# Patient Record
Sex: MALE | Race: WHITE | NOT HISPANIC OR LATINO | Employment: OTHER | URBAN - METROPOLITAN AREA
[De-identification: names, ages, dates, MRNs, and addresses within clinical notes are randomized per-mention and may not be internally consistent; named-entity substitution may affect disease eponyms.]

---

## 2017-01-10 RX ORDER — GLIPIZIDE 5 MG/1
TABLET ORAL
Qty: 180 TAB | Refills: 3 | Status: SHIPPED | OUTPATIENT
Start: 2017-01-10 | End: 2018-01-08 | Stop reason: SDUPTHER

## 2017-02-23 ENCOUNTER — HOSPITAL ENCOUNTER (OUTPATIENT)
Dept: LAB | Facility: MEDICAL CENTER | Age: 72
End: 2017-02-23
Attending: UROLOGY
Payer: MEDICARE

## 2017-02-23 LAB — PSA SERPL DL<=0.01 NG/ML-MCNC: <0.01 NG/ML (ref 0–4)

## 2017-02-23 PROCEDURE — 84270 ASSAY OF SEX HORMONE GLOBUL: CPT

## 2017-02-23 PROCEDURE — 36415 COLL VENOUS BLD VENIPUNCTURE: CPT

## 2017-02-23 PROCEDURE — 84402 ASSAY OF FREE TESTOSTERONE: CPT

## 2017-02-23 PROCEDURE — 84403 ASSAY OF TOTAL TESTOSTERONE: CPT

## 2017-02-23 PROCEDURE — 84153 ASSAY OF PSA TOTAL: CPT

## 2017-02-25 LAB
SHBG SERPL-SCNC: 23 NMOL/L (ref 11–80)
TESTOST FREE MFR SERPL: 2 % (ref 1.6–2.9)
TESTOST FREE SERPL-MCNC: 1 PG/ML (ref 47–244)
TESTOST SERPL-MCNC: 7 NG/DL (ref 300–720)

## 2017-05-15 ENCOUNTER — HOSPITAL ENCOUNTER (OUTPATIENT)
Dept: LAB | Facility: MEDICAL CENTER | Age: 72
End: 2017-05-15
Attending: FAMILY MEDICINE
Payer: MEDICARE

## 2017-05-15 DIAGNOSIS — R80.9 PROTEINURIA: ICD-10-CM

## 2017-05-15 DIAGNOSIS — I10 ESSENTIAL HYPERTENSION, BENIGN: ICD-10-CM

## 2017-05-15 DIAGNOSIS — E78.2 MIXED HYPERLIPIDEMIA: ICD-10-CM

## 2017-05-15 LAB
ALBUMIN SERPL BCP-MCNC: 4.5 G/DL (ref 3.2–4.9)
ALBUMIN/GLOB SERPL: 1.3 G/DL
ALP SERPL-CCNC: 66 U/L (ref 30–99)
ALT SERPL-CCNC: 40 U/L (ref 2–50)
ANION GAP SERPL CALC-SCNC: 7 MMOL/L (ref 0–11.9)
APPEARANCE UR: CLEAR
AST SERPL-CCNC: 23 U/L (ref 12–45)
BASOPHILS # BLD AUTO: 0.2 % (ref 0–1.8)
BASOPHILS # BLD: 0.01 K/UL (ref 0–0.12)
BILIRUB SERPL-MCNC: 0.3 MG/DL (ref 0.1–1.5)
BILIRUB UR QL STRIP.AUTO: NEGATIVE
BUN SERPL-MCNC: 46 MG/DL (ref 8–22)
CALCIUM SERPL-MCNC: 10.1 MG/DL (ref 8.5–10.5)
CHLORIDE SERPL-SCNC: 108 MMOL/L (ref 96–112)
CHOLEST SERPL-MCNC: 203 MG/DL (ref 100–199)
CO2 SERPL-SCNC: 23 MMOL/L (ref 20–33)
COLOR UR: YELLOW
CREAT SERPL-MCNC: 1.6 MG/DL (ref 0.5–1.4)
CULTURE IF INDICATED INDCX: NO UA CULTURE
EOSINOPHIL # BLD AUTO: 0.03 K/UL (ref 0–0.51)
EOSINOPHIL NFR BLD: 0.6 % (ref 0–6.9)
ERYTHROCYTE [DISTWIDTH] IN BLOOD BY AUTOMATED COUNT: 40.9 FL (ref 35.9–50)
GFR SERPL CREATININE-BSD FRML MDRD: 42 ML/MIN/1.73 M 2
GLOBULIN SER CALC-MCNC: 3.4 G/DL (ref 1.9–3.5)
GLUCOSE SERPL-MCNC: 112 MG/DL (ref 65–99)
GLUCOSE UR STRIP.AUTO-MCNC: NEGATIVE MG/DL
HCT VFR BLD AUTO: 36.9 % (ref 42–52)
HDLC SERPL-MCNC: 36 MG/DL
HGB BLD-MCNC: 11.9 G/DL (ref 14–18)
IMM GRANULOCYTES # BLD AUTO: 0.04 K/UL (ref 0–0.11)
IMM GRANULOCYTES NFR BLD AUTO: 0.8 % (ref 0–0.9)
KETONES UR STRIP.AUTO-MCNC: NEGATIVE MG/DL
LDLC SERPL CALC-MCNC: 111 MG/DL
LEUKOCYTE ESTERASE UR QL STRIP.AUTO: NEGATIVE
LYMPHOCYTES # BLD AUTO: 2.21 K/UL (ref 1–4.8)
LYMPHOCYTES NFR BLD: 41.7 % (ref 22–41)
MCH RBC QN AUTO: 28.7 PG (ref 27–33)
MCHC RBC AUTO-ENTMCNC: 32.2 G/DL (ref 33.7–35.3)
MCV RBC AUTO: 88.9 FL (ref 81.4–97.8)
MICRO URNS: NORMAL
MONOCYTES # BLD AUTO: 1.02 K/UL (ref 0–0.85)
MONOCYTES NFR BLD AUTO: 19.2 % (ref 0–13.4)
NEUTROPHILS # BLD AUTO: 1.99 K/UL (ref 1.82–7.42)
NEUTROPHILS NFR BLD: 37.5 % (ref 44–72)
NITRITE UR QL STRIP.AUTO: NEGATIVE
NRBC # BLD AUTO: 0 K/UL
NRBC BLD AUTO-RTO: 0 /100 WBC
PH UR STRIP.AUTO: 6 [PH]
PLATELET # BLD AUTO: 220 K/UL (ref 164–446)
PMV BLD AUTO: 11 FL (ref 9–12.9)
POTASSIUM SERPL-SCNC: 5 MMOL/L (ref 3.6–5.5)
PROT SERPL-MCNC: 7.9 G/DL (ref 6–8.2)
PROT UR QL STRIP: NEGATIVE MG/DL
RBC # BLD AUTO: 4.15 M/UL (ref 4.7–6.1)
RBC UR QL AUTO: NEGATIVE
SODIUM SERPL-SCNC: 138 MMOL/L (ref 135–145)
SP GR UR STRIP.AUTO: 1.02
TRIGL SERPL-MCNC: 281 MG/DL (ref 0–149)
WBC # BLD AUTO: 5.3 K/UL (ref 4.8–10.8)

## 2017-05-15 PROCEDURE — 85025 COMPLETE CBC W/AUTO DIFF WBC: CPT

## 2017-05-15 PROCEDURE — 36415 COLL VENOUS BLD VENIPUNCTURE: CPT

## 2017-05-15 PROCEDURE — 80053 COMPREHEN METABOLIC PANEL: CPT

## 2017-05-15 PROCEDURE — 81003 URINALYSIS AUTO W/O SCOPE: CPT

## 2017-05-15 PROCEDURE — 80061 LIPID PANEL: CPT

## 2017-05-16 ENCOUNTER — OFFICE VISIT (OUTPATIENT)
Dept: MEDICAL GROUP | Facility: PHYSICIAN GROUP | Age: 72
End: 2017-05-16
Payer: MEDICARE

## 2017-05-16 VITALS
HEIGHT: 68 IN | DIASTOLIC BLOOD PRESSURE: 88 MMHG | OXYGEN SATURATION: 97 % | WEIGHT: 180 LBS | BODY MASS INDEX: 27.28 KG/M2 | TEMPERATURE: 96.8 F | HEART RATE: 86 BPM | SYSTOLIC BLOOD PRESSURE: 138 MMHG

## 2017-05-16 DIAGNOSIS — N20.0 NEPHROLITHIASIS: ICD-10-CM

## 2017-05-16 DIAGNOSIS — C61 PROSTATE CANCER (HCC): ICD-10-CM

## 2017-05-16 DIAGNOSIS — E78.2 MIXED HYPERLIPIDEMIA: ICD-10-CM

## 2017-05-16 DIAGNOSIS — I10 ESSENTIAL HYPERTENSION, BENIGN: ICD-10-CM

## 2017-05-16 DIAGNOSIS — R73.9 ELEVATED BLOOD SUGAR: ICD-10-CM

## 2017-05-16 DIAGNOSIS — N28.9 FUNCTION KIDNEY DECREASED: ICD-10-CM

## 2017-05-16 PROCEDURE — 4040F PNEUMOC VAC/ADMIN/RCVD: CPT | Performed by: FAMILY MEDICINE

## 2017-05-16 PROCEDURE — G8419 CALC BMI OUT NRM PARAM NOF/U: HCPCS | Performed by: FAMILY MEDICINE

## 2017-05-16 PROCEDURE — G8432 DEP SCR NOT DOC, RNG: HCPCS | Performed by: FAMILY MEDICINE

## 2017-05-16 PROCEDURE — 1036F TOBACCO NON-USER: CPT | Performed by: FAMILY MEDICINE

## 2017-05-16 PROCEDURE — 99214 OFFICE O/P EST MOD 30 MIN: CPT | Performed by: FAMILY MEDICINE

## 2017-05-16 PROCEDURE — 1101F PT FALLS ASSESS-DOCD LE1/YR: CPT | Performed by: FAMILY MEDICINE

## 2017-05-16 PROCEDURE — 3017F COLORECTAL CA SCREEN DOC REV: CPT | Performed by: FAMILY MEDICINE

## 2017-05-16 RX ORDER — ATORVASTATIN CALCIUM 80 MG/1
80 TABLET, FILM COATED ORAL EVERY EVENING
Qty: 90 TAB | Refills: 3 | Status: SHIPPED | OUTPATIENT
Start: 2017-05-16 | End: 2018-06-18 | Stop reason: SDUPTHER

## 2017-05-16 NOTE — PATIENT INSTRUCTIONS
Patient given written instructions regarding labs, imaging, medications, referrals, dietary and lifestyle management, and return visit.    Carlos Peters MD

## 2017-05-16 NOTE — MR AVS SNAPSHOT
"        Kemal Mueller   2017 9:00 AM   Office Visit   MRN: [id number]    Department:  [facility]   Dept Phone:  [phone]    Description:  Male : 1945   Provider:  Carlos Peters M.D.           Reason for Visit     Follow-Up 6 month FV/no concerns       Allergies as of 2017     Allergen Noted Reactions    Nkda [No Known Drug Allergy] 2010         You were diagnosed with     Function kidney decreased   [468594]       Mixed hyperlipidemia   [272.2.ICD-9-CM]       Essential hypertension, benign   [401.1.ICD-9-CM]       Elevated blood sugar   [738718]       Nephrolithiasis   [659055]         Vital Signs     Blood Pressure Pulse Temperature Height Weight Body Mass Index    138/88 mmHg 86 36 °C (96.8 °F) 1.727 m (5' 8\") 81.647 kg (180 lb) 27.38 kg/m2    Oxygen Saturation Smoking Status                97% Former Smoker          Basic Information     Date Of Birth Sex Race Ethnicity Preferred Language    1945 Male White Non- English      Your appointments     Aug 16, 2017  9:20 AM   Established Patient with Carlos Peters M.D.   [facility] ([address] [address] [phone]           You will be receiving a confirmation call a few days before your appointment from our automated call confirmation system.              Problem List              ICD-10-CM Priority Class Noted - Resolved    Mixed hyperlipidemia E78.2   2009 - Present    Essential hypertension, benign I10   2009 - Present    Calculus of kidney N20.0   2009 - Present    Reflux esophagitis K21.0   2009 - Present    Other abnormal blood chemistry R79.89   2009 - Present    Proteinuria R80.9   2009 - Present    Elevated blood sugar R73.9   Unknown - Present    Nephrolithiasis N20.0   2010 - Present    Glaucoma H40.9   2010 - Present    GERD (gastroesophageal reflux disease) K21.9   2010 - Present    Prostate cancer " (CMS-[facility]) C61   9/21/2010 - Present    ED (erectile dysfunction) N52.9   1/11/2011 - Present    Cataracts, bilateral H26.9   1/13/2012 - Present    Degeneration of cervical intervertebral disc M50.30   10/30/2012 - Present    Cervical stenosis of spine M48.02 Medium  10/30/2012 - Present    Left wrist pain M25.532   5/20/2015 - Present    Right renal mass N28.89   1/11/2016 - Present    Age-related nuclear cataract of eye H25.10   1/14/2016 - Present    Herpes zoster without complication B02.9   11/15/2016 - Present    Function kidney decreased N28.9   5/16/2017 - Present      Health Maintenance        Date Due Completion Dates    IMM ZOSTER VACCINE 1/3/2003 ---    IMM DTaP/Tdap/Td Vaccine (1 - Tdap) 9/2/2008 9/1/2008    IMM PNEUMOCOCCAL 65+ (ADULT) LOW/MEDIUM RISK SERIES (2 of 2 - PPSV23) 10/20/2016 10/20/2015    COLONOSCOPY 2/9/2020 2/9/2010            Current Immunizations     13-VALENT PCV PREVNAR 10/20/2015 11:56 AM    Influenza TIV (IM) 10/23/2012, 10/16/2011    Influenza Vaccine Adult HD 12/14/2016  4:53 PM, 10/20/2015 11:54 AM    Influenza Vaccine Pediatric 10/28/2010    Influenza Vaccine Quad Inj (Pf) 9/23/2014    Pneumococcal Vaccine (UF)Historical Data 9/1/2008    TD Vaccine 9/1/2008      Below and/or attached are the medications your provider expects you to take. Review all of your home medications and newly ordered medications with your provider and/or pharmacist. Follow medication instructions as directed by your provider and/or pharmacist. Please keep your medication list with you and share with your provider. Update the information when medications are discontinued, doses are changed, or new medications (including over-the-counter products) are added; and carry medication information at all times in the event of emergency situations     Allergies:  NKDA - (reactions not documented)               Medications  Valid as of: May 16, 2017 -  9:40 AM    Generic Name Brand Name Tablet Size Instructions for  use    AcetaZOLAMIDE (Tab) DIAMOX 125 MG Take 125 mg by mouth every day.        Atorvastatin Calcium (Tab) LIPITOR 80 MG Take 1 Tab by mouth every evening.        Brimonidine Tartrate-Timolol (Solution) Brimonidine Tartrate-Timolol 0.2-0.5 % 1 Drop by Ophthalmic route 2 times a day.        Ezetimibe (Tab) ZETIA 10 MG TAKE ONE TABLET BY MOUTH DAILY        GlipiZIDE (Tab) GLUCOTROL 5 MG TAKE ONE TABLET BY MOUTH TWICE A DAY        Latanoprost (Solution) XALATAN 0.005 % Place 1 Drop in both eyes every evening.        Lisinopril (Tab) PRINIVIL, ZESTRIL 40 MG TAKE ONE TABLET BY MOUTH DAILY        Multiple Vitamins-Minerals   Take  by mouth.        Nortriptyline HCl (Cap) PAMELOR 10 MG Take 1 Cap by mouth every bedtime.        Omeprazole (CAPSULE DELAYED RELEASE) PRILOSEC 20 MG TAKE ONE CAPSULE BY MOUTH DAILY        Potassium Citrate (Tab CR) UROCIT-K SR 10 MEQ (1080 MG) Take 10 mEq by mouth every day.        Trolamine Salicylate (Cream) ASPERCREME or MYOFLEX 10 % Apply 1 Squirt to affected area(s) 4 times a day.        ValACYclovir HCl (Tab) VALTREX 1 GM Take 1 Tab by mouth 3 times a day.        .                 Medicines prescribed today were sent to:     [facility] [place], [place] - [address]    [address] [place] [address]    Phone: [phone] Fax: [phone]    Open 24 Hours?: No    [id number] - [place] - [address] [place] [address]    Phone: [phone] Fax: [phone]    Open 24 Hours?: No      Medication refill instructions:       If your prescription bottle indicates you have medication refills left, it is not necessary to call your provider’s office. Please contact your pharmacy and they will refill your medication.    If your prescription bottle indicates you do not have any refills left, you may request refills at any time through one of the following ways: The online [employer] system (except Urgent Care), by calling your provider’s office, or by  asking your pharmacy to contact your provider’s office with a refill request. Medication refills are processed only during regular business hours and may not be available until the next business day. Your provider may request additional information or to have a follow-up visit with you prior to refilling your medication.   *Please Note: Medication refills are assigned a new Rx number when refilled electronically. Your pharmacy may indicate that no refills were authorized even though a new prescription for the same medication is available at the pharmacy. Please request the medicine by name with the pharmacy before contacting your provider for a refill.        Your To Do List     Future Labs/Procedures Complete By Expires    CBC WITH DIFFERENTIAL  As directed 5/17/2018    COMP METABOLIC PANEL  As directed 5/17/2018    LIPID PROFILE  As directed 5/17/2018    URINALYSIS,CULTURE IF INDICATED  As directed 5/17/2018      Other Notes About Your Plan               Please Assess and Status Chronic Disease from Current and Prior Visits.     Query: Please Assess the following Diagnosis    Please obtain Medical Records from Dr. James Choudhary - Urologist - Please status if the Prostate Cancer is still active and being treated C61or is it now a HX of Prostate Cancer Z85.46. Please provide the documents for  to review.                 [employer] Access Code: [email or web address]  Expires: 6/15/2017  8:55 AM    [employer]  A secure, online tool to manage your health information     [employer]® is a secure, online tool that connects you to your personalized health information from the privacy of your home -- day or night - making it very easy for you to manage your healthcare. Once the activation process is completed, you can even access your medical information using the [employer] mario, which is available for free in the Apple Mario store or Google Play store.     [employer] provides the following levels of access (as  shown below):   My Chart Features   [facility] Doctor [facility]  Urgent  Care Non-[facility]  Primary Care  Doctor   Email your healthcare team securely and privately 24/7 X X X    Manage appointments: schedule your next appointment; view details of past/upcoming appointments X      Request prescription refills. X      View recent personal medical records, including lab and immunizations X X X X   View health record, including health history, allergies, medications X X X X   Read reports about your outpatient visits, procedures, consult and ER notes X X X X   See your discharge summary, which is a recap of your hospital and/or ER visit that includes your diagnosis, lab results, and care plan. X X       How to register for [employer]:  1. Go to  [email or web address].  2. Click on the Sign Up Now box, which takes you to the New Member Sign Up page. You will need to provide the following information:  a. Enter your [employer] Access Code exactly as it appears at the top of this page. (You will not need to use this code after you’ve completed the sign-up process. If you do not sign up before the expiration date, you must request a new code.)   b. Enter your date of birth.   c. Enter your home email address.   d. Click Submit, and follow the next screen’s instructions.  3. Create a [employer] ID. This will be your [employer] login ID and cannot be changed, so think of one that is secure and easy to remember.  4. Create a [employer] password. You can change your password at any time.  5. Enter your Password Reset Question and Answer. This can be used at a later time if you forget your password.   6. Enter your e-mail address. This allows you to receive e-mail notifications when new information is available in [employer].  7. Click Sign Up. You can now view your health information.    For assistance activating your [employer] account, call [phone]

## 2017-05-16 NOTE — PROGRESS NOTES
Patient comes in to review his recent labs. He feels well. He doesn't really have any complaints today. He has an appointment to see his urologist within the next couple of weeks regarding his history of prostate cancer. He says that his urine flow is stable.  He has no chest pains or shortness of breath.    I reviewed the following    Past Medical History   Diagnosis Date   • Elevated cholesterol    • Elevated BP    • Elevated blood sugar    • GERD (gastroesophageal reflux disease)    • Hypertension    • Glaucoma    • Unspecified urinary incontinence    • Indigestion    • CATARACT    • Dental disorder      upper dentures   • Arthritis      spine and wrists   • High cholesterol    • Cancer (CMS-HCC)      prostate- removed '09; CA returned in pocket- radiation therapy done   • Renal disorder      stones   • Renal cancer (CMS-HCC)      Had treatment done- resolved per CAT scan   • Diabetes (CMS-HCC)    • Bronchitis 1/15   • Breath shortness      activity induced   • Heart burn    • wrist pain 5/20/2015     bilat wrist pain   • ED (erectile dysfunction)    • Degeneration of cervical intervertebral disc    • Cervical stenosis of spine    • Anesthesia      Low BP; nausea; hard time waking        Past Surgical History   Procedure Laterality Date   • Tonsillectomy and adenoidectomy  1953   • Pr removal of kidney stone  1986/1998     x2   • Prostatectomy, radical retro  4/21/2010     Performed by EDYTA KUMAR at [facility]   • Node dissection  4/21/2010     Performed by EDYTA KUMAR at [facility]   • Appendectomy     • Carpal tunnel release  1990     Bilateral   • Trigger finger release  1990     Right small finger   • Cervical disk and fusion anterior  10/30/2012     Performed by Selvin Ying M.D. at [facility]   • Cataract phaco with iol Left 1/14/2016     Procedure: CATARACT PHACO WITH IOL;  Surgeon: Alfonso Hernandez M.D.;  Location: [facility];  Service:    •  Cataract phaco with iol Right 1/28/2016     Procedure: CATARACT PHACO WITH IOL;  Surgeon: Alfonso Hernandez M.D.;  Location: [facility];  Service:        Allergies   Allergen Reactions   • Nkda [No Known Drug Allergy]        Current Outpatient Prescriptions   Medication Sig Dispense Refill   • atorvastatin (LIPITOR) 80 MG tablet Take 1 Tab by mouth every evening. 90 Tab 3   • lisinopril (PRINIVIL, ZESTRIL) 40 MG tablet TAKE ONE TABLET BY MOUTH DAILY 90 Tab 3   • glipiZIDE (GLUCOTROL) 5 MG Tab TAKE ONE TABLET BY MOUTH TWICE A  Tab 3   • omeprazole (PRILOSEC) 20 MG delayed-release capsule TAKE ONE CAPSULE BY MOUTH DAILY 90 Cap 3   • ZETIA 10 MG Tab TAKE ONE TABLET BY MOUTH DAILY 90 Tab 3   • potassium citrate SR (UROCIT-K SR) 10 MEQ (1080 MG) TBCR Take 10 mEq by mouth every day.     • Multiple Vitamins-Minerals (CENTRUM SILVER PO) Take  by mouth.     • Brimonidine Tartrate-Timolol (COMBIGAN) 0.2-0.5 % SOLN 1 Drop by Ophthalmic route 2 times a day.     • latanoprost (XALATAN) 0.005 % SOLN Place 1 Drop in both eyes every evening.     • ACETAZOLAMIDE 125 MG PO TABS Take 125 mg by mouth every day.     • valacyclovir (VALTREX) 1 GM Tab Take 1 Tab by mouth 3 times a day. 21 Tab 0   • nortriptyline (PAMELOR) 10 MG Cap Take 1 Cap by mouth every bedtime. 30 Cap 3   • trolamine salicylate (ASPERCREME/ALOE) 10 % cream Apply 1 Squirt to affected area(s) 4 times a day. 1 Tube 3     No current facility-administered medications for this visit.        Family History   Problem Relation Age of Onset   • Diabetes Mother        Social History     Social History   • Marital Status:      Spouse Name: N/A   • Number of Children: N/A   • Years of Education: N/A     Occupational History   • Not on file.     Social History Main Topics   • Smoking status: Former Smoker     Quit date: 01/01/1977   • Smokeless tobacco: Never Used      Comment: 1/3 ppd for 10 years, quit 1977   • Alcohol Use: 1.2 oz/week     2 Cans of  beer per week      Comment: 1-2 per day   • Drug Use: No   • Sexual Activity:     Partners: Male     Other Topics Concern   • Not on file     Social History Narrative        Hospital Outpatient Visit on 05/15/2017   Component Date Value   • WBC 05/15/2017 5.3    • RBC 05/15/2017 4.15*   • Hemoglobin 05/15/2017 11.9*   • Hematocrit 05/15/2017 36.9*   • MCV 05/15/2017 88.9    • MCH 05/15/2017 28.7    • MCHC 05/15/2017 32.2*   • RDW 05/15/2017 40.9    • Platelet Count 05/15/2017 220    • MPV 05/15/2017 11.0    • Neutrophils-Polys 05/15/2017 37.50*   • Lymphocytes 05/15/2017 41.70*   • Monocytes 05/15/2017 19.20*   • Eosinophils 05/15/2017 0.60    • Basophils 05/15/2017 0.20    • Immature Granulocytes 05/15/2017 0.80    • Nucleated RBC 05/15/2017 0.00    • Neutrophils (Absolute) 05/15/2017 1.99    • Lymphs (Absolute) 05/15/2017 2.21    • Monos (Absolute) 05/15/2017 1.02*   • Eos (Absolute) 05/15/2017 0.03    • Baso (Absolute) 05/15/2017 0.01    • Immature Granulocytes (a* 05/15/2017 0.04    • NRBC (Absolute) 05/15/2017 0.00    • Sodium 05/15/2017 138    • Potassium 05/15/2017 5.0    • Chloride 05/15/2017 108    • Co2 05/15/2017 23    • Anion Gap 05/15/2017 7.0    • Glucose 05/15/2017 112*   • Bun 05/15/2017 46*   • Creatinine 05/15/2017 1.60*   • Calcium 05/15/2017 10.1    • AST(SGOT) 05/15/2017 23    • ALT(SGPT) 05/15/2017 40    • Alkaline Phosphatase 05/15/2017 66    • Total Bilirubin 05/15/2017 0.3    • Albumin 05/15/2017 4.5    • Total Protein 05/15/2017 7.9    • Globulin 05/15/2017 3.4    • A-G Ratio 05/15/2017 1.3    • Cholesterol,Tot 05/15/2017 203*   • Triglycerides 05/15/2017 281*   • HDL 05/15/2017 36*   • LDL 05/15/2017 111*   • Color 05/15/2017 Yellow    • Character 05/15/2017 Clear    • Specific Gravity 05/15/2017 1.020    • Ph 05/15/2017 6.0    • Glucose 05/15/2017 Negative    • Ketones 05/15/2017 Negative    • Protein 05/15/2017 Negative    • Bilirubin 05/15/2017 Negative    • Nitrite 05/15/2017 Negative     • Leukocyte Esterase 05/15/2017 Negative    • Occult Blood 05/15/2017 Negative    • Micro Urine Req 05/15/2017 see below    • Culture Indicated 05/15/2017 No    • GFR If  05/15/2017 51*   • GFR If Non  Ameri* 05/15/2017 42*         Physical Exam   Constitutional: He is oriented. He appears well-developed and well-nourished. No distress.   HENT:   Head: Normocephalic and atraumatic.   Right Ear: External ear normal. Ear canal and TM normal   Left Ear: External ear normal. Ear canal and TM normal   Nose: Nose normal.   Mouth/Throat: Oropharynx is clear and moist.   Eyes: Conjunctivae and extraocular motions are normal. Pupils are equal, round, and reactive to light.        Fundi benign bilaterally   Neck: No thyromegaly present.   Cardiovascular: Normal rate, regular rhythm, normal heart sounds and intact distal pulses.  Exam reveals no gallop.    No murmur heard.  Pulmonary/Chest: Effort normal and breath sounds normal. No respiratory distress. He has no wheezes. He has no rales.   Abdominal: Soft. Bowel sounds are normal. No hepatosplenomegaly. He exhibits no distension. No tenderness. He has no rebound and no guarding.   Musculoskeletal: Normal range of motion. He exhibits no edema and no tenderness.   Lymphadenopathy:     He has no cervical adenopathy.  No supraclavicular adenopathy.   Neurological: He is alert and oriented. He has normal reflexes.        Babinskis downgoing bilaterally   Skin: Skin is warm and dry. No rash noted. No erythema.   Psychiatric: He has a normal mood and appropriate affect. His behavior is normal. Judgment and thought content normal.     1. Function kidney decreased --- I want the patient to drink more water  COMP METABOLIC PANEL    URINALYSIS,CULTURE IF INDICATED   2. Mixed hyperlipidemia --control could be better  atorvastatin (LIPITOR) 80 MG tablet--one by mouth daily at bedtime instead of pravastatin which is not working as well as I would like. We will  discontinue the pravastatin.     COMP METABOLIC PANEL    LIPID PROFILE   3. Essential hypertension, benign --controlled  CBC WITH DIFFERENTIAL    COMP METABOLIC PANEL    LIPID PROFILE   4. Elevated blood sugar --improved  COMP METABOLIC PANEL   5. Nephrolithiasis --quiescent  URINALYSIS,CULTURE IF INDICATED   6. Prostate cancer (CMS-HCC)   doing well. Continue to see urologist      Recheck with me 3 months or when necessary    Please note that this dictation was created using voice recognition software. I have worked with consultants from the vendor as well as technical experts from [employer] to optimize the interface. I have made every reasonable attempt to correct obvious errors, but I expect that there are errors of grammar and possibly content that I did not discover before finalizing the note.

## 2017-06-23 ENCOUNTER — HOSPITAL ENCOUNTER (OUTPATIENT)
Dept: LAB | Facility: MEDICAL CENTER | Age: 72
End: 2017-06-23
Attending: UROLOGY
Payer: MEDICARE

## 2017-06-23 ENCOUNTER — HOSPITAL ENCOUNTER (OUTPATIENT)
Dept: LAB | Facility: MEDICAL CENTER | Age: 72
End: 2017-06-23
Attending: PHYSICIAN ASSISTANT
Payer: MEDICARE

## 2017-06-23 LAB
PSA SERPL-MCNC: <0.01 NG/ML (ref 0–4)
TESTOST SERPL-MCNC: 44 NG/DL (ref 175–781)

## 2017-06-23 PROCEDURE — 36415 COLL VENOUS BLD VENIPUNCTURE: CPT

## 2017-06-23 PROCEDURE — 84153 ASSAY OF PSA TOTAL: CPT

## 2017-08-14 ENCOUNTER — HOSPITAL ENCOUNTER (OUTPATIENT)
Dept: LAB | Facility: MEDICAL CENTER | Age: 72
End: 2017-08-14
Attending: FAMILY MEDICINE
Payer: MEDICARE

## 2017-08-14 DIAGNOSIS — E78.2 MIXED HYPERLIPIDEMIA: ICD-10-CM

## 2017-08-14 DIAGNOSIS — R73.9 ELEVATED BLOOD SUGAR: ICD-10-CM

## 2017-08-14 DIAGNOSIS — I10 ESSENTIAL HYPERTENSION, BENIGN: ICD-10-CM

## 2017-08-14 DIAGNOSIS — N20.0 NEPHROLITHIASIS: ICD-10-CM

## 2017-08-14 DIAGNOSIS — N28.9 FUNCTION KIDNEY DECREASED: ICD-10-CM

## 2017-08-14 LAB
ALBUMIN SERPL BCP-MCNC: 4.2 G/DL (ref 3.2–4.9)
ALBUMIN/GLOB SERPL: 1.4 G/DL
ALP SERPL-CCNC: 71 U/L (ref 30–99)
ALT SERPL-CCNC: 26 U/L (ref 2–50)
ANION GAP SERPL CALC-SCNC: 5 MMOL/L (ref 0–11.9)
APPEARANCE UR: CLEAR
AST SERPL-CCNC: 21 U/L (ref 12–45)
BASOPHILS # BLD AUTO: 0.2 % (ref 0–1.8)
BASOPHILS # BLD: 0.01 K/UL (ref 0–0.12)
BILIRUB SERPL-MCNC: 0.3 MG/DL (ref 0.1–1.5)
BILIRUB UR QL STRIP.AUTO: NEGATIVE
BUN SERPL-MCNC: 32 MG/DL (ref 8–22)
CALCIUM SERPL-MCNC: 9.5 MG/DL (ref 8.5–10.5)
CHLORIDE SERPL-SCNC: 111 MMOL/L (ref 96–112)
CHOLEST SERPL-MCNC: 129 MG/DL (ref 100–199)
CO2 SERPL-SCNC: 24 MMOL/L (ref 20–33)
COLOR UR: YELLOW
CREAT SERPL-MCNC: 1.22 MG/DL (ref 0.5–1.4)
CULTURE IF INDICATED INDCX: NO UA CULTURE
EOSINOPHIL # BLD AUTO: 0.01 K/UL (ref 0–0.51)
EOSINOPHIL NFR BLD: 0.2 % (ref 0–6.9)
ERYTHROCYTE [DISTWIDTH] IN BLOOD BY AUTOMATED COUNT: 41.1 FL (ref 35.9–50)
GFR SERPL CREATININE-BSD FRML MDRD: 58 ML/MIN/1.73 M 2
GLOBULIN SER CALC-MCNC: 3 G/DL (ref 1.9–3.5)
GLUCOSE SERPL-MCNC: 74 MG/DL (ref 65–99)
GLUCOSE UR STRIP.AUTO-MCNC: NEGATIVE MG/DL
HCT VFR BLD AUTO: 36.4 % (ref 42–52)
HDLC SERPL-MCNC: 35 MG/DL
HGB BLD-MCNC: 11.4 G/DL (ref 14–18)
IMM GRANULOCYTES # BLD AUTO: 0.04 K/UL (ref 0–0.11)
IMM GRANULOCYTES NFR BLD AUTO: 0.8 % (ref 0–0.9)
KETONES UR STRIP.AUTO-MCNC: NEGATIVE MG/DL
LDLC SERPL CALC-MCNC: 66 MG/DL
LEUKOCYTE ESTERASE UR QL STRIP.AUTO: NEGATIVE
LYMPHOCYTES # BLD AUTO: 2.15 K/UL (ref 1–4.8)
LYMPHOCYTES NFR BLD: 43.4 % (ref 22–41)
MCH RBC QN AUTO: 28.2 PG (ref 27–33)
MCHC RBC AUTO-ENTMCNC: 31.3 G/DL (ref 33.7–35.3)
MCV RBC AUTO: 90.1 FL (ref 81.4–97.8)
MICRO URNS: NORMAL
MONOCYTES # BLD AUTO: 0.9 K/UL (ref 0–0.85)
MONOCYTES NFR BLD AUTO: 18.2 % (ref 0–13.4)
NEUTROPHILS # BLD AUTO: 1.84 K/UL (ref 1.82–7.42)
NEUTROPHILS NFR BLD: 37.2 % (ref 44–72)
NITRITE UR QL STRIP.AUTO: NEGATIVE
NRBC # BLD AUTO: 0 K/UL
NRBC BLD AUTO-RTO: 0 /100 WBC
PH UR STRIP.AUTO: 7 [PH]
PLATELET # BLD AUTO: 167 K/UL (ref 164–446)
PMV BLD AUTO: 11.3 FL (ref 9–12.9)
POTASSIUM SERPL-SCNC: 5.3 MMOL/L (ref 3.6–5.5)
PROT SERPL-MCNC: 7.2 G/DL (ref 6–8.2)
PROT UR QL STRIP: NEGATIVE MG/DL
RBC # BLD AUTO: 4.04 M/UL (ref 4.7–6.1)
RBC UR QL AUTO: NEGATIVE
SODIUM SERPL-SCNC: 140 MMOL/L (ref 135–145)
SP GR UR STRIP.AUTO: 1.02
TRIGL SERPL-MCNC: 140 MG/DL (ref 0–149)
UROBILINOGEN UR STRIP.AUTO-MCNC: 0.2 MG/DL
WBC # BLD AUTO: 5 K/UL (ref 4.8–10.8)

## 2017-08-14 PROCEDURE — 80061 LIPID PANEL: CPT

## 2017-08-14 PROCEDURE — 81003 URINALYSIS AUTO W/O SCOPE: CPT

## 2017-08-14 PROCEDURE — 85025 COMPLETE CBC W/AUTO DIFF WBC: CPT

## 2017-08-14 PROCEDURE — 80053 COMPREHEN METABOLIC PANEL: CPT

## 2017-08-14 PROCEDURE — 36415 COLL VENOUS BLD VENIPUNCTURE: CPT

## 2017-08-15 ENCOUNTER — TELEPHONE (OUTPATIENT)
Dept: MEDICAL GROUP | Facility: PHYSICIAN GROUP | Age: 72
End: 2017-08-15

## 2017-08-15 NOTE — TELEPHONE ENCOUNTER
ESTABLISHED PATIENT PRE-VISIT PLANNING     Note: Patient will not be contacted if there is no indication to call.     1.  Reviewed notes from the last few office visits within the medical group: Yes    2.  If any orders were placed at last visit or intended to be done for this visit (i.e. 6 mos follow-up), do we have Results/Consult Notes?        •  Labs - Labs ordered, completed and results are in chart.       •  Imaging - Imaging was not ordered at last office visit.       •  Referrals - No referrals were ordered at last office visit.    3. Is this appointment scheduled as a Hospital Follow-Up? No    4.  Immunizations were updated in Epic using WebIZ?: Epic matches WebIZ       •  Web [employer] Recommendations: TDAP and ZOSTAVAX (Shingles)    5.  Patient is due for the following Health Maintenance Topics:   Health Maintenance Due   Topic Date Due   • Annual Wellness Visit  1945   • IMM ZOSTER VACCINE  01/03/2003   • IMM DTaP/Tdap/Td Vaccine (1 - Tdap) 09/02/2008   • IMM PNEUMOCOCCAL 65+ (ADULT) LOW/MEDIUM RISK SERIES (2 of 2 - PPSV23) 10/20/2016           6.  Patient was informed to arrive 15 min prior to their scheduled appointment and bring in their medication bottles.

## 2017-08-16 ENCOUNTER — OFFICE VISIT (OUTPATIENT)
Dept: MEDICAL GROUP | Facility: PHYSICIAN GROUP | Age: 72
End: 2017-08-16
Payer: MEDICARE

## 2017-08-16 VITALS
WEIGHT: 178 LBS | BODY MASS INDEX: 26.98 KG/M2 | HEIGHT: 68 IN | TEMPERATURE: 97.9 F | RESPIRATION RATE: 14 BRPM | OXYGEN SATURATION: 95 % | DIASTOLIC BLOOD PRESSURE: 60 MMHG | SYSTOLIC BLOOD PRESSURE: 126 MMHG | HEART RATE: 78 BPM

## 2017-08-16 DIAGNOSIS — D64.89 ANEMIA DUE TO OTHER CAUSE, NOT CLASSIFIED: ICD-10-CM

## 2017-08-16 DIAGNOSIS — N28.89 RIGHT RENAL MASS: ICD-10-CM

## 2017-08-16 DIAGNOSIS — E78.2 MIXED HYPERLIPIDEMIA: ICD-10-CM

## 2017-08-16 DIAGNOSIS — C61 PROSTATE CANCER (HCC): ICD-10-CM

## 2017-08-16 DIAGNOSIS — E78.5 HYPERLIPIDEMIA, UNSPECIFIED HYPERLIPIDEMIA TYPE: ICD-10-CM

## 2017-08-16 DIAGNOSIS — I10 ESSENTIAL HYPERTENSION, BENIGN: ICD-10-CM

## 2017-08-16 PROCEDURE — 99214 OFFICE O/P EST MOD 30 MIN: CPT | Performed by: FAMILY MEDICINE

## 2017-08-16 RX ORDER — EZETIMIBE 10 MG/1
TABLET ORAL
Qty: 90 TAB | Refills: 3 | Status: SHIPPED | OUTPATIENT
Start: 2017-08-16 | End: 2018-10-22 | Stop reason: SDUPTHER

## 2017-08-16 NOTE — MR AVS SNAPSHOT
"        Kemal Mueller   2017 9:20 AM   Office Visit   MRN: [id number]    Department:  [facility]   Dept Phone:  [phone]    Description:  Male : 1945   Provider:  Carlos Peters M.D.           Reason for Visit     Follow-Up 3 month follow up       Allergies as of 2017     Allergen Noted Reactions    Nkda [No Known Drug Allergy] 2010         You were diagnosed with     Anemia due to other cause, not classified   [5496750]       Mixed hyperlipidemia   [272.2.ICD-9-CM]       Essential hypertension, benign   [401.1.ICD-9-CM]       Prostate cancer (CMS-HCC)   [608678]   Sees Dr Choudhary    Right renal mass   [189844]   Sees Dr Choudhary    Hyperlipidemia, unspecified hyperlipidemia type   [2658376]         Vital Signs     Blood Pressure Pulse Temperature Respirations Height Weight    126/60 mmHg 78 36.6 °C (97.9 °F) 14 1.727 m (5' 8\") 80.74 kg (178 lb)    Body Mass Index Oxygen Saturation Smoking Status             27.07 kg/m2 95% Former Smoker         Basic Information     Date Of Birth Sex Race Ethnicity Preferred Language    1945 Male White Non- English      Your appointments     2018 10:20 AM   Established Patient with Carlos Peters M.D.   [address] [id number] [phone]           You will be receiving a confirmation call a few days before your appointment from our automated call confirmation system.              Problem List              ICD-10-CM Priority Class Noted - Resolved    Mixed hyperlipidemia E78.2   2009 - Present    Essential hypertension, benign I10   2009 - Present    Calculus of kidney N20.0   2009 - Present    Reflux esophagitis K21.0   2009 - Present    Other abnormal blood chemistry R79.89   2009 - Present    Proteinuria R80.9   2009 - Present    Elevated blood sugar R73.9   Unknown - Present    Nephrolithiasis N20.0   2010 - Present  "    Glaucoma H40.9   9/12/2010 - Present    GERD (gastroesophageal reflux disease) K21.9   9/12/2010 - Present    Prostate cancer (CMS-HCC) C61   9/21/2010 - Present    ED (erectile dysfunction) N52.9   1/11/2011 - Present    Cataracts, bilateral H26.9   1/13/2012 - Present    Degeneration of cervical intervertebral disc M50.30   10/30/2012 - Present    Cervical stenosis of spine M48.02 Medium  10/30/2012 - Present    Left wrist pain M25.532   5/20/2015 - Present    Right renal mass N28.89   1/11/2016 - Present    Age-related nuclear cataract of eye H25.10   1/14/2016 - Present    Herpes zoster without complication B02.9   11/15/2016 - Present    Function kidney decreased N28.9   5/16/2017 - Present      Health Maintenance        Date Due Completion Dates    IMM DTaP/Tdap/Td Vaccine (1 - Tdap) 9/2/2008 9/1/2008    IMM PNEUMOCOCCAL 65+ (ADULT) LOW/MEDIUM RISK SERIES (2 of 2 - PPSV23) 10/20/2016 10/20/2015    IMM INFLUENZA (1) 9/1/2017 12/14/2016, 10/20/2015, 9/23/2014, 10/23/2012, 10/16/2011, 10/28/2010    COLONOSCOPY 2/9/2020 2/9/2010            Current Immunizations     13-VALENT PCV PREVNAR 10/20/2015 11:56 AM    Influenza TIV (IM) 10/23/2012, 10/16/2011    Influenza Vaccine Adult HD 12/14/2016  4:53 PM, 10/20/2015 11:54 AM    Influenza Vaccine Pediatric 10/28/2010    Influenza Vaccine Quad Inj (Pf) 9/23/2014    Pneumococcal Vaccine (UF)Historical Data 9/1/2008    TD Vaccine 9/1/2008      Below and/or attached are the medications your provider expects you to take. Review all of your home medications and newly ordered medications with your provider and/or pharmacist. Follow medication instructions as directed by your provider and/or pharmacist. Please keep your medication list with you and share with your provider. Update the information when medications are discontinued, doses are changed, or new medications (including over-the-counter products) are added; and carry medication information at all times in the event  of emergency situations     Allergies:  NKDA - (reactions not documented)               Medications  Valid as of: August 16, 2017 - 10:26 AM    Generic Name Brand Name Tablet Size Instructions for use    AcetaZOLAMIDE (Tab) DIAMOX 125 MG Take 125 mg by mouth every day.        Atorvastatin Calcium (Tab) LIPITOR 80 MG Take 1 Tab by mouth every evening.        Brimonidine Tartrate-Timolol (Solution) Brimonidine Tartrate-Timolol 0.2-0.5 % 1 Drop by Ophthalmic route 2 times a day.        Ezetimibe (Tab) ZETIA 10 MG TAKE ONE TABLET BY MOUTH DAILY        GlipiZIDE (Tab) GLUCOTROL 5 MG TAKE ONE TABLET BY MOUTH TWICE A DAY        Latanoprost (Solution) XALATAN 0.005 % Place 1 Drop in both eyes every evening.        Lisinopril (Tab) PRINIVIL, ZESTRIL 40 MG TAKE ONE TABLET BY MOUTH DAILY        Multiple Vitamins-Minerals   Take  by mouth.        Nortriptyline HCl (Cap) PAMELOR 10 MG Take 1 Cap by mouth every bedtime.        Omeprazole (CAPSULE DELAYED RELEASE) PRILOSEC 20 MG TAKE ONE CAPSULE BY MOUTH DAILY        Potassium Citrate (Tab CR) UROCIT-K SR 10 MEQ (1080 MG) Take 10 mEq by mouth every day.        Trolamine Salicylate (Cream) ASPERCREME or MYOFLEX 10 % Apply 1 Squirt to affected area(s) 4 times a day.        ValACYclovir HCl (Tab) VALTREX 1 GM Take 1 Tab by mouth 3 times a day.        .                 Medicines prescribed today were sent to:     [facility] - [place], [place] - [address]    [address] [facility] [address]    Phone: [phone] Fax: [phone]    Open 24 Hours?: No    [id number] - [place] - [address]    [address] [place] [address]    Phone: [phone] Fax: [phone]    Open 24 Hours?: No      Medication refill instructions:       If your prescription bottle indicates you have medication refills left, it is not necessary to call your provider’s office. Please contact your pharmacy and they will refill your medication.    If your prescription bottle indicates  you do not have any refills left, you may request refills at any time through one of the following ways: The online [employer] system (except Urgent Care), by calling your provider’s office, or by asking your pharmacy to contact your provider’s office with a refill request. Medication refills are processed only during regular business hours and may not be available until the next business day. Your provider may request additional information or to have a follow-up visit with you prior to refilling your medication.   *Please Note: Medication refills are assigned a new Rx number when refilled electronically. Your pharmacy may indicate that no refills were authorized even though a new prescription for the same medication is available at the pharmacy. Please request the medicine by name with the pharmacy before contacting your provider for a refill.        Referral     A referral request has been sent to our patient care coordination department. Please allow 3-5 business days for us to process this request and contact you either by phone or mail. If you do not hear from us by the 5th business day, please call us at [phone].        Other Notes About Your Plan               Please Assess and Status Chronic Disease from Current and Prior Visits.     Query: Please Assess the following Diagnosis    Please obtain Medical Records from Dr. James Choudhary - Urologist - Please status if the Prostate Cancer is still active and being treated C61or is it now a HX of Prostate Cancer Z85.46. Please provide the documents for  to review.                 [employer] Status: Patient Declined

## 2017-08-16 NOTE — PROGRESS NOTES
Patient comes in to review his recent labs. He says he feels well. He doesn't have any chest pains or shortness of breath. He feels that he has enough energy. He has been taking vitamins--the [employer] equivalent of Centrum--on a regular basis for at least a year.      Review of Systems   Constitutional: Negative.  Negative for fever, chills, weight loss and malaise/fatigue.   HENT: Negative for hearing loss, ear pain, congestion, sore throat, neck pain and tinnitus.    Eyes: Negative for blurred vision, double vision and pain.   Respiratory: Negative for cough, hemoptysis, shortness of breath and wheezing.    Cardiovascular: Negative for chest pain, palpitations, orthopnea, claudication, leg swelling and PND.   Gastrointestinal: Negative for heartburn, nausea, vomiting, abdominal pain, diarrhea, constipation, blood in stool and melena.   Genitourinary: Negative for incontinence, dysuria, urgency, frequency and hematuria.  Male--negative for erectile dysfunction  Musculoskeletal: Negative for myalgias, back pain and joint pain.   Skin: Negative for rash and itching. No lesions that will not heal.  Neurological: Negative for dizziness, tingling, tremors, focal weakness, seizures, loss of consciousness and headaches.   Endo/Heme/Allergies: Negative for environmental allergies and polydipsia.  Does not bruise/bleed easily.   Psychiatric/Behavioral: Negative for depression, memory loss and substance abuse.  The patient is not nervous/anxious and does not have insomnia.  All others negative.      I reviewed the following    Past Medical History   Diagnosis Date   • Elevated cholesterol    • Elevated BP    • Elevated blood sugar    • GERD (gastroesophageal reflux disease)    • Hypertension    • Glaucoma    • Unspecified urinary incontinence    • Indigestion    • CATARACT    • Dental disorder      upper dentures   • Arthritis      spine and wrists   • High cholesterol    • Cancer (CMS-HCC)      prostate- removed '09; CA  returned in pocket- radiation therapy done   • Renal disorder      stones   • Renal cancer (CMS-HCC)      Had treatment done- resolved per CAT scan   • Diabetes (CMS-HCC)    • Bronchitis 1/15   • Breath shortness      activity induced   • Heart burn    • wrist pain 5/20/2015     bilat wrist pain   • ED (erectile dysfunction)    • Degeneration of cervical intervertebral disc    • Cervical stenosis of spine    • Anesthesia      Low BP; nausea; hard time waking        Past Surgical History   Procedure Laterality Date   • Tonsillectomy and adenoidectomy  1953   • Pr removal of kidney stone  1986/1998     x2   • Prostatectomy, radical retro  4/21/2010     Performed by EDYTA KUMAR at SURGERY [facility] ORS   • Node dissection  4/21/2010     Performed by EDYTA KUMAR at [facility] ORS   • Appendectomy     • Carpal tunnel release  1990     Bilateral   • Trigger finger release  1990     Right small finger   • Cervical disk and fusion anterior  10/30/2012     Performed by Selvin Ying M.D. at SURGERY [facility] ORS   • Cataract phaco with iol Left 1/14/2016     Procedure: CATARACT PHACO WITH IOL;  Surgeon: Alfonso Hernandez M.D.;  Location: SURGERY SURGICAL [facility] ORS;  Service:    • Cataract phaco with iol Right 1/28/2016     Procedure: CATARACT PHACO WITH IOL;  Surgeon: Alfonso Hernandez M.D.;  Location: SURGERY SURGICAL [facility] ORS;  Service:        Allergies   Allergen Reactions   • Nkda [No Known Drug Allergy]        Current Outpatient Prescriptions   Medication Sig Dispense Refill   • ezetimibe (ZETIA) 10 MG Tab TAKE ONE TABLET BY MOUTH DAILY 90 Tab 3   • atorvastatin (LIPITOR) 80 MG tablet Take 1 Tab by mouth every evening. 90 Tab 3   • lisinopril (PRINIVIL, ZESTRIL) 40 MG tablet TAKE ONE TABLET BY MOUTH DAILY 90 Tab 3   • glipiZIDE (GLUCOTROL) 5 MG Tab TAKE ONE TABLET BY MOUTH TWICE A  Tab 3   • omeprazole (PRILOSEC) 20 MG delayed-release capsule TAKE ONE CAPSULE BY MOUTH DAILY 90 Cap 3   • potassium  citrate SR (UROCIT-K SR) 10 MEQ (1080 MG) TBCR Take 10 mEq by mouth every day.     • Multiple Vitamins-Minerals (CENTRUM SILVER PO) Take  by mouth.     • Brimonidine Tartrate-Timolol (COMBIGAN) 0.2-0.5 % SOLN 1 Drop by Ophthalmic route 2 times a day.     • latanoprost (XALATAN) 0.005 % SOLN Place 1 Drop in both eyes every evening.     • ACETAZOLAMIDE 125 MG PO TABS Take 125 mg by mouth every day.     • valacyclovir (VALTREX) 1 GM Tab Take 1 Tab by mouth 3 times a day. (Patient not taking: Reported on 8/16/2017) 21 Tab 0   • nortriptyline (PAMELOR) 10 MG Cap Take 1 Cap by mouth every bedtime. (Patient not taking: Reported on 8/16/2017) 30 Cap 3   • trolamine salicylate (ASPERCREME/ALOE) 10 % cream Apply 1 Squirt to affected area(s) 4 times a day. 1 Tube 3     No current facility-administered medications for this visit.        Family History   Problem Relation Age of Onset   • Diabetes Mother        Social History     Social History   • Marital Status:      Spouse Name: N/A   • Number of Children: N/A   • Years of Education: N/A     Occupational History   • Not on file.     Social History Main Topics   • Smoking status: Former Smoker     Quit date: 01/01/1977   • Smokeless tobacco: Never Used      Comment: 1/3 ppd for 10 years, quit 1977   • Alcohol Use: 1.2 oz/week     2 Cans of beer per week      Comment: 1-2 per day   • Drug Use: No   • Sexual Activity:     Partners: Male     Other Topics Concern   • Not on file     Social History Narrative      Hospital Outpatient Visit on 08/14/2017   Component Date Value   • WBC 08/14/2017 5.0    • RBC 08/14/2017 4.04*   • Hemoglobin 08/14/2017 11.4*   • Hematocrit 08/14/2017 36.4*   • MCV 08/14/2017 90.1    • MCH 08/14/2017 28.2    • MCHC 08/14/2017 31.3*   • RDW 08/14/2017 41.1    • Platelet Count 08/14/2017 167    • MPV 08/14/2017 11.3    • Neutrophils-Polys 08/14/2017 37.20*   • Lymphocytes 08/14/2017 43.40*   • Monocytes 08/14/2017 18.20*   • Eosinophils 08/14/2017  0.20    • Basophils 08/14/2017 0.20    • Immature Granulocytes 08/14/2017 0.80    • Nucleated RBC 08/14/2017 0.00    • Neutrophils (Absolute) 08/14/2017 1.84    • Lymphs (Absolute) 08/14/2017 2.15    • Monos (Absolute) 08/14/2017 0.90*   • Eos (Absolute) 08/14/2017 0.01    • Baso (Absolute) 08/14/2017 0.01    • Immature Granulocytes (a* 08/14/2017 0.04    • NRBC (Absolute) 08/14/2017 0.00    • Sodium 08/14/2017 140    • Potassium 08/14/2017 5.3    • Chloride 08/14/2017 111    • Co2 08/14/2017 24    • Anion Gap 08/14/2017 5.0    • Glucose 08/14/2017 74    • Bun 08/14/2017 32*   • Creatinine 08/14/2017 1.22    • Calcium 08/14/2017 9.5    • AST(SGOT) 08/14/2017 21    • ALT(SGPT) 08/14/2017 26    • Alkaline Phosphatase 08/14/2017 71    • Total Bilirubin 08/14/2017 0.3    • Albumin 08/14/2017 4.2    • Total Protein 08/14/2017 7.2    • Globulin 08/14/2017 3.0    • A-G Ratio 08/14/2017 1.4    • Cholesterol,Tot 08/14/2017 129    • Triglycerides 08/14/2017 140    • HDL 08/14/2017 35*   • LDL 08/14/2017 66    • Color 08/14/2017 Yellow    • Character 08/14/2017 Clear    • Specific Gravity 08/14/2017 1.016    • Ph 08/14/2017 7.0    • Glucose 08/14/2017 Negative    • Ketones 08/14/2017 Negative    • Protein 08/14/2017 Negative    • Bilirubin 08/14/2017 Negative    • Urobilinogen, Urine 08/14/2017 0.2    • Nitrite 08/14/2017 Negative    • Leukocyte Esterase 08/14/2017 Negative    • Occult Blood 08/14/2017 Negative    • Micro Urine Req 08/14/2017 see below    • Culture Indicated 08/14/2017 No    • GFR If  08/14/2017 >60    • GFR If Non  Ameri* 08/14/2017 58*       Physical Exam   Constitutional: He is oriented. He appears well-developed and well-nourished. No distress.   HENT:   Head: Normocephalic and atraumatic.   Right Ear: External ear normal. Ear canal and TM normal   Left Ear: External ear normal. Ear canal and TM normal   Nose: Nose normal.   Mouth/Throat: Oropharynx is clear and moist.   Eyes:  Conjunctivae and extraocular motions are normal. Pupils are equal, round, and reactive to light.        Fundi benign bilaterally   Neck: No thyromegaly present.   Cardiovascular: Normal rate, regular rhythm, normal heart sounds and intact distal pulses.  Exam reveals no gallop.    No murmur heard.  Pulmonary/Chest: Effort normal and breath sounds normal. No respiratory distress. He has no wheezes. He has no rales.   Abdominal: Soft. Bowel sounds are normal. No hepatosplenomegaly. He exhibits no distension. No tenderness. He has no rebound and no guarding.   Musculoskeletal: Normal range of motion. He exhibits no edema and no tenderness.   Lymphadenopathy:     He has no cervical adenopathy.  No supraclavicular adenopathy.   Neurological: He is alert and oriented. He has normal reflexes.        Babinskis downgoing bilaterally   Skin: Skin is warm and dry. No rash noted. No erythema.   Psychiatric: He has a normal mood and appropriate affect. His behavior is normal. Judgment and thought content normal.     1. Anemia due to other cause, not classified--this anemia is persistent and has not responded to over-the-counter vitamins  REFERRAL TO HEMATOLOGY ONCOLOGY Referral to?: [facility] Hem[facility]Onc   2. Mixed hyperlipidemia   doing well    3. Essential hypertension, benign   controlled    4. Prostate cancer (CMS-HCC)   continue seeing Dr. Choudhary     Sees Dr Choudhary   5. Right renal mass   continue seeing Dr. Choudhary     Sees Dr Choudhary   6. Hyperlipidemia, unspecified hyperlipidemia type  ezetimibe (ZETIA) 10 MG Tab--refill      Recheck 6 months or when necessary    Please note that this dictation was created using voice recognition software. I have worked with consultants from the vendor as well as technical experts from [facility] to optimize the interface. I have made every reasonable attempt to correct obvious errors, but I expect that there are errors of grammar and possibly content that I did not discover before  finalizing the note.

## 2017-08-16 NOTE — PATIENT INSTRUCTIONS
Patient given written instructions regarding labs, medications, referrals, dietary and lifestyle management, and return visit.    Carlos Peters MD

## 2017-08-17 ENCOUNTER — NON-PROVIDER VISIT (OUTPATIENT)
Dept: HEMATOLOGY ONCOLOGY | Facility: MEDICAL CENTER | Age: 72
End: 2017-08-17
Payer: MEDICARE

## 2017-08-17 ENCOUNTER — HOSPITAL ENCOUNTER (OUTPATIENT)
Facility: MEDICAL CENTER | Age: 72
End: 2017-08-17
Attending: INTERNAL MEDICINE
Payer: MEDICARE

## 2017-08-17 ENCOUNTER — OFFICE VISIT (OUTPATIENT)
Dept: HEMATOLOGY ONCOLOGY | Facility: MEDICAL CENTER | Age: 72
End: 2017-08-17
Payer: MEDICARE

## 2017-08-17 VITALS
TEMPERATURE: 97.6 F | RESPIRATION RATE: 16 BRPM | OXYGEN SATURATION: 96 % | DIASTOLIC BLOOD PRESSURE: 64 MMHG | HEART RATE: 76 BPM | HEIGHT: 66 IN | BODY MASS INDEX: 28.31 KG/M2 | SYSTOLIC BLOOD PRESSURE: 120 MMHG | WEIGHT: 176.15 LBS

## 2017-08-17 DIAGNOSIS — D64.9 ANEMIA, UNSPECIFIED TYPE: ICD-10-CM

## 2017-08-17 DIAGNOSIS — D63.8 ANEMIA, CHRONIC DISEASE: Primary | ICD-10-CM

## 2017-08-17 DIAGNOSIS — D63.8 ANEMIA, CHRONIC DISEASE: ICD-10-CM

## 2017-08-17 LAB
BASOPHILS # BLD AUTO: 0.9 % (ref 0–1.8)
BASOPHILS # BLD: 0.05 K/UL (ref 0–0.12)
EOSINOPHIL # BLD AUTO: 0.13 K/UL (ref 0–0.51)
EOSINOPHIL NFR BLD: 2.6 % (ref 0–6.9)
ERYTHROCYTE [DISTWIDTH] IN BLOOD BY AUTOMATED COUNT: 38.6 FL (ref 35.9–50)
FERRITIN SERPL-MCNC: 314.8 NG/ML (ref 22–322)
FOLATE SERPL-MCNC: >23.7 NG/ML
HCT VFR BLD AUTO: 35.9 % (ref 42–52)
HGB BLD-MCNC: 11.8 G/DL (ref 14–18)
HGB RETIC QN AUTO: 33.5 PG/CELL (ref 29–35)
IMM RETICS NFR: 9.9 % (ref 9.3–17.4)
IRON SATN MFR SERPL: 28 % (ref 15–55)
IRON SERPL-MCNC: 96 UG/DL (ref 50–180)
LYMPHOCYTES # BLD AUTO: 2.26 K/UL (ref 1–4.8)
LYMPHOCYTES NFR BLD: 45.2 % (ref 22–41)
MANUAL DIFF BLD: ABNORMAL
MCH RBC QN AUTO: 28.6 PG (ref 27–33)
MCHC RBC AUTO-ENTMCNC: 32.9 G/DL (ref 33.7–35.3)
MCV RBC AUTO: 86.9 FL (ref 81.4–97.8)
MONOCYTES # BLD AUTO: 0.52 K/UL (ref 0–0.85)
MONOCYTES NFR BLD AUTO: 10.4 % (ref 0–13.4)
NEUTROPHILS # BLD AUTO: 2.05 K/UL (ref 1.82–7.42)
NEUTROPHILS NFR BLD: 40.9 % (ref 44–72)
PLATELET # BLD AUTO: 169 K/UL (ref 164–446)
PLATELET BLD QL SMEAR: NORMAL
PMV BLD AUTO: 10.7 FL (ref 9–12.9)
RBC # BLD AUTO: 4.13 M/UL (ref 4.7–6.1)
RBC BLD AUTO: NORMAL
RETICS # AUTO: 0.12 M/UL (ref 0.04–0.06)
RETICS/RBC NFR: 3 % (ref 0.8–2.1)
TIBC SERPL-MCNC: 342 UG/DL (ref 250–450)
VIT B12 SERPL-MCNC: 935 PG/ML (ref 211–911)
WBC # BLD AUTO: 5 K/UL (ref 4.8–10.8)

## 2017-08-17 PROCEDURE — 82728 ASSAY OF FERRITIN: CPT

## 2017-08-17 PROCEDURE — 83540 ASSAY OF IRON: CPT

## 2017-08-17 PROCEDURE — 82525 ASSAY OF COPPER: CPT

## 2017-08-17 PROCEDURE — 82746 ASSAY OF FOLIC ACID SERUM: CPT

## 2017-08-17 PROCEDURE — 82668 ASSAY OF ERYTHROPOIETIN: CPT

## 2017-08-17 PROCEDURE — 82607 VITAMIN B-12: CPT

## 2017-08-17 PROCEDURE — 36415 COLL VENOUS BLD VENIPUNCTURE: CPT | Performed by: INTERNAL MEDICINE

## 2017-08-17 PROCEDURE — 83550 IRON BINDING TEST: CPT

## 2017-08-17 PROCEDURE — 99204 OFFICE O/P NEW MOD 45 MIN: CPT | Performed by: INTERNAL MEDICINE

## 2017-08-17 PROCEDURE — 85007 BL SMEAR W/DIFF WBC COUNT: CPT

## 2017-08-17 PROCEDURE — 85027 COMPLETE CBC AUTOMATED: CPT

## 2017-08-17 PROCEDURE — 85046 RETICYTE/HGB CONCENTRATE: CPT

## 2017-08-17 ASSESSMENT — PAIN SCALES - GENERAL: PAINLEVEL: NO PAIN

## 2017-08-17 NOTE — MR AVS SNAPSHOT
"        Kemal Mueller   2017 9:00 AM   Office Visit   MRN: [id number]    Department:  [facility]   Dept Phone:  [phone]    Description:  Male : 1945   Provider:  Gio Vick M.D.           Reason for Visit     New Patient Ref by Fran Dx: Anemia      Allergies as of 2017     Allergen Noted Reactions    Nkda [No Known Drug Allergy] 2010         You were diagnosed with     Anemia, chronic disease   [310322]  -  Primary       Vital Signs     Blood Pressure Pulse Temperature Respirations Height Weight    120/64 mmHg 76 36.4 °C (97.6 °F) 16 1.676 m (5' 5.98\") 79.9 kg (176 lb 2.4 oz)    Body Mass Index Oxygen Saturation Smoking Status             28.44 kg/m2 96% Former Smoker         Basic Information     Date Of Birth Sex Race Ethnicity Preferred Language    1945 Male White Non- English      Your appointments     2018 10:20 AM   Established Patient with Carlos Peters M.D.   [facility]    [address] [place] [id number] [phone]           You will be receiving a confirmation call a few days before your appointment from our automated call confirmation system.              Problem List              ICD-10-CM Priority Class Noted - Resolved    Mixed hyperlipidemia E78.2   2009 - Present    Essential hypertension, benign I10   2009 - Present    Calculus of kidney N20.0   2009 - Present    Reflux esophagitis K21.0   2009 - Present    Other abnormal blood chemistry R79.89   2009 - Present    Proteinuria R80.9   2009 - Present    Elevated blood sugar R73.9   Unknown - Present    Nephrolithiasis N20.0   2010 - Present    Glaucoma H40.9   2010 - Present    GERD (gastroesophageal reflux disease) K21.9   2010 - Present    Prostate cancer (CMS-HCC) C61   2010 - Present    ED (erectile dysfunction) N52.9   2011 - Present    Cataracts, bilateral H26.9   2012 - " Present    Degeneration of cervical intervertebral disc M50.30   10/30/2012 - Present    Cervical stenosis of spine M48.02 Medium  10/30/2012 - Present    Left wrist pain M25.532   5/20/2015 - Present    Right renal mass N28.89   1/11/2016 - Present    Age-related nuclear cataract of eye H25.10   1/14/2016 - Present    Herpes zoster without complication B02.9   11/15/2016 - Present    Function kidney decreased N28.9   5/16/2017 - Present      Health Maintenance        Date Due Completion Dates    IMM DTaP/Tdap/Td Vaccine (1 - Tdap) 9/2/2008 9/1/2008    IMM PNEUMOCOCCAL 65+ (ADULT) LOW/MEDIUM RISK SERIES (2 of 2 - PPSV23) 10/20/2016 10/20/2015    IMM INFLUENZA (1) 9/1/2017 12/14/2016, 10/20/2015, 9/23/2014, 10/23/2012, 10/16/2011, 10/28/2010    COLONOSCOPY 2/9/2020 2/9/2010            Current Immunizations     13-VALENT PCV PREVNAR 10/20/2015 11:56 AM    Influenza TIV (IM) 10/23/2012, 10/16/2011    Influenza Vaccine Adult HD 12/14/2016  4:53 PM, 10/20/2015 11:54 AM    Influenza Vaccine Pediatric 10/28/2010    Influenza Vaccine Quad Inj (Pf) 9/23/2014    Pneumococcal Vaccine (UF)Historical Data 9/1/2008    TD Vaccine 9/1/2008      Below and/or attached are the medications your provider expects you to take. Review all of your home medications and newly ordered medications with your provider and/or pharmacist. Follow medication instructions as directed by your provider and/or pharmacist. Please keep your medication list with you and share with your provider. Update the information when medications are discontinued, doses are changed, or new medications (including over-the-counter products) are added; and carry medication information at all times in the event of emergency situations     Allergies:  NKDA - (reactions not documented)               Medications  Valid as of: August 17, 2017 -  9:39 AM    Generic Name Brand Name Tablet Size Instructions for use    AcetaZOLAMIDE (Tab) DIAMOX 125 MG Take 125 mg by mouth every  day.        Atorvastatin Calcium (Tab) LIPITOR 80 MG Take 1 Tab by mouth every evening.        Brimonidine Tartrate-Timolol (Solution) Brimonidine Tartrate-Timolol 0.2-0.5 % 1 Drop by Ophthalmic route 2 times a day.        Ezetimibe (Tab) ZETIA 10 MG TAKE ONE TABLET BY MOUTH DAILY        GlipiZIDE (Tab) GLUCOTROL 5 MG TAKE ONE TABLET BY MOUTH TWICE A DAY        Latanoprost (Solution) XALATAN 0.005 % Place 1 Drop in both eyes every evening.        Lisinopril (Tab) PRINIVIL, ZESTRIL 40 MG TAKE ONE TABLET BY MOUTH DAILY        Multiple Vitamins-Minerals   Take  by mouth.        Nortriptyline HCl (Cap) PAMELOR 10 MG Take 1 Cap by mouth every bedtime.        Omeprazole (CAPSULE DELAYED RELEASE) PRILOSEC 20 MG TAKE ONE CAPSULE BY MOUTH DAILY        Potassium Citrate (Tab CR) UROCIT-K SR 10 MEQ (1080 MG) Take 10 mEq by mouth every day.        Trolamine Salicylate (Cream) ASPERCREME or MYOFLEX 10 % Apply 1 Squirt to affected area(s) 4 times a day.        ValACYclovir HCl (Tab) VALTREX 1 GM Take 1 Tab by mouth 3 times a day.        .                 Medicines prescribed today were sent to:     [facility] [place], [place] - [address]    [address] [facility] [address]    Phone: [phone] Fax: [phone]    Open 24 Hours?: No    [facility] #[id number] - [place] - [address]    [address] [place] [address]    Phone: [phone] Fax: [phone]    Open 24 Hours?: No      Medication refill instructions:       If your prescription bottle indicates you have medication refills left, it is not necessary to call your provider’s office. Please contact your pharmacy and they will refill your medication.    If your prescription bottle indicates you do not have any refills left, you may request refills at any time through one of the following ways: The online [employer] system (except Urgent Care), by calling your provider’s office, or by asking your pharmacy to contact your provider’s office with a refill  request. Medication refills are processed only during regular business hours and may not be available until the next business day. Your provider may request additional information or to have a follow-up visit with you prior to refilling your medication.   *Please Note: Medication refills are assigned a new Rx number when refilled electronically. Your pharmacy may indicate that no refills were authorized even though a new prescription for the same medication is available at the pharmacy. Please request the medicine by name with the pharmacy before contacting your provider for a refill.        Your To Do List     Future Labs/Procedures Complete By Expires    CBC without Differential  As directed 8/1/2018    Copper, Serum  As directed 8/1/2018    Differential Manual  As directed 8/1/2018    ERYTHROPOIETIN  As directed 8/17/2018    Ferritin  As directed 8/1/2018    Folate  As directed 8/1/2018    Iron/Total Iron Bind  As directed 8/1/2018    Reticulocytes Count  As directed 8/1/2018    Vitamin B12  As directed 8/1/2018      Other Notes About Your Plan               Please Assess and Status Chronic Disease from Current and Prior Visits.     Query: Please Assess the following Diagnosis    Please obtain Medical Records from Dr. James Choudhary - Urologist - Please status if the Prostate Cancer is still active and being treated C61or is it now a HX of Prostate Cancer Z85.46. Please provide the documents for  to review.                 Saji Status: Patient Declined

## 2017-08-17 NOTE — MR AVS SNAPSHOT
Kemal Mueller   2017 8:30 AM   Appointment   MRN: [id number]    Department:  [facility]   Dept Phone:  [phone]    Description:  Male : 1945   Provider:  ON[facility] RYANN Izquierdo           Allergies as of 2017     Allergen Noted Reactions    Nkda [No Known Drug Allergy] 2010         Vital Signs     Smoking Status                   Former Smoker           Basic Information     Date Of Birth Sex Race Ethnicity Preferred Language    1945 Male White Non- English      Your appointments     2018 10:20 AM   Established Patient with Carlos Peters M.D.   [facility]    [address] [facility] [address] [phone]           You will be receiving a confirmation call a few days before your appointment from our automated call confirmation system.              Problem List              ICD-10-CM Priority Class Noted - Resolved    Mixed hyperlipidemia E78.2   2009 - Present    Essential hypertension, benign I10   2009 - Present    Calculus of kidney N20.0   2009 - Present    Reflux esophagitis K21.0   2009 - Present    Other abnormal blood chemistry R79.89   2009 - Present    Proteinuria R80.9   2009 - Present    Elevated blood sugar R73.9   Unknown - Present    Nephrolithiasis N20.0   2010 - Present    Glaucoma H40.9   2010 - Present    GERD (gastroesophageal reflux disease) K21.9   2010 - Present    Prostate cancer (CMS-[facility]) C61   2010 - Present    ED (erectile dysfunction) N52.9   2011 - Present    Cataracts, bilateral H26.9   2012 - Present    Degeneration of cervical intervertebral disc M50.30   10/30/2012 - Present    Cervical stenosis of spine M48.02 Medium  10/30/2012 - Present    Left wrist pain M25.532   2015 - Present    Right renal mass N28.89   2016 - Present    Age-related nuclear cataract of eye H25.10   2016 - Present    Herpes zoster without complication  B02.9   11/15/2016 - Present    Function kidney decreased N28.9   5/16/2017 - Present      Health Maintenance        Date Due Completion Dates    IMM DTaP/Tdap/Td Vaccine (1 - Tdap) 9/2/2008 9/1/2008    IMM PNEUMOCOCCAL 65+ (ADULT) LOW/MEDIUM RISK SERIES (2 of 2 - PPSV23) 10/20/2016 10/20/2015    IMM INFLUENZA (1) 9/1/2017 12/14/2016, 10/20/2015, 9/23/2014, 10/23/2012, 10/16/2011, 10/28/2010    COLONOSCOPY 2/9/2020 2/9/2010            Current Immunizations     13-VALENT PCV PREVNAR 10/20/2015 11:56 AM    Influenza TIV (IM) 10/23/2012, 10/16/2011    Influenza Vaccine Adult HD 12/14/2016  4:53 PM, 10/20/2015 11:54 AM    Influenza Vaccine Pediatric 10/28/2010    Influenza Vaccine Quad Inj (Pf) 9/23/2014    Pneumococcal Vaccine (UF)Historical Data 9/1/2008    TD Vaccine 9/1/2008      Below and/or attached are the medications your provider expects you to take. Review all of your home medications and newly ordered medications with your provider and/or pharmacist. Follow medication instructions as directed by your provider and/or pharmacist. Please keep your medication list with you and share with your provider. Update the information when medications are discontinued, doses are changed, or new medications (including over-the-counter products) are added; and carry medication information at all times in the event of emergency situations     Allergies:  NKDA - (reactions not documented)               Medications  Valid as of: August 17, 2017 -  9:57 AM    Generic Name Brand Name Tablet Size Instructions for use    AcetaZOLAMIDE (Tab) DIAMOX 125 MG Take 125 mg by mouth every day.        Atorvastatin Calcium (Tab) LIPITOR 80 MG Take 1 Tab by mouth every evening.        Brimonidine Tartrate-Timolol (Solution) Brimonidine Tartrate-Timolol 0.2-0.5 % 1 Drop by Ophthalmic route 2 times a day.        Ezetimibe (Tab) ZETIA 10 MG TAKE ONE TABLET BY MOUTH DAILY        GlipiZIDE (Tab) GLUCOTROL 5 MG TAKE ONE TABLET BY MOUTH TWICE A DAY          Latanoprost (Solution) XALATAN 0.005 % Place 1 Drop in both eyes every evening.        Lisinopril (Tab) PRINIVIL, ZESTRIL 40 MG TAKE ONE TABLET BY MOUTH DAILY        Multiple Vitamins-Minerals   Take  by mouth.        Nortriptyline HCl (Cap) PAMELOR 10 MG Take 1 Cap by mouth every bedtime.        Omeprazole (CAPSULE DELAYED RELEASE) PRILOSEC 20 MG TAKE ONE CAPSULE BY MOUTH DAILY        Potassium Citrate (Tab CR) UROCIT-K SR 10 MEQ (1080 MG) Take 10 mEq by mouth every day.        Trolamine Salicylate (Cream) ASPERCREME or MYOFLEX 10 % Apply 1 Squirt to affected area(s) 4 times a day.        ValACYclovir HCl (Tab) VALTREX 1 GM Take 1 Tab by mouth 3 times a day.        .                 Medicines prescribed today were sent to:     [facility] [place], [place] - [address]    [address] [facility] [address]    Phone: [phone] Fax: [phone]    Open 24 Hours?: No    [facility] #[id number] - [place] - [address]    [address] [place] [address]    Phone: [phone] Fax: [phone]    Open 24 Hours?: No      Medication refill instructions:       If your prescription bottle indicates you have medication refills left, it is not necessary to call your provider’s office. Please contact your pharmacy and they will refill your medication.    If your prescription bottle indicates you do not have any refills left, you may request refills at any time through one of the following ways: The online [employer] system (except Urgent Care), by calling your provider’s office, or by asking your pharmacy to contact your provider’s office with a refill request. Medication refills are processed only during regular business hours and may not be available until the next business day. Your provider may request additional information or to have a follow-up visit with you prior to refilling your medication.   *Please Note: Medication refills are assigned a new Rx number when refilled electronically. Your pharmacy  may indicate that no refills were authorized even though a new prescription for the same medication is available at the pharmacy. Please request the medicine by name with the pharmacy before contacting your provider for a refill.        Other Notes About Your Plan               Please Assess and Status Chronic Disease from Current and Prior Visits.     Query: Please Assess the following Diagnosis    Please obtain Medical Records from Dr. James Choudhary - Urologist - Please status if the Prostate Cancer is still active and being treated C61or is it now a HX of Prostate Cancer Z85.46. Please provide the documents for  to review.                 Luis Felipet Status: Patient Declined

## 2017-08-17 NOTE — PROGRESS NOTES
Kemal Mueller is a 72 y.o. male here for a non-provider visit for: Lab Draws on  8/17/2017 at 1:06 PM    Procedure Performed: Venipuncture     Anatomical Site: Left Antecubital Area (AC)    Equipment Used: 21G    Labs Drawn: CBC W/O Diff, Differential Manual, Ferritin, Vitamin B12, Folate, Copper Serum, Erythropoietin, Iron/Total Iron Bind    Ordering Provider: Gio Vick M.D.    Drawn By: Jerrod Stubbs, Med Ass't

## 2017-08-17 NOTE — PROGRESS NOTES
Consult Note: Hematology    Date of consultation: 8/17/2017 9:48 AM    Referring provider: Carlos Peters,*    Reason for consultation:   CC: Anemia    History of presenting illness:   -April 21, 2010. Radical prostatectomy. Elkin 4+3. Adenocarcinoma is present from proximal mid and distal left prostate gland amounting to 30% of the left prostate volume. No carcinoma identified on the right side. Stage pT2b  -April 13, 2012 completed radiation to the prostate bed.   -2014 started on Lupron for biochemical recurrence.  -2016 Lupron has been stopped  -February 23, 2017. Testosterone level-1  -June 23, 2017. PSA was undetectable    -2014. 1.4 cm solid enhancing mass lower pole of the right kidney.  -February 12, 2015 Cryotherapy right inferior pole  -Surveillance per Dr. Pacheco     Medical records in [facility] were reviewed and summarized above    Kemal Mueller  is a 72 y.o. year old male who is referred to the hematology clinic for evaluation of anemia. The anemia is chronic, mild, normocytic, not associated with any shortness of breath or chest pains.. first noted on February 2015, patient has not needed any blood transfusions.    Past Medical History:    Past Medical History   Diagnosis Date   • Elevated cholesterol    • Elevated BP    • Elevated blood sugar    • GERD (gastroesophageal reflux disease)    • Hypertension    • Glaucoma    • Unspecified urinary incontinence    • Indigestion    • CATARACT    • Dental disorder      upper dentures   • Arthritis      spine and wrists   • High cholesterol    • Cancer (CMS-HCC)      prostate- removed '09; CA returned in pocket- radiation therapy done   • Renal disorder      stones   • Renal cancer (CMS-HCC)      Had treatment done- resolved per CAT scan   • Diabetes (CMS-HCC)    • Bronchitis 1/15   • Breath shortness      activity induced   • Heart burn    • wrist pain 5/20/2015     bilat wrist pain   • ED (erectile dysfunction)    • Degeneration of cervical  intervertebral disc    • Cervical stenosis of spine    • Anesthesia      Low BP; nausea; hard time waking       Past surgical history:    Past Surgical History   Procedure Laterality Date   • Tonsillectomy and adenoidectomy  1953   • Pr removal of kidney stone  1986/1998     x2   • Prostatectomy, radical retro  4/21/2010     Performed by EDYTA KUMAR at SURGERY [facility] ORS   • Node dissection  4/21/2010     Performed by EDYTA KUMAR at SURGERY [facility] ORS   • Appendectomy     • Carpal tunnel release  1990     Bilateral   • Trigger finger release  1990     Right small finger   • Cervical disk and fusion anterior  10/30/2012     Performed by Selvin Ying M.D. at SURGERY [facility] ORS   • Cataract phaco with iol Left 1/14/2016     Procedure: CATARACT PHACO WITH IOL;  Surgeon: Alfonso Hernandez M.D.;  Location: SURGERY [facility];  Service:    • Cataract phaco with iol Right 1/28/2016     Procedure: CATARACT PHACO WITH IOL;  Surgeon: Alfonso Hernandez M.D.;  Location: SURGERY SURGICAL [facility];  Service:        Allergies:  Nkda    Medications:    Current Outpatient Prescriptions   Medication Sig Dispense Refill   • ezetimibe (ZETIA) 10 MG Tab TAKE ONE TABLET BY MOUTH DAILY 90 Tab 3   • atorvastatin (LIPITOR) 80 MG tablet Take 1 Tab by mouth every evening. 90 Tab 3   • lisinopril (PRINIVIL, ZESTRIL) 40 MG tablet TAKE ONE TABLET BY MOUTH DAILY 90 Tab 3   • glipiZIDE (GLUCOTROL) 5 MG Tab TAKE ONE TABLET BY MOUTH TWICE A  Tab 3   • omeprazole (PRILOSEC) 20 MG delayed-release capsule TAKE ONE CAPSULE BY MOUTH DAILY 90 Cap 3   • trolamine salicylate (ASPERCREME/ALOE) 10 % cream Apply 1 Squirt to affected area(s) 4 times a day. 1 Tube 3   • potassium citrate SR (UROCIT-K SR) 10 MEQ (1080 MG) TBCR Take 10 mEq by mouth every day.     • Multiple Vitamins-Minerals (CENTRUM SILVER PO) Take  by mouth.     • Brimonidine Tartrate-Timolol (COMBIGAN) 0.2-0.5 % SOLN 1 Drop by Ophthalmic route 2 times a day.    "  • latanoprost (XALATAN) 0.005 % SOLN Place 1 Drop in both eyes every evening.     • ACETAZOLAMIDE 125 MG PO TABS Take 125 mg by mouth every day.     • valacyclovir (VALTREX) 1 GM Tab Take 1 Tab by mouth 3 times a day. (Patient not taking: Reported on 8/16/2017) 21 Tab 0   • nortriptyline (PAMELOR) 10 MG Cap Take 1 Cap by mouth every bedtime. (Patient not taking: Reported on 8/16/2017) 30 Cap 3     No current facility-administered medications for this visit.       Social History:     Social History     Social History   • Marital Status:      Spouse Name: N/A   • Number of Children: N/A   • Years of Education: N/A     Occupational History   • retired [employer] manager      Social History Main Topics   • Smoking status: Former Smoker -- 0.20 packs/day for 10 years     Quit date: 01/01/1977   • Smokeless tobacco: Never Used      Comment: 1/3 ppd for 10 years, quit 1977   • Alcohol Use: 1.2 oz/week     2 Cans of beer per week      Comment: 1-2 per day   • Drug Use: No   • Sexual Activity:     Partners: Female     Other Topics Concern   • Not on file     Social History Narrative       Family History:     Family History   Problem Relation Age of Onset   • Diabetes Mother        Review of Systems:  Constitutional: No fever, chills, weight loss ,malaise/fatigue.      All other review of systems are negative except what was mentioned above in the HPI.    Physical Exam:  Vitals:   /64 mmHg  Pulse 76  Temp(Src) 36.4 °C (97.6 °F)  Resp 16  Ht 1.676 m (5' 5.98\")  Wt 79.9 kg (176 lb 2.4 oz)  BMI 28.44 kg/m2  SpO2 96%    General: Not in acute distress, alert and oriented x 3  HEENT: No pallor, icterus. Oropharynx clear.   Neck: Supple, no palpable masses.  Lymph nodes: No palpable cervical, supraclavicular, axillary or inguinal lymphadenopathy.    CVS: regular rate and rhythm, no rubs or gallops  RESP: Clear to auscultate bilaterally, no wheezing or crackles.   ABD: Soft, non tender, non distended, positive " bowel sounds, no palpable organomegaly  EXT: No edema or cyanosis  CNS: Alert and oriented x3, No focal deficits.  Skin- No rash      Labs:   Results for NILE BEGUM (MRN [id number]) as of 8/17/2017 10:03   10/31/2012 04:18 2/14/2014 10:52 4/16/2014 10:25 2/2/2015 10:20 1/8/2016 09:21 5/25/2016 13:37 10/8/2016 09:33 5/15/2017 10:06 8/14/2017 10:42   WBC 10.7 5.1 5.2 5.2 4.3 (L)  4.4 (L) 5.3 5.0   RBC 4.74 5.64 5.53 3.87 (L) 4.08 (L)  4.23 (L) 4.15 (L) 4.04 (L)   Hemoglobin 14.0 16.1 15.8 11.4 (L) 11.7 (L) 11.7 (L) 12.1 (L) 11.9 (L) 11.4 (L)   Hematocrit 41.7 (L) 47.9 46.6 35.6 (L) 36.7 (L) 37.5 (L) 37.8 (L) 36.9 (L) 36.4 (L)   MCV 88.0 85.0 84.3 92.0 90.0  89.4 88.9 90.1   MCH 29.6 28.6 28.5 29.5 28.7  28.6 28.7 28.2   MCHC 33.7 33.7 33.8 32.0 (L) 31.9 (L)  32.0 (L) 32.2 (L) 31.3 (L)   RDW 12.7 13.1 13.1 41.7 39.5  40.7 40.9 41.1   Platelet Count 172 201 154 (L) 258 174  202 220 167   MPV 8.1 8.5 8.9 10.6 10.9  10.8 11.0 11.3     Imaging  November 20, 2015 MRI of the abdomen  1.  1.5 cm right renal lower pole exophytic T2 hypointense mass without central or masslike enhancement. There is mild peripheral enhancement.    2.  This finding is most consistent with a treated renal cell carcinoma.    3.  The peripheral enhancement could be residual tumor versus from treatment.    4.  Multiple bilateral renal cysts.    Assessment and Plan:  -Anemia  -Secondary to low testosterone  -We'll check serum iron ferritin and B12 folate today.  -We'll check a erythropoietin level as he has mild CK D  -All labs above were reviewed with the patient from 2012 to present.    -Prostate cancer  -Elkin 4+3 = 7  -Stage II a [pT2b,N0,M0,PSA X]  -Primary treatment with prostatectomy  -Radiation to the prostate bed for recurrence.  -Followed by Lupron for biochemical recurrence, which was stopped last year.  -June 23, 2017 PSA less than 0.1  -Continue follow-up with Dr. Baptiste    -Kidney mass  -Discovered 2014  -Cryoablation on  February 12, 2015  -Continued surveillance with Dr. Baptiste      He agreed and verbalized his agreement and understanding with the current plan.  I answered all questions and concerns he has at this time.  Dear  Carlos Peters,*,  Thank you for allowing me to participate in his care.    All labs and/or imaging studies mentioned in the note above were reviewed with and explained to the patient as a pertain to medical decision making.    Please note that this dictation was created using voice recognition software. I have made every reasonable attempt to correct obvious errors, but I expect that there are errors of grammar and possibly content that I did not discover before finalizing the note.      SIGNATURES:  Gio Vick    CC:  Carlos Peters M.D.  Carlos Peters,*

## 2017-08-18 LAB — EPO SERPL-ACNC: 5 MU/ML (ref 4–27)

## 2017-08-19 LAB — COPPER SERPL-MCNC: 93 UG/DL (ref 70–140)

## 2017-09-20 ENCOUNTER — HOSPITAL ENCOUNTER (OUTPATIENT)
Dept: RADIOLOGY | Facility: MEDICAL CENTER | Age: 72
End: 2017-09-20
Attending: UROLOGY
Payer: MEDICARE

## 2017-09-20 VITALS — BODY MASS INDEX: 27.47 KG/M2 | HEIGHT: 67 IN | WEIGHT: 175 LBS

## 2017-09-20 DIAGNOSIS — C64.1 MALIGNANT NEOPLASM OF RIGHT KIDNEY, EXCEPT RENAL PELVIS (HCC): ICD-10-CM

## 2017-09-20 DIAGNOSIS — C61 MALIGNANT NEOPLASM OF PROSTATE (HCC): ICD-10-CM

## 2017-09-20 PROCEDURE — A9270 NON-COVERED ITEM OR SERVICE: HCPCS | Performed by: RADIOLOGY

## 2017-09-20 PROCEDURE — 74183 MRI ABD W/O CNTR FLWD CNTR: CPT

## 2017-09-20 PROCEDURE — 700102 HCHG RX REV CODE 250 W/ 637 OVERRIDE(OP): Performed by: RADIOLOGY

## 2017-09-20 PROCEDURE — A9577 INJ MULTIHANCE: HCPCS | Performed by: UROLOGY

## 2017-09-20 PROCEDURE — 700117 HCHG RX CONTRAST REV CODE 255: Performed by: UROLOGY

## 2017-09-20 RX ORDER — DIAZEPAM 5 MG/1
5 TABLET ORAL
Status: COMPLETED | OUTPATIENT
Start: 2017-09-20 | End: 2017-09-20

## 2017-09-20 RX ADMIN — GADOBENATE DIMEGLUMINE 10 ML: 529 INJECTION, SOLUTION INTRAVENOUS at 16:14

## 2017-09-20 RX ADMIN — DIAZEPAM 5 MG: 5 TABLET ORAL at 14:22

## 2017-09-20 RX ADMIN — DIAZEPAM 5 MG: 5 TABLET ORAL at 14:47

## 2017-09-20 ASSESSMENT — PAIN SCALES - GENERAL
PAINLEVEL_OUTOF10: 0
PAINLEVEL_OUTOF10: 0

## 2017-09-20 NOTE — DISCHARGE INSTRUCTIONS
MRI ADULT DISCHARGE INSTRUCTIONS    You have been medicated today for your scan. Please follow the instructions below to ensure your safe recovery. If you have any questions or problems, feel free to call us at [phone] or [phone].     1.   Have someone stay with you to assist you as needed.    2.   Do not drive or operate any mechanical devices.    3.   Do not perform any activity that requires concentration. Make no major decisions over the next 24 hours.     4.   Be careful changing positions from laying down to sitting or standing, as you may become dizzy.     5.   Do not drink alcohol for 48 hours.    6.   There are no restrictions for eating your normal meals. Drink fluids.    7.   You may continue your usual medications for pain, tranquilizers, muscle relaxants or sedatives when awake.     8.   Tomorrow, you may continue your normal daily activities.     9.   Pressure dressing on 10 - 15 minutes. If swelling or bleeding occurs when removed, continue placing direct pressure on injection site for another 5 minutes, or until bleeding stops.   Diazepam (VALIUM) Oral solution  What is this medicine?  You were prescribed DIAZEPAM (dye AZ e radha) for the procedure you had today. This medication is a benzodiazepine. It is used to treat anxiety and nervousness. It also can help treat alcohol withdrawal, relax muscles, and treat certain types of seizures.  This medicine may be used for other purposes; ask your health care provider or pharmacist if you have questions.  What side effects may I notice from receiving this medicine?  Side effects that you should report to your doctor or health care professional as soon as possible:  • allergic reactions like skin rash, itching or hives, swelling of the face, lips, or tongue  • angry, confused, depressed, other mood changes  • breathing problems  • feeling faint or lightheaded, falls  • muscle cramps  • problems with balance, talking,  walking  • restlessness  • tremors  • trouble passing urine or change in the amount of urine  • unusually weak or tired  Side effects that usually do not require medical attention (report to your doctor or health care professional if they continue or are bothersome):  • difficulty sleeping, nightmares  • dizziness, drowsiness, clumsiness, or unsteadiness, a hangover effect  • headache  • nausea, vomiting  This list may not describe all possible side effects. Call your doctor for medical advice about side effects. You may report side effects to FDA at 5-071-BAG-6240.    I have been informed of and understand the above discharge instructions.

## 2017-10-25 ENCOUNTER — HOSPITAL ENCOUNTER (OUTPATIENT)
Dept: LAB | Facility: MEDICAL CENTER | Age: 72
End: 2017-10-25
Attending: UROLOGY
Payer: MEDICARE

## 2017-10-25 LAB — PSA SERPL-MCNC: 0.01 NG/ML (ref 0–4)

## 2017-10-25 PROCEDURE — 84402 ASSAY OF FREE TESTOSTERONE: CPT

## 2017-10-25 PROCEDURE — 84403 ASSAY OF TOTAL TESTOSTERONE: CPT

## 2017-10-25 PROCEDURE — 84270 ASSAY OF SEX HORMONE GLOBUL: CPT

## 2017-10-25 PROCEDURE — 84153 ASSAY OF PSA TOTAL: CPT

## 2017-10-25 PROCEDURE — 36415 COLL VENOUS BLD VENIPUNCTURE: CPT

## 2017-10-27 LAB
SHBG SERPL-SCNC: 31 NMOL/L (ref 11–80)
TESTOST FREE MFR SERPL: 1.7 % (ref 1.6–2.9)
TESTOST FREE SERPL-MCNC: 10 PG/ML (ref 47–244)
TESTOST SERPL-MCNC: 57 NG/DL (ref 300–720)

## 2017-11-19 DIAGNOSIS — K21.9 GASTROESOPHAGEAL REFLUX DISEASE WITHOUT ESOPHAGITIS: ICD-10-CM

## 2017-11-20 RX ORDER — OMEPRAZOLE 20 MG/1
CAPSULE, DELAYED RELEASE ORAL
Qty: 90 CAP | Refills: 1 | Status: SHIPPED | OUTPATIENT
Start: 2017-11-20 | End: 2018-05-02 | Stop reason: SDUPTHER

## 2017-12-04 ENCOUNTER — PATIENT OUTREACH (OUTPATIENT)
Dept: HEALTH INFORMATION MANAGEMENT | Facility: OTHER | Age: 72
End: 2017-12-04

## 2017-12-04 NOTE — PROGRESS NOTES
Attempt #:1    WebIZ Checked & Epic Updated: Yes  · WebIZ Recommendations: FLU, TDAP and ZOSTAVAX (Shingles)  · Is patient due for Tdap? YES. Patient was not notified of copay/out of pocket cost.  · Is patient due for Shingles? YES. Patient was not notified of copay/out of pocket cost.  HealthConnect Verified: yes  Verify PCP: yes    Communication Preference Obtained: yes     Review Care Team: yes    Annual Wellness Visit Scheduling  1. Scheduling Status:Scheduled        Care Gap Scheduling (Attempt to Schedule EACH Overdue Care Gap!)     Health Maintenance Due   Topic Date Due   • Annual Wellness Visit  1945   • IMM DTaP/Tdap/Td Vaccine (1 - Tdap) 09/02/2008   • IMM PNEUMOCOCCAL 65+ (ADULT) LOW/MEDIUM RISK SERIES (2 of 2 - PPSV23) 10/20/2016   • IMM INFLUENZA (1) 09/01/2017        Scheduled patient for Annual Wellness Visit/ IMMUNIZATIONS WILL BE COMPLETED AT PHARMACY       Chatalog Activation: DECLINED  Chatalog Mario: no  Virtual Visits: no  Opt In to Text Messages: no

## 2018-01-08 RX ORDER — GLIPIZIDE 5 MG/1
TABLET ORAL
Qty: 180 TAB | Refills: 3 | Status: SHIPPED | OUTPATIENT
Start: 2018-01-08 | End: 2018-12-30 | Stop reason: SDUPTHER

## 2018-02-09 ENCOUNTER — OFFICE VISIT (OUTPATIENT)
Dept: URGENT CARE | Facility: CLINIC | Age: 73
End: 2018-02-09
Payer: MEDICARE

## 2018-02-09 VITALS
DIASTOLIC BLOOD PRESSURE: 78 MMHG | TEMPERATURE: 97.4 F | WEIGHT: 176 LBS | OXYGEN SATURATION: 97 % | RESPIRATION RATE: 16 BRPM | HEART RATE: 82 BPM | BODY MASS INDEX: 27.62 KG/M2 | HEIGHT: 67 IN | SYSTOLIC BLOOD PRESSURE: 110 MMHG

## 2018-02-09 DIAGNOSIS — J22 LOWER RESPIRATORY INFECTION (E.G., BRONCHITIS, PNEUMONIA, PNEUMONITIS, PULMONITIS): ICD-10-CM

## 2018-02-09 PROCEDURE — 99214 OFFICE O/P EST MOD 30 MIN: CPT | Performed by: PHYSICIAN ASSISTANT

## 2018-02-09 RX ORDER — AZITHROMYCIN 250 MG/1
TABLET, FILM COATED ORAL
Qty: 6 TAB | Refills: 0 | Status: SHIPPED | OUTPATIENT
Start: 2018-02-09 | End: 2019-01-29

## 2018-02-09 RX ORDER — BENZONATATE 100 MG/1
100 CAPSULE ORAL 3 TIMES DAILY PRN
Qty: 60 CAP | Refills: 0 | Status: SHIPPED | OUTPATIENT
Start: 2018-02-09 | End: 2019-01-29

## 2018-02-09 RX ORDER — PROMETHAZINE HYDROCHLORIDE AND CODEINE PHOSPHATE 6.25; 1 MG/5ML; MG/5ML
5 SYRUP ORAL NIGHTLY PRN
Qty: 120 ML | Refills: 0 | Status: SHIPPED | OUTPATIENT
Start: 2018-02-09 | End: 2018-02-16

## 2018-02-09 ASSESSMENT — ENCOUNTER SYMPTOMS
NEUROLOGICAL NEGATIVE: 1
MUSCULOSKELETAL NEGATIVE: 1
COUGH: 1
SINUS PAIN: 1
EYES NEGATIVE: 1
WHEEZING: 1
SORE THROAT: 1
MYALGIAS: 0
CARDIOVASCULAR NEGATIVE: 1
PSYCHIATRIC NEGATIVE: 1
GASTROINTESTINAL NEGATIVE: 1

## 2018-02-09 NOTE — PATIENT INSTRUCTIONS
Flonase and nasal saline irrigation (netti pot or Don Med Sinus Rinse).  Used distilled water or boiled tap water with nasal flushes, not straight tap water.  Humidifier at bedtime.  Hot steam showers to loosen up mucous.  Cough medicine at bedtime.  Delsym 12 hour during the day (orange bottle).  Lots of fluids, tea with honey.  Return if worsening: Yellow thicker mucus changes, worsening pain around the eyes and radiates to the teeth, and fever over 101°F.

## 2018-02-09 NOTE — PROGRESS NOTES
Subjective:      Kemal Mueller is a 73 y.o. male who presents with Cough (x 3 days, productive cough, chest congestion and little wheezing) and Sinus Problem (x 4 days, nasal congestion, runny nose, )            HPI  Chief Complaint   Patient presents with   • Cough     x 3 days, productive cough, chest congestion and little wheezing   • Sinus Problem     x 4 days, nasal congestion, runny nose,        HPI:  Kemal Mueller is a 73 y.o. male who presents with cough and sinus problems for 3-4 days.  He does have a hx of DM and renal insufficinecy .  Unable to sleep at night due to cough.  Post nasal drip.  Lots of cough.  Wet sounding cough.  Feeling wheezy.  No fever or chills.  Some sinus pressure.  Patient denies HA,  chest pain, palpitations, fever, chills, or n/v/d.      Past Medical History:   Diagnosis Date   • Anesthesia     Low BP; nausea; hard time waking   • Arthritis     spine and wrists   • Breath shortness     activity induced   • Bronchitis 1/15   • Cancer (CMS-HCC)     prostate- removed '09; CA returned in pocket- radiation therapy done   • CATARACT    • Cervical stenosis of spine    • Degeneration of cervical intervertebral disc    • Dental disorder     upper dentures   • Diabetes (CMS-[facility])    • ED (erectile dysfunction)    • Elevated blood sugar    • Elevated BP    • Elevated cholesterol    • GERD (gastroesophageal reflux disease)    • Glaucoma    • Heart burn    • High cholesterol    • Hypertension    • Indigestion    • Renal cancer (CMS-[facility])     Had treatment done- resolved per CAT scan   • Renal disorder     stones   • Unspecified urinary incontinence    • wrist pain 5/20/2015    bilat wrist pain       Past Surgical History:   Procedure Laterality Date   • CATARACT PHACO WITH IOL Right 1/28/2016    Procedure: CATARACT PHACO WITH IOL;  Surgeon: Alfonso Hernandez M.D.;  Location: SURGERY SURGICAL [facility] ORS;  Service:    • CATARACT PHACO WITH IOL Left 1/14/2016    Procedure: CATARACT PHACO WITH  IOL;  Surgeon: Alfonso Hernandez M.D.;  Location: SURGERY [facility] ORS;  Service:    • CERVICAL DISK AND FUSION ANTERIOR  10/30/2012    Performed by Selvin Ying M.D. at SURGERY [facility] ORS   • PROSTATECTOMY, RADICAL RETRO  4/21/2010    Performed by EDYTA KUMAR at SURGERY [facility] ORS   • NODE DISSECTION  4/21/2010    Performed by EDYTA KUMAR at SURGERY [facility] ORS   • CARPAL TUNNEL RELEASE  1990    Bilateral   • TRIGGER FINGER RELEASE  1990    Right small finger   • TONSILLECTOMY AND ADENOIDECTOMY  1953   • APPENDECTOMY     • PB REMOVAL OF KIDNEY STONE  1986/1998    x2       Family History   Problem Relation Age of Onset   • Diabetes Mother    • Alzheimer's Disease Father    • Stroke Mother      No pertinent family history.    Social History     Social History   • Marital status:      Spouse name: N/A   • Number of children: N/A   • Years of education: N/A     Occupational History   • retired [employer] manager      Social History Main Topics   • Smoking status: Former Smoker     Packs/day: 0.20     Years: 10.00     Quit date: 1/1/1977   • Smokeless tobacco: Never Used      Comment: 1/3 ppd for 10 years, quit 1977   • Alcohol use 1.2 oz/week     2 Cans of beer per week      Comment: 1-2 per day   • Drug use: No   • Sexual activity: Not Currently     Partners: Female     Other Topics Concern   • Not on file     Social History Narrative   • No narrative on file         Current Outpatient Prescriptions:   •  glipiZIDE, TAKE ONE TABLET BY MOUTH TWICE A DAY, 2/9/2018  •  omeprazole, TAKE ONE CAPSULE BY MOUTH DAILY, 2/9/2018  •  ezetimibe, TAKE ONE TABLET BY MOUTH DAILY, 2/9/2018  •  atorvastatin, 80 mg, Oral, Q EVENING, 2/9/2018  •  lisinopril, TAKE ONE TABLET BY MOUTH DAILY, 2/9/2018  •  nortriptyline, 10 mg, Oral, QHS, 2/9/2018  •  potassium citrate SR, 10 mEq, Oral, DAILY, 2/9/2018  •  Multiple Vitamins-Minerals (CENTRUM SILVER PO), Take  by mouth., 2/9/2018  •  Brimonidine  "Tartrate-Timolol, 1 Drop, Ophthalmic, BID, 2/9/2018  •  latanoprost, 1 Drop, Both Eyes, Nightly, 2/9/2018  •  acetaZOLAMIDE, 125 mg, Oral, DAILY, 2/9/2018  •  valacyclovir, 1,000 mg, Oral, TID (Patient not taking: Reported on 8/16/2017), Not taking  •  trolamine salicylate, 1 Squirt, Topical, 4X/DAY, PRN    Allergies   Allergen Reactions   • Nkda [No Known Drug Allergy]         Review of Systems   Constitutional: Positive for malaise/fatigue.   HENT: Positive for sinus pain and sore throat.    Eyes: Negative.    Respiratory: Positive for cough and wheezing.    Cardiovascular: Negative.    Gastrointestinal: Negative.    Genitourinary: Negative.    Musculoskeletal: Negative.  Negative for myalgias.   Skin: Negative.    Neurological: Negative.    Endo/Heme/Allergies: Negative.    Psychiatric/Behavioral: Negative.           Objective:     /78   Pulse 82   Temp 36.3 °C (97.4 °F)   Resp 16   Ht 1.702 m (5' 7\")   Wt 79.8 kg (176 lb)   SpO2 97%   BMI 27.57 kg/m²      Physical Exam       Physical Exam   Nursing note and vitals reviewed.    Constitutional:   Appropriately groomed, pleasant affect, well nourished, in NAD.    Head:   Normocephalic, atraumatic.    Eyes:   PERRLA, EOM's full, sclera white, conjunctiva not erythematous, and medial canthus without exudate bilaterally.    Ears:  Auricle and tragus non-tender to manipulation.  No pre-auricular lymphadenopathy or mastoid ttp.  EACs with mild cerumen bilaterally, not erythematous.  TM’s pearly gray with cone of light present and umbo and malleolus visible bilaterally.  No bulging or fluid bubbles present in middle ear.  Hearing grossly intact to voice.    Nose:  Nares not patent bilaterally.  Nasal mucosa edematous with yellow-white rhinorrhea bilaterally. Frontal and Maxillary sinuses exquisitely tender to percussion bilaterally.    Throat:  Dentition wnl, mucosa moist without lesions.  Oropharynx erythematous, with no enlargement of the palatine tonsils " bilaterally with no exudates.    Post nasal drainage present.  Soft palate rises symmetrically bilaterally and uvula midline.      Neck: Neck supple, with mild anterior lymphadenopathy that is soft and mobile to palpation. Thyroid non-palpable without tenderness or nodules. No supraclavicular lymphadenopathy.    Lungs:  Respiratory effort not labored without accessory muscle use.  Lungs clear to auscultation bilaterally without wheezes or rales. Rhonchi clear to cough.    Heart:  RRR, without murmurs rubs or gallops.  Radial and dorsalis pedis pulse 2+ bilaterally.  No LE edema.    Musculoskeletal:  Gait non-antalgic with a narrow base.    Derm:  Skin without rashes or lesions with good turgor pressure.      Psychiatric:  Mood, affect, and judgement appropriate.         Assessment/Plan:     1. Lower respiratory infection (e.g., bronchitis, pneumonia, pneumonitis, pulmonitis)  promethazine-codeine (PHENERGAN-CODEINE) 6.25-10 MG/5ML Syrup    azithromycin (ZITHROMAX) 250 MG Tab    benzonatate (TESSALON) 100 MG Cap      Patient presents with sinus congestion and worsening cough. Unable to lay flat at that time feeling wheezy. On exam patient has oxygen saturation 97% room air. Lungs tight to auscultation but no evidence of crackles or wheezes. Suspect viral etiology recommended pushing fluids and nighttime cough medicine. Tessalon Perles for daytime. Did prescribe contingent antibiotic if not improving or having thicker mucus changes.    Patient was in agreement with this treatment plan and seemed to understand without barriers. All questions were encouraged and answered.  Reviewed signs and symptoms of when to seek emergency medical care.     Please note that this dictation was created using voice recognition software.  I have made every reasonable attempt to correct obvious errors, but I expect there are errors of evelia and possibly content that I did not discover before finalizing the note.

## 2018-02-12 ENCOUNTER — TELEPHONE (OUTPATIENT)
Dept: MEDICAL GROUP | Facility: PHYSICIAN GROUP | Age: 73
End: 2018-02-12

## 2018-02-12 NOTE — TELEPHONE ENCOUNTER
Future Appointments       Provider Department Center    2/14/2018 1:00 PM Carlos Peters M.D.; [facility]        FULL PVP ANNUAL WELLNESS VISIT PRE-VISIT PLANNING WITHOUT OUTREACH    1.  Reviewed note from last office visit with PCP: YES 8/16/17    2.  If any orders were placed at last visit, do we have Results/Consult Notes?        •  Labs - Labs were not ordered at last office visit.       •  Imaging - Imaging was not ordered at last office visit.       •  Referrals - No referrals were ordered at last office visit.    3.  Immunizations were updated in [facility] using WebIZ?: Yes       •  WebIZ Recommendations: FLU, TDAP and ZOSTAVAX (Shingles)        •  Is patient due for Tdap? YES. Patient was notified of copay/out of pocket cost.       •  Is patient due for Shingles? YES. Patient was notified of copay/out of pocket cost.     4.  Patient is due for the following Health Maintenance Topics:   Health Maintenance Due   Topic Date Due   • Annual Wellness Visit  SCHEDULED FOR 2/14/18   • IMM DTaP/Tdap/Td Vaccine (1 - Tdap) 09/02/2008   • IMM PNEUMOCOCCAL 65+ (ADULT) LOW/MEDIUM RISK SERIES (2 of 2 - PPSV23) 10/20/2016       - Patient already has appointment scheduled for Immunizations: FLU, PNEUMOVAX (PPSV23) and TDAP.    5.  Reviewed/Updated the following with patient:       •   Preferred Pharmacy? YES       •   Preferred Lab? YES       •   Preferred Communication? YES       •   Allergies? YES       •   Medications? YES. Was Abstract Encounter opened and chart updated? YES       •   Social History? YES. Was Abstract Encounter opened and chart updated? YES       •   Family History (document living status of immediate family members and if + hx of  cancer, diabetes, hypertension, hyperlipidemia, heart attack, stroke) YES. Was Abstract Encounter opened and chart updated? YES    6.  Care Team Updated:       •   DME Company (gait device, O2, CPAP, etc.): YES       •   Other  Specialists (eye doctor, derm, GYN, cardiology, endo, etc): YES    7.  Patient has the following Care Path diagnoses on Problem List:  NONE    8.  MDX printed and highlighted for Provider? YES    9.  Patient was advised: “This is a free wellness visit. The provider will screen for medical conditions to help you stay healthy. If you have other concerns to address you may be asked to discuss these at a separate visit or there may be an additional fee.”     10.  Patient was informed to arrive 15 min prior to their scheduled appointment and bring in their medication bottles.

## 2018-02-14 ENCOUNTER — OFFICE VISIT (OUTPATIENT)
Dept: MEDICAL GROUP | Facility: PHYSICIAN GROUP | Age: 73
End: 2018-02-14
Payer: MEDICARE

## 2018-02-14 VITALS
SYSTOLIC BLOOD PRESSURE: 124 MMHG | TEMPERATURE: 97.3 F | HEART RATE: 77 BPM | HEIGHT: 67 IN | OXYGEN SATURATION: 97 % | DIASTOLIC BLOOD PRESSURE: 76 MMHG | BODY MASS INDEX: 27.47 KG/M2 | WEIGHT: 175 LBS

## 2018-02-14 DIAGNOSIS — M48.02 CERVICAL STENOSIS OF SPINE: ICD-10-CM

## 2018-02-14 DIAGNOSIS — E55.9 VITAMIN D DEFICIENCY: ICD-10-CM

## 2018-02-14 DIAGNOSIS — I10 ESSENTIAL HYPERTENSION, BENIGN: ICD-10-CM

## 2018-02-14 DIAGNOSIS — C61 PROSTATE CANCER (HCC): ICD-10-CM

## 2018-02-14 DIAGNOSIS — E78.2 MIXED HYPERLIPIDEMIA: ICD-10-CM

## 2018-02-14 DIAGNOSIS — N28.89 RIGHT RENAL MASS: ICD-10-CM

## 2018-02-14 PROCEDURE — G0439 PPPS, SUBSEQ VISIT: HCPCS | Performed by: FAMILY MEDICINE

## 2018-02-14 RX ORDER — LISINOPRIL 40 MG/1
TABLET ORAL
Qty: 90 TAB | Refills: 3 | Status: SHIPPED | OUTPATIENT
Start: 2018-02-14 | End: 2019-03-11 | Stop reason: SDUPTHER

## 2018-02-14 ASSESSMENT — ACTIVITIES OF DAILY LIVING (ADL): BATHING_REQUIRES_ASSISTANCE: 0

## 2018-02-14 ASSESSMENT — PATIENT HEALTH QUESTIONNAIRE - PHQ9: CLINICAL INTERPRETATION OF PHQ2 SCORE: 0

## 2018-02-14 ASSESSMENT — PAIN SCALES - GENERAL: PAINLEVEL: 1=MINIMAL PAIN

## 2018-02-14 NOTE — PROGRESS NOTES
Chief Complaint   Patient presents with   • Annual Wellness Visit         HPI:  Kemal is a 73 y.o. here for Medicare Annual Wellness Visit--patient feels well. He sees Dr. Choudhary for his history of prostate cancer and his right renal mass. Dr. Choudhary has been following his PSAs.  The patient is due for lab work otherwise.  He has no chest pains or shortness of breath.    Patient Active Problem List    Diagnosis Date Noted   • Cervical stenosis of spine 10/30/2012     Priority: Medium   • Anemia 08/17/2017   • Function kidney decreased 05/16/2017   • Herpes zoster without complication 11/15/2016   • Age-related nuclear cataract of eye 01/14/2016   • Right renal mass 01/11/2016   • Left wrist pain 05/20/2015   • Degeneration of cervical intervertebral disc 10/30/2012   • Cataracts, bilateral 01/13/2012   • ED (erectile dysfunction) 01/11/2011   • Prostate cancer (CMS-[facility]) 09/21/2010   • Nephrolithiasis 09/12/2010   • Glaucoma 09/12/2010   • GERD (gastroesophageal reflux disease) 09/12/2010   • Elevated blood sugar    • Mixed hyperlipidemia 05/21/2009   • Essential hypertension, benign 05/21/2009   • Calculus of kidney 05/21/2009   • Reflux esophagitis 05/21/2009   • Other abnormal blood chemistry 05/21/2009   • Proteinuria 05/21/2009       Current Outpatient Prescriptions   Medication Sig Dispense Refill   • promethazine-codeine (PHENERGAN-CODEINE) 6.25-10 MG/5ML Syrup Take 5 mL by mouth at bedtime as needed for Cough for up to 7 days. 120 mL 0   • benzonatate (TESSALON) 100 MG Cap Take 1 Cap by mouth 3 times a day as needed for Cough. 60 Cap 0   • glipiZIDE (GLUCOTROL) 5 MG Tab TAKE ONE TABLET BY MOUTH TWICE A  Tab 3   • omeprazole (PRILOSEC) 20 MG delayed-release capsule TAKE ONE CAPSULE BY MOUTH DAILY 90 Cap 1   • ezetimibe (ZETIA) 10 MG Tab TAKE ONE TABLET BY MOUTH DAILY 90 Tab 3   • atorvastatin (LIPITOR) 80 MG tablet Take 1 Tab by mouth every evening. 90 Tab 3   • lisinopril (PRINIVIL, ZESTRIL) 40  MG tablet TAKE ONE TABLET BY MOUTH DAILY 90 Tab 3   • valacyclovir (VALTREX) 1 GM Tab Take 1 Tab by mouth 3 times a day. 21 Tab 0   • trolamine salicylate (ASPERCREME/ALOE) 10 % cream Apply 1 Squirt to affected area(s) 4 times a day. 1 Tube 3   • potassium citrate SR (UROCIT-K SR) 10 MEQ (1080 MG) TBCR Take 10 mEq by mouth every day.     • Multiple Vitamins-Minerals (CENTRUM SILVER PO) Take  by mouth.     • Brimonidine Tartrate-Timolol (COMBIGAN) 0.2-0.5 % SOLN 1 Drop by Ophthalmic route 2 times a day.     • latanoprost (XALATAN) 0.005 % SOLN Place 1 Drop in both eyes every evening.     • ACETAZOLAMIDE 125 MG PO TABS Take 125 mg by mouth every day.     • azithromycin (ZITHROMAX) 250 MG Tab 2 tablets on day one, then 1 tablet PO daily until done. 6 Tab 0   • nortriptyline (PAMELOR) 10 MG Cap Take 1 Cap by mouth every bedtime. 30 Cap 3     No current facility-administered medications for this visit.         Patient is taking medications as noted in medication list.  Current supplements as per medication list.     Allergies: Nkda [no known drug allergy]    Current social contact/activities: go out to dinner with friends     Is patient current with immunizations? Yes.    He  reports that he quit smoking about 41 years ago. He has a 2.00 pack-year smoking history. He has never used smokeless tobacco. He reports that he drinks about 1.2 oz of alcohol per week . He reports that he does not use drugs.  Counseling given: Not Answered        DPA/Advanced directive: Patient has Advanced Directive on file.     ROS:    Gait: Uses no assistive device   Ostomy: no   Other tubes: no   Amputations: no   Chronic oxygen use no   Last eye exam 2/2018   Wears hearing aids: no   : Reports urinary leakage during the last 6 months that has not interfered at all with their daily activities or sleep.      Screening:    Depression Screening    Little interest or pleasure in doing things?  0 - not at all  Feeling down, depressed, or  hopeless? 0 - not at all  Patient Health Questionnaire Score: 0    If depressive symptoms identified deferred to follow up visit unless specifically addressed in assessment and plan.    Interpretation of PHQ-9 Total Score   Score Severity   1-4 No Depression   5-9 Mild Depression   10-14 Moderate Depression   15-19 Moderately Severe Depression   20-27 Severe Depression    Screening for Cognitive Impairment    Three Minute Recall (apple, watch, deejay)  3/3 Village, kitchen, baby  Draw clock face with all 12 numbers set to the hand to show 10 minutes past 11 o'clock  1 /5 time 3:40  If cognitive concerns identified, deferred for follow up unless specifically addressed in assessment and plan.    Fall Risk Assessment    Has the patient had two or more falls in the last year or any fall with injury in the last year?  No  If fall risk identified, deferred for follow up unless specifically addressed in assessment and plan.    Safety Assessment    Throw rugs on floor.  No  Handrails on all stairs.  Yes  Good lighting in all hallways.  Yes  Difficulty hearing.  No  Patient counseled about all safety risks that were identified.    Functional Assessment ADLs    Are there any barriers preventing you from cooking for yourself or meeting nutritional needs?  No.    Are there any barriers preventing you from driving safely or obtaining transportation?  No.    Are there any barriers preventing you from using a telephone or calling for help?  No.    Are there any barriers preventing you from shopping?  No.    Are there any barriers preventing you from taking care of your own finances?  No.    Are there any barriers preventing you from managing your medications?  No.    Are there any barriers preventing you from showering/bathing yourself?  No.    Are you currently engaging any exercise or physical activity?  No.       Health Maintenance Summary                Annual Wellness Visit Overdue 1945     IMM DTaP/Tdap/Td Vaccine  Overdue 9/2/2008      Done 9/1/2008 Imm Admin: TD Vaccine    IMM INFLUENZA Postponed 2/16/2018 Originally 9/1/2017. Provider advises postponing for medical reasons     Done 12/14/2016 Imm Admin: Influenza Vaccine Adult HD     Patient has more history with this topic...    COLONOSCOPY Next Due 2/9/2020      Done 2/9/2010 AMB REFERRAL TO GI FOR COLONOSCOPY          Patient Care Team:  Carlos Petesr M.D. as PCP - General  Alfonso Hernandez M.D. as Consulting Physician (Ophthalmology)  James Choudhary M.D. as Consulting Physician (Urology)    Social History   Substance Use Topics   • Smoking status: Former Smoker     Packs/day: 0.20     Years: 10.00     Quit date: 1/1/1977   • Smokeless tobacco: Never Used      Comment: 1/3 ppd for 10 years, quit 1977   • Alcohol use 1.2 oz/week     2 Cans of beer per week      Comment: 1-2 per day     Family History   Problem Relation Age of Onset   • Diabetes Mother    • Stroke Mother    • Alzheimer's Disease Father      He  has a past medical history of Anesthesia; Arthritis; Breath shortness; Bronchitis (1/15); Cancer (CMS-HCC); CATARACT; Cervical stenosis of spine; Degeneration of cervical intervertebral disc; Dental disorder; Diabetes (CMS-HCC); ED (erectile dysfunction); Elevated blood sugar; Elevated BP; Elevated cholesterol; GERD (gastroesophageal reflux disease); Glaucoma; Heart burn; High cholesterol; Hypertension; Indigestion; Renal cancer (CMS-HCC); Renal disorder; Unspecified urinary incontinence; and wrist pain (5/20/2015).   Past Surgical History:   Procedure Laterality Date   • CATARACT PHACO WITH IOL Right 1/28/2016    Procedure: CATARACT PHACO WITH IOL;  Surgeon: Alfonso Hernandez M.D.;  Location: SURGERY SURGICAL [facility] ORS;  Service:    • CATARACT PHACO WITH IOL Left 1/14/2016    Procedure: CATARACT PHACO WITH IOL;  Surgeon: Alfonso Hernandez M.D.;  Location: SURGERY SURGICAL [facility] ORS;  Service:    • CERVICAL DISK AND FUSION ANTERIOR  10/30/2012     "Performed by Selvin Ying M.D. at SURGERY [facility]   • PROSTATECTOMY, RADICAL RETRO  4/21/2010    Performed by EDYTA KUMAR at SURGERY [facility]   • NODE DISSECTION  4/21/2010    Performed by EDYTA KUMAR at SURGERY [facility]   • CARPAL TUNNEL RELEASE  1990    Bilateral   • TRIGGER FINGER RELEASE  1990    Right small finger   • TONSILLECTOMY AND ADENOIDECTOMY  1953   • APPENDECTOMY     • PB REMOVAL OF KIDNEY STONE  1986/1998    x2           Exam:     Blood pressure 124/76, pulse 77, temperature 36.3 °C (97.3 °F), height 1.702 m (5' 7\"), weight 79.4 kg (175 lb), SpO2 97 %. Body mass index is 27.41 kg/m².    Hearing fair.    Dentition good  Alert, oriented in no acute distress.  Eye contact is good, speech goal directed, affect calm    Physical Exam   Constitutional: He is oriented. He appears well-developed and well-nourished. No distress.   HENT:   Head: Normocephalic and atraumatic.   Right Ear: External ear normal. Ear canal and TM normal   Left Ear: External ear normal. Ear canal and TM normal   Nose: Nose normal.   Mouth/Throat: Oropharynx is clear and moist.   Eyes: Conjunctivae and extraocular motions are normal. Pupils are equal, round, and reactive to light.        Fundi benign bilaterally   Neck: No thyromegaly present.   Cardiovascular: Normal rate, regular rhythm, normal heart sounds and intact distal pulses.  Exam reveals no gallop.    No murmur heard.  Pulmonary/Chest: Effort normal and breath sounds normal. No respiratory distress. He has no wheezes. He has no rales.   Abdominal: Soft. Bowel sounds are normal. No hepatosplenomegaly. He exhibits no distension. No tenderness. He has no rebound and no guarding.   Musculoskeletal: Normal range of motion. He exhibits no edema and no tenderness.   Lymphadenopathy:     He has no cervical adenopathy.  No supraclavicular adenopathy.   Neurological: He is alert and oriented. He has normal reflexes.        Babinskis downgoing bilaterally  " The patient has intact sensation to monofilament light touch over both feet. No sores or ulcers are noted over the feet.    Skin: Skin is warm and dry. No rash noted. No erythema.   Psychiatric: He has a normal mood and appropriate affect. His behavior is normal. Judgment and thought content normal.     1. Cervical stenosis of spine   Doing well improved.    2. Mixed hyperlipidemia --needs reassessment  COMP METABOLIC PANEL    LIPID PROFILE   3. Essential hypertension, benign--needs reassessment  CBC WITH DIFFERENTIAL    COMP METABOLIC PANEL    LIPID PROFILE    URINALYSIS,CULTURE IF INDICATED    lisinopril (PRINIVIL, ZESTRIL) 40 MG tablet   4. Prostate cancer (CMS-[facility])   Continue to see Dr. Choudhary    5. Right renal mass   Continue to see Dr. Choudhary    6. Vitamin D deficiency  VITAMIN D 25-HYDROXY         Assessment and Plan. The following treatment and monitoring plan is recommended--see above      Services suggested: No services needed at this time  Health Care Screening recommendations as per orders if indicated.  Referrals offered: PT/OT/Nutrition counseling/Behavioral Health/Smoking cessation as per orders if indicated.    Discussion today about general wellness and lifestyle habits:    · Prevent falls and reduce trip hazards; Cautioned about securing or removing rugs.  · Have a working fire alarm and carbon monoxide detector;   · Engage in regular physical activity and social activities       Follow-up: Recheck one year or when necessary    Please note that this dictation was created using voice recognition software. I have worked with consultants from the vendor as well as technical experts from [facility] [email or web address] to optimize the interface. I have made every reasonable attempt to correct obvious errors, but I expect that there are errors of grammar and possibly content that I did not discover before finalizing the note.

## 2018-02-15 ENCOUNTER — HOSPITAL ENCOUNTER (OUTPATIENT)
Dept: LAB | Facility: MEDICAL CENTER | Age: 73
End: 2018-02-15
Attending: FAMILY MEDICINE
Payer: MEDICARE

## 2018-02-15 DIAGNOSIS — Z20.828 EXPOSURE TO THE FLU: ICD-10-CM

## 2018-02-15 DIAGNOSIS — E78.2 MIXED HYPERLIPIDEMIA: ICD-10-CM

## 2018-02-15 DIAGNOSIS — I10 ESSENTIAL HYPERTENSION, BENIGN: ICD-10-CM

## 2018-02-15 LAB
25(OH)D3 SERPL-MCNC: 24 NG/ML (ref 30–100)
ALBUMIN SERPL BCP-MCNC: 4.4 G/DL (ref 3.2–4.9)
ALBUMIN/GLOB SERPL: 1.4 G/DL
ALP SERPL-CCNC: 92 U/L (ref 30–99)
ALT SERPL-CCNC: 32 U/L (ref 2–50)
ANION GAP SERPL CALC-SCNC: 6 MMOL/L (ref 0–11.9)
APPEARANCE UR: CLEAR
AST SERPL-CCNC: 24 U/L (ref 12–45)
BASOPHILS # BLD AUTO: 0.2 % (ref 0–1.8)
BASOPHILS # BLD: 0.01 K/UL (ref 0–0.12)
BILIRUB SERPL-MCNC: 0.3 MG/DL (ref 0.1–1.5)
BILIRUB UR QL STRIP.AUTO: NEGATIVE
BUN SERPL-MCNC: 26 MG/DL (ref 8–22)
CALCIUM SERPL-MCNC: 9.4 MG/DL (ref 8.5–10.5)
CHLORIDE SERPL-SCNC: 109 MMOL/L (ref 96–112)
CHOLEST SERPL-MCNC: 117 MG/DL (ref 100–199)
CO2 SERPL-SCNC: 25 MMOL/L (ref 20–33)
COLOR UR: YELLOW
CREAT SERPL-MCNC: 1.4 MG/DL (ref 0.5–1.4)
CULTURE IF INDICATED INDCX: NO UA CULTURE
EOSINOPHIL # BLD AUTO: 0.04 K/UL (ref 0–0.51)
EOSINOPHIL NFR BLD: 0.8 % (ref 0–6.9)
ERYTHROCYTE [DISTWIDTH] IN BLOOD BY AUTOMATED COUNT: 41.1 FL (ref 35.9–50)
GLOBULIN SER CALC-MCNC: 3.1 G/DL (ref 1.9–3.5)
GLUCOSE SERPL-MCNC: 70 MG/DL (ref 65–99)
GLUCOSE UR STRIP.AUTO-MCNC: NEGATIVE MG/DL
HCT VFR BLD AUTO: 40.7 % (ref 42–52)
HDLC SERPL-MCNC: 28 MG/DL
HGB BLD-MCNC: 12.5 G/DL (ref 14–18)
IMM GRANULOCYTES # BLD AUTO: 0.03 K/UL (ref 0–0.11)
IMM GRANULOCYTES NFR BLD AUTO: 0.6 % (ref 0–0.9)
KETONES UR STRIP.AUTO-MCNC: NEGATIVE MG/DL
LDLC SERPL CALC-MCNC: 50 MG/DL
LEUKOCYTE ESTERASE UR QL STRIP.AUTO: NEGATIVE
LYMPHOCYTES # BLD AUTO: 2.69 K/UL (ref 1–4.8)
LYMPHOCYTES NFR BLD: 54.3 % (ref 22–41)
MCH RBC QN AUTO: 27.9 PG (ref 27–33)
MCHC RBC AUTO-ENTMCNC: 30.7 G/DL (ref 33.7–35.3)
MCV RBC AUTO: 90.8 FL (ref 81.4–97.8)
MICRO URNS: NORMAL
MONOCYTES # BLD AUTO: 1.08 K/UL (ref 0–0.85)
MONOCYTES NFR BLD AUTO: 21.8 % (ref 0–13.4)
NEUTROPHILS # BLD AUTO: 1.1 K/UL (ref 1.82–7.42)
NEUTROPHILS NFR BLD: 22.3 % (ref 44–72)
NITRITE UR QL STRIP.AUTO: NEGATIVE
NRBC # BLD AUTO: 0 K/UL
NRBC BLD-RTO: 0 /100 WBC
PH UR STRIP.AUTO: 7 [PH]
PLATELET # BLD AUTO: 149 K/UL (ref 164–446)
PMV BLD AUTO: 10.7 FL (ref 9–12.9)
POTASSIUM SERPL-SCNC: 5.1 MMOL/L (ref 3.6–5.5)
PROT SERPL-MCNC: 7.5 G/DL (ref 6–8.2)
PROT UR QL STRIP: NEGATIVE MG/DL
RBC # BLD AUTO: 4.48 M/UL (ref 4.7–6.1)
RBC UR QL AUTO: NEGATIVE
SODIUM SERPL-SCNC: 140 MMOL/L (ref 135–145)
SP GR UR STRIP.AUTO: 1.01
TRIGL SERPL-MCNC: 194 MG/DL (ref 0–149)
UROBILINOGEN UR STRIP.AUTO-MCNC: 0.2 MG/DL
WBC # BLD AUTO: 5 K/UL (ref 4.8–10.8)

## 2018-02-15 PROCEDURE — 82306 VITAMIN D 25 HYDROXY: CPT

## 2018-02-15 PROCEDURE — 81003 URINALYSIS AUTO W/O SCOPE: CPT

## 2018-02-15 PROCEDURE — 80053 COMPREHEN METABOLIC PANEL: CPT

## 2018-02-15 PROCEDURE — 80061 LIPID PANEL: CPT

## 2018-02-15 PROCEDURE — 36415 COLL VENOUS BLD VENIPUNCTURE: CPT

## 2018-02-15 PROCEDURE — 85025 COMPLETE CBC W/AUTO DIFF WBC: CPT

## 2018-02-15 RX ORDER — OSELTAMIVIR PHOSPHATE 75 MG/1
75 CAPSULE ORAL DAILY
Qty: 10 CAP | Refills: 0 | Status: SHIPPED | OUTPATIENT
Start: 2018-02-15 | End: 2019-01-29

## 2018-02-18 DIAGNOSIS — R79.89 LOW VITAMIN D LEVEL: ICD-10-CM

## 2018-02-18 RX ORDER — MELATONIN
2000 DAILY
Qty: 100 TAB | Refills: 3
Start: 2018-02-18

## 2018-02-20 ENCOUNTER — TELEPHONE (OUTPATIENT)
Dept: MEDICAL GROUP | Facility: PHYSICIAN GROUP | Age: 73
End: 2018-02-20

## 2018-02-20 NOTE — TELEPHONE ENCOUNTER
----- Message from Carlos Peters M.D. sent at 2/18/2018  9:43 PM PST -----  Dear Kemal  Your chem panel and cholesterol look good.  Your blood count shows that your past mild anemia is improving.  Your urine is normal.  Your Vitamin D is still low. Begin OTC Vitamin D 1000 units, 2 pills a day and recheck Vitamin D level in 4 months.    Carlos Peters M.D.

## 2018-02-26 ENCOUNTER — PATIENT OUTREACH (OUTPATIENT)
Dept: HEALTH INFORMATION MANAGEMENT | Facility: OTHER | Age: 73
End: 2018-02-26

## 2018-04-26 ENCOUNTER — HOSPITAL ENCOUNTER (OUTPATIENT)
Dept: LAB | Facility: MEDICAL CENTER | Age: 73
End: 2018-04-26
Attending: UROLOGY
Payer: MEDICARE

## 2018-04-26 LAB — PSA SERPL-MCNC: 0.08 NG/ML (ref 0–4)

## 2018-04-26 PROCEDURE — 84153 ASSAY OF PSA TOTAL: CPT

## 2018-04-26 PROCEDURE — 36415 COLL VENOUS BLD VENIPUNCTURE: CPT

## 2018-04-26 PROCEDURE — 84403 ASSAY OF TOTAL TESTOSTERONE: CPT

## 2018-04-26 PROCEDURE — 84270 ASSAY OF SEX HORMONE GLOBUL: CPT

## 2018-04-28 LAB
SHBG SERPL-SCNC: 33 NMOL/L (ref 11–80)
TESTOST FREE MFR SERPL: 1.7 % (ref 1.6–2.9)
TESTOST FREE SERPL-MCNC: 18 PG/ML (ref 47–244)
TESTOST SERPL-MCNC: 105 NG/DL (ref 300–720)

## 2018-05-02 DIAGNOSIS — K21.9 GASTROESOPHAGEAL REFLUX DISEASE WITHOUT ESOPHAGITIS: ICD-10-CM

## 2018-05-02 RX ORDER — OMEPRAZOLE 20 MG/1
CAPSULE, DELAYED RELEASE ORAL
Qty: 90 CAP | Refills: 3 | Status: SHIPPED | OUTPATIENT
Start: 2018-05-02 | End: 2019-05-10 | Stop reason: SDUPTHER

## 2018-05-31 NOTE — PROGRESS NOTES
Attempt #:Final    WebIZ Checked & Epic Updated: yes  Annual Wellness Visit Scheduling  1. Scheduling Status:Not scheduled/ Pt has a FV scheduled before June 30 the      Care Gap Scheduling (Attempt to Schedule EACH Overdue Care Gap!)  Health Maintenance Due   Topic Date Due   • Annual Wellness Visit  1945   • IMM DTaP/Tdap/Td Vaccine (1 - Tdap) 09/02/2008 // Scheduled       MyChart Activation: already active/ Pt already complete info with [facility].

## 2018-06-18 DIAGNOSIS — E78.2 MIXED HYPERLIPIDEMIA: ICD-10-CM

## 2018-06-19 RX ORDER — ATORVASTATIN CALCIUM 80 MG/1
TABLET, FILM COATED ORAL
Qty: 90 TAB | Refills: 2 | Status: SHIPPED | OUTPATIENT
Start: 2018-06-19 | End: 2019-03-11 | Stop reason: SDUPTHER

## 2018-06-20 ENCOUNTER — TELEPHONE (OUTPATIENT)
Dept: MEDICAL GROUP | Facility: PHYSICIAN GROUP | Age: 73
End: 2018-06-20

## 2018-06-20 DIAGNOSIS — C61 PROSTATE CANCER (HCC): ICD-10-CM

## 2018-06-20 DIAGNOSIS — E78.2 MIXED HYPERLIPIDEMIA: ICD-10-CM

## 2018-06-20 DIAGNOSIS — D64.9 ANEMIA, UNSPECIFIED TYPE: ICD-10-CM

## 2018-06-20 DIAGNOSIS — R73.9 ELEVATED BLOOD SUGAR: ICD-10-CM

## 2018-06-20 DIAGNOSIS — I10 ESSENTIAL HYPERTENSION, BENIGN: ICD-10-CM

## 2018-06-20 NOTE — TELEPHONE ENCOUNTER
Fasting labs have been ordered; please be sure patient is to do these at least 48 hours prior to appointment    Any Simpson D.O.   Covering for Carlos Peters M.D.

## 2018-06-20 NOTE — TELEPHONE ENCOUNTER
VOICEMAIL  1. Caller Name: Ron                      Call Back Number: [phone]    2. Message: Patient is scheduled to see  6/27/18 and is requesting lab orders to have done prior? Please let patient know.     3. Patient approves office to leave a detailed voicemail/MyChart message: N\A

## 2018-06-22 ENCOUNTER — HOSPITAL ENCOUNTER (OUTPATIENT)
Dept: LAB | Facility: MEDICAL CENTER | Age: 73
End: 2018-06-22
Attending: FAMILY MEDICINE
Payer: MEDICARE

## 2018-06-22 DIAGNOSIS — E78.2 MIXED HYPERLIPIDEMIA: ICD-10-CM

## 2018-06-22 DIAGNOSIS — C61 PROSTATE CANCER (HCC): ICD-10-CM

## 2018-06-22 DIAGNOSIS — D64.9 ANEMIA, UNSPECIFIED TYPE: ICD-10-CM

## 2018-06-22 DIAGNOSIS — R73.9 ELEVATED BLOOD SUGAR: ICD-10-CM

## 2018-06-22 DIAGNOSIS — I10 ESSENTIAL HYPERTENSION, BENIGN: ICD-10-CM

## 2018-06-22 LAB
ALBUMIN SERPL BCP-MCNC: 4.5 G/DL (ref 3.2–4.9)
ALBUMIN/GLOB SERPL: 1.5 G/DL
ALP SERPL-CCNC: 76 U/L (ref 30–99)
ALT SERPL-CCNC: 19 U/L (ref 2–50)
ANION GAP SERPL CALC-SCNC: 6 MMOL/L (ref 0–11.9)
AST SERPL-CCNC: 21 U/L (ref 12–45)
BASOPHILS # BLD AUTO: 0.2 % (ref 0–1.8)
BASOPHILS # BLD: 0.01 K/UL (ref 0–0.12)
BILIRUB SERPL-MCNC: 0.4 MG/DL (ref 0.1–1.5)
BUN SERPL-MCNC: 37 MG/DL (ref 8–22)
CALCIUM SERPL-MCNC: 9.8 MG/DL (ref 8.5–10.5)
CHLORIDE SERPL-SCNC: 110 MMOL/L (ref 96–112)
CHOLEST SERPL-MCNC: 127 MG/DL (ref 100–199)
CO2 SERPL-SCNC: 24 MMOL/L (ref 20–33)
CREAT SERPL-MCNC: 1.64 MG/DL (ref 0.5–1.4)
CREAT UR-MCNC: 63.5 MG/DL
EOSINOPHIL # BLD AUTO: 0.02 K/UL (ref 0–0.51)
EOSINOPHIL NFR BLD: 0.4 % (ref 0–6.9)
ERYTHROCYTE [DISTWIDTH] IN BLOOD BY AUTOMATED COUNT: 41.1 FL (ref 35.9–50)
EST. AVERAGE GLUCOSE BLD GHB EST-MCNC: 140 MG/DL
GLOBULIN SER CALC-MCNC: 3 G/DL (ref 1.9–3.5)
GLUCOSE SERPL-MCNC: 68 MG/DL (ref 65–99)
HBA1C MFR BLD: 6.5 % (ref 0–5.6)
HCT VFR BLD AUTO: 40.2 % (ref 42–52)
HDLC SERPL-MCNC: 34 MG/DL
HGB BLD-MCNC: 12.5 G/DL (ref 14–18)
IMM GRANULOCYTES # BLD AUTO: 0.03 K/UL (ref 0–0.11)
IMM GRANULOCYTES NFR BLD AUTO: 0.6 % (ref 0–0.9)
LDLC SERPL CALC-MCNC: 66 MG/DL
LYMPHOCYTES # BLD AUTO: 2.14 K/UL (ref 1–4.8)
LYMPHOCYTES NFR BLD: 39.9 % (ref 22–41)
MCH RBC QN AUTO: 28 PG (ref 27–33)
MCHC RBC AUTO-ENTMCNC: 31.1 G/DL (ref 33.7–35.3)
MCV RBC AUTO: 89.9 FL (ref 81.4–97.8)
MICROALBUMIN UR-MCNC: 4 MG/DL
MICROALBUMIN/CREAT UR: 63 MG/G (ref 0–30)
MONOCYTES # BLD AUTO: 1.09 K/UL (ref 0–0.85)
MONOCYTES NFR BLD AUTO: 20.3 % (ref 0–13.4)
NEUTROPHILS # BLD AUTO: 2.08 K/UL (ref 1.82–7.42)
NEUTROPHILS NFR BLD: 38.6 % (ref 44–72)
NRBC # BLD AUTO: 0 K/UL
NRBC BLD-RTO: 0 /100 WBC
PLATELET # BLD AUTO: 165 K/UL (ref 164–446)
PMV BLD AUTO: 10.8 FL (ref 9–12.9)
POTASSIUM SERPL-SCNC: 5.7 MMOL/L (ref 3.6–5.5)
PROT SERPL-MCNC: 7.5 G/DL (ref 6–8.2)
PSA SERPL-MCNC: 0.1 NG/ML (ref 0–4)
RBC # BLD AUTO: 4.47 M/UL (ref 4.7–6.1)
SODIUM SERPL-SCNC: 140 MMOL/L (ref 135–145)
TRIGL SERPL-MCNC: 136 MG/DL (ref 0–149)
WBC # BLD AUTO: 5.4 K/UL (ref 4.8–10.8)

## 2018-06-22 PROCEDURE — 82570 ASSAY OF URINE CREATININE: CPT

## 2018-06-22 PROCEDURE — 80053 COMPREHEN METABOLIC PANEL: CPT

## 2018-06-22 PROCEDURE — 82043 UR ALBUMIN QUANTITATIVE: CPT

## 2018-06-22 PROCEDURE — 36415 COLL VENOUS BLD VENIPUNCTURE: CPT

## 2018-06-22 PROCEDURE — 80061 LIPID PANEL: CPT

## 2018-06-22 PROCEDURE — 84153 ASSAY OF PSA TOTAL: CPT

## 2018-06-22 PROCEDURE — 83036 HEMOGLOBIN GLYCOSYLATED A1C: CPT

## 2018-06-22 PROCEDURE — 85025 COMPLETE CBC W/AUTO DIFF WBC: CPT

## 2018-06-27 ENCOUNTER — OFFICE VISIT (OUTPATIENT)
Dept: MEDICAL GROUP | Facility: PHYSICIAN GROUP | Age: 73
End: 2018-06-27
Payer: MEDICARE

## 2018-06-27 VITALS
SYSTOLIC BLOOD PRESSURE: 120 MMHG | HEIGHT: 67 IN | TEMPERATURE: 97.7 F | DIASTOLIC BLOOD PRESSURE: 64 MMHG | WEIGHT: 173 LBS | BODY MASS INDEX: 27.15 KG/M2 | RESPIRATION RATE: 16 BRPM | OXYGEN SATURATION: 95 %

## 2018-06-27 DIAGNOSIS — C61 PROSTATE CANCER (HCC): ICD-10-CM

## 2018-06-27 DIAGNOSIS — I10 ESSENTIAL HYPERTENSION, BENIGN: ICD-10-CM

## 2018-06-27 DIAGNOSIS — N28.9 FUNCTION KIDNEY DECREASED: ICD-10-CM

## 2018-06-27 DIAGNOSIS — D64.9 ANEMIA, UNSPECIFIED TYPE: ICD-10-CM

## 2018-06-27 DIAGNOSIS — Z23 NEED FOR TDAP VACCINATION: ICD-10-CM

## 2018-06-27 PROCEDURE — 99213 OFFICE O/P EST LOW 20 MIN: CPT | Performed by: FAMILY MEDICINE

## 2018-06-27 NOTE — PROGRESS NOTES
Patient comes in for follow-up on his recent labs.  We have tried vitamins and iron for his anemia.  He feels well.  He does not really have any symptoms.  He is taking supplemental vitamin D as well.  He is due to have a great grandchild born in the next couple of months.  He needs a Tdap update.  He continues to see his urologist for his history of prostate cancer and for the right renal mass.  The right renal mass is stable.    I reviewed the following    Past Medical History:   Diagnosis Date   • Anemia 2015     Due to low testosterone from hormone treatment for prostate cancer as per [facility] evaluation.   • Anesthesia     Low BP; nausea; hard time waking   • Arthritis     spine and wrists   • Breath shortness     activity induced   • Bronchitis 1/15   • Cancer (HCC)     prostate- removed '09; CA returned in pocket- radiation therapy done   • CATARACT    • Cervical stenosis of spine    • Degeneration of cervical intervertebral disc    • Dental disorder     upper dentures   • Diabetes (HCC)    • ED (erectile dysfunction)    • Elevated blood sugar    • Elevated BP    • Elevated cholesterol    • GERD (gastroesophageal reflux disease)    • Glaucoma    • Heart burn    • High cholesterol    • Hypertension    • Indigestion    • Renal cancer (HCC)     Had treatment done- resolved per CAT scan   • Renal disorder     stones   • Unspecified urinary incontinence    • wrist pain 5/20/2015    bilat wrist pain        Past Surgical History:   Procedure Laterality Date   • CATARACT PHACO WITH IOL Right 1/28/2016    Procedure: CATARACT PHACO WITH IOL;  Surgeon: Alfonso Hernandez M.D.;  Location: [facility];  Service:    • CATARACT PHACO WITH IOL Left 1/14/2016    Procedure: CATARACT PHACO WITH IOL;  Surgeon: Alfonso Hernandez M.D.;  Location: [facility];  Service:    • CERVICAL DISK AND FUSION ANTERIOR  10/30/2012    Performed by Selvin Ying M.D. at [facility]   •  PROSTATECTOMY, RADICAL RETRO  4/21/2010    Performed by EDYTA KUMAR at SURGERY [facility] ORS   • NODE DISSECTION  4/21/2010    Performed by EDYTA KUMAR at SURGERY [facility]   • CARPAL TUNNEL RELEASE  1990    Bilateral   • TRIGGER FINGER RELEASE  1990    Right small finger   • TONSILLECTOMY AND ADENOIDECTOMY  1953   • APPENDECTOMY     • PB REMOVAL OF KIDNEY STONE  1986/1998    x2       Allergies   Allergen Reactions   • Nkda [No Known Drug Allergy]        Current Outpatient Prescriptions   Medication Sig Dispense Refill   • tetanus-dipth-acell pertussis (ADACEL) 5-2-15.5 LF-MCG/0.5 Suspension 0.5 mL by Intramuscular route Once PRN (Give 1 Vial Tdap IM  Z23). 1 Vial 0   • atorvastatin (LIPITOR) 80 MG tablet TAKE ONE TABLET BY MOUTH EVERY EVENING 90 Tab 2   • omeprazole (PRILOSEC) 20 MG delayed-release capsule TAKE ONE CAPSULE BY MOUTH DAILY 90 Cap 3   • vitamin D3, cholecalciferol, 1000 UNIT Tab Take 2 Tabs by mouth every day. 100 Tab 3   • lisinopril (PRINIVIL, ZESTRIL) 40 MG tablet TAKE ONE TABLET BY MOUTH DAILY 90 Tab 3   • glipiZIDE (GLUCOTROL) 5 MG Tab TAKE ONE TABLET BY MOUTH TWICE A  Tab 3   • potassium citrate SR (UROCIT-K SR) 10 MEQ (1080 MG) TBCR Take 10 mEq by mouth every day.     • Multiple Vitamins-Minerals (CENTRUM SILVER PO) Take  by mouth.     • Brimonidine Tartrate-Timolol (COMBIGAN) 0.2-0.5 % SOLN 1 Drop by Ophthalmic route 2 times a day.     • latanoprost (XALATAN) 0.005 % SOLN Place 1 Drop in both eyes every evening.     • ACETAZOLAMIDE 125 MG PO TABS Take 125 mg by mouth every day.     • oseltamivir (TAMIFLU) 75 MG Cap Take 1 Cap by mouth every day. (Patient not taking: Reported on 6/27/2018) 10 Cap 0   • azithromycin (ZITHROMAX) 250 MG Tab 2 tablets on day one, then 1 tablet PO daily until done. 6 Tab 0   • benzonatate (TESSALON) 100 MG Cap Take 1 Cap by mouth 3 times a day as needed for Cough. (Patient not taking: Reported on 6/27/2018) 60 Cap 0   • ezetimibe (ZETIA) 10 MG  Tab TAKE ONE TABLET BY MOUTH DAILY (Patient not taking: Reported on 6/27/2018) 90 Tab 3   • valacyclovir (VALTREX) 1 GM Tab Take 1 Tab by mouth 3 times a day. 21 Tab 0   • trolamine salicylate (ASPERCREME/ALOE) 10 % cream Apply 1 Squirt to affected area(s) 4 times a day. 1 Tube 3     No current facility-administered medications for this visit.         Family History   Problem Relation Age of Onset   • Diabetes Mother    • Stroke Mother    • Alzheimer's Disease Father        Social History     Social History   • Marital status:      Spouse name: N/A   • Number of children: N/A   • Years of education: N/A     Occupational History   • retired [employer] manager      Social History Main Topics   • Smoking status: Former Smoker     Packs/day: 0.20     Years: 10.00     Quit date: 1/1/1977   • Smokeless tobacco: Never Used      Comment: 1/3 ppd for 10 years, quit 1977   • Alcohol use 1.2 oz/week     2 Cans of beer per week      Comment: 1-2 per day   • Drug use: No   • Sexual activity: Not Currently     Partners: Female     Other Topics Concern   • Not on file     Social History Narrative   • No narrative on file      Hospital Outpatient Visit on 06/22/2018   Component Date Value   • WBC 06/22/2018 5.4    • RBC 06/22/2018 4.47*   • Hemoglobin 06/22/2018 12.5*   • Hematocrit 06/22/2018 40.2*   • MCV 06/22/2018 89.9    • MCH 06/22/2018 28.0    • MCHC 06/22/2018 31.1*   • RDW 06/22/2018 41.1    • Platelet Count 06/22/2018 165    • MPV 06/22/2018 10.8    • Neutrophils-Polys 06/22/2018 38.60*   • Lymphocytes 06/22/2018 39.90    • Monocytes 06/22/2018 20.30*   • Eosinophils 06/22/2018 0.40    • Basophils 06/22/2018 0.20    • Immature Granulocytes 06/22/2018 0.60    • Nucleated RBC 06/22/2018 0.00    • Neutrophils (Absolute) 06/22/2018 2.08    • Lymphs (Absolute) 06/22/2018 2.14    • Monos (Absolute) 06/22/2018 1.09*   • Eos (Absolute) 06/22/2018 0.02    • Baso (Absolute) 06/22/2018 0.01    • Immature Granulocytes (a*  06/22/2018 0.03    • NRBC (Absolute) 06/22/2018 0.00    • Sodium 06/22/2018 140    • Potassium 06/22/2018 5.7*   • Chloride 06/22/2018 110    • Co2 06/22/2018 24    • Anion Gap 06/22/2018 6.0    • Glucose 06/22/2018 68    • Bun 06/22/2018 37*   • Creatinine 06/22/2018 1.64*   • Calcium 06/22/2018 9.8    • AST(SGOT) 06/22/2018 21    • ALT(SGPT) 06/22/2018 19    • Alkaline Phosphatase 06/22/2018 76    • Total Bilirubin 06/22/2018 0.4    • Albumin 06/22/2018 4.5    • Total Protein 06/22/2018 7.5    • Globulin 06/22/2018 3.0    • A-G Ratio 06/22/2018 1.5    • Cholesterol,Tot 06/22/2018 127    • Triglycerides 06/22/2018 136    • HDL 06/22/2018 34*   • LDL 06/22/2018 66    • Creatinine, Urine 06/22/2018 63.50    • Microalbumin, Urine Rand* 06/22/2018 4.0    • Micro Alb Creat Ratio 06/22/2018 63*   • Glycohemoglobin 06/22/2018 6.5*   • Est Avg Glucose 06/22/2018 140    • Prostatic Specific Antig* 06/22/2018 0.10    • GFR If  06/22/2018 50*   • GFR If Non  Ameri* 06/22/2018 41*     Physical Exam   Constitutional: He is oriented. He appears well-developed and well-nourished. No distress.   HENT:   Head: Normocephalic and atraumatic.   Right Ear: External ear normal. Ear canal and TM normal   Left Ear: External ear normal. Ear canal and TM normal   Nose: Nose normal.   Mouth/Throat: Oropharynx is clear and moist.   Eyes: Conjunctivae and extraocular motions are normal. Pupils are equal, round, and reactive to light.        Fundi benign bilaterally   Neck: No thyromegaly present.   Cardiovascular: Normal rate, regular rhythm, normal heart sounds and intact distal pulses.  Exam reveals no gallop.    No murmur heard.  Pulmonary/Chest: Effort normal and breath sounds normal. No respiratory distress. He has no wheezes. He has no rales.   Abdominal: Soft. Bowel sounds are normal. No hepatosplenomegaly. He exhibits no distension. No tenderness. He has no rebound and no guarding.   Musculoskeletal: Normal range  of motion. He exhibits no edema and no tenderness.   Lymphadenopathy:     He has no cervical adenopathy.  No supraclavicular adenopathy.   Neurological: He is alert and oriented. He has normal reflexes.        Babinskis downgoing bilaterally   Skin: Skin is warm and dry. No rash noted. No erythema.   Psychiatric: He has a normal mood and appropriate affect. His behavior is normal. Judgment and thought content normal.    1. Anemia, unspecified type   stable.  I do not think that the vitamins and iron did anything for this anemia but they are not hurting him either so I told him to continue taking the vitamins    Due to Low Testosterone Level as per Hematology at [facility]   2. Function kidney decreased   stable   3. Prostate cancer (HCC)   continue to see urologist   4. Essential hypertension, benign   controlled   5. Need for Tdap vaccination  tetanus-dipth-acell pertussis (ADACEL) 5-2-15.5 LF-MCG/0.5 Suspension--given prescription to have this done at an outside pharmacy     Recheck January 2019 or as needed    Please note that this dictation was created using voice recognition software. I have worked with consultants from the vendor as well as technical experts from [facility] to optimize the interface. I have made every reasonable attempt to correct obvious errors, but I expect that there are errors of grammar and possibly content that I did not discover before finalizing the note.

## 2018-10-22 DIAGNOSIS — E78.5 HYPERLIPIDEMIA, UNSPECIFIED HYPERLIPIDEMIA TYPE: ICD-10-CM

## 2018-10-22 NOTE — TELEPHONE ENCOUNTER
Phone Number Called: [phone] (home)       Message: lvm for pt to call and let us know if he is taking the zetia still    Left Message for patient to call back: yes

## 2018-10-24 RX ORDER — EZETIMIBE 10 MG/1
TABLET ORAL
Qty: 90 TAB | Refills: 0 | Status: SHIPPED | OUTPATIENT
Start: 2018-10-24 | End: 2019-01-17 | Stop reason: SDUPTHER

## 2018-10-24 NOTE — TELEPHONE ENCOUNTER
1. Caller Name: Kemal Mueller         Call Back Number: [phone] (home)         Patient approves a detailed voicemail message: yes    spoke to Sam he is still currently taking zetia

## 2018-10-24 NOTE — TELEPHONE ENCOUNTER
Requested Prescriptions     Signed Prescriptions Disp Refills   • ezetimibe (ZETIA) 10 MG Tab 90 Tab 0     Sig: TAKE ONE TABLET BY MOUTH DAILY     Authorizing Provider: FLORY IZAGUIRRE A.P.R.N.

## 2018-11-05 ENCOUNTER — HOSPITAL ENCOUNTER (OUTPATIENT)
Dept: LAB | Facility: MEDICAL CENTER | Age: 73
End: 2018-11-05
Attending: UROLOGY
Payer: MEDICARE

## 2018-11-05 LAB
BUN SERPL-MCNC: 40 MG/DL (ref 8–22)
CREAT SERPL-MCNC: 1.73 MG/DL (ref 0.5–1.4)
PSA SERPL-MCNC: 0.2 NG/ML (ref 0–4)

## 2018-11-05 PROCEDURE — 82565 ASSAY OF CREATININE: CPT

## 2018-11-05 PROCEDURE — 36415 COLL VENOUS BLD VENIPUNCTURE: CPT

## 2018-11-05 PROCEDURE — 84520 ASSAY OF UREA NITROGEN: CPT

## 2018-11-05 PROCEDURE — 84153 ASSAY OF PSA TOTAL: CPT

## 2018-11-14 ENCOUNTER — HOSPITAL ENCOUNTER (OUTPATIENT)
Dept: RADIOLOGY | Facility: MEDICAL CENTER | Age: 73
End: 2018-11-14
Attending: UROLOGY
Payer: MEDICARE

## 2018-11-14 VITALS — WEIGHT: 175 LBS | BODY MASS INDEX: 27.47 KG/M2 | HEIGHT: 67 IN

## 2018-11-14 DIAGNOSIS — Z85.528 PERSONAL HISTORY OF RENAL CANCER: ICD-10-CM

## 2018-11-14 PROCEDURE — 700117 HCHG RX CONTRAST REV CODE 255: Performed by: UROLOGY

## 2018-11-14 PROCEDURE — 74183 MRI ABD W/O CNTR FLWD CNTR: CPT

## 2018-11-14 PROCEDURE — 700102 HCHG RX REV CODE 250 W/ 637 OVERRIDE(OP)

## 2018-11-14 PROCEDURE — A9585 GADOBUTROL INJECTION: HCPCS | Performed by: UROLOGY

## 2018-11-14 PROCEDURE — A9270 NON-COVERED ITEM OR SERVICE: HCPCS

## 2018-11-14 RX ORDER — DIAZEPAM 5 MG/1
10 TABLET ORAL
Status: COMPLETED | OUTPATIENT
Start: 2018-11-14 | End: 2018-11-14

## 2018-11-14 RX ORDER — DIAZEPAM 5 MG/1
TABLET ORAL
Status: COMPLETED
Start: 2018-11-14 | End: 2018-11-14

## 2018-11-14 RX ORDER — GADOBUTROL 604.72 MG/ML
7.5 INJECTION INTRAVENOUS ONCE
Status: COMPLETED | OUTPATIENT
Start: 2018-11-14 | End: 2018-11-14

## 2018-11-14 RX ADMIN — GADOBUTROL 7.5 ML: 604.72 INJECTION INTRAVENOUS at 11:15

## 2018-11-14 RX ADMIN — DIAZEPAM 10 MG: 5 TABLET ORAL at 10:05

## 2018-11-14 NOTE — DISCHARGE INSTRUCTIONS
MRI ADULT DISCHARGE INSTRUCTIONS    You have been medicated today for your scan. Please follow the instructions below to ensure your safe recovery. If you have any questions or problems, feel free to call us at [phone] or [phone].     1.   Have someone stay with you to assist you as needed.    2.   Do not drive or operate any mechanical devices.    3.   Do not perform any activity that requires concentration. Make no major decisions over the next 24 hours.     4.   Be careful changing positions from laying down to sitting or standing, as you may become dizzy.     5.   Do not drink alcohol for 48 hours.    6.   There are no restrictions for eating your normal meals. Drink fluids.    7.   You may continue your usual medications for pain, tranquilizers, muscle relaxants or sedatives when awake.     8.   Tomorrow, you may continue your normal daily activities.     9.   Pressure dressing on 10 - 15 minutes. If swelling or bleeding occurs when removed, continue placing direct pressure on injection site for another 5 minutes, or until bleeding stops.     Diazepam (VALIUM) Oral solution  What is this medicine?  You were prescribed DIAZEPAM (dye AZ e radha) for the procedure you had today. This medication is a benzodiazepine. It is used to treat anxiety and nervousness. It also can help treat alcohol withdrawal, relax muscles, and treat certain types of seizures.  This medicine may be used for other purposes; ask your health care provider or pharmacist if you have questions.  What side effects may I notice from receiving this medicine?  Side effects that you should report to your doctor or health care professional as soon as possible:  • allergic reactions like skin rash, itching or hives, swelling of the face, lips, or tongue  • angry, confused, depressed, other mood changes  • breathing problems  • feeling faint or lightheaded, falls  • muscle cramps  • problems with balance, talking,  walking  • restlessness  • tremors  • trouble passing urine or change in the amount of urine  • unusually weak or tired  Side effects that usually do not require medical attention (report to your doctor or health care professional if they continue or are bothersome):  • difficulty sleeping, nightmares  • dizziness, drowsiness, clumsiness, or unsteadiness, a hangover effect  • headache  • nausea, vomiting  This list may not describe all possible side effects. Call your doctor for medical advice about side effects. You may report side effects to FDA at 6-772-PNX-7277.      I have been informed of and understand the above discharge instructions.

## 2018-12-26 ENCOUNTER — HOSPITAL ENCOUNTER (OUTPATIENT)
Dept: LAB | Facility: MEDICAL CENTER | Age: 73
End: 2018-12-26
Attending: INTERNAL MEDICINE
Payer: MEDICARE

## 2018-12-26 LAB
ALBUMIN SERPL BCP-MCNC: 4.4 G/DL (ref 3.2–4.9)
ALBUMIN/GLOB SERPL: 1.5 G/DL
ALP SERPL-CCNC: 77 U/L (ref 30–99)
ALT SERPL-CCNC: 20 U/L (ref 2–50)
ANION GAP SERPL CALC-SCNC: 6 MMOL/L (ref 0–11.9)
APPEARANCE UR: CLEAR
AST SERPL-CCNC: 18 U/L (ref 12–45)
BASOPHILS # BLD AUTO: 0.2 % (ref 0–1.8)
BASOPHILS # BLD: 0.01 K/UL (ref 0–0.12)
BILIRUB SERPL-MCNC: 0.4 MG/DL (ref 0.1–1.5)
BILIRUB UR QL STRIP.AUTO: NEGATIVE
BUN SERPL-MCNC: 32 MG/DL (ref 8–22)
CALCIUM SERPL-MCNC: 9.3 MG/DL (ref 8.5–10.5)
CHLORIDE SERPL-SCNC: 108 MMOL/L (ref 96–112)
CO2 SERPL-SCNC: 24 MMOL/L (ref 20–33)
COLOR UR: YELLOW
CREAT SERPL-MCNC: 1.7 MG/DL (ref 0.5–1.4)
CREAT UR-MCNC: 90.7 MG/DL
EOSINOPHIL # BLD AUTO: 0.03 K/UL (ref 0–0.51)
EOSINOPHIL NFR BLD: 0.5 % (ref 0–6.9)
ERYTHROCYTE [DISTWIDTH] IN BLOOD BY AUTOMATED COUNT: 40.2 FL (ref 35.9–50)
FASTING STATUS PATIENT QL REPORTED: NORMAL
GLOBULIN SER CALC-MCNC: 3 G/DL (ref 1.9–3.5)
GLUCOSE SERPL-MCNC: 86 MG/DL (ref 65–99)
GLUCOSE UR STRIP.AUTO-MCNC: NEGATIVE MG/DL
HCT VFR BLD AUTO: 39.9 % (ref 42–52)
HGB BLD-MCNC: 12.8 G/DL (ref 14–18)
IMM GRANULOCYTES # BLD AUTO: 0.03 K/UL (ref 0–0.11)
IMM GRANULOCYTES NFR BLD AUTO: 0.5 % (ref 0–0.9)
KETONES UR STRIP.AUTO-MCNC: NEGATIVE MG/DL
LEUKOCYTE ESTERASE UR QL STRIP.AUTO: NEGATIVE
LYMPHOCYTES # BLD AUTO: 2.49 K/UL (ref 1–4.8)
LYMPHOCYTES NFR BLD: 42.9 % (ref 22–41)
MCH RBC QN AUTO: 28.4 PG (ref 27–33)
MCHC RBC AUTO-ENTMCNC: 32.1 G/DL (ref 33.7–35.3)
MCV RBC AUTO: 88.5 FL (ref 81.4–97.8)
MICRO URNS: NORMAL
MONOCYTES # BLD AUTO: 1.37 K/UL (ref 0–0.85)
MONOCYTES NFR BLD AUTO: 23.6 % (ref 0–13.4)
NEUTROPHILS # BLD AUTO: 1.88 K/UL (ref 1.82–7.42)
NEUTROPHILS NFR BLD: 32.3 % (ref 44–72)
NITRITE UR QL STRIP.AUTO: NEGATIVE
NRBC # BLD AUTO: 0 K/UL
NRBC BLD-RTO: 0 /100 WBC
PH UR STRIP.AUTO: 7.5 [PH]
PHOSPHATE SERPL-MCNC: 3.7 MG/DL (ref 2.5–4.5)
PLATELET # BLD AUTO: 169 K/UL (ref 164–446)
PMV BLD AUTO: 10.8 FL (ref 9–12.9)
POTASSIUM SERPL-SCNC: 5.1 MMOL/L (ref 3.6–5.5)
PROT SERPL-MCNC: 7.4 G/DL (ref 6–8.2)
PROT UR QL STRIP: NEGATIVE MG/DL
PROT UR-MCNC: 11 MG/DL (ref 0–15)
PROT/CREAT UR: 121 MG/G (ref 15–68)
RBC # BLD AUTO: 4.51 M/UL (ref 4.7–6.1)
RBC UR QL AUTO: NEGATIVE
SODIUM SERPL-SCNC: 138 MMOL/L (ref 135–145)
SP GR UR STRIP.AUTO: 1.01
UROBILINOGEN UR STRIP.AUTO-MCNC: 0.2 MG/DL
WBC # BLD AUTO: 5.8 K/UL (ref 4.8–10.8)

## 2018-12-26 PROCEDURE — 82570 ASSAY OF URINE CREATININE: CPT

## 2018-12-26 PROCEDURE — 80053 COMPREHEN METABOLIC PANEL: CPT

## 2018-12-26 PROCEDURE — 85025 COMPLETE CBC W/AUTO DIFF WBC: CPT

## 2018-12-26 PROCEDURE — 84156 ASSAY OF PROTEIN URINE: CPT

## 2018-12-26 PROCEDURE — 81003 URINALYSIS AUTO W/O SCOPE: CPT

## 2018-12-26 PROCEDURE — 84100 ASSAY OF PHOSPHORUS: CPT

## 2018-12-26 PROCEDURE — 36415 COLL VENOUS BLD VENIPUNCTURE: CPT

## 2019-01-17 DIAGNOSIS — E78.5 HYPERLIPIDEMIA, UNSPECIFIED HYPERLIPIDEMIA TYPE: ICD-10-CM

## 2019-01-17 RX ORDER — EZETIMIBE 10 MG/1
10 TABLET ORAL DAILY
Qty: 90 TAB | Refills: 0 | Status: SHIPPED | OUTPATIENT
Start: 2019-01-17 | End: 2019-04-20 | Stop reason: SDUPTHER

## 2019-01-29 ENCOUNTER — OFFICE VISIT (OUTPATIENT)
Dept: MEDICAL GROUP | Facility: PHYSICIAN GROUP | Age: 74
End: 2019-01-29
Payer: MEDICARE

## 2019-01-29 VITALS
SYSTOLIC BLOOD PRESSURE: 120 MMHG | RESPIRATION RATE: 12 BRPM | DIASTOLIC BLOOD PRESSURE: 68 MMHG | HEART RATE: 56 BPM | TEMPERATURE: 97.2 F | OXYGEN SATURATION: 95 % | WEIGHT: 175 LBS | BODY MASS INDEX: 27.47 KG/M2 | HEIGHT: 67 IN

## 2019-01-29 DIAGNOSIS — N28.89 RIGHT RENAL MASS: ICD-10-CM

## 2019-01-29 DIAGNOSIS — R80.9 PROTEINURIA, UNSPECIFIED TYPE: ICD-10-CM

## 2019-01-29 DIAGNOSIS — D64.9 ANEMIA, UNSPECIFIED TYPE: ICD-10-CM

## 2019-01-29 DIAGNOSIS — C61 PROSTATE CANCER (HCC): ICD-10-CM

## 2019-01-29 DIAGNOSIS — Z23 NEEDS FLU SHOT: ICD-10-CM

## 2019-01-29 DIAGNOSIS — H25.10 AGE-RELATED NUCLEAR CATARACT, UNSPECIFIED LATERALITY: ICD-10-CM

## 2019-01-29 DIAGNOSIS — I10 ESSENTIAL HYPERTENSION, BENIGN: ICD-10-CM

## 2019-01-29 DIAGNOSIS — E78.2 MIXED HYPERLIPIDEMIA: ICD-10-CM

## 2019-01-29 DIAGNOSIS — N20.0 CALCULUS OF KIDNEY: ICD-10-CM

## 2019-01-29 PROCEDURE — G0008 ADMIN INFLUENZA VIRUS VAC: HCPCS | Performed by: FAMILY MEDICINE

## 2019-01-29 PROCEDURE — 99214 OFFICE O/P EST MOD 30 MIN: CPT | Mod: 25 | Performed by: FAMILY MEDICINE

## 2019-01-29 PROCEDURE — 90662 IIV NO PRSV INCREASED AG IM: CPT | Performed by: FAMILY MEDICINE

## 2019-01-29 PROCEDURE — 8041 PR SCP AHA: Performed by: FAMILY MEDICINE

## 2019-01-29 ASSESSMENT — PATIENT HEALTH QUESTIONNAIRE - PHQ9: CLINICAL INTERPRETATION OF PHQ2 SCORE: 0

## 2019-01-29 NOTE — PROGRESS NOTES
Patient comes in with several issues.  He needs to have his cholesterol level rechecked.  He saw a kidney specialist recently about a slightly increased creatinine level.  Things are stable as far as he knows.  He feels well.  He has no chest pains no shortness of breath.  He is due for a flu shot.    I reviewed the following    Past Medical History:   Diagnosis Date   • Anemia 2015     Due to low testosterone from hormone treatment for prostate cancer as per [facility] Hematology evaluation.   • Anesthesia     Low BP; nausea; hard time waking   • Arthritis     spine and wrists   • Breath shortness     activity induced   • Bronchitis 1/15   • Cancer (HCC)     prostate- removed '09; CA returned in pocket- radiation therapy done   • CATARACT    • Cervical stenosis of spine    • Degeneration of cervical intervertebral disc    • Dental disorder     upper dentures   • Diabetes (HCC)    • ED (erectile dysfunction)    • Elevated blood sugar    • Elevated BP    • Elevated cholesterol    • GERD (gastroesophageal reflux disease)    • Glaucoma    • Heart burn    • High cholesterol    • Hypertension    • Indigestion    • Renal cancer (HCC)     Had treatment done- resolved per CAT scan   • Renal disorder     stones   • Unspecified urinary incontinence    • wrist pain 5/20/2015    bilat wrist pain        Past Surgical History:   Procedure Laterality Date   • CATARACT PHACO WITH IOL Right 1/28/2016    Procedure: CATARACT PHACO WITH IOL;  Surgeon: Alfonso Hernandez M.D.;  Location: [facility];  Service:    • CATARACT PHACO WITH IOL Left 1/14/2016    Procedure: CATARACT PHACO WITH IOL;  Surgeon: Alfonso Hernandez M.D.;  Location: [facility];  Service:    • CERVICAL DISK AND FUSION ANTERIOR  10/30/2012    Performed by Selvin Ying M.D. at [facility]   • PROSTATECTOMY, RADICAL RETRO  4/21/2010    Performed by EDYTA KUMAR at [facility]   • NODE DISSECTION  4/21/2010    Performed by  EDYTA KUMAR at [facility] ORS   • CARPAL TUNNEL RELEASE  1990    Bilateral   • TRIGGER FINGER RELEASE  1990    Right small finger   • TONSILLECTOMY AND ADENOIDECTOMY  1953   • APPENDECTOMY     • PB REMOVAL OF KIDNEY STONE  1986/1998    x2       Allergies   Allergen Reactions   • Nkda [No Known Drug Allergy]        Current Outpatient Prescriptions   Medication Sig Dispense Refill   • ezetimibe (ZETIA) 10 MG Tab Take 1 Tab by mouth every day. 90 Tab 0   • glipiZIDE (GLUCOTROL) 5 MG Tab TAKE ONE TABLET BY MOUTH TWICE A  Tab 3   • atorvastatin (LIPITOR) 80 MG tablet TAKE ONE TABLET BY MOUTH EVERY EVENING 90 Tab 2   • omeprazole (PRILOSEC) 20 MG delayed-release capsule TAKE ONE CAPSULE BY MOUTH DAILY 90 Cap 3   • vitamin D3, cholecalciferol, 1000 UNIT Tab Take 2 Tabs by mouth every day. 100 Tab 3   • lisinopril (PRINIVIL, ZESTRIL) 40 MG tablet TAKE ONE TABLET BY MOUTH DAILY 90 Tab 3   • potassium citrate SR (UROCIT-K SR) 10 MEQ (1080 MG) TBCR Take 10 mEq by mouth every day.     • Multiple Vitamins-Minerals (CENTRUM SILVER PO) Take  by mouth.     • Brimonidine Tartrate-Timolol (COMBIGAN) 0.2-0.5 % SOLN 1 Drop by Ophthalmic route 2 times a day.     • latanoprost (XALATAN) 0.005 % SOLN Place 1 Drop in both eyes every evening.     • ACETAZOLAMIDE 125 MG PO TABS Take 125 mg by mouth every day.     • trolamine salicylate (ASPERCREME/ALOE) 10 % cream Apply 1 Squirt to affected area(s) 4 times a day. 1 Tube 3     No current facility-administered medications for this visit.         Family History   Problem Relation Age of Onset   • Diabetes Mother    • Stroke Mother    • Alzheimer's Disease Father        Social History     Social History   • Marital status:      Spouse name: N/A   • Number of children: N/A   • Years of education: N/A     Occupational History   • retired drugstore manager      Social History Main Topics   • Smoking status: Former Smoker     Packs/day: 0.20     Years: 10.00     Quit date:  1/1/1977   • Smokeless tobacco: Never Used      Comment: 1/3 ppd for 10 years, quit 1977   • Alcohol use 1.2 oz/week     2 Cans of beer per week      Comment: 1-2 per day   • Drug use: No   • Sexual activity: Not Currently     Partners: Female     Other Topics Concern   • Not on file     Social History Narrative   • No narrative on file      No visits with results within 1 Month(s) from this visit.   Latest known visit with results is:   Hospital Outpatient Visit on 12/26/2018   Component Date Value   • Color 12/26/2018 Yellow    • Character 12/26/2018 Clear    • Specific Gravity 12/26/2018 1.011    • Ph 12/26/2018 7.5    • Glucose 12/26/2018 Negative    • Ketones 12/26/2018 Negative    • Protein 12/26/2018 Negative    • Bilirubin 12/26/2018 Negative    • Urobilinogen, Urine 12/26/2018 0.2    • Nitrite 12/26/2018 Negative    • Leukocyte Esterase 12/26/2018 Negative    • Occult Blood 12/26/2018 Negative    • Micro Urine Req 12/26/2018 see below    • Total Protein, Urine 12/26/2018 11.0    • Creatinine, Random Urine 12/26/2018 90.70    • Protein Creatinine Ratio 12/26/2018 121*   • WBC 12/26/2018 5.8    • RBC 12/26/2018 4.51*   • Hemoglobin 12/26/2018 12.8*   • Hematocrit 12/26/2018 39.9*   • MCV 12/26/2018 88.5    • MCH 12/26/2018 28.4    • MCHC 12/26/2018 32.1*   • RDW 12/26/2018 40.2    • Platelet Count 12/26/2018 169    • MPV 12/26/2018 10.8    • Neutrophils-Polys 12/26/2018 32.30*   • Lymphocytes 12/26/2018 42.90*   • Monocytes 12/26/2018 23.60*   • Eosinophils 12/26/2018 0.50    • Basophils 12/26/2018 0.20    • Immature Granulocytes 12/26/2018 0.50    • Nucleated RBC 12/26/2018 0.00    • Neutrophils (Absolute) 12/26/2018 1.88    • Lymphs (Absolute) 12/26/2018 2.49    • Monos (Absolute) 12/26/2018 1.37*   • Eos (Absolute) 12/26/2018 0.03    • Baso (Absolute) 12/26/2018 0.01    • Immature Granulocytes (a* 12/26/2018 0.03    • NRBC (Absolute) 12/26/2018 0.00    • GFR If  12/26/2018 48*   • GFR If  Non  Ameri* 12/26/2018 39*   • Sodium 12/26/2018 138    • Potassium 12/26/2018 5.1    • Chloride 12/26/2018 108    • Co2 12/26/2018 24    • Anion Gap 12/26/2018 6.0    • Glucose 12/26/2018 86    • Bun 12/26/2018 32*   • Creatinine 12/26/2018 1.70*   • Calcium 12/26/2018 9.3    • AST(SGOT) 12/26/2018 18    • ALT(SGPT) 12/26/2018 20    • Alkaline Phosphatase 12/26/2018 77    • Total Bilirubin 12/26/2018 0.4    • Albumin 12/26/2018 4.4    • Total Protein 12/26/2018 7.4    • Globulin 12/26/2018 3.0    • A-G Ratio 12/26/2018 1.5    • Phosphorus 12/26/2018 3.7    • Fasting Status 12/26/2018 Fasting        Physical Exam   Constitutional: He is oriented. He appears well-developed and well-nourished. No distress.   HENT:   Head: Normocephalic and atraumatic.   Right Ear: External ear normal. Ear canal and TM normal   Left Ear: External ear normal. Ear canal and TM normal   Nose: Nose normal.   Mouth/Throat: Oropharynx is clear and moist.   Eyes: Conjunctivae and extraocular motions are normal. Pupils are equal, round, and reactive to light.        Fundi benign bilaterally   Neck: No thyromegaly present.   Cardiovascular: Normal rate, regular rhythm, normal heart sounds and intact distal pulses.  Exam reveals no gallop.    No murmur heard.  Pulmonary/Chest: Effort normal and breath sounds normal. No respiratory distress. He has no wheezes. He has no rales.   Abdominal: Soft. Bowel sounds are normal. No hepatosplenomegaly. He exhibits no distension. No tenderness. He has no rebound and no guarding.   Musculoskeletal: Normal range of motion. He exhibits no edema and no tenderness.   Lymphadenopathy:     He has no cervical adenopathy.  No supraclavicular adenopathy.   Neurological: He is alert and oriented. He has normal reflexes.        Babinskis downgoing bilaterally   Skin: Skin is warm and dry. No rash noted. No erythema.   Psychiatric: He has a normal mood and appropriate affect. His behavior is normal. Judgment and  thought content normal.     1. Mixed hyperlipidemia --needs reassessment Lipid Profile   2. Essential hypertension, benign   controlled   3. Proteinuria, unspecified type   continue to see kidney specialist   4. Needs flu shot  Influenza Vaccine, High Dose (65+ Only)     Please note that this dictation was created using voice recognition software. I have worked with consultants from the vendor as well as technical experts from [facility] [employer] to optimize the interface. I have made every reasonable attempt to correct obvious errors, but I expect that there are errors of grammar and possibly content that I did not discover before finalizing the note.    Recheck with me 6 months or as needed

## 2019-01-30 ENCOUNTER — HOSPITAL ENCOUNTER (OUTPATIENT)
Dept: LAB | Facility: MEDICAL CENTER | Age: 74
End: 2019-01-30
Attending: FAMILY MEDICINE
Payer: MEDICARE

## 2019-01-30 DIAGNOSIS — E78.2 MIXED HYPERLIPIDEMIA: ICD-10-CM

## 2019-01-30 LAB
CHOLEST SERPL-MCNC: 124 MG/DL (ref 100–199)
FASTING STATUS PATIENT QL REPORTED: NORMAL
HDLC SERPL-MCNC: 36 MG/DL
LDLC SERPL CALC-MCNC: 59 MG/DL
TRIGL SERPL-MCNC: 146 MG/DL (ref 0–149)

## 2019-01-30 PROCEDURE — 36415 COLL VENOUS BLD VENIPUNCTURE: CPT

## 2019-01-30 PROCEDURE — 80061 LIPID PANEL: CPT

## 2019-01-31 ENCOUNTER — TELEPHONE (OUTPATIENT)
Dept: MEDICAL GROUP | Facility: PHYSICIAN GROUP | Age: 74
End: 2019-01-31

## 2019-01-31 NOTE — TELEPHONE ENCOUNTER
----- Message from Carlos Peters M.D. sent at 1/30/2019  5:58 PM PST -----  Dear Kemal  Your cholesterol levels are excellent.Good news.    Carlos Peters MD

## 2019-01-31 NOTE — LETTER
January 31, 2019         Kemal Mueller  [address] [place] [address]        Dear Kemal     Your cholesterol levels are excellent.Good news.     Carlos Peters MD       Resulted Orders   Lipid Profile   Result Value Ref Range    Cholesterol,Tot 124 100 - 199 mg/dL    Triglycerides 146 0 - 149 mg/dL    HDL 36 (A) >=40 mg/dL    LDL 59 <100 mg/dL    Narrative    Request patient fasting?->Yes   FASTING STATUS   Result Value Ref Range    Fasting Status Fasting     Narrative    Request patient fasting?->Yes     If you have any questions or concerns, please don't hesitate to call.    Electronically Signed

## 2019-02-26 ENCOUNTER — HOSPITAL ENCOUNTER (OUTPATIENT)
Dept: LAB | Facility: MEDICAL CENTER | Age: 74
End: 2019-02-26
Attending: INTERNAL MEDICINE
Payer: MEDICARE

## 2019-02-26 LAB
ALBUMIN SERPL BCP-MCNC: 4.8 G/DL (ref 3.2–4.9)
ALBUMIN/GLOB SERPL: 2 G/DL
ALP SERPL-CCNC: 77 U/L (ref 30–99)
ALT SERPL-CCNC: 17 U/L (ref 2–50)
ANION GAP SERPL CALC-SCNC: 6 MMOL/L (ref 0–11.9)
APPEARANCE UR: CLEAR
AST SERPL-CCNC: 18 U/L (ref 12–45)
BASOPHILS # BLD AUTO: 0.2 % (ref 0–1.8)
BASOPHILS # BLD: 0.01 K/UL (ref 0–0.12)
BILIRUB SERPL-MCNC: 0.4 MG/DL (ref 0.1–1.5)
BILIRUB UR QL STRIP.AUTO: NEGATIVE
BUN SERPL-MCNC: 33 MG/DL (ref 8–22)
CALCIUM SERPL-MCNC: 10.3 MG/DL (ref 8.5–10.5)
CHLORIDE SERPL-SCNC: 108 MMOL/L (ref 96–112)
CO2 SERPL-SCNC: 24 MMOL/L (ref 20–33)
COLOR UR: YELLOW
CREAT SERPL-MCNC: 1.72 MG/DL (ref 0.5–1.4)
EOSINOPHIL # BLD AUTO: 0.03 K/UL (ref 0–0.51)
EOSINOPHIL NFR BLD: 0.6 % (ref 0–6.9)
ERYTHROCYTE [DISTWIDTH] IN BLOOD BY AUTOMATED COUNT: 42.4 FL (ref 35.9–50)
FASTING STATUS PATIENT QL REPORTED: NORMAL
GLOBULIN SER CALC-MCNC: 2.4 G/DL (ref 1.9–3.5)
GLUCOSE SERPL-MCNC: 76 MG/DL (ref 65–99)
GLUCOSE UR STRIP.AUTO-MCNC: NEGATIVE MG/DL
HCT VFR BLD AUTO: 40.8 % (ref 42–52)
HGB BLD-MCNC: 12.8 G/DL (ref 14–18)
IMM GRANULOCYTES # BLD AUTO: 0.02 K/UL (ref 0–0.11)
IMM GRANULOCYTES NFR BLD AUTO: 0.4 % (ref 0–0.9)
KETONES UR STRIP.AUTO-MCNC: NEGATIVE MG/DL
LEUKOCYTE ESTERASE UR QL STRIP.AUTO: NEGATIVE
LYMPHOCYTES # BLD AUTO: 2.12 K/UL (ref 1–4.8)
LYMPHOCYTES NFR BLD: 42.6 % (ref 22–41)
MCH RBC QN AUTO: 28.7 PG (ref 27–33)
MCHC RBC AUTO-ENTMCNC: 31.4 G/DL (ref 33.7–35.3)
MCV RBC AUTO: 91.5 FL (ref 81.4–97.8)
MICRO URNS: NORMAL
MONOCYTES # BLD AUTO: 1.01 K/UL (ref 0–0.85)
MONOCYTES NFR BLD AUTO: 20.3 % (ref 0–13.4)
NEUTROPHILS # BLD AUTO: 1.79 K/UL (ref 1.82–7.42)
NEUTROPHILS NFR BLD: 35.9 % (ref 44–72)
NITRITE UR QL STRIP.AUTO: NEGATIVE
NRBC # BLD AUTO: 0 K/UL
NRBC BLD-RTO: 0 /100 WBC
PH UR STRIP.AUTO: 7.5 [PH]
PLATELET # BLD AUTO: 163 K/UL (ref 164–446)
PMV BLD AUTO: 11.5 FL (ref 9–12.9)
POTASSIUM SERPL-SCNC: 4.9 MMOL/L (ref 3.6–5.5)
PROT SERPL-MCNC: 7.2 G/DL (ref 6–8.2)
PROT UR QL STRIP: NEGATIVE MG/DL
RBC # BLD AUTO: 4.46 M/UL (ref 4.7–6.1)
RBC UR QL AUTO: NEGATIVE
SODIUM SERPL-SCNC: 138 MMOL/L (ref 135–145)
SP GR UR STRIP.AUTO: 1.01
UROBILINOGEN UR STRIP.AUTO-MCNC: 0.2 MG/DL
WBC # BLD AUTO: 5 K/UL (ref 4.8–10.8)

## 2019-02-26 PROCEDURE — 80053 COMPREHEN METABOLIC PANEL: CPT

## 2019-02-26 PROCEDURE — 36415 COLL VENOUS BLD VENIPUNCTURE: CPT

## 2019-02-26 PROCEDURE — 85025 COMPLETE CBC W/AUTO DIFF WBC: CPT

## 2019-02-26 PROCEDURE — 81003 URINALYSIS AUTO W/O SCOPE: CPT

## 2019-03-11 DIAGNOSIS — I10 ESSENTIAL HYPERTENSION, BENIGN: ICD-10-CM

## 2019-03-11 DIAGNOSIS — E78.2 MIXED HYPERLIPIDEMIA: ICD-10-CM

## 2019-03-11 RX ORDER — ATORVASTATIN CALCIUM 80 MG/1
TABLET, FILM COATED ORAL
Qty: 90 TAB | Refills: 3 | Status: SHIPPED | OUTPATIENT
Start: 2019-03-11 | End: 2020-02-12 | Stop reason: SDUPTHER

## 2019-03-11 RX ORDER — LISINOPRIL 40 MG/1
TABLET ORAL
Qty: 90 TAB | Refills: 3 | Status: SHIPPED | OUTPATIENT
Start: 2019-03-11 | End: 2019-12-23

## 2019-05-10 DIAGNOSIS — K21.9 GASTROESOPHAGEAL REFLUX DISEASE WITHOUT ESOPHAGITIS: ICD-10-CM

## 2019-05-10 NOTE — TELEPHONE ENCOUNTER
Was the patient seen in the last year in this department? Yes LOV 01/29/19    Does patient have an active prescription for medications requested? No     Received Request Via: Pharmacy

## 2019-05-11 RX ORDER — OMEPRAZOLE 20 MG/1
20 CAPSULE, DELAYED RELEASE ORAL DAILY
Qty: 90 CAP | Refills: 3 | Status: SHIPPED | OUTPATIENT
Start: 2019-05-11 | End: 2020-02-12 | Stop reason: SDUPTHER

## 2019-06-24 ENCOUNTER — TELEPHONE (OUTPATIENT)
Dept: MEDICAL GROUP | Facility: PHYSICIAN GROUP | Age: 74
End: 2019-06-24

## 2019-06-24 NOTE — TELEPHONE ENCOUNTER
Future Appointments       Provider Department Center    7/9/2019 10:00 AM Carlos Peters M.D. [facility] [place]        ESTABLISHED PATIENT PRE-VISIT PLANNING     Patient was NOT contacted to complete PVP.    1.  Reviewed notes from the last few office visits within the medical group: Yes 1/29/19    2.  If any orders were placed at last visit or intended to be done for this visit (i.e. 6 mos follow-up), do we have Results/Consult Notes?        •  Labs - Labs ordered, completed on 2/26/19 and results are in chart.       •  Imaging - Imaging was not ordered at last office visit.       •  Referrals - No referrals were ordered at last office visit.    3. Is this appointment scheduled as a Hospital Follow-Up? No    4.  Immunizations were updated in [place] using [employer]?: Yes       •  Web Iz Recommendations: MMR  and SHINGRIX (Shingles)    5.  Patient is due for the following Health Maintenance Topics:   Health Maintenance Due   Topic Date Due   • Annual Wellness Visit  1945       6. Orders for overdue Health Maintenance topics pended in Pre-Charting? NO    7.  AHA (MDX) form printed for Provider? No, already completed    8.  Patient was NOT informed to arrive 15 min prior to their scheduled appointment and bring in their medication bottles.

## 2019-07-03 ENCOUNTER — HOSPITAL ENCOUNTER (OUTPATIENT)
Dept: LAB | Facility: MEDICAL CENTER | Age: 74
End: 2019-07-03
Attending: UROLOGY
Payer: MEDICARE

## 2019-07-03 LAB — PSA SERPL-MCNC: 0.66 NG/ML (ref 0–4)

## 2019-07-03 PROCEDURE — 36415 COLL VENOUS BLD VENIPUNCTURE: CPT

## 2019-07-03 PROCEDURE — 84153 ASSAY OF PSA TOTAL: CPT

## 2019-07-15 ENCOUNTER — TELEPHONE (OUTPATIENT)
Dept: MEDICAL GROUP | Facility: PHYSICIAN GROUP | Age: 74
End: 2019-07-15

## 2019-07-15 NOTE — TELEPHONE ENCOUNTER
VOICEMAIL  1. Caller Name: Kemal Mueller                      Call Back Number: [phone] (home)     2. Message: Pt requesting for lab work for Next visit on 08/13/19.    3. Patient approves office to leave a detailed voicemail/MyChart message: N\A

## 2019-07-16 ENCOUNTER — APPOINTMENT (OUTPATIENT)
Dept: MEDICAL GROUP | Facility: PHYSICIAN GROUP | Age: 74
End: 2019-07-16
Payer: MEDICARE

## 2019-07-16 DIAGNOSIS — N28.9 FUNCTION KIDNEY DECREASED: ICD-10-CM

## 2019-07-16 DIAGNOSIS — E55.9 VITAMIN D DEFICIENCY: ICD-10-CM

## 2019-07-16 DIAGNOSIS — E78.2 MIXED HYPERLIPIDEMIA: ICD-10-CM

## 2019-07-16 DIAGNOSIS — I10 ESSENTIAL HYPERTENSION, BENIGN: ICD-10-CM

## 2019-07-16 DIAGNOSIS — D64.9 ANEMIA, UNSPECIFIED TYPE: ICD-10-CM

## 2019-07-17 NOTE — TELEPHONE ENCOUNTER
Phone Number Called: [phone] (home)     Call outcome: left message for patient to call back regarding message below    Message: Letting Pt know his labs orders are in the system and can get them done prior to next arianna.

## 2019-08-12 ENCOUNTER — HOSPITAL ENCOUNTER (OUTPATIENT)
Dept: LAB | Facility: MEDICAL CENTER | Age: 74
End: 2019-08-12
Attending: FAMILY MEDICINE
Payer: MEDICARE

## 2019-08-12 DIAGNOSIS — N28.9 FUNCTION KIDNEY DECREASED: ICD-10-CM

## 2019-08-12 DIAGNOSIS — E78.2 MIXED HYPERLIPIDEMIA: ICD-10-CM

## 2019-08-12 DIAGNOSIS — I10 ESSENTIAL HYPERTENSION, BENIGN: ICD-10-CM

## 2019-08-12 DIAGNOSIS — D64.9 ANEMIA, UNSPECIFIED TYPE: ICD-10-CM

## 2019-08-12 LAB
25(OH)D3 SERPL-MCNC: 47 NG/ML (ref 30–100)
ALBUMIN SERPL BCP-MCNC: 4.1 G/DL (ref 3.2–4.9)
ALBUMIN/GLOB SERPL: 1.3 G/DL
ALP SERPL-CCNC: 67 U/L (ref 30–99)
ALT SERPL-CCNC: 17 U/L (ref 2–50)
ANION GAP SERPL CALC-SCNC: 5 MMOL/L (ref 0–11.9)
APPEARANCE UR: CLEAR
AST SERPL-CCNC: 18 U/L (ref 12–45)
BASOPHILS # BLD AUTO: 0.2 % (ref 0–1.8)
BASOPHILS # BLD: 0.01 K/UL (ref 0–0.12)
BILIRUB SERPL-MCNC: 0.4 MG/DL (ref 0.1–1.5)
BILIRUB UR QL STRIP.AUTO: NEGATIVE
BUN SERPL-MCNC: 31 MG/DL (ref 8–22)
CALCIUM SERPL-MCNC: 9.7 MG/DL (ref 8.5–10.5)
CHLORIDE SERPL-SCNC: 109 MMOL/L (ref 96–112)
CHOLEST SERPL-MCNC: 111 MG/DL (ref 100–199)
CO2 SERPL-SCNC: 25 MMOL/L (ref 20–33)
COLOR UR: YELLOW
CREAT SERPL-MCNC: 1.55 MG/DL (ref 0.5–1.4)
EOSINOPHIL # BLD AUTO: 0.02 K/UL (ref 0–0.51)
EOSINOPHIL NFR BLD: 0.4 % (ref 0–6.9)
ERYTHROCYTE [DISTWIDTH] IN BLOOD BY AUTOMATED COUNT: 43.9 FL (ref 35.9–50)
FASTING STATUS PATIENT QL REPORTED: NORMAL
GLOBULIN SER CALC-MCNC: 3.1 G/DL (ref 1.9–3.5)
GLUCOSE SERPL-MCNC: 64 MG/DL (ref 65–99)
GLUCOSE UR STRIP.AUTO-MCNC: NEGATIVE MG/DL
HCT VFR BLD AUTO: 41.8 % (ref 42–52)
HDLC SERPL-MCNC: 36 MG/DL
HGB BLD-MCNC: 12.7 G/DL (ref 14–18)
IMM GRANULOCYTES # BLD AUTO: 0.01 K/UL (ref 0–0.11)
IMM GRANULOCYTES NFR BLD AUTO: 0.2 % (ref 0–0.9)
KETONES UR STRIP.AUTO-MCNC: NEGATIVE MG/DL
LDLC SERPL CALC-MCNC: 57 MG/DL
LEUKOCYTE ESTERASE UR QL STRIP.AUTO: NEGATIVE
LYMPHOCYTES # BLD AUTO: 2.02 K/UL (ref 1–4.8)
LYMPHOCYTES NFR BLD: 43.4 % (ref 22–41)
MCH RBC QN AUTO: 28.2 PG (ref 27–33)
MCHC RBC AUTO-ENTMCNC: 30.4 G/DL (ref 33.7–35.3)
MCV RBC AUTO: 92.9 FL (ref 81.4–97.8)
MICRO URNS: NORMAL
MONOCYTES # BLD AUTO: 0.86 K/UL (ref 0–0.85)
MONOCYTES NFR BLD AUTO: 18.5 % (ref 0–13.4)
NEUTROPHILS # BLD AUTO: 1.73 K/UL (ref 1.82–7.42)
NEUTROPHILS NFR BLD: 37.3 % (ref 44–72)
NITRITE UR QL STRIP.AUTO: NEGATIVE
NRBC # BLD AUTO: 0 K/UL
NRBC BLD-RTO: 0 /100 WBC
PH UR STRIP.AUTO: 7.5 [PH] (ref 5–8)
PLATELET # BLD AUTO: 187 K/UL (ref 164–446)
PMV BLD AUTO: 11.1 FL (ref 9–12.9)
POTASSIUM SERPL-SCNC: 4.4 MMOL/L (ref 3.6–5.5)
PROT SERPL-MCNC: 7.2 G/DL (ref 6–8.2)
PROT UR QL STRIP: NEGATIVE MG/DL
RBC # BLD AUTO: 4.5 M/UL (ref 4.7–6.1)
RBC UR QL AUTO: NEGATIVE
SODIUM SERPL-SCNC: 139 MMOL/L (ref 135–145)
SP GR UR STRIP.AUTO: 1.01
TRIGL SERPL-MCNC: 88 MG/DL (ref 0–149)
UROBILINOGEN UR STRIP.AUTO-MCNC: 0.2 MG/DL
WBC # BLD AUTO: 4.7 K/UL (ref 4.8–10.8)

## 2019-08-12 PROCEDURE — 80053 COMPREHEN METABOLIC PANEL: CPT

## 2019-08-12 PROCEDURE — 80061 LIPID PANEL: CPT

## 2019-08-12 PROCEDURE — 36415 COLL VENOUS BLD VENIPUNCTURE: CPT

## 2019-08-12 PROCEDURE — 85025 COMPLETE CBC W/AUTO DIFF WBC: CPT

## 2019-08-12 PROCEDURE — 81003 URINALYSIS AUTO W/O SCOPE: CPT

## 2019-08-12 PROCEDURE — 82306 VITAMIN D 25 HYDROXY: CPT

## 2019-08-13 ENCOUNTER — OFFICE VISIT (OUTPATIENT)
Dept: MEDICAL GROUP | Facility: PHYSICIAN GROUP | Age: 74
End: 2019-08-13
Payer: MEDICARE

## 2019-08-13 VITALS
DIASTOLIC BLOOD PRESSURE: 78 MMHG | HEIGHT: 67 IN | TEMPERATURE: 97.4 F | RESPIRATION RATE: 16 BRPM | BODY MASS INDEX: 26.06 KG/M2 | OXYGEN SATURATION: 94 % | SYSTOLIC BLOOD PRESSURE: 130 MMHG | HEART RATE: 66 BPM | WEIGHT: 166 LBS

## 2019-08-13 DIAGNOSIS — D64.9 ANEMIA, UNSPECIFIED TYPE: ICD-10-CM

## 2019-08-13 DIAGNOSIS — C61 PROSTATE CANCER (HCC): ICD-10-CM

## 2019-08-13 DIAGNOSIS — N28.89 RIGHT RENAL MASS: ICD-10-CM

## 2019-08-13 DIAGNOSIS — I10 ESSENTIAL HYPERTENSION, BENIGN: ICD-10-CM

## 2019-08-13 DIAGNOSIS — N28.9 FUNCTION KIDNEY DECREASED: ICD-10-CM

## 2019-08-13 PROCEDURE — 99214 OFFICE O/P EST MOD 30 MIN: CPT | Performed by: FAMILY MEDICINE

## 2019-08-14 NOTE — PROGRESS NOTES
Patient comes in to follow-up with his recent labs.  He feels good.  He says he has more energy than he used to have.  He denies chest pains or shortness of breath.  He just saw his urologist who feels that the lesion on his right kidney is stable and has not recurred.  Also his prostate cancer is doing well according to the urologist.    I reviewed the following    Past Medical History:   Diagnosis Date   • Anemia 2015     Due to low testosterone from hormone treatment for prostate cancer as per [facility] Hematology evaluation.   • Anesthesia     Low BP; nausea; hard time waking   • Arthritis     spine and wrists   • Breath shortness     activity induced   • Bronchitis 1/15   • Cancer (HCC)     prostate- removed '09; CA returned in pocket- radiation therapy done   • CATARACT    • Cervical stenosis of spine    • Degeneration of cervical intervertebral disc    • Dental disorder     upper dentures   • Diabetes (HCC)    • ED (erectile dysfunction)    • Elevated blood sugar    • Elevated BP    • Elevated cholesterol    • GERD (gastroesophageal reflux disease)    • Glaucoma    • Heart burn    • High cholesterol    • Hypertension    • Indigestion    • Renal cancer (HCC)     Had treatment done- resolved per CAT scan   • Renal disorder     stones   • Unspecified urinary incontinence    • wrist pain 5/20/2015    bilat wrist pain        Past Surgical History:   Procedure Laterality Date   • CATARACT PHACO WITH IOL Right 1/28/2016    Procedure: CATARACT PHACO WITH IOL;  Surgeon: Alfonso Hernandez M.D.;  Location: [facility];  Service:    • CATARACT PHACO WITH IOL Left 1/14/2016    Procedure: CATARACT PHACO WITH IOL;  Surgeon: Alfonso Hernandez M.D.;  Location: [facility];  Service:    • CERVICAL DISK AND FUSION ANTERIOR  10/30/2012    Performed by Selvin Ying M.D. at [facility]   • PROSTATECTOMY, RADICAL RETRO  4/21/2010    Performed by EDYTA KUMAR at [facility]   • NODE  DISSECTION  4/21/2010    Performed by EDYTA KUMAR at [facility]   • CARPAL TUNNEL RELEASE  1990    Bilateral   • TRIGGER FINGER RELEASE  1990    Right small finger   • TONSILLECTOMY AND ADENOIDECTOMY  1953   • APPENDECTOMY     • PB REMOVAL OF KIDNEY STONE  1986/1998    x2       Allergies   Allergen Reactions   • Nkda [No Known Drug Allergy]        Current Outpatient Medications   Medication Sig Dispense Refill   • omeprazole (PRILOSEC) 20 MG delayed-release capsule Take 1 Cap by mouth every day. 90 Cap 3   • ezetimibe (ZETIA) 10 MG Tab TAKE ONE TABLET BY MOUTH DAILY 90 Tab 3   • atorvastatin (LIPITOR) 80 MG tablet TAKE ONE TABLET BY MOUTH EVERY EVENING 90 Tab 3   • lisinopril (PRINIVIL, ZESTRIL) 40 MG tablet TAKE ONE TABLET BY MOUTH DAILY 90 Tab 3   • glipiZIDE (GLUCOTROL) 5 MG Tab TAKE ONE TABLET BY MOUTH TWICE A  Tab 3   • vitamin D3, cholecalciferol, 1000 UNIT Tab Take 2 Tabs by mouth every day. 100 Tab 3   • trolamine salicylate (ASPERCREME/ALOE) 10 % cream Apply 1 Squirt to affected area(s) 4 times a day. 1 Tube 3   • potassium citrate SR (UROCIT-K SR) 10 MEQ (1080 MG) TBCR Take 10 mEq by mouth every day.     • Multiple Vitamins-Minerals (CENTRUM SILVER PO) Take  by mouth.     • Brimonidine Tartrate-Timolol (COMBIGAN) 0.2-0.5 % SOLN 1 Drop by Ophthalmic route 2 times a day.     • latanoprost (XALATAN) 0.005 % SOLN Place 1 Drop in both eyes every evening.     • ACETAZOLAMIDE 125 MG PO TABS Take 125 mg by mouth every day.       No current facility-administered medications for this visit.         Family History   Problem Relation Age of Onset   • Diabetes Mother    • Stroke Mother    • Alzheimer's Disease Father        Social History     Socioeconomic History   • Marital status:      Spouse name: Not on file   • Number of children: Not on file   • Years of education: Not on file   • Highest education level: Not on file   Occupational History   • Occupation: retired drugstore manager    Social Needs   • Financial resource strain: Not on file   • Food insecurity:     Worry: Not on file     Inability: Not on file   • Transportation needs:     Medical: Not on file     Non-medical: Not on file   Tobacco Use   • Smoking status: Former Smoker     Packs/day: 0.20     Years: 10.00     Pack years: 2.00     Last attempt to quit: 1977     Years since quittin.6   • Smokeless tobacco: Never Used   • Tobacco comment: 1/3 ppd for 10 years, quit    Substance and Sexual Activity   • Alcohol use: Yes     Alcohol/week: 1.2 oz     Types: 2 Cans of beer per week     Comment: 1-2 per day   • Drug use: No   • Sexual activity: Not Currently     Partners: Female   Lifestyle   • Physical activity:     Days per week: Not on file     Minutes per session: Not on file   • Stress: Not on file   Relationships   • Social connections:     Talks on phone: Not on file     Gets together: Not on file     Attends [employer] service: Not on file     Active member of club or organization: Not on file     Attends meetings of clubs or organizations: Not on file     Relationship status: Not on file   • Intimate partner violence:     Fear of current or ex partner: Not on file     Emotionally abused: Not on file     Physically abused: Not on file     Forced sexual activity: Not on file   Other Topics Concern   • Not on file   Social History Narrative   • Not on file      Hospital Outpatient Visit on 2019   Component Date Value   • Color 2019 Yellow    • Character 2019 Clear    • Specific Gravity 2019 1.010    • Ph 2019 7.5    • Glucose 2019 Negative    • Ketones 2019 Negative    • Protein 2019 Negative    • Bilirubin 2019 Negative    • Urobilinogen, Urine 2019 0.2    • Nitrite 2019 Negative    • Leukocyte Esterase 2019 Negative    • Occult Blood 2019 Negative    • Micro Urine Req 2019 see below    • Cholesterol,Tot 2019 111    •  Triglycerides 08/12/2019 88    • HDL 08/12/2019 36*   • LDL 08/12/2019 57    • Sodium 08/12/2019 139    • Potassium 08/12/2019 4.4    • Chloride 08/12/2019 109    • Co2 08/12/2019 25    • Anion Gap 08/12/2019 5.0    • Glucose 08/12/2019 64*   • Bun 08/12/2019 31*   • Creatinine 08/12/2019 1.55*   • Calcium 08/12/2019 9.7    • AST(SGOT) 08/12/2019 18    • ALT(SGPT) 08/12/2019 17    • Alkaline Phosphatase 08/12/2019 67    • Total Bilirubin 08/12/2019 0.4    • Albumin 08/12/2019 4.1    • Total Protein 08/12/2019 7.2    • Globulin 08/12/2019 3.1    • A-G Ratio 08/12/2019 1.3    • WBC 08/12/2019 4.7*   • RBC 08/12/2019 4.50*   • Hemoglobin 08/12/2019 12.7*   • Hematocrit 08/12/2019 41.8*   • MCV 08/12/2019 92.9    • MCH 08/12/2019 28.2    • MCHC 08/12/2019 30.4*   • RDW 08/12/2019 43.9    • Platelet Count 08/12/2019 187    • MPV 08/12/2019 11.1    • Neutrophils-Polys 08/12/2019 37.30*   • Lymphocytes 08/12/2019 43.40*   • Monocytes 08/12/2019 18.50*   • Eosinophils 08/12/2019 0.40    • Basophils 08/12/2019 0.20    • Immature Granulocytes 08/12/2019 0.20    • Nucleated RBC 08/12/2019 0.00    • Neutrophils (Absolute) 08/12/2019 1.73*   • Lymphs (Absolute) 08/12/2019 2.02    • Monos (Absolute) 08/12/2019 0.86*   • Eos (Absolute) 08/12/2019 0.02    • Baso (Absolute) 08/12/2019 0.01    • Immature Granulocytes (a* 08/12/2019 0.01    • NRBC (Absolute) 08/12/2019 0.00    • 25-Hydroxy   Vitamin D 25 08/12/2019 47    • Fasting Status 08/12/2019 Fasting    • GFR If  08/12/2019 53*   • GFR If Non  Ameri* 08/12/2019 44*     Physical Exam   Constitutional: He is oriented. He appears well-developed and well-nourished. No distress.   HENT:   Head: Normocephalic and atraumatic.   Right Ear: External ear normal. Ear canal and TM normal   Left Ear: External ear normal. Ear canal and TM normal   Nose: Nose normal.   Mouth/Throat: Oropharynx is clear and moist.   Eyes: Conjunctivae and extraocular motions are normal.  Pupils are equal, round, and reactive to light.        Fundi benign bilaterally   Neck: No thyromegaly present.   Cardiovascular: Normal rate, regular rhythm, normal heart sounds and intact distal pulses.  Exam reveals no gallop.    No murmur heard.  Pulmonary/Chest: Effort normal and breath sounds normal. No respiratory distress. He has no wheezes. He has no rales.   Abdominal: Soft. Bowel sounds are normal. No hepatosplenomegaly. He exhibits no distension. No tenderness. He has no rebound and no guarding.   Musculoskeletal: Normal range of motion. He exhibits no edema and no tenderness.   Lymphadenopathy:     He has no cervical adenopathy.  No supraclavicular adenopathy.   Neurological: He is alert and oriented. He has normal reflexes.        Babinskis downgoing bilaterally   Skin: Skin is warm and dry. No rash noted. No erythema.   Psychiatric: He has a normal mood and appropriate affect. His behavior is normal. Judgment and thought content normal.     1. Anemia, unspecified type   thought to be due to low testosterone.  The patient has a history of prostate cancer so we cannot get any testosterone treatment.  He is just going to have to live with this anemia.   2. Essential hypertension, benign   controlled   3. Function kidney decreased   improving   4. Prostate cancer (HCC)   continue to see urologist   5. Right renal mass   continue to see urologist     Please note that this dictation was created using voice recognition software. I have worked with consultants from the vendor as well as technical experts from [facility] [employer] to optimize the interface. I have made every reasonable attempt to correct obvious errors, but I expect that there are errors of grammar and possibly content that I did not discover before finalizing the note.    Recheck with me 6 months or as needed

## 2019-08-31 ENCOUNTER — HOSPITAL ENCOUNTER (OUTPATIENT)
Dept: LAB | Facility: MEDICAL CENTER | Age: 74
End: 2019-08-31
Attending: INTERNAL MEDICINE
Payer: MEDICARE

## 2019-08-31 LAB
ALBUMIN SERPL BCP-MCNC: 4.4 G/DL (ref 3.2–4.9)
BUN SERPL-MCNC: 37 MG/DL (ref 8–22)
CALCIUM SERPL-MCNC: 9.6 MG/DL (ref 8.5–10.5)
CHLORIDE SERPL-SCNC: 108 MMOL/L (ref 96–112)
CO2 SERPL-SCNC: 23 MMOL/L (ref 20–33)
CREAT SERPL-MCNC: 1.58 MG/DL (ref 0.5–1.4)
GLUCOSE SERPL-MCNC: 86 MG/DL (ref 65–99)
PHOSPHATE SERPL-MCNC: 3.4 MG/DL (ref 2.5–4.5)
POTASSIUM SERPL-SCNC: 4.4 MMOL/L (ref 3.6–5.5)
SODIUM SERPL-SCNC: 138 MMOL/L (ref 135–145)

## 2019-08-31 PROCEDURE — 82784 ASSAY IGA/IGD/IGG/IGM EACH: CPT

## 2019-08-31 PROCEDURE — 83883 ASSAY NEPHELOMETRY NOT SPEC: CPT | Mod: 91

## 2019-08-31 PROCEDURE — 84155 ASSAY OF PROTEIN SERUM: CPT

## 2019-08-31 PROCEDURE — 36415 COLL VENOUS BLD VENIPUNCTURE: CPT

## 2019-08-31 PROCEDURE — 80069 RENAL FUNCTION PANEL: CPT

## 2019-08-31 PROCEDURE — 84165 PROTEIN E-PHORESIS SERUM: CPT

## 2019-08-31 PROCEDURE — 86334 IMMUNOFIX E-PHORESIS SERUM: CPT

## 2019-09-02 LAB
KAPPA LC FREE SER-MCNC: 3.6 MG/DL (ref 0.33–1.94)
KAPPA LC FREE/LAMBDA FREE SER NEPH: 1.35 {RATIO} (ref 0.26–1.65)
LAMBDA LC FREE SERPL-MCNC: 2.67 MG/DL (ref 0.57–2.63)

## 2019-09-03 LAB
ALBUMIN SERPL ELPH-MCNC: 4.27 G/DL (ref 3.75–5.01)
ALPHA1 GLOB SERPL ELPH-MCNC: 0.25 G/DL (ref 0.19–0.46)
ALPHA2 GLOB SERPL ELPH-MCNC: 0.59 G/DL (ref 0.48–1.05)
B-GLOBULIN SERPL ELPH-MCNC: 0.83 G/DL (ref 0.48–1.1)
GAMMA GLOB SERPL ELPH-MCNC: 1.06 G/DL (ref 0.62–1.51)
IGA SERPL-MCNC: 230 MG/DL (ref 68–408)
IGG SERPL-MCNC: 1050 MG/DL (ref 768–1632)
IGM SERPL-MCNC: 235 MG/DL (ref 35–263)
INTERPRETATION SERPL IFE-IMP: NORMAL
INTERPRETATION SERPL IFE-IMP: NORMAL
PATHOLOGY STUDY: NORMAL
PROT SERPL-MCNC: 7 G/DL (ref 6.3–8.2)

## 2019-10-05 ENCOUNTER — IMMUNIZATION (OUTPATIENT)
Dept: SOCIAL WORK | Facility: CLINIC | Age: 74
End: 2019-10-05
Payer: MEDICARE

## 2019-10-05 DIAGNOSIS — Z23 NEED FOR VACCINATION: ICD-10-CM

## 2019-10-05 PROCEDURE — 90662 IIV NO PRSV INCREASED AG IM: CPT | Performed by: REGISTERED NURSE

## 2019-10-05 PROCEDURE — G0008 ADMIN INFLUENZA VIRUS VAC: HCPCS | Performed by: REGISTERED NURSE

## 2019-10-08 DIAGNOSIS — R73.9 ELEVATED BLOOD SUGAR: ICD-10-CM

## 2019-10-08 RX ORDER — GLIPIZIDE 5 MG/1
TABLET ORAL
Qty: 200 TAB | Refills: 3 | Status: SHIPPED | OUTPATIENT
Start: 2019-10-08 | End: 2020-11-12 | Stop reason: SDUPTHER

## 2019-10-08 NOTE — TELEPHONE ENCOUNTER
Was the patient seen in the last year in this department? Yes lov 8/13/19    Does patient have an active prescription for medications requested? No     Received Request Via: Pharmacy

## 2019-12-23 DIAGNOSIS — I10 ESSENTIAL HYPERTENSION, BENIGN: ICD-10-CM

## 2019-12-23 RX ORDER — LISINOPRIL 40 MG/1
TABLET ORAL
Qty: 100 TAB | Refills: 0 | Status: SHIPPED | OUTPATIENT
Start: 2019-12-23 | End: 2020-02-12 | Stop reason: SDUPTHER

## 2020-01-07 ENCOUNTER — HOSPITAL ENCOUNTER (OUTPATIENT)
Dept: LAB | Facility: MEDICAL CENTER | Age: 75
End: 2020-01-07
Attending: UROLOGY
Payer: MEDICARE

## 2020-01-07 LAB — PSA SERPL-MCNC: 1.29 NG/ML (ref 0–4)

## 2020-01-07 PROCEDURE — 84153 ASSAY OF PSA TOTAL: CPT

## 2020-01-07 PROCEDURE — 36415 COLL VENOUS BLD VENIPUNCTURE: CPT

## 2020-01-15 ENCOUNTER — OFFICE VISIT (OUTPATIENT)
Dept: URGENT CARE | Facility: CLINIC | Age: 75
End: 2020-01-15
Payer: MEDICARE

## 2020-01-15 VITALS
DIASTOLIC BLOOD PRESSURE: 78 MMHG | HEART RATE: 87 BPM | OXYGEN SATURATION: 94 % | HEIGHT: 67 IN | RESPIRATION RATE: 18 BRPM | BODY MASS INDEX: 26.53 KG/M2 | SYSTOLIC BLOOD PRESSURE: 118 MMHG | TEMPERATURE: 98.4 F | WEIGHT: 169 LBS

## 2020-01-15 DIAGNOSIS — Z20.89 EXPOSURE TO PNEUMONIA: ICD-10-CM

## 2020-01-15 DIAGNOSIS — J06.9 VIRAL URI WITH COUGH: ICD-10-CM

## 2020-01-15 PROBLEM — N18.9 CHRONIC KIDNEY DISEASE: Status: ACTIVE | Noted: 2018-11-28

## 2020-01-15 PROBLEM — M19.90 ARTHRITIS: Status: ACTIVE | Noted: 2020-01-15

## 2020-01-15 PROBLEM — Z85.528 HISTORY OF MALIGNANT NEOPLASM OF KIDNEY: Status: ACTIVE | Noted: 2018-04-30

## 2020-01-15 PROBLEM — Z87.442 HISTORY OF URINARY STONE: Status: ACTIVE | Noted: 2018-11-28

## 2020-01-15 PROCEDURE — 99214 OFFICE O/P EST MOD 30 MIN: CPT | Performed by: PHYSICIAN ASSISTANT

## 2020-01-15 RX ORDER — CODEINE PHOSPHATE/GUAIFENESIN 10-100MG/5
5 LIQUID (ML) ORAL 3 TIMES DAILY PRN
Qty: 120 ML | Refills: 0 | Status: SHIPPED | OUTPATIENT
Start: 2020-01-15 | End: 2020-01-22

## 2020-01-15 RX ORDER — AZITHROMYCIN 250 MG/1
TABLET, FILM COATED ORAL
Qty: 6 TAB | Refills: 0 | Status: SHIPPED | OUTPATIENT
Start: 2020-01-15 | End: 2020-09-23

## 2020-01-15 RX ORDER — BENZONATATE 100 MG/1
100-200 CAPSULE ORAL 3 TIMES DAILY PRN
Qty: 60 CAP | Refills: 0 | Status: SHIPPED | OUTPATIENT
Start: 2020-01-15 | End: 2020-09-23

## 2020-01-15 ASSESSMENT — ENCOUNTER SYMPTOMS
CHILLS: 0
WHEEZING: 0
COUGH: 1
DIARRHEA: 0
HEMOPTYSIS: 0
NAUSEA: 0
FEVER: 0
DIZZINESS: 0
SPUTUM PRODUCTION: 0
HEARTBURN: 0
EYE DISCHARGE: 0
VOMITING: 0
MUSCULOSKELETAL NEGATIVE: 1
SHORTNESS OF BREATH: 0
SORE THROAT: 0
HEADACHES: 0
RHINORRHEA: 1
EYE REDNESS: 0
ABDOMINAL PAIN: 0

## 2020-01-15 ASSESSMENT — COPD QUESTIONNAIRES: COPD: 0

## 2020-01-15 NOTE — PROGRESS NOTES
"Subjective:      Kemal Mueller is a 75 y.o. male who presents with Cough (nasal drainage-x2-3 days)            Cough   This is a new problem. The current episode started in the past 7 days (2-3 days). The problem has been unchanged. The problem occurs every few minutes. The cough is non-productive. Associated symptoms include nasal congestion, postnasal drip and rhinorrhea. Pertinent negatives include no chest pain, chills, ear pain, eye redness, fever, headaches, heartburn, hemoptysis, rash, sore throat, shortness of breath or wheezing. The symptoms are aggravated by lying down. He has tried OTC cough suppressant for the symptoms. The treatment provided mild relief. There is no history of asthma, COPD or pneumonia.     Patient presents to urgent care reporting a 2-3 day history of runny nose, nasal congestion, sore throat, and dry cough. No fevers, chills, body aches, chest pain, wheezing, or SOB. No history of chronic lung disease or pneumonia. He has received all pneumococcal vaccinations. He is concerned because his wife was recently hospitalized for pneumonia. No recent use of antibiotics.     Review of Systems   Constitutional: Negative for chills and fever.   HENT: Positive for congestion, postnasal drip and rhinorrhea. Negative for ear pain and sore throat.    Eyes: Negative for discharge and redness.   Respiratory: Positive for cough. Negative for hemoptysis, sputum production, shortness of breath and wheezing.    Cardiovascular: Negative for chest pain.   Gastrointestinal: Negative for abdominal pain, diarrhea, heartburn, nausea and vomiting.   Genitourinary: Negative.    Musculoskeletal: Negative.    Skin: Negative for rash.   Neurological: Negative for dizziness and headaches.        Objective:     /78 (BP Location: Left arm)   Pulse 87   Temp 36.9 °C (98.4 °F) (Temporal)   Resp 18   Ht 1.702 m (5' 7\")   Wt 76.7 kg (169 lb)   SpO2 94%   BMI 26.47 kg/m²      Physical Exam  Vitals signs " and nursing note reviewed.   Constitutional:       General: He is not in acute distress.     Appearance: Normal appearance. He is well-developed. He is not ill-appearing.   HENT:      Head: Normocephalic and atraumatic.      Right Ear: Hearing, tympanic membrane, ear canal and external ear normal. There is no impacted cerumen.      Left Ear: Hearing, tympanic membrane, ear canal and external ear normal. There is no impacted cerumen.      Nose: Congestion and rhinorrhea present.      Mouth/Throat:      Mouth: Mucous membranes are moist.      Pharynx: Uvula midline. Posterior oropharyngeal erythema present. No oropharyngeal exudate.   Eyes:      General:         Right eye: No discharge.         Left eye: No discharge.      Conjunctiva/sclera: Conjunctivae normal.      Pupils: Pupils are equal, round, and reactive to light.   Neck:      Musculoskeletal: Normal range of motion.   Cardiovascular:      Rate and Rhythm: Normal rate and regular rhythm.      Heart sounds: Normal heart sounds. No murmur.   Pulmonary:      Effort: Pulmonary effort is normal.      Breath sounds: Normal breath sounds. No wheezing or rales.      Comments: Dry cough present throughout exam  Musculoskeletal: Normal range of motion.   Neurological:      Mental Status: He is alert and oriented to person, place, and time.   Psychiatric:         Behavior: Behavior normal.            PMH:  has a past medical history of Anemia (2015), Anesthesia, Arthritis, Breath shortness, Bronchitis (1/15), Cancer ([facility]), CATARACT, Cervical stenosis of spine, Degeneration of cervical intervertebral disc, Dental disorder, Diabetes ([facility]), ED (erectile dysfunction), Elevated blood sugar, Elevated BP, Elevated cholesterol, GERD (gastroesophageal reflux disease), Glaucoma, Heart burn, High cholesterol, Hypertension, Indigestion, Renal cancer ([facility]), Renal disorder, Unspecified urinary incontinence, and wrist pain (5/20/2015).  MEDS:   Current Outpatient Medications:   •   benzonatate (TESSALON) 100 MG Cap, Take 1-2 Caps by mouth 3 times a day as needed., Disp: 60 Cap, Rfl: 0  •  guaifenesin-codeine (TUSSI-ORGANIDIN NR) 100-10 MG/5ML syrup, Take 5 mL by mouth 3 times a day as needed for up to 7 days., Disp: 120 mL, Rfl: 0  •  azithromycin (ZITHROMAX) 250 MG Tab, Take 2 tablets on day one, then 1 tablet on days two through five, Disp: 6 Tab, Rfl: 0  •  lisinopril (PRINIVIL) 40 MG tablet, TAKE ONE TABLET BY MOUTH DAILY, Disp: 100 Tab, Rfl: 0  •  glipiZIDE (GLUCOTROL) 5 MG Tab, TAKE ONE TABLET BY MOUTH TWICE A DAY, Disp: 200 Tab, Rfl: 3  •  omeprazole (PRILOSEC) 20 MG delayed-release capsule, Take 1 Cap by mouth every day., Disp: 90 Cap, Rfl: 3  •  ezetimibe (ZETIA) 10 MG Tab, TAKE ONE TABLET BY MOUTH DAILY, Disp: 90 Tab, Rfl: 3  •  atorvastatin (LIPITOR) 80 MG tablet, TAKE ONE TABLET BY MOUTH EVERY EVENING, Disp: 90 Tab, Rfl: 3  •  vitamin D3, cholecalciferol, 1000 UNIT Tab, Take 2 Tabs by mouth every day., Disp: 100 Tab, Rfl: 3  •  trolamine salicylate (ASPERCREME/ALOE) 10 % cream, Apply 1 Squirt to affected area(s) 4 times a day., Disp: 1 Tube, Rfl: 3  •  potassium citrate SR (UROCIT-K SR) 10 MEQ (1080 MG) TBCR, Take 10 mEq by mouth every day., Disp: , Rfl:   •  Multiple Vitamins-Minerals (CENTRUM SILVER PO), Take  by mouth., Disp: , Rfl:   •  Brimonidine Tartrate-Timolol (COMBIGAN) 0.2-0.5 % SOLN, 1 Drop by Ophthalmic route 2 times a day., Disp: , Rfl:   •  latanoprost (XALATAN) 0.005 % SOLN, Place 1 Drop in both eyes every evening., Disp: , Rfl:   •  ACETAZOLAMIDE 125 MG PO TABS, Take 125 mg by mouth every day., Disp: , Rfl:   ALLERGIES:   Allergies   Allergen Reactions   • Nkda [No Known Drug Allergy]      SURGHX:   Past Surgical History:   Procedure Laterality Date   • CATARACT PHACO WITH IOL Right 1/28/2016    Procedure: CATARACT PHACO WITH IOL;  Surgeon: Alfonso Hernandez M.D.;  Location: SURGERY [facility];  Service:    • CATARACT PHACO WITH IOL Left 1/14/2016     Procedure: CATARACT PHACO WITH IOL;  Surgeon: Alfonso Hernandez M.D.;  Location: SURGERY [facility]S;  Service:    • CERVICAL DISK AND FUSION ANTERIOR  10/30/2012    Performed by Selvin Ying M.D. at SURGERY [facility] ORS   • PROSTATECTOMY, RADICAL RETRO  4/21/2010    Performed by EDYTA KUMAR at SURGERY [facility]   • NODE DISSECTION  4/21/2010    Performed by EDYTA KUMAR at SURGERY [facility] ORS   • CARPAL TUNNEL RELEASE  1990    Bilateral   • TRIGGER FINGER RELEASE  1990    Right small finger   • TONSILLECTOMY AND ADENOIDECTOMY  1953   • APPENDECTOMY     • PB REMOVAL OF KIDNEY STONE  1986/1998    x2     SOCHX:  reports that he quit smoking about 43 years ago. He has a 2.00 pack-year smoking history. He has never used smokeless tobacco. He reports current alcohol use of about 1.2 oz of alcohol per week. He reports that he does not use drugs.  FH: family history includes Alzheimer's Disease in his father; Diabetes in his mother; Stroke in his mother.       Assessment/Plan:       1. Viral URI with cough  - benzonatate (TESSALON) 100 MG Cap; Take 1-2 Caps by mouth 3 times a day as needed.  Dispense: 60 Cap; Refill: 0  - guaifenesin-codeine (TUSSI-ORGANIDIN NR) 100-10 MG/5ML syrup; Take 5 mL by mouth 3 times a day as needed for up to 7 days.  Dispense: 120 mL; Refill: 0   - Will cause sedation, avoid driving, operating heavy machinery, and drinking alcohol    Advised patient symptoms are most likely viral in etiology, recommend supportive care. Increased fluids and rest. No evidence of bacterial etiology at today's visit, advised patient there is no indication at this time to treat with antibiotics. Tessalon perlse and codeine cough suppressant as needed for symptomatic relief. Call or return to office if symptoms persist or worsen. The patient demonstrated a good understanding and agreed with the treatment plan.    2. Exposure to pneumonia     - azithromycin (ZITHROMAX) 250 MG Tab; Take 2 tablets  on day one, then 1 tablet on days two through five  Dispense: 6 Tab; Refill: 0    Contingent course of antibiotics provided at today's visit due to recent exposure to pneumonia, instructed to start taking if symptoms persist/worsen. ED precautions discussed. The patient demonstrated a good understanding and agreed with the treatment plan.

## 2020-01-24 ENCOUNTER — HOSPITAL ENCOUNTER (OUTPATIENT)
Dept: RADIOLOGY | Facility: MEDICAL CENTER | Age: 75
End: 2020-01-24
Attending: UROLOGY
Payer: MEDICARE

## 2020-01-24 DIAGNOSIS — Z85.528 PERSONAL HISTORY OF RENAL CANCER: ICD-10-CM

## 2020-01-24 PROCEDURE — 76775 US EXAM ABDO BACK WALL LIM: CPT

## 2020-02-12 ENCOUNTER — OFFICE VISIT (OUTPATIENT)
Dept: MEDICAL GROUP | Facility: PHYSICIAN GROUP | Age: 75
End: 2020-02-12
Payer: MEDICARE

## 2020-02-12 VITALS
OXYGEN SATURATION: 95 % | HEART RATE: 79 BPM | TEMPERATURE: 97.6 F | WEIGHT: 169 LBS | DIASTOLIC BLOOD PRESSURE: 60 MMHG | BODY MASS INDEX: 26.53 KG/M2 | SYSTOLIC BLOOD PRESSURE: 122 MMHG | RESPIRATION RATE: 16 BRPM | HEIGHT: 67 IN

## 2020-02-12 DIAGNOSIS — Z85.528 HISTORY OF MALIGNANT NEOPLASM OF KIDNEY: ICD-10-CM

## 2020-02-12 DIAGNOSIS — K21.00 GASTROESOPHAGEAL REFLUX DISEASE WITH ESOPHAGITIS: ICD-10-CM

## 2020-02-12 DIAGNOSIS — Z12.11 COLON CANCER SCREENING: ICD-10-CM

## 2020-02-12 DIAGNOSIS — E78.2 MIXED HYPERLIPIDEMIA: ICD-10-CM

## 2020-02-12 DIAGNOSIS — R73.9 ELEVATED BLOOD SUGAR: ICD-10-CM

## 2020-02-12 DIAGNOSIS — E55.9 VITAMIN D DEFICIENCY: ICD-10-CM

## 2020-02-12 DIAGNOSIS — I10 ESSENTIAL HYPERTENSION, BENIGN: ICD-10-CM

## 2020-02-12 DIAGNOSIS — C61 PROSTATE CANCER (HCC): ICD-10-CM

## 2020-02-12 DIAGNOSIS — E78.5 HYPERLIPIDEMIA, UNSPECIFIED HYPERLIPIDEMIA TYPE: ICD-10-CM

## 2020-02-12 DIAGNOSIS — K21.9 GASTROESOPHAGEAL REFLUX DISEASE WITHOUT ESOPHAGITIS: ICD-10-CM

## 2020-02-12 DIAGNOSIS — Z87.442 HISTORY OF URINARY STONE: ICD-10-CM

## 2020-02-12 PROCEDURE — 99214 OFFICE O/P EST MOD 30 MIN: CPT | Performed by: FAMILY MEDICINE

## 2020-02-12 PROCEDURE — 8041 PR SCP AHA: Performed by: FAMILY MEDICINE

## 2020-02-12 RX ORDER — EZETIMIBE 10 MG/1
TABLET ORAL
Qty: 90 TAB | Refills: 3 | Status: SHIPPED | OUTPATIENT
Start: 2020-02-12 | End: 2020-04-13 | Stop reason: SDUPTHER

## 2020-02-12 RX ORDER — ATORVASTATIN CALCIUM 80 MG/1
TABLET, FILM COATED ORAL
Qty: 90 TAB | Refills: 3 | Status: SHIPPED | OUTPATIENT
Start: 2020-02-12 | End: 2020-04-13 | Stop reason: SDUPTHER

## 2020-02-12 RX ORDER — OMEPRAZOLE 20 MG/1
20 CAPSULE, DELAYED RELEASE ORAL DAILY
Qty: 90 CAP | Refills: 3 | Status: SHIPPED | OUTPATIENT
Start: 2020-02-12 | End: 2020-05-28 | Stop reason: SDUPTHER

## 2020-02-12 RX ORDER — LISINOPRIL 40 MG/1
TABLET ORAL
Qty: 100 TAB | Refills: 3 | Status: SHIPPED | OUTPATIENT
Start: 2020-02-12 | End: 2020-04-13 | Stop reason: SDUPTHER

## 2020-02-12 ASSESSMENT — PATIENT HEALTH QUESTIONNAIRE - PHQ9: CLINICAL INTERPRETATION OF PHQ2 SCORE: 0

## 2020-02-12 NOTE — PROGRESS NOTES
Subjective:     Kemal Mueller is a 75 y.o. male here today for  Annual Health Assessment.    Patient continues to see Dr. Baptiste for his history of prostate cancer as well as a history of malignant tumor in his kidney.  He recently had an ultrasound done of his kidneys which I am glad to tell him shows no evidence of recurrence of a kidney tumor.  He has no chest pains no shortness of breath.  He is due for lab work.  He has had hyperglycemia in the past but not kishan diabetes.  He is on glipizide 5 mg p.o. twice daily and tolerates that well.  His GERD is not bothering him right now.  He is due for stool FIT.  He has not seen any blood in his bowels.      Health Maintenance Summary                Annual Wellness Visit Overdue 1945     COLONOSCOPY Overdue 2/9/2020      Done 2/9/2010 AMB REFERRAL TO GI FOR COLONOSCOPY    IMM DTaP/Tdap/Td Vaccine Next Due 6/27/2028      Done 6/27/2018 Imm Admin: Tdap Vaccine     Patient has more history with this topic...           Annual Health Assessment Questions:     1.  Are you currently engaging in any exercise or physical activity? Yes    2.  How would you describe your mood or emotional well-being today? good    3.  Have you had any falls in the last year? No    4.  Have you noticed any problems with your balance or had difficulty walking? No    5.  In the last six months have you experienced any leakage of urine? Yes    6. DPA/Advanced Directive: Patient has Living Will and Durable Power of , but it is not on file. Instructed to bring in a copy to scan into their chart.    Current medicines (including changes today)  Current Outpatient Medications   Medication Sig Dispense Refill   • lisinopril (PRINIVIL) 40 MG tablet TAKE ONE TABLET BY MOUTH DAILY 100 Tab 0   • glipiZIDE (GLUCOTROL) 5 MG Tab TAKE ONE TABLET BY MOUTH TWICE A  Tab 3   • omeprazole (PRILOSEC) 20 MG delayed-release capsule Take 1 Cap by mouth every day. 90 Cap 3   • ezetimibe (ZETIA)  10 MG Tab TAKE ONE TABLET BY MOUTH DAILY 90 Tab 3   • atorvastatin (LIPITOR) 80 MG tablet TAKE ONE TABLET BY MOUTH EVERY EVENING 90 Tab 3   • vitamin D3, cholecalciferol, 1000 UNIT Tab Take 2 Tabs by mouth every day. 100 Tab 3   • trolamine salicylate (ASPERCREME/ALOE) 10 % cream Apply 1 Squirt to affected area(s) 4 times a day. 1 Tube 3   • potassium citrate SR (UROCIT-K SR) 10 MEQ (1080 MG) TBCR Take 10 mEq by mouth every day.     • Multiple Vitamins-Minerals (CENTRUM SILVER PO) Take  by mouth.     • Brimonidine Tartrate-Timolol (COMBIGAN) 0.2-0.5 % SOLN 1 Drop by Ophthalmic route 2 times a day.     • latanoprost (XALATAN) 0.005 % SOLN Place 1 Drop in both eyes every evening.     • ACETAZOLAMIDE 125 MG PO TABS Take 125 mg by mouth every day.     • benzonatate (TESSALON) 100 MG Cap Take 1-2 Caps by mouth 3 times a day as needed. (Patient not taking: Reported on 2/12/2020) 60 Cap 0   • azithromycin (ZITHROMAX) 250 MG Tab Take 2 tablets on day one, then 1 tablet on days two through five 6 Tab 0     No current facility-administered medications for this visit.        He  has a past medical history of Anemia (2015), Anesthesia, Arthritis, Breath shortness, Bronchitis (1/15), Cancer ([facility]), CATARACT, Cervical stenosis of spine, Degeneration of cervical intervertebral disc, Dental disorder, Diabetes ([facility]), ED (erectile dysfunction), Elevated blood sugar, Elevated BP, Elevated cholesterol, GERD (gastroesophageal reflux disease), Glaucoma, Heart burn, High cholesterol, Hypertension, Indigestion, Renal cancer ([facility]), Renal disorder, Unspecified urinary incontinence, and wrist pain (5/20/2015).    Nkda [no known drug allergy]    He  reports that he quit smoking about 43 years ago. He has a 2.00 pack-year smoking history. He has never used smokeless tobacco. He reports current alcohol use of about 1.2 oz of alcohol per week. He reports that he does not use drugs.  Counseling given: Not Answered  Comment: 1/3 ppd for 10 years,  "quit 1977      ROS   No chest pain, no shortness of breath, no abdominal pain.      Review of Systems   Constitutional: Negative.  Negative for fever, chills, weight loss and malaise/fatigue.   HENT: Negative for hearing loss, ear pain, congestion, sore throat, neck pain and tinnitus.    Eyes: Negative for blurred vision, double vision and pain.   Respiratory: Negative for cough, hemoptysis, shortness of breath and wheezing.    Cardiovascular: Negative for chest pain, palpitations, orthopnea, claudication, leg swelling and PND.   Gastrointestinal: Negative for heartburn, nausea, vomiting, abdominal pain, diarrhea, constipation, blood in stool and melena.   Genitourinary: Negative for incontinence, dysuria, urgency, frequency and hematuria.  Male--positive for erectile dysfunction  Musculoskeletal: Negative for myalgias, back pain and joint pain.   Skin: Negative for rash and itching. No lesions that will not heal.  Neurological: Negative for dizziness, tingling, tremors, focal weakness, seizures, loss of consciousness and headaches.   Endo/Heme/Allergies: Negative for environmental allergies and polydipsia.  Does not bruise/bleed easily.   Psychiatric/Behavioral: Negative for depression, memory loss and substance abuse.  The patient is not nervous/anxious and does not have insomnia.  All others negative.     Objective:     Physical Exam:  /60 (BP Location: Left arm, Patient Position: Sitting, BP Cuff Size: Adult)   Pulse 79   Temp 36.4 °C (97.6 °F) (Temporal)   Resp 16   Ht 1.702 m (5' 7\")   Wt 76.7 kg (169 lb)   SpO2 95%  Body mass index is 26.47 kg/m².     Physical Exam   Constitutional: He is oriented. He appears well-developed and well-nourished. No distress.   HENT:   Head: Normocephalic and atraumatic.   Right Ear: External ear normal. Ear canal and TM normal   Left Ear: External ear normal. Ear canal and TM normal   Nose: Nose normal.   Mouth/Throat: Oropharynx is clear and moist.   Eyes: " Conjunctivae and extraocular motions are normal. Pupils are equal, round, and reactive to light.        Fundi benign bilaterally   Neck: No thyromegaly present.   Cardiovascular: Normal rate, regular rhythm, normal heart sounds and intact distal pulses.  Exam reveals no gallop.    No murmur heard.  Pulmonary/Chest: Effort normal and breath sounds normal. No respiratory distress. He has no wheezes. He has no rales.   Abdominal: Soft. Bowel sounds are normal. No hepatosplenomegaly. He exhibits no distension. No tenderness. He has no rebound and no guarding.   Prostate is being checked by his urologist.  Musculoskeletal: Normal range of motion. He exhibits no edema and no tenderness.   Lymphadenopathy:     He has no cervical adenopathy.  No supraclavicular adenopathy.   Neurological: He is alert and oriented. He has normal reflexes.        Babinskis downgoing bilaterally--The patient has intact sensation to monofilament light touch over both feet. No sores or ulcers are noted over the feet.     Skin: Skin is warm and dry. No rash noted. No erythema.   Psychiatric: He has a normal mood and appropriate affect. His behavior is normal. Judgment and thought content normal..    Assessment and Plan:     1. Prostate cancer (HCC)   continue to see urologist   2. Mixed hyperlipidemia --needs reassessment ezetimibe (ZETIA) 10 MG Tab    atorvastatin (LIPITOR) 80 MG tablet  CMP and lipid profile   3. Elevated blood sugar--needs reassessment Comp Metabolic Panel    Lipid Profile    MICROALBUMIN CREAT RATIO URINE    HEMOGLOBIN A1C    Diabetic Monofilament LE Exam   4. Gastroesophageal reflux disease with esophagitis --doing well omeprazole (PRILOSEC) 20 MG delayed-release capsule   5. History of urinary stone --not currently bothering patient Comp Metabolic Panel   6. History of malignant neoplasm of kidney  Comp Metabolic Panel  Patient given good news that his recent ultrasound does not show any evidence of recurrence of a renal  tumor.  Continue to see urologist.   7. Hyperlipidemia, unspecified hyperlipidemia type--doing well ezetimibe (ZETIA) 10 MG Tab    atorvastatin (LIPITOR) 80 MG tablet   8. Essential hypertension, benign --controlled lisinopril (PRINIVIL) 40 MG tablet    CBC WITH DIFFERENTIAL    Comp Metabolic Panel    Lipid Profile   9. Gastroesophageal reflux disease without esophagitis --doing well omeprazole (PRILOSEC) 20 MG delayed-release capsule   10. Vitamin D deficiency--needs reassessment VITAMIN D 25-HYDROXY   11. Colon cancer screening --needs reassessment OCCULT BLOOD FECES IMMUNOASSAY         Discussion today about general wellness and lifestyle habits:    · Engage in regular physical activity and social activities.  · Prevent falls and reduce trip hazards; using ambulatory aides, hearing and vision testing if appropriate.  · Steps to improve urinary incontinence.  · Advanced care planning.    Follow-Up: Recheck 1 year or PRN.  He is thinking of transferring to Dr. Fragoso because his wife already sees Dr. Fragoso and she has agreed to take him as a patient.  I have a lot of respect for Dr. Fragoso and I told the patient this.         PLEASE NOTE: This dictation was created using voice recognition software. I have made every reasonable attempt to correct obvious errors, but I expect that there are errors of grammar and possibly content that I did not discover before finalizing the note.

## 2020-02-14 ENCOUNTER — HOSPITAL ENCOUNTER (OUTPATIENT)
Dept: LAB | Facility: MEDICAL CENTER | Age: 75
End: 2020-02-14
Attending: INTERNAL MEDICINE
Payer: MEDICARE

## 2020-02-14 ENCOUNTER — HOSPITAL ENCOUNTER (OUTPATIENT)
Dept: LAB | Facility: MEDICAL CENTER | Age: 75
End: 2020-02-14
Attending: FAMILY MEDICINE
Payer: MEDICARE

## 2020-02-14 ENCOUNTER — TELEPHONE (OUTPATIENT)
Dept: MEDICAL GROUP | Facility: PHYSICIAN GROUP | Age: 75
End: 2020-02-14

## 2020-02-14 DIAGNOSIS — R73.9 ELEVATED BLOOD SUGAR: ICD-10-CM

## 2020-02-14 DIAGNOSIS — Z85.528 HISTORY OF MALIGNANT NEOPLASM OF KIDNEY: ICD-10-CM

## 2020-02-14 DIAGNOSIS — I10 ESSENTIAL HYPERTENSION, BENIGN: ICD-10-CM

## 2020-02-14 DIAGNOSIS — Z87.442 HISTORY OF URINARY STONE: ICD-10-CM

## 2020-02-14 LAB
25(OH)D3 SERPL-MCNC: 46 NG/ML (ref 30–100)
ALBUMIN SERPL BCP-MCNC: 4.4 G/DL (ref 3.2–4.9)
ALBUMIN SERPL BCP-MCNC: 4.4 G/DL (ref 3.2–4.9)
ALBUMIN/GLOB SERPL: 1.3 G/DL
ALP SERPL-CCNC: 66 U/L (ref 30–99)
ALT SERPL-CCNC: 17 U/L (ref 2–50)
ANION GAP SERPL CALC-SCNC: 8 MMOL/L (ref 0–11.9)
AST SERPL-CCNC: 21 U/L (ref 12–45)
BASOPHILS # BLD AUTO: 0.3 % (ref 0–1.8)
BASOPHILS # BLD: 0.02 K/UL (ref 0–0.12)
BILIRUB SERPL-MCNC: 0.4 MG/DL (ref 0.1–1.5)
BUN SERPL-MCNC: 41 MG/DL (ref 8–22)
BUN SERPL-MCNC: 42 MG/DL (ref 8–22)
CALCIUM SERPL-MCNC: 10.1 MG/DL (ref 8.5–10.5)
CALCIUM SERPL-MCNC: 10.1 MG/DL (ref 8.5–10.5)
CHLORIDE SERPL-SCNC: 108 MMOL/L (ref 96–112)
CHLORIDE SERPL-SCNC: 109 MMOL/L (ref 96–112)
CHOLEST SERPL-MCNC: 141 MG/DL (ref 100–199)
CO2 SERPL-SCNC: 23 MMOL/L (ref 20–33)
CO2 SERPL-SCNC: 23 MMOL/L (ref 20–33)
CREAT SERPL-MCNC: 1.62 MG/DL (ref 0.5–1.4)
CREAT SERPL-MCNC: 1.62 MG/DL (ref 0.5–1.4)
CREAT UR-MCNC: 77.8 MG/DL
EOSINOPHIL # BLD AUTO: 0.06 K/UL (ref 0–0.51)
EOSINOPHIL NFR BLD: 1 % (ref 0–6.9)
ERYTHROCYTE [DISTWIDTH] IN BLOOD BY AUTOMATED COUNT: 43.8 FL (ref 35.9–50)
EST. AVERAGE GLUCOSE BLD GHB EST-MCNC: 126 MG/DL
FASTING STATUS PATIENT QL REPORTED: NORMAL
GLOBULIN SER CALC-MCNC: 3.4 G/DL (ref 1.9–3.5)
GLUCOSE SERPL-MCNC: 63 MG/DL (ref 65–99)
GLUCOSE SERPL-MCNC: 64 MG/DL (ref 65–99)
HBA1C MFR BLD: 6 % (ref 0–5.6)
HCT VFR BLD AUTO: 44 % (ref 42–52)
HDLC SERPL-MCNC: 37 MG/DL
HGB BLD-MCNC: 13.8 G/DL (ref 14–18)
IMM GRANULOCYTES # BLD AUTO: 0.03 K/UL (ref 0–0.11)
IMM GRANULOCYTES NFR BLD AUTO: 0.5 % (ref 0–0.9)
LDLC SERPL CALC-MCNC: 82 MG/DL
LYMPHOCYTES # BLD AUTO: 2.96 K/UL (ref 1–4.8)
LYMPHOCYTES NFR BLD: 48.5 % (ref 22–41)
MCH RBC QN AUTO: 28.8 PG (ref 27–33)
MCHC RBC AUTO-ENTMCNC: 31.4 G/DL (ref 33.7–35.3)
MCV RBC AUTO: 91.7 FL (ref 81.4–97.8)
MICROALBUMIN UR-MCNC: 7.6 MG/DL
MICROALBUMIN/CREAT UR: 98 MG/G (ref 0–30)
MONOCYTES # BLD AUTO: 1.19 K/UL (ref 0–0.85)
MONOCYTES NFR BLD AUTO: 19.5 % (ref 0–13.4)
NEUTROPHILS # BLD AUTO: 1.84 K/UL (ref 1.82–7.42)
NEUTROPHILS NFR BLD: 30.2 % (ref 44–72)
NRBC # BLD AUTO: 0 K/UL
NRBC BLD-RTO: 0 /100 WBC
PHOSPHATE SERPL-MCNC: 4.2 MG/DL (ref 2.5–4.5)
PLATELET # BLD AUTO: 199 K/UL (ref 164–446)
PMV BLD AUTO: 10.9 FL (ref 9–12.9)
POTASSIUM SERPL-SCNC: 5 MMOL/L (ref 3.6–5.5)
POTASSIUM SERPL-SCNC: 5.1 MMOL/L (ref 3.6–5.5)
PROT SERPL-MCNC: 7.8 G/DL (ref 6–8.2)
RBC # BLD AUTO: 4.8 M/UL (ref 4.7–6.1)
SODIUM SERPL-SCNC: 140 MMOL/L (ref 135–145)
SODIUM SERPL-SCNC: 140 MMOL/L (ref 135–145)
TRIGL SERPL-MCNC: 110 MG/DL (ref 0–149)
WBC # BLD AUTO: 6.1 K/UL (ref 4.8–10.8)

## 2020-02-14 PROCEDURE — 80061 LIPID PANEL: CPT

## 2020-02-14 PROCEDURE — 82306 VITAMIN D 25 HYDROXY: CPT

## 2020-02-14 PROCEDURE — 82043 UR ALBUMIN QUANTITATIVE: CPT

## 2020-02-14 PROCEDURE — 80069 RENAL FUNCTION PANEL: CPT

## 2020-02-14 PROCEDURE — 82570 ASSAY OF URINE CREATININE: CPT

## 2020-02-14 PROCEDURE — 36415 COLL VENOUS BLD VENIPUNCTURE: CPT

## 2020-02-14 PROCEDURE — 83036 HEMOGLOBIN GLYCOSYLATED A1C: CPT

## 2020-02-14 PROCEDURE — 85025 COMPLETE CBC W/AUTO DIFF WBC: CPT

## 2020-02-14 PROCEDURE — 80053 COMPREHEN METABOLIC PANEL: CPT

## 2020-02-14 NOTE — TELEPHONE ENCOUNTER
Pt is here because he said  is willing to take him as a new pt since his other family members are established already. I am not able to make him the appt so maybe Madan can call pt and make him the appt if that is ok with Dr. Fragoso. Thanks

## 2020-02-18 ENCOUNTER — TELEPHONE (OUTPATIENT)
Dept: MEDICAL GROUP | Facility: PHYSICIAN GROUP | Age: 75
End: 2020-02-18

## 2020-02-18 NOTE — TELEPHONE ENCOUNTER
----- Message from Carlos Peters M.D. sent at 2/15/2020  5:17 PM PST -----  Dear Sam  Your Vitamin D is normal.  Your cholesterol levels are great.  Your kidney functions and blood sugar are stable.  Your A1C looks better at 6.0.  Your urine microalbumin is 98. Keep seeing the kidney specialist.  Your anemia is improving.    Carlos Peters M.D.

## 2020-03-19 ENCOUNTER — OFFICE VISIT (OUTPATIENT)
Dept: MEDICAL GROUP | Facility: PHYSICIAN GROUP | Age: 75
End: 2020-03-19
Payer: MEDICARE

## 2020-03-19 VITALS
SYSTOLIC BLOOD PRESSURE: 114 MMHG | HEART RATE: 83 BPM | BODY MASS INDEX: 25.99 KG/M2 | DIASTOLIC BLOOD PRESSURE: 50 MMHG | OXYGEN SATURATION: 97 % | WEIGHT: 165.57 LBS | HEIGHT: 67 IN | TEMPERATURE: 96.9 F

## 2020-03-19 DIAGNOSIS — I10 ESSENTIAL HYPERTENSION, BENIGN: ICD-10-CM

## 2020-03-19 DIAGNOSIS — N18.30 CKD (CHRONIC KIDNEY DISEASE), STAGE III: ICD-10-CM

## 2020-03-19 DIAGNOSIS — N20.0 NEPHROLITHIASIS: ICD-10-CM

## 2020-03-19 DIAGNOSIS — K21.9 GASTROESOPHAGEAL REFLUX DISEASE, ESOPHAGITIS PRESENCE NOT SPECIFIED: ICD-10-CM

## 2020-03-19 DIAGNOSIS — E55.9 VITAMIN D DEFICIENCY: ICD-10-CM

## 2020-03-19 DIAGNOSIS — C61 PROSTATE CANCER (HCC): ICD-10-CM

## 2020-03-19 DIAGNOSIS — Z85.528 HISTORY OF MALIGNANT NEOPLASM OF KIDNEY: ICD-10-CM

## 2020-03-19 DIAGNOSIS — R73.01 IMPAIRED FASTING BLOOD SUGAR: ICD-10-CM

## 2020-03-19 DIAGNOSIS — E78.5 DYSLIPIDEMIA: ICD-10-CM

## 2020-03-19 PROCEDURE — 99214 OFFICE O/P EST MOD 30 MIN: CPT | Performed by: FAMILY MEDICINE

## 2020-03-19 ASSESSMENT — FIBROSIS 4 INDEX: FIB4 SCORE: 1.97

## 2020-03-19 NOTE — PROGRESS NOTES
Subjective:      Kemal Mueller is a 75 y.o. male who presents with New Patient (N2U/Fran)        HPI:    The patient is here to establish care and to discuss his chronic medical problems. This is a [facility] Peters[facility] due to previous PCP retiring from practice.     History of malignant neoplasm of kidney  History of renal cell carcinoma of left kidney, s/p cryoablation at [place].  Patient is followed by his nephrologist Dr. Choudhary.  Renal  ultrasound in 1/24/20 showed no evidence of recurrence of kidney tumor.     Nephrolithiasis  He has history of nephrolithiasis of bilateral kidneys and underwent multiple procedures including lithotripsy, and has not had any kidney stones since 1998 after being placed on potassium citrate and acetazolamide.  He is also followed by Dr. Pacheco.    Prostate cancer (HCC)  History of prostate cancer, s/p TURP in April 2010.  He said his prostate cancer recurred in 2012 and he underwent 8 weeks of radiation and he also took Lupron shots.  His PSAs have been undetectable but it started to increase again recently.  Last PSA 1.29 in January 2020 which has increased from 0.66 in July 2019.  He said Dr. Pacheco is following this very closely and he has another PSA scheduled next month.  He said he may need a bone scan depending on the PSA result.  He denies any urinary symptoms.    Impaired fasting blood sugar  Patient has history of impaired fasting glucose, and is taking glipizide 5 mg BID with food.  He said his previous PCP started him on glipizide when his hemoglobin A1c started to increase at diabetic level of 6.5.  He is tolerating the medication without any side effects.  He does not check his blood sugars at home.    Essential hypertension, benign  He takes lisinopril 40 mg QD for his hypertension without any side effects. Blood pressure in the office today is within goal at 114/50.    Dyslipidemia  He has been  taking atorvastain 80 mg QD and zetia 10 mg QD as prescribed for his dyslipidemia without myalgias or other side effects.  Patient states that Zetia was added because the LDL was not at goal on maximum dose of atorvastatin.  Blood work was done before this visit.  Denies history of CAD, PAD, stroke.     CKD, stage III  Patient has chronic kidney disease stage III followed by his nephrologist Dr. Roberts.  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  Kidney function has been stable.  He avoids nephrotoxic agents including NSAIDs.    Vitamin D deficiency  He takes 1000 IU's BID of OTC Vitamin D supplementation. Blood work was completed prior to this visit which show Vitamin D level of 46.    Gastroesophageal reflux disease, esophagitis presence not specified  He has been taking Omeprazole 20 mg for a long time now with good control of his acid reflux symptoms. Patient has been asymptomatic and denies any recent flares. Patient reports he takes the medication everyday, and has not tried taking it on as-needed basis only. s.     I reviewed the following    Past Medical History:   Diagnosis Date   • Anemia 2015     Due to low testosterone from hormone treatment for prostate cancer as per [facility]ology evaluation.   • Anesthesia     Low BP; nausea; hard time waking   • Arthritis     spine and wrists   • Breath shortness     activity induced   • Bronchitis 1/15   • CATARACT    • Cervical stenosis of spine    • CKD (chronic kidney disease), stage III (HCC)     Dr. Roberts   • Degeneration of cervical intervertebral disc    • Dental disorder     upper dentures   • ED (erectile dysfunction)    • Elevated blood sugar    • Elevated BP    • Elevated cholesterol    • GERD (gastroesophageal reflux disease)    • Glaucoma    • Heart burn    • High cholesterol    • Hypertension    • Indigestion    • Prostate cancer (HCC)     prostate- removed '09; CA returned in pocket- radiation therapy done   • Renal cancer (HCC)      Had treatment done- resolved per CAT scan   • Unspecified urinary incontinence    • wrist pain 5/20/2015    bilat wrist pain        Past Surgical History:   Procedure Laterality Date   • CATARACT PHACO WITH IOL Right 1/28/2016    Procedure: CATARACT PHACO WITH IOL;  Surgeon: Alfonso Hernandez M.D.;  Location: [facility];  Service:    • CATARACT PHACO WITH IOL Left 1/14/2016    Procedure: CATARACT PHACO WITH IOL;  Surgeon: Alfonso Hernandez M.D.;  Location: [facility];  Service:    • CERVICAL DISK AND FUSION ANTERIOR  10/30/2012    Performed by Selvin Ying M.D. at SURGERY [facility] ORS   • PROSTATECTOMY, RADICAL RETRO  4/21/2010    Performed by EDYTA KUMAR at [facility] ORS   • NODE DISSECTION  4/21/2010    Performed by EDYTA KUMAR at [facility]   • CARPAL TUNNEL RELEASE  1990    Bilateral   • TRIGGER FINGER RELEASE  1990    Right small finger   • TONSILLECTOMY AND ADENOIDECTOMY  1953   • APPENDECTOMY     • PB REMOVAL OF KIDNEY STONE  1986/1998    x2       Allergies   Allergen Reactions   • Nkda [No Known Drug Allergy]        Current Outpatient Medications   Medication Sig Dispense Refill   • ezetimibe (ZETIA) 10 MG Tab TAKE ONE TABLET BY MOUTH DAILY 90 Tab 3   • atorvastatin (LIPITOR) 80 MG tablet TAKE ONE TABLET BY MOUTH EVERY EVENING 90 Tab 3   • lisinopril (PRINIVIL) 40 MG tablet TAKE ONE TABLET BY MOUTH DAILY 100 Tab 3   • omeprazole (PRILOSEC) 20 MG delayed-release capsule Take 1 Cap by mouth every day. 90 Cap 3   • glipiZIDE (GLUCOTROL) 5 MG Tab TAKE ONE TABLET BY MOUTH TWICE A  Tab 3   • vitamin D3, cholecalciferol, 1000 UNIT Tab Take 2 Tabs by mouth every day. 100 Tab 3   • potassium citrate SR (UROCIT-K SR) 10 MEQ (1080 MG) TBCR Take 10 mEq by mouth every day.     • Multiple Vitamins-Minerals (CENTRUM SILVER PO) Take  by mouth.     • Brimonidine Tartrate-Timolol (COMBIGAN) 0.2-0.5 % SOLN 1 Drop by Ophthalmic route 2 times a day.     •  latanoprost (XALATAN) 0.005 % SOLN Place 1 Drop in both eyes every evening.     • ACETAZOLAMIDE 125 MG PO TABS Take 125 mg by mouth every day.     • benzonatate (TESSALON) 100 MG Cap Take 1-2 Caps by mouth 3 times a day as needed. (Patient not taking: Reported on 2020) 60 Cap 0   • azithromycin (ZITHROMAX) 250 MG Tab Take 2 tablets on day one, then 1 tablet on days two through five (Patient not taking: Reported on 3/19/2020) 6 Tab 0   • trolamine salicylate (ASPERCREME/ALOE) 10 % cream Apply 1 Squirt to affected area(s) 4 times a day. (Patient not taking: Reported on 3/19/2020) 1 Tube 3     No current facility-administered medications for this visit.         Family History   Problem Relation Age of Onset   • Diabetes Mother    • Stroke Mother    • Alzheimer's Disease Father        Social History     Socioeconomic History   • Marital status:      Spouse name: Not on file   • Number of children: Not on file   • Years of education: Not on file   • Highest education level: Not on file   Occupational History   • Occupation: retired [employer] manager   Social Needs   • Financial resource strain: Not on file   • Food insecurity     Worry: Not on file     Inability: Not on file   • Transportation needs     Medical: Not on file     Non-medical: Not on file   Tobacco Use   • Smoking status: Former Smoker     Packs/day: 0.20     Years: 10.00     Pack years: 2.00     Last attempt to quit: 1977     Years since quittin.2   • Smokeless tobacco: Never Used   • Tobacco comment: 1/3 ppd for 10 years, quit    Substance and Sexual Activity   • Alcohol use: Yes     Alcohol/week: 1.2 oz     Types: 2 Cans of beer per week     Comment: 1-2 per day   • Drug use: No   • Sexual activity: Not Currently     Partners: Female   Lifestyle   • Physical activity     Days per week: Not on file     Minutes per session: Not on file   • Stress: Not on file   Relationships   • Social connections     Talks on phone: Not on file     " Gets together: Not on file     Attends [employer] service: Not on file     Active member of club or organization: Not on file     Attends meetings of clubs or organizations: Not on file     Relationship status: Not on file   • Intimate partner violence     Fear of current or ex partner: Not on file     Emotionally abused: Not on file     Physically abused: Not on file     Forced sexual activity: Not on file   Other Topics Concern   • Not on file   Social History Narrative   • Not on file         ROS:  As per the HPI as shown above, the rest are negative.       Objective:     /50 (BP Location: Left arm, Patient Position: Sitting, BP Cuff Size: Adult)   Pulse 83   Temp 36.1 °C (96.9 °F) (Temporal)   Ht 1.702 m (5' 7\")   Wt 75.1 kg (165 lb 9.1 oz)   SpO2 97%   BMI 25.93 kg/m²     Physical Exam  Examined alert, awake, oriented, not in distress    Neck-supple, no lymphadenopathy, no thyromegaly  Lungs-clear to auscultation, no rales, no wheezes  Heart-regular rate and rhythm, no murmur  Extremities-no edema, clubbing, cyanosis       Labs:  Hospital Outpatient Visit on 02/14/2020   Component Date Value Ref Range Status   • Glycohemoglobin 02/14/2020 6.0* 0.0 - 5.6 % Final    Comment: Increased risk for diabetes:  5.7 -6.4%  Diabetes:  >6.4%  Glycemic control for adults with diabetes:  <7.0%  The above interpretations are per ADA guidelines.  Diagnosis  of diabetes mellitus on the basis of elevated Hemoglobin A1c  should be confirmed by repeating the Hb A1c test.     • Est Avg Glucose 02/14/2020 126  mg/dL Final    Comment: The eAG calculation is based on the A1c-Derived Daily Glucose  (ADAG) study.  See the ADA's website for additional information.     • Creatinine, Urine 02/14/2020 77.80  mg/dL Final   • Microalbumin, Urine Random 02/14/2020 7.6  mg/dL Final   • Micro Alb Creat Ratio 02/14/2020 98* 0 - 30 mg/g Final   • Cholesterol,Tot 02/14/2020 141  100 - 199 mg/dL Final   • Triglycerides 02/14/2020 110  0 " - 149 mg/dL Final   • HDL 02/14/2020 37* >=40 mg/dL Final   • LDL 02/14/2020 82  <100 mg/dL Final   • Sodium 02/14/2020 140  135 - 145 mmol/L Final   • Potassium 02/14/2020 5.0  3.6 - 5.5 mmol/L Final   • Chloride 02/14/2020 109  96 - 112 mmol/L Final   • Co2 02/14/2020 23  20 - 33 mmol/L Final   • Anion Gap 02/14/2020 8.0  0.0 - 11.9 Final   • Glucose 02/14/2020 63* 65 - 99 mg/dL Final   • Bun 02/14/2020 41* 8 - 22 mg/dL Final   • Creatinine 02/14/2020 1.62* 0.50 - 1.40 mg/dL Final   • Calcium 02/14/2020 10.1  8.5 - 10.5 mg/dL Final   • AST(SGOT) 02/14/2020 21  12 - 45 U/L Final   • ALT(SGPT) 02/14/2020 17  2 - 50 U/L Final   • Alkaline Phosphatase 02/14/2020 66  30 - 99 U/L Final   • Total Bilirubin 02/14/2020 0.4  0.1 - 1.5 mg/dL Final   • Albumin 02/14/2020 4.4  3.2 - 4.9 g/dL Final   • Total Protein 02/14/2020 7.8  6.0 - 8.2 g/dL Final   • Globulin 02/14/2020 3.4  1.9 - 3.5 g/dL Final   • A-G Ratio 02/14/2020 1.3  g/dL Final   • WBC 02/14/2020 6.1  4.8 - 10.8 K/uL Final   • RBC 02/14/2020 4.80  4.70 - 6.10 M/uL Final   • Hemoglobin 02/14/2020 13.8* 14.0 - 18.0 g/dL Final   • Hematocrit 02/14/2020 44.0  42.0 - 52.0 % Final   • MCV 02/14/2020 91.7  81.4 - 97.8 fL Final   • MCH 02/14/2020 28.8  27.0 - 33.0 pg Final   • MCHC 02/14/2020 31.4* 33.7 - 35.3 g/dL Final   • RDW 02/14/2020 43.8  35.9 - 50.0 fL Final   • Platelet Count 02/14/2020 199  164 - 446 K/uL Final   • MPV 02/14/2020 10.9  9.0 - 12.9 fL Final   • Neutrophils-Polys 02/14/2020 30.20* 44.00 - 72.00 % Final   • Lymphocytes 02/14/2020 48.50* 22.00 - 41.00 % Final   • Monocytes 02/14/2020 19.50* 0.00 - 13.40 % Final   • Eosinophils 02/14/2020 1.00  0.00 - 6.90 % Final   • Basophils 02/14/2020 0.30  0.00 - 1.80 % Final   • Immature Granulocytes 02/14/2020 0.50  0.00 - 0.90 % Final   • Nucleated RBC 02/14/2020 0.00  /100 WBC Final   • Neutrophils (Absolute) 02/14/2020 1.84  1.82 - 7.42 K/uL Final    Includes immature neutrophils, if present.   • Lymphs  (Absolute) 02/14/2020 2.96  1.00 - 4.80 K/uL Final   • Monos (Absolute) 02/14/2020 1.19* 0.00 - 0.85 K/uL Final   • Eos (Absolute) 02/14/2020 0.06  0.00 - 0.51 K/uL Final   • Baso (Absolute) 02/14/2020 0.02  0.00 - 0.12 K/uL Final   • Immature Granulocytes (abs) 02/14/2020 0.03  0.00 - 0.11 K/uL Final   • NRBC (Absolute) 02/14/2020 0.00  K/uL Final   • Fasting Status 02/14/2020 Fasting   Final   • 25-Hydroxy   Vitamin D 25 02/14/2020 46  30 - 100 ng/mL Final    Comment: Adult Ranges:   <20 ng/mL - Deficiency  20-29 ng/mL - Insufficiency   ng/mL - Sufficiency  The [employer] Centaur Vitamin D Assay is standardized to the  [facility] reference measurement procedures, a  reference method for the Vitamin D Standardization Program  (VDSP).  The VDSP aligns patient results among 25 (OH)  Vitamin D methods.     • GFR If  02/14/2020 50* >60 mL/min/1.73 m 2 Final   • GFR If Non  02/14/2020 42* >60 mL/min/1.73 m 2 Final   Hospital Outpatient Visit on 02/14/2020   Component Date Value Ref Range Status   • GFR If  02/14/2020 50* >60 mL/min/1.73 m 2 Final   • GFR If Non  02/14/2020 42* >60 mL/min/1.73 m 2 Final   • Sodium 02/14/2020 140  135 - 145 mmol/L Final   • Potassium 02/14/2020 5.1  3.6 - 5.5 mmol/L Final   • Chloride 02/14/2020 108  96 - 112 mmol/L Final   • Co2 02/14/2020 23  20 - 33 mmol/L Final   • Glucose 02/14/2020 64* 65 - 99 mg/dL Final   • Creatinine 02/14/2020 1.62* 0.50 - 1.40 mg/dL Final   • Bun 02/14/2020 42* 8 - 22 mg/dL Final   • Calcium 02/14/2020 10.1  8.5 - 10.5 mg/dL Final   • Phosphorus 02/14/2020 4.2  2.5 - 4.5 mg/dL Final   • Albumin 02/14/2020 4.4  3.2 - 4.9 g/dL Final   Hospital Outpatient Visit on 01/07/2020   Component Date Value Ref Range Status   • Prostatic Specific Antigen Tot 01/07/2020 1.29  0.00 - 4.00 ng/mL Final    Comment: The Access Hybritech PSA assay is a paramagnetic particle,  chemiluminescent immunoassay for  the quantitative determination  of total prostate specific antigen (PSA) levels using the  Access Immunoassay System. Values obtained with different  methods cannot be used interchangeably for patient monitoring.            Assessment/Plan:     1. History of malignant neoplasm of kidney  History of malignant tumor of left kidney, s/p cryoablation at [place].  Patient is followed by Dr. Roberts (Nephrology). Renal ultrasound in 1/24/20 showed no evidence of recurrence of kidney tumor.  We will follow along with his nephrologist.  - Lipid Profile; Future  - Comp Metabolic Panel; Future  - HEMOGLOBIN A1C; Future  - CBC WITH DIFFERENTIAL; Future  - VITAMIN D,25 HYDROXY; Future    2. Prostate cancer (HCC)  History of prostate cancer, s/p prostatectomy in 2010.  Prostate cancer recurred in 2012 and he underwent and underwent 8 weeks of radiation therapy and received lupron injections and PSAs became undetectable.  PSA started to increase recently with the last PSA level of 1.29 in January 2020.  He has another PSA lab work to be done next month ordered by his urologist.  He was told he may need bone scan depending on results.  We will follow along.    - Lipid Profile; Future  - Comp Metabolic Panel; Future  - HEMOGLOBIN A1C; Future  - CBC WITH DIFFERENTIAL; Future  - VITAMIN D,25 HYDROXY; Future    3. Impaired fasting blood sugar  Previous labs in February 2020 show blood glucose levels are 63 and A1C 6.0. This remains in the prediabetic range.  I advised him to make diet changes to improve his glucose levels.  Advised him to avoid sweets, decrease his carbohydrate intake, exercise regularly and keep a healthy weight.   - Lipid Profile; Future  - Comp Metabolic Panel; Future  - HEMOGLOBIN A1C; Future  - CBC WITH DIFFERENTIAL; Future  - VITAMIN D,25 HYDROXY; Future    4. Essential hypertension, benign  Blood pressure is stable and within goal on his current regimen. We will continue the current dosages. I have advised  him to avoid salty foods and exercise regularly.   - Lipid Profile; Future  - Comp Metabolic Panel; Future  - HEMOGLOBIN A1C; Future  - CBC WITH DIFFERENTIAL; Future  - VITAMIN D,25 HYDROXY; Future    5. Dyslipidemia  His lipid panel values show HDL cholesterol are lower than goal at 37, all other lipid values within normal limits. Stable on current atorvastain 80 mg QD and zetia 10 mg QD dosage. I have advised the patient to increase his exercise regimen to improve the HDL and to continue watching the diet in terms of avoidance of fatty foods.  Continue current medications.  Recheck blood work next visit.   - Lipid Profile; Future  - Comp Metabolic Panel; Future  - HEMOGLOBIN A1C; Future  - CBC WITH DIFFERENTIAL; Future  - VITAMIN D,25 HYDROXY; Future    6. CKD (chronic kidney disease), stage III (HCC)  Stable kidney function.  Continue to avoid nephrotoxic agents such as NSAIDs and take Tylenol only PRN for fever, aches, or pains.  Advised proper hydration. Continue to follow up with Dr. Roberts, Nephrology.  - Lipid Profile; Future  - Comp Metabolic Panel; Future  - HEMOGLOBIN A1C; Future  - CBC WITH DIFFERENTIAL; Future  - VITAMIN D,25 HYDROXY; Future    7. Vitamin D deficiency  Patient's Vitamin D level is within the target range at 46 in February 2020. . Advised to continue current OTC supplementation of 1000 IU's BID daily.    - Lipid Profile; Future  - Comp Metabolic Panel; Future  - HEMOGLOBIN A1C; Future  - CBC WITH DIFFERENTIAL; Future  - VITAMIN D,25 HYDROXY; Future    8. Nephrolithiasis  He said he has not had any recurrence of kidney stones since 1998 after being placed on potassium citrate 10 MEQ QD and acetazolamide 125 mg which he takes daily.  Continue follow-up urologist.  - Lipid Profile; Future  - Comp Metabolic Panel; Future  - HEMOGLOBIN A1C; Future  - CBC WITH DIFFERENTIAL; Future  - VITAMIN D,25 HYDROXY; Future    9. Gastroesophageal reflux disease, esophagitis presence not specified  Under  good control. Patient is asymptomatic. He takes omeprazole every day.  He is advised to taper off omeprazole from QD to once every other day.  If he is doing well on this dose he can go down to every 3 days and after that on as-needed basis because of potential for problems with long-term use of PPI.  - Lipid Profile; Future  - Comp Metabolic Panel; Future  - HEMOGLOBIN A1C; Future  - CBC WITH DIFFERENTIAL; Future  - VITAMIN D,25 HYDROXY; Future     Follow-up in 6 months.      Salome COOK (Nas), am scribing for, and in the presence of, Moni Fragoso MD     Electronically signed by: Salome Mckay (Nas), 3/19/2020    Moni COOK MD personally performed the services described in this documentation, as scribed by Salome Mckay in my presence, and it is both accurate and complete.

## 2020-04-13 DIAGNOSIS — I10 ESSENTIAL HYPERTENSION, BENIGN: ICD-10-CM

## 2020-04-13 DIAGNOSIS — E78.5 HYPERLIPIDEMIA, UNSPECIFIED HYPERLIPIDEMIA TYPE: ICD-10-CM

## 2020-04-13 DIAGNOSIS — E78.2 MIXED HYPERLIPIDEMIA: ICD-10-CM

## 2020-04-13 RX ORDER — ATORVASTATIN CALCIUM 80 MG/1
TABLET, FILM COATED ORAL
Qty: 100 TAB | Refills: 3 | Status: SHIPPED | OUTPATIENT
Start: 2020-04-13 | End: 2021-04-21

## 2020-04-13 RX ORDER — LISINOPRIL 40 MG/1
TABLET ORAL
Qty: 100 TAB | Refills: 3 | Status: SHIPPED | OUTPATIENT
Start: 2020-04-13 | End: 2021-05-12

## 2020-04-13 RX ORDER — EZETIMIBE 10 MG/1
TABLET ORAL
Qty: 100 TAB | Refills: 3 | Status: SHIPPED | OUTPATIENT
Start: 2020-04-13 | End: 2021-04-21

## 2020-05-22 ENCOUNTER — HOSPITAL ENCOUNTER (OUTPATIENT)
Dept: LAB | Facility: MEDICAL CENTER | Age: 75
End: 2020-05-22
Attending: UROLOGY
Payer: MEDICARE

## 2020-05-22 PROCEDURE — 36415 COLL VENOUS BLD VENIPUNCTURE: CPT

## 2020-05-22 PROCEDURE — 84403 ASSAY OF TOTAL TESTOSTERONE: CPT

## 2020-05-22 PROCEDURE — 84153 ASSAY OF PSA TOTAL: CPT

## 2020-05-23 LAB
PSA SERPL-MCNC: 3.1 NG/ML (ref 0–4)
TESTOST SERPL-MCNC: 141 NG/DL (ref 175–781)

## 2020-05-28 DIAGNOSIS — K21.9 GASTROESOPHAGEAL REFLUX DISEASE WITHOUT ESOPHAGITIS: ICD-10-CM

## 2020-05-28 DIAGNOSIS — K21.00 GASTROESOPHAGEAL REFLUX DISEASE WITH ESOPHAGITIS: ICD-10-CM

## 2020-05-28 RX ORDER — OMEPRAZOLE 20 MG/1
20 CAPSULE, DELAYED RELEASE ORAL DAILY
Qty: 90 CAP | Refills: 3 | Status: SHIPPED | OUTPATIENT
Start: 2020-05-28 | End: 2021-06-02

## 2020-06-09 ENCOUNTER — TELEPHONE (OUTPATIENT)
Dept: MEDICAL GROUP | Facility: PHYSICIAN GROUP | Age: 75
End: 2020-06-09

## 2020-06-09 NOTE — TELEPHONE ENCOUNTER
1. Caller Name: Karen Mueller                     Call Back Number: [phone]      How would the patient prefer to be contacted with a response: Phone call OK to leave a detailed message    Patient's wife called- patient had a sudden leg cramp last night that made him nauseous, break out in cold sweats and he passed out for a few seconds. She said he is normal today. She is looking for advice from a provider on whether or not they should be worried, offered her an appt but she refused wanting to know what nikki lara Recommends first.

## 2020-06-09 NOTE — TELEPHONE ENCOUNTER
Phone Number Called: [phone] (home)     Call outcome: Spoke to patient regarding message below.    Message: patient understood message no questions

## 2020-06-09 NOTE — TELEPHONE ENCOUNTER
It sounds like he had a fainting reaction secondary to the pain.  The best advice we have for a bad muscle cramp is to put a bar of soap under the mattress sheet.  He could also drink some tonic water before bed as sometimes the quinine can help with muscle cramping.  If  he has another episode of passing out he should get evaluated.

## 2020-07-16 ENCOUNTER — PATIENT OUTREACH (OUTPATIENT)
Dept: HEALTH INFORMATION MANAGEMENT | Facility: OTHER | Age: 75
End: 2020-07-16

## 2020-07-16 NOTE — PROGRESS NOTES
1. HealthConnect Verified: yes    2. Verify PCP: yes    3. Review and add  to Care Team: yes      4. Reviewed/Updated the following with patient:       •   Communication Preference Obtained? YES  • MyChart Activation: already active       •   E-Mail Address Obtained? YES       •   Appointment Day and Time Preferences? YES       •   Preferred Pharmacy? YES       •   Preferred Lab? YES    5. Care Gap Scheduling (Attempt to Schedule EACH Overdue Care Gap!)    Patient prefers to discuss Annual Wellness Visit (AWV) with PCP.

## 2020-07-31 ENCOUNTER — HOSPITAL ENCOUNTER (OUTPATIENT)
Dept: LAB | Facility: MEDICAL CENTER | Age: 75
End: 2020-07-31
Attending: UROLOGY
Payer: MEDICARE

## 2020-07-31 PROCEDURE — 84153 ASSAY OF PSA TOTAL: CPT

## 2020-07-31 PROCEDURE — 36415 COLL VENOUS BLD VENIPUNCTURE: CPT

## 2020-08-01 LAB — PSA SERPL-MCNC: 5.85 NG/ML (ref 0–4)

## 2020-08-04 ENCOUNTER — HOSPITAL ENCOUNTER (OUTPATIENT)
Dept: LAB | Facility: MEDICAL CENTER | Age: 75
End: 2020-08-04
Attending: UROLOGY
Payer: MEDICARE

## 2020-08-04 LAB — TESTOST SERPL-MCNC: 124 NG/DL (ref 175–781)

## 2020-08-04 PROCEDURE — 84403 ASSAY OF TOTAL TESTOSTERONE: CPT

## 2020-08-13 ENCOUNTER — HOSPITAL ENCOUNTER (OUTPATIENT)
Dept: RADIOLOGY | Facility: MEDICAL CENTER | Age: 75
End: 2020-08-13
Attending: UROLOGY
Payer: MEDICARE

## 2020-08-13 DIAGNOSIS — C61 MALIGNANT NEOPLASM OF PROSTATE (HCC): ICD-10-CM

## 2020-08-13 PROCEDURE — A9588 FLUCICLOVINE F-18: HCPCS

## 2020-09-09 ENCOUNTER — HOSPITAL ENCOUNTER (OUTPATIENT)
Dept: LAB | Facility: MEDICAL CENTER | Age: 75
End: 2020-09-09
Attending: NURSE PRACTITIONER
Payer: MEDICARE

## 2020-09-09 ENCOUNTER — HOSPITAL ENCOUNTER (OUTPATIENT)
Dept: LAB | Facility: MEDICAL CENTER | Age: 75
End: 2020-09-09
Attending: FAMILY MEDICINE
Payer: MEDICARE

## 2020-09-09 DIAGNOSIS — E78.5 DYSLIPIDEMIA: ICD-10-CM

## 2020-09-09 DIAGNOSIS — R73.01 IMPAIRED FASTING BLOOD SUGAR: ICD-10-CM

## 2020-09-09 DIAGNOSIS — K21.9 GASTROESOPHAGEAL REFLUX DISEASE, ESOPHAGITIS PRESENCE NOT SPECIFIED: ICD-10-CM

## 2020-09-09 DIAGNOSIS — Z85.528 HISTORY OF MALIGNANT NEOPLASM OF KIDNEY: ICD-10-CM

## 2020-09-09 DIAGNOSIS — I10 ESSENTIAL HYPERTENSION, BENIGN: ICD-10-CM

## 2020-09-09 DIAGNOSIS — N20.0 NEPHROLITHIASIS: ICD-10-CM

## 2020-09-09 DIAGNOSIS — N18.30 CKD (CHRONIC KIDNEY DISEASE), STAGE III: ICD-10-CM

## 2020-09-09 DIAGNOSIS — E55.9 VITAMIN D DEFICIENCY: ICD-10-CM

## 2020-09-09 DIAGNOSIS — C61 PROSTATE CANCER (HCC): ICD-10-CM

## 2020-09-09 LAB
25(OH)D3 SERPL-MCNC: 68 NG/ML (ref 30–100)
25(OH)D3 SERPL-MCNC: 69 NG/ML (ref 30–100)
ALBUMIN SERPL BCP-MCNC: 4.6 G/DL (ref 3.2–4.9)
ALBUMIN SERPL BCP-MCNC: 4.6 G/DL (ref 3.2–4.9)
ALBUMIN/GLOB SERPL: 1.5 G/DL
ALBUMIN/GLOB SERPL: 1.6 G/DL
ALP SERPL-CCNC: 90 U/L (ref 30–99)
ALP SERPL-CCNC: 90 U/L (ref 30–99)
ALT SERPL-CCNC: 20 U/L (ref 2–50)
ALT SERPL-CCNC: 22 U/L (ref 2–50)
ANION GAP SERPL CALC-SCNC: 10 MMOL/L (ref 7–16)
ANION GAP SERPL CALC-SCNC: 10 MMOL/L (ref 7–16)
APPEARANCE UR: CLEAR
AST SERPL-CCNC: 19 U/L (ref 12–45)
AST SERPL-CCNC: 20 U/L (ref 12–45)
BASOPHILS # BLD AUTO: 0.2 % (ref 0–1.8)
BASOPHILS # BLD AUTO: 0.3 % (ref 0–1.8)
BASOPHILS # BLD: 0.01 K/UL (ref 0–0.12)
BASOPHILS # BLD: 0.02 K/UL (ref 0–0.12)
BILIRUB SERPL-MCNC: 0.3 MG/DL (ref 0.1–1.5)
BILIRUB SERPL-MCNC: 0.3 MG/DL (ref 0.1–1.5)
BILIRUB UR QL STRIP.AUTO: NEGATIVE
BUN SERPL-MCNC: 27 MG/DL (ref 8–22)
BUN SERPL-MCNC: 27 MG/DL (ref 8–22)
CALCIUM SERPL-MCNC: 9.7 MG/DL (ref 8.5–10.5)
CALCIUM SERPL-MCNC: 9.8 MG/DL (ref 8.5–10.5)
CHLORIDE SERPL-SCNC: 107 MMOL/L (ref 96–112)
CHLORIDE SERPL-SCNC: 107 MMOL/L (ref 96–112)
CHOLEST SERPL-MCNC: 141 MG/DL (ref 100–199)
CO2 SERPL-SCNC: 22 MMOL/L (ref 20–33)
CO2 SERPL-SCNC: 22 MMOL/L (ref 20–33)
COLOR UR: YELLOW
CREAT SERPL-MCNC: 1.4 MG/DL (ref 0.5–1.4)
CREAT SERPL-MCNC: 1.42 MG/DL (ref 0.5–1.4)
CREAT UR-MCNC: 47.62 MG/DL
EOSINOPHIL # BLD AUTO: 0.02 K/UL (ref 0–0.51)
EOSINOPHIL # BLD AUTO: 0.02 K/UL (ref 0–0.51)
EOSINOPHIL NFR BLD: 0.3 % (ref 0–6.9)
EOSINOPHIL NFR BLD: 0.3 % (ref 0–6.9)
ERYTHROCYTE [DISTWIDTH] IN BLOOD BY AUTOMATED COUNT: 41.8 FL (ref 35.9–50)
ERYTHROCYTE [DISTWIDTH] IN BLOOD BY AUTOMATED COUNT: 42.3 FL (ref 35.9–50)
EST. AVERAGE GLUCOSE BLD GHB EST-MCNC: 140 MG/DL
FASTING STATUS PATIENT QL REPORTED: NORMAL
GLOBULIN SER CALC-MCNC: 2.9 G/DL (ref 1.9–3.5)
GLOBULIN SER CALC-MCNC: 3 G/DL (ref 1.9–3.5)
GLUCOSE SERPL-MCNC: 66 MG/DL (ref 65–99)
GLUCOSE SERPL-MCNC: 67 MG/DL (ref 65–99)
GLUCOSE UR STRIP.AUTO-MCNC: NEGATIVE MG/DL
HBA1C MFR BLD: 6.5 % (ref 0–5.6)
HCT VFR BLD AUTO: 42.7 % (ref 42–52)
HCT VFR BLD AUTO: 43.2 % (ref 42–52)
HDLC SERPL-MCNC: 41 MG/DL
HGB BLD-MCNC: 13.8 G/DL (ref 14–18)
HGB BLD-MCNC: 13.8 G/DL (ref 14–18)
IMM GRANULOCYTES # BLD AUTO: 0.04 K/UL (ref 0–0.11)
IMM GRANULOCYTES # BLD AUTO: 0.05 K/UL (ref 0–0.11)
IMM GRANULOCYTES NFR BLD AUTO: 0.6 % (ref 0–0.9)
IMM GRANULOCYTES NFR BLD AUTO: 0.8 % (ref 0–0.9)
KETONES UR STRIP.AUTO-MCNC: NEGATIVE MG/DL
LDLC SERPL CALC-MCNC: 80 MG/DL
LEUKOCYTE ESTERASE UR QL STRIP.AUTO: NEGATIVE
LYMPHOCYTES # BLD AUTO: 2.5 K/UL (ref 1–4.8)
LYMPHOCYTES # BLD AUTO: 2.66 K/UL (ref 1–4.8)
LYMPHOCYTES NFR BLD: 40.2 % (ref 22–41)
LYMPHOCYTES NFR BLD: 42 % (ref 22–41)
MCH RBC QN AUTO: 28.9 PG (ref 27–33)
MCH RBC QN AUTO: 29.2 PG (ref 27–33)
MCHC RBC AUTO-ENTMCNC: 31.9 G/DL (ref 33.7–35.3)
MCHC RBC AUTO-ENTMCNC: 32.3 G/DL (ref 33.7–35.3)
MCV RBC AUTO: 90.5 FL (ref 81.4–97.8)
MCV RBC AUTO: 90.6 FL (ref 81.4–97.8)
MICRO URNS: NORMAL
MONOCYTES # BLD AUTO: 1.32 K/UL (ref 0–0.85)
MONOCYTES # BLD AUTO: 1.36 K/UL (ref 0–0.85)
MONOCYTES NFR BLD AUTO: 20.8 % (ref 0–13.4)
MONOCYTES NFR BLD AUTO: 21.9 % (ref 0–13.4)
NEUTROPHILS # BLD AUTO: 2.27 K/UL (ref 1.82–7.42)
NEUTROPHILS # BLD AUTO: 2.29 K/UL (ref 1.82–7.42)
NEUTROPHILS NFR BLD: 35.8 % (ref 44–72)
NEUTROPHILS NFR BLD: 36.8 % (ref 44–72)
NITRITE UR QL STRIP.AUTO: NEGATIVE
NRBC # BLD AUTO: 0 K/UL
NRBC # BLD AUTO: 0 K/UL
NRBC BLD-RTO: 0 /100 WBC
NRBC BLD-RTO: 0 /100 WBC
PH UR STRIP.AUTO: 7 [PH] (ref 5–8)
PLATELET # BLD AUTO: 187 K/UL (ref 164–446)
PLATELET # BLD AUTO: 191 K/UL (ref 164–446)
PMV BLD AUTO: 10.6 FL (ref 9–12.9)
PMV BLD AUTO: 10.7 FL (ref 9–12.9)
POTASSIUM SERPL-SCNC: 4.8 MMOL/L (ref 3.6–5.5)
POTASSIUM SERPL-SCNC: 4.8 MMOL/L (ref 3.6–5.5)
PROT SERPL-MCNC: 7.5 G/DL (ref 6–8.2)
PROT SERPL-MCNC: 7.6 G/DL (ref 6–8.2)
PROT UR QL STRIP: NEGATIVE MG/DL
PROT UR-MCNC: 27 MG/DL (ref 0–15)
PROT/CREAT UR: 567 MG/G (ref 15–68)
RBC # BLD AUTO: 4.72 M/UL (ref 4.7–6.1)
RBC # BLD AUTO: 4.77 M/UL (ref 4.7–6.1)
RBC UR QL AUTO: NEGATIVE
SODIUM SERPL-SCNC: 139 MMOL/L (ref 135–145)
SODIUM SERPL-SCNC: 139 MMOL/L (ref 135–145)
SP GR UR STRIP.AUTO: 1.01
TRIGL SERPL-MCNC: 102 MG/DL (ref 0–149)
UROBILINOGEN UR STRIP.AUTO-MCNC: 0.2 MG/DL
WBC # BLD AUTO: 6.2 K/UL (ref 4.8–10.8)
WBC # BLD AUTO: 6.3 K/UL (ref 4.8–10.8)

## 2020-09-09 PROCEDURE — 82306 VITAMIN D 25 HYDROXY: CPT | Mod: 91

## 2020-09-09 PROCEDURE — 80053 COMPREHEN METABOLIC PANEL: CPT | Mod: 91

## 2020-09-09 PROCEDURE — 84156 ASSAY OF PROTEIN URINE: CPT

## 2020-09-09 PROCEDURE — 81003 URINALYSIS AUTO W/O SCOPE: CPT

## 2020-09-09 PROCEDURE — 85025 COMPLETE CBC W/AUTO DIFF WBC: CPT

## 2020-09-09 PROCEDURE — 36415 COLL VENOUS BLD VENIPUNCTURE: CPT

## 2020-09-09 PROCEDURE — 85025 COMPLETE CBC W/AUTO DIFF WBC: CPT | Mod: 91

## 2020-09-09 PROCEDURE — 80053 COMPREHEN METABOLIC PANEL: CPT

## 2020-09-09 PROCEDURE — 83036 HEMOGLOBIN GLYCOSYLATED A1C: CPT

## 2020-09-09 PROCEDURE — 82570 ASSAY OF URINE CREATININE: CPT

## 2020-09-09 PROCEDURE — 80061 LIPID PANEL: CPT

## 2020-09-09 PROCEDURE — 82306 VITAMIN D 25 HYDROXY: CPT

## 2020-09-15 ENCOUNTER — TELEPHONE (OUTPATIENT)
Dept: MEDICAL GROUP | Facility: PHYSICIAN GROUP | Age: 75
End: 2020-09-15

## 2020-09-15 NOTE — TELEPHONE ENCOUNTER
ESTABLISHED PATIENT PRE-VISIT PLANNING     Patient was NOT contacted to complete PVP.     Note: Patient will not be contacted if there is no indication to call.     1.  Reviewed notes from the last few office visits within the medical group: Yes    2.  If any orders were placed at last visit or intended to be done for this visit (i.e. 6 mos follow-up), do we have Results/Consult Notes?        •  Labs - Labs ordered, completed on 09/09/2020 and results are in chart.   Note: If patient appointment is for lab review and patient did not complete labs, check with provider if OK to reschedule patient until labs completed.       •  Imaging - Imaging ordered, completed and results are in chart.       •  Referrals - No referrals were ordered at last office visit.    3. Is this appointment scheduled as a Hospital Follow-Up? No    4.  Immunizations were updated in Epic using WebIZ?: Epic matches WebIZ       •  Web Iz Recommendations: FLU    5.  Patient is due for the following Health Maintenance Topics:   Health Maintenance Due   Topic Date Due   • Annual Wellness Visit  1945   • IMM INFLUENZA (1) 09/01/2020       - Patient plans to schedule appointment for Annual Wellness Visit (AWV).    6. Orders for overdue Health Maintenance topics pended in Pre-Charting? N\A    7.  AHA (MDX) form printed for Provider? YES    8.  Patient was NOT informed to arrive 15 min prior to their scheduled appointment and bring in their medication bottles.

## 2020-09-23 ENCOUNTER — OFFICE VISIT (OUTPATIENT)
Dept: MEDICAL GROUP | Facility: PHYSICIAN GROUP | Age: 75
End: 2020-09-23
Payer: MEDICARE

## 2020-09-23 VITALS
HEIGHT: 67 IN | SYSTOLIC BLOOD PRESSURE: 124 MMHG | TEMPERATURE: 97 F | OXYGEN SATURATION: 94 % | WEIGHT: 174.38 LBS | BODY MASS INDEX: 27.37 KG/M2 | HEART RATE: 81 BPM | DIASTOLIC BLOOD PRESSURE: 60 MMHG

## 2020-09-23 DIAGNOSIS — I10 ESSENTIAL HYPERTENSION, BENIGN: ICD-10-CM

## 2020-09-23 DIAGNOSIS — C61 PRIMARY PROSTATE CANCER WITH METASTASIS FROM PROSTATE TO OTHER SITE (HCC): ICD-10-CM

## 2020-09-23 DIAGNOSIS — R73.01 IMPAIRED FASTING BLOOD SUGAR: ICD-10-CM

## 2020-09-23 DIAGNOSIS — Z23 NEED FOR IMMUNIZATION AGAINST INFLUENZA: ICD-10-CM

## 2020-09-23 DIAGNOSIS — E55.9 VITAMIN D DEFICIENCY: ICD-10-CM

## 2020-09-23 DIAGNOSIS — E78.5 DYSLIPIDEMIA: ICD-10-CM

## 2020-09-23 PROCEDURE — 99214 OFFICE O/P EST MOD 30 MIN: CPT | Mod: 25 | Performed by: FAMILY MEDICINE

## 2020-09-23 PROCEDURE — G0008 ADMIN INFLUENZA VIRUS VAC: HCPCS | Performed by: FAMILY MEDICINE

## 2020-09-23 PROCEDURE — 90662 IIV NO PRSV INCREASED AG IM: CPT | Performed by: FAMILY MEDICINE

## 2020-09-23 ASSESSMENT — FIBROSIS 4 INDEX: FIB4 SCORE: 1.63

## 2020-09-24 NOTE — PROGRESS NOTES
"Subjective:      Kemal Mueller is a 75 y.o. male who presents with Hypertension            HPI     Patient returns for follow-up of his medical problems.    Patient has history of prostate cancer status post TURP in April 2010.  Prostate cancer recurred in 2012 and he underwent 8 weeks of radiation treatment and he also did Lupron shots.  PSAs have been undetectable after that but then started to increase again recently with PSA of 1.29 in January 2020 which has increased from 0.66 in July 2019.  PSA continued to rise and was 3.10 in May 2020 and 5.85 in July 2020.  He was sent for PET scan and it showed abnormal uptake within the lower abdominal retroperitoneal nodes and internal iliac nodes most prominent in the right consistent with prostate prince metastasis.  Because of this he was started on Lupron shots and referred to radiation oncologist Dr. Rubio.  He said he will soon start radiation treatment.  He denies any problems with urination.    In terms of his hypertension, this is well controlled on lisinopril.  Denies any side effects.    For his impaired fasting blood sugar, he is managing this with his own efforts.    For the dyslipidemia, he continues to take atorvastatin and Zetia without myalgias.    He continues to take vitamin D supplementation for vitamin D deficiency 1000 international units twice daily.    He continues to follow-up with his nephrologist for chronic kidney disease stage III.    He needs flu shot today.    Past medical history, past surgical history, family history reviewed-no changes    Social history reviewed-no changes    Allergies reviewed-no changes    Medications reviewed-no changes    ROS     Per HPI, the rest are negative.       Objective:     /60 (BP Location: Left arm, Patient Position: Sitting, BP Cuff Size: Adult)   Pulse 81   Temp 36.1 °C (97 °F) (Temporal)   Ht 1.702 m (5' 7\")   Wt 79.1 kg (174 lb 6.1 oz)   SpO2 94%   BMI 27.31 kg/m²      Physical Exam "     Examined alert, awake, oriented, not in distress    Neck-supple, no lymphadenopathy, no thyromegaly  Lungs-clear to auscultation, no rales, no wheezes  Heart-regular rate and rhythm, no murmur  Extremities-no edema, clubbing, cyanosis       Hospital Outpatient Visit on 09/09/2020   Component Date Value Ref Range Status   • Color 09/09/2020 Yellow   Final   • Character 09/09/2020 Clear   Final   • Specific Gravity 09/09/2020 1.010  <1.035 Final   • Ph 09/09/2020 7.0  5.0 - 8.0 Final   • Glucose 09/09/2020 Negative  Negative mg/dL Final   • Ketones 09/09/2020 Negative  Negative mg/dL Final   • Protein 09/09/2020 Negative  Negative mg/dL Final   • Bilirubin 09/09/2020 Negative  Negative Final   • Urobilinogen, Urine 09/09/2020 0.2  Negative Final   • Nitrite 09/09/2020 Negative  Negative Final   • Leukocyte Esterase 09/09/2020 Negative  Negative Final   • Occult Blood 09/09/2020 Negative  Negative Final   • Micro Urine Req 09/09/2020 see below   Final    Comment: Microscopic examination not performed when specimen is clear  and chemically negative for protein, blood, leukocyte esterase  and nitrite.     • WBC 09/09/2020 6.2  4.8 - 10.8 K/uL Final   • RBC 09/09/2020 4.77  4.70 - 6.10 M/uL Final   • Hemoglobin 09/09/2020 13.8* 14.0 - 18.0 g/dL Final   • Hematocrit 09/09/2020 43.2  42.0 - 52.0 % Final   • MCV 09/09/2020 90.6  81.4 - 97.8 fL Final   • MCH 09/09/2020 28.9  27.0 - 33.0 pg Final   • MCHC 09/09/2020 31.9* 33.7 - 35.3 g/dL Final   • RDW 09/09/2020 42.3  35.9 - 50.0 fL Final   • Platelet Count 09/09/2020 191  164 - 446 K/uL Final   • MPV 09/09/2020 10.7  9.0 - 12.9 fL Final   • Neutrophils-Polys 09/09/2020 36.80* 44.00 - 72.00 % Final   • Lymphocytes 09/09/2020 40.20  22.00 - 41.00 % Final   • Monocytes 09/09/2020 21.90* 0.00 - 13.40 % Final   • Eosinophils 09/09/2020 0.30  0.00 - 6.90 % Final   • Basophils 09/09/2020 0.20  0.00 - 1.80 % Final   • Immature Granulocytes 09/09/2020 0.60  0.00 - 0.90 % Final    • Nucleated RBC 09/09/2020 0.00  /100 WBC Final   • Neutrophils (Absolute) 09/09/2020 2.29  1.82 - 7.42 K/uL Final    Includes immature neutrophils, if present.   • Lymphs (Absolute) 09/09/2020 2.50  1.00 - 4.80 K/uL Final   • Monos (Absolute) 09/09/2020 1.36* 0.00 - 0.85 K/uL Final   • Eos (Absolute) 09/09/2020 0.02  0.00 - 0.51 K/uL Final   • Baso (Absolute) 09/09/2020 0.01  0.00 - 0.12 K/uL Final   • Immature Granulocytes (abs) 09/09/2020 0.04  0.00 - 0.11 K/uL Final   • NRBC (Absolute) 09/09/2020 0.00  K/uL Final   • 25-Hydroxy   Vitamin D 25 09/09/2020 69  30 - 100 ng/mL Final    Comment: Adult Ranges:   <20 ng/mL - Deficiency  20-29 ng/mL - Insufficiency   ng/mL - Sufficiency  Effective 3/18/2020, this electrochemiluminescence binding assay is  performed using Roche cristina e immunoassay analyzer.  The [employer] Vitamin D  total II assay is intended for the quantitative determination of total 25  hydroxyvitamin D in human serum and plasma. This assay is to be used as an  aid in the assessment of vitamin D sufficiency in adults.     • Sodium 09/09/2020 139  135 - 145 mmol/L Final   • Potassium 09/09/2020 4.8  3.6 - 5.5 mmol/L Final   • Chloride 09/09/2020 107  96 - 112 mmol/L Final   • Co2 09/09/2020 22  20 - 33 mmol/L Final   • Anion Gap 09/09/2020 10.0  7.0 - 16.0 Final   • Glucose 09/09/2020 67  65 - 99 mg/dL Final   • Bun 09/09/2020 27* 8 - 22 mg/dL Final   • Creatinine 09/09/2020 1.40  0.50 - 1.40 mg/dL Final   • Calcium 09/09/2020 9.7  8.5 - 10.5 mg/dL Final   • AST(SGOT) 09/09/2020 19  12 - 45 U/L Final   • ALT(SGPT) 09/09/2020 22  2 - 50 U/L Final   • Alkaline Phosphatase 09/09/2020 90  30 - 99 U/L Final   • Total Bilirubin 09/09/2020 0.3  0.1 - 1.5 mg/dL Final   • Albumin 09/09/2020 4.6  3.2 - 4.9 g/dL Final   • Total Protein 09/09/2020 7.6  6.0 - 8.2 g/dL Final   • Globulin 09/09/2020 3.0  1.9 - 3.5 g/dL Final   • A-G Ratio 09/09/2020 1.5  g/dL Final   • Total Protein, Urine 09/09/2020 27.0* 0.0 -  15.0 mg/dL Final   • Creatinine, Random Urine 09/09/2020 47.62  mg/dL Final    Comment: Reference ranges have not been established for this specimen type.  Result interpretation should include consideration of patient's  medical condition and clinical presentation.     • Protein Creatinine Ratio 09/09/2020 567* 15 - 68 mg/g Final   • GFR If  09/09/2020 59* >60 mL/min/1.73 m 2 Final   • GFR If Non  09/09/2020 49* >60 mL/min/1.73 m 2 Final     Results for NILE BEGUM (MRN [id number]) as of 9/23/2020 18:37   Ref. Range 9/9/2020 09:19   Glycohemoglobin Latest Ref Range: 0.0 - 5.6 % 6.5 (H)   Estim. Avg Glu Latest Units: mg/dL 140   Fasting Status Unknown Fasting   Cholesterol,Tot Latest Ref Range: 100 - 199 mg/dL 141   Triglycerides Latest Ref Range: 0 - 149 mg/dL 102   HDL Latest Ref Range: >=40 mg/dL 41   LDL Latest Ref Range: <100 mg/dL 80        Assessment/Plan:        1. 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  He just started taking Lupron shots through his urologist.  He will start radiation treatment through radiation oncologist.  We will follow along.    2. Essential hypertension, benign  Controlled on his medication.  Continue lisinopril.  - Lipid Profile; Future  - Comp Metabolic Panel; Future  - HEMOGLOBIN A1C; Future    3. Impaired fasting blood sugar  His hemoglobin A1c has increased to 6.5 which is now in the diabetic level but not out of control.  Advised to work harder on avoidance of sweets, decreasing carbs, regular exercise and weight loss.  Recheck blood work next visit.  Hopefully this will not become persistently in the diabetic range.  Discussed the need to get it down below 6.5 and even lower.  - Lipid Profile; Future  - Comp Metabolic Panel; Future  - HEMOGLOBIN A1C; Future    4. Dyslipidemia  All at target on his cholesterol  medications.  - Lipid Profile; Future  - Comp Metabolic Panel; Future  - HEMOGLOBIN A1C; Future    5. Vitamin D deficiency  Vitamin D adequately replaced.  Continue the same dose of vitamin D supplementation.    6. Need for immunization against influenza  High-dose flu shot was given.  - INFLUENZA VACCINE, HIGH DOSE (65+ ONLY)    He is also due for Shingrix but he wants to hold off until he is finished with his radiation treatment to get this.  He will return in 6 months for follow-up.      Please note that this dictation was created using voice recognition software. I have worked with consultants from the vendor as well as technical experts from [facility]  [employer] to optimize the interface. I have made every reasonable attempt to correct obvious errors, but I expect that there are errors of grammar and possibly content I did not discover before finalizing the note.

## 2020-11-12 DIAGNOSIS — R73.9 ELEVATED BLOOD SUGAR: ICD-10-CM

## 2020-11-12 RX ORDER — GLIPIZIDE 5 MG/1
TABLET ORAL
Qty: 200 TAB | Refills: 3 | Status: SHIPPED | OUTPATIENT
Start: 2020-11-12 | End: 2021-03-24 | Stop reason: SDUPTHER

## 2020-12-04 ENCOUNTER — HOSPITAL ENCOUNTER (OUTPATIENT)
Dept: LAB | Facility: MEDICAL CENTER | Age: 75
End: 2020-12-04
Attending: PHYSICIAN ASSISTANT
Payer: MEDICARE

## 2020-12-04 ENCOUNTER — HOSPITAL ENCOUNTER (OUTPATIENT)
Dept: LAB | Facility: MEDICAL CENTER | Age: 75
End: 2020-12-04
Attending: NURSE PRACTITIONER
Payer: MEDICARE

## 2020-12-04 LAB
ALBUMIN SERPL BCP-MCNC: 4 G/DL (ref 3.2–4.9)
ALBUMIN SERPL BCP-MCNC: 4.2 G/DL (ref 3.2–4.9)
ALBUMIN/GLOB SERPL: 1.4 G/DL
ALBUMIN/GLOB SERPL: 1.5 G/DL
ALP SERPL-CCNC: 70 U/L (ref 30–99)
ALP SERPL-CCNC: 73 U/L (ref 30–99)
ALT SERPL-CCNC: 22 U/L (ref 2–50)
ALT SERPL-CCNC: 23 U/L (ref 2–50)
ANION GAP SERPL CALC-SCNC: 10 MMOL/L (ref 7–16)
ANION GAP SERPL CALC-SCNC: 9 MMOL/L (ref 7–16)
APPEARANCE UR: CLEAR
AST SERPL-CCNC: 20 U/L (ref 12–45)
AST SERPL-CCNC: 24 U/L (ref 12–45)
BASOPHILS # BLD AUTO: 0 % (ref 0–1.8)
BASOPHILS # BLD AUTO: 0.2 % (ref 0–1.8)
BASOPHILS # BLD: 0 K/UL (ref 0–0.12)
BASOPHILS # BLD: 0.01 K/UL (ref 0–0.12)
BILIRUB SERPL-MCNC: 0.3 MG/DL (ref 0.1–1.5)
BILIRUB SERPL-MCNC: 0.3 MG/DL (ref 0.1–1.5)
BILIRUB UR QL STRIP.AUTO: NEGATIVE
BUN SERPL-MCNC: 31 MG/DL (ref 8–22)
BUN SERPL-MCNC: 33 MG/DL (ref 8–22)
CALCIUM SERPL-MCNC: 10.1 MG/DL (ref 8.5–10.5)
CALCIUM SERPL-MCNC: 9.8 MG/DL (ref 8.5–10.5)
CHLORIDE SERPL-SCNC: 104 MMOL/L (ref 96–112)
CHLORIDE SERPL-SCNC: 105 MMOL/L (ref 96–112)
CO2 SERPL-SCNC: 25 MMOL/L (ref 20–33)
CO2 SERPL-SCNC: 25 MMOL/L (ref 20–33)
COLOR UR: YELLOW
CREAT SERPL-MCNC: 1.37 MG/DL (ref 0.5–1.4)
CREAT SERPL-MCNC: 1.4 MG/DL (ref 0.5–1.4)
CREAT UR-MCNC: 88.02 MG/DL
EOSINOPHIL # BLD AUTO: 0.01 K/UL (ref 0–0.51)
EOSINOPHIL # BLD AUTO: 0.02 K/UL (ref 0–0.51)
EOSINOPHIL NFR BLD: 0.2 % (ref 0–6.9)
EOSINOPHIL NFR BLD: 0.4 % (ref 0–6.9)
ERYTHROCYTE [DISTWIDTH] IN BLOOD BY AUTOMATED COUNT: 50.5 FL (ref 35.9–50)
ERYTHROCYTE [DISTWIDTH] IN BLOOD BY AUTOMATED COUNT: 50.7 FL (ref 35.9–50)
GLOBULIN SER CALC-MCNC: 2.8 G/DL (ref 1.9–3.5)
GLOBULIN SER CALC-MCNC: 2.9 G/DL (ref 1.9–3.5)
GLUCOSE SERPL-MCNC: 53 MG/DL (ref 65–99)
GLUCOSE SERPL-MCNC: 55 MG/DL (ref 65–99)
GLUCOSE UR STRIP.AUTO-MCNC: NEGATIVE MG/DL
HCT VFR BLD AUTO: 35.4 % (ref 42–52)
HCT VFR BLD AUTO: 35.5 % (ref 42–52)
HGB BLD-MCNC: 11.2 G/DL (ref 14–18)
HGB BLD-MCNC: 11.3 G/DL (ref 14–18)
IMM GRANULOCYTES # BLD AUTO: 0.07 K/UL (ref 0–0.11)
IMM GRANULOCYTES # BLD AUTO: 0.1 K/UL (ref 0–0.11)
IMM GRANULOCYTES NFR BLD AUTO: 1.5 % (ref 0–0.9)
IMM GRANULOCYTES NFR BLD AUTO: 2.2 % (ref 0–0.9)
KETONES UR STRIP.AUTO-MCNC: NEGATIVE MG/DL
LEUKOCYTE ESTERASE UR QL STRIP.AUTO: NEGATIVE
LYMPHOCYTES # BLD AUTO: 0.67 K/UL (ref 1–4.8)
LYMPHOCYTES # BLD AUTO: 0.68 K/UL (ref 1–4.8)
LYMPHOCYTES NFR BLD: 14.4 % (ref 22–41)
LYMPHOCYTES NFR BLD: 14.7 % (ref 22–41)
MAGNESIUM SERPL-MCNC: 2 MG/DL (ref 1.5–2.5)
MCH RBC QN AUTO: 29.6 PG (ref 27–33)
MCH RBC QN AUTO: 30.1 PG (ref 27–33)
MCHC RBC AUTO-ENTMCNC: 31.6 G/DL (ref 33.7–35.3)
MCHC RBC AUTO-ENTMCNC: 31.8 G/DL (ref 33.7–35.3)
MCV RBC AUTO: 93.4 FL (ref 81.4–97.8)
MCV RBC AUTO: 94.7 FL (ref 81.4–97.8)
MICRO URNS: NORMAL
MONOCYTES # BLD AUTO: 1.34 K/UL (ref 0–0.85)
MONOCYTES # BLD AUTO: 1.38 K/UL (ref 0–0.85)
MONOCYTES NFR BLD AUTO: 28.9 % (ref 0–13.4)
MONOCYTES NFR BLD AUTO: 29.6 % (ref 0–13.4)
NEUTROPHILS # BLD AUTO: 2.5 K/UL (ref 1.82–7.42)
NEUTROPHILS # BLD AUTO: 2.52 K/UL (ref 1.82–7.42)
NEUTROPHILS NFR BLD: 53.8 % (ref 44–72)
NEUTROPHILS NFR BLD: 54.1 % (ref 44–72)
NITRITE UR QL STRIP.AUTO: NEGATIVE
NRBC # BLD AUTO: 0 K/UL
NRBC # BLD AUTO: 0 K/UL
NRBC BLD-RTO: 0 /100 WBC
NRBC BLD-RTO: 0 /100 WBC
PH UR STRIP.AUTO: 7 [PH] (ref 5–8)
PLATELET # BLD AUTO: 133 K/UL (ref 164–446)
PLATELET # BLD AUTO: 135 K/UL (ref 164–446)
PMV BLD AUTO: 10.6 FL (ref 9–12.9)
PMV BLD AUTO: 10.8 FL (ref 9–12.9)
POTASSIUM SERPL-SCNC: 4.6 MMOL/L (ref 3.6–5.5)
POTASSIUM SERPL-SCNC: 4.6 MMOL/L (ref 3.6–5.5)
PROT SERPL-MCNC: 6.9 G/DL (ref 6–8.2)
PROT SERPL-MCNC: 7 G/DL (ref 6–8.2)
PROT UR QL STRIP: NEGATIVE MG/DL
PROT UR-MCNC: 18 MG/DL (ref 0–15)
PROT/CREAT UR: 204 MG/G (ref 15–68)
PSA SERPL-MCNC: 0.33 NG/ML (ref 0–4)
RBC # BLD AUTO: 3.75 M/UL (ref 4.7–6.1)
RBC # BLD AUTO: 3.79 M/UL (ref 4.7–6.1)
RBC UR QL AUTO: NEGATIVE
SODIUM SERPL-SCNC: 139 MMOL/L (ref 135–145)
SODIUM SERPL-SCNC: 139 MMOL/L (ref 135–145)
SP GR UR STRIP.AUTO: 1.01
TESTOST SERPL-MCNC: <10 NG/DL (ref 175–781)
URATE SERPL-MCNC: 7.7 MG/DL (ref 2.5–8.3)
UROBILINOGEN UR STRIP.AUTO-MCNC: 0.2 MG/DL
WBC # BLD AUTO: 4.6 K/UL (ref 4.8–10.8)
WBC # BLD AUTO: 4.7 K/UL (ref 4.8–10.8)

## 2020-12-04 PROCEDURE — 85025 COMPLETE CBC W/AUTO DIFF WBC: CPT | Mod: 91

## 2020-12-04 PROCEDURE — 81003 URINALYSIS AUTO W/O SCOPE: CPT

## 2020-12-04 PROCEDURE — 84403 ASSAY OF TOTAL TESTOSTERONE: CPT

## 2020-12-04 PROCEDURE — 36415 COLL VENOUS BLD VENIPUNCTURE: CPT

## 2020-12-04 PROCEDURE — 84153 ASSAY OF PSA TOTAL: CPT

## 2020-12-04 PROCEDURE — 80053 COMPREHEN METABOLIC PANEL: CPT

## 2020-12-04 PROCEDURE — 83735 ASSAY OF MAGNESIUM: CPT

## 2020-12-04 PROCEDURE — 85025 COMPLETE CBC W/AUTO DIFF WBC: CPT

## 2020-12-04 PROCEDURE — 80053 COMPREHEN METABOLIC PANEL: CPT | Mod: 91

## 2020-12-04 PROCEDURE — 84550 ASSAY OF BLOOD/URIC ACID: CPT

## 2021-01-11 DIAGNOSIS — Z23 NEED FOR VACCINATION: ICD-10-CM

## 2021-03-05 ENCOUNTER — HOSPITAL ENCOUNTER (OUTPATIENT)
Dept: RADIOLOGY | Facility: MEDICAL CENTER | Age: 76
End: 2021-03-05
Attending: UROLOGY
Payer: MEDICARE

## 2021-03-05 DIAGNOSIS — C61 MALIGNANT NEOPLASM OF PROSTATE (HCC): ICD-10-CM

## 2021-03-05 PROCEDURE — 77080 DXA BONE DENSITY AXIAL: CPT

## 2021-03-22 ENCOUNTER — HOSPITAL ENCOUNTER (OUTPATIENT)
Dept: LAB | Facility: MEDICAL CENTER | Age: 76
End: 2021-03-22
Attending: UROLOGY
Payer: MEDICARE

## 2021-03-22 ENCOUNTER — HOSPITAL ENCOUNTER (OUTPATIENT)
Dept: LAB | Facility: MEDICAL CENTER | Age: 76
End: 2021-03-22
Attending: FAMILY MEDICINE
Payer: MEDICARE

## 2021-03-22 DIAGNOSIS — I10 ESSENTIAL HYPERTENSION, BENIGN: ICD-10-CM

## 2021-03-22 DIAGNOSIS — R73.01 IMPAIRED FASTING BLOOD SUGAR: ICD-10-CM

## 2021-03-22 DIAGNOSIS — E78.5 DYSLIPIDEMIA: ICD-10-CM

## 2021-03-22 LAB
ALBUMIN SERPL BCP-MCNC: 3.8 G/DL (ref 3.2–4.9)
ALBUMIN SERPL BCP-MCNC: 3.9 G/DL (ref 3.2–4.9)
ALBUMIN/GLOB SERPL: 1.3 G/DL
ALBUMIN/GLOB SERPL: 1.3 G/DL
ALP SERPL-CCNC: 74 U/L (ref 30–99)
ALP SERPL-CCNC: 75 U/L (ref 30–99)
ALT SERPL-CCNC: 20 U/L (ref 2–50)
ALT SERPL-CCNC: 21 U/L (ref 2–50)
ANION GAP SERPL CALC-SCNC: 7 MMOL/L (ref 7–16)
ANION GAP SERPL CALC-SCNC: 9 MMOL/L (ref 7–16)
AST SERPL-CCNC: 22 U/L (ref 12–45)
AST SERPL-CCNC: 24 U/L (ref 12–45)
BASOPHILS # BLD AUTO: 0.1 % (ref 0–1.8)
BASOPHILS # BLD: 0.01 K/UL (ref 0–0.12)
BILIRUB SERPL-MCNC: 0.3 MG/DL (ref 0.1–1.5)
BILIRUB SERPL-MCNC: 0.3 MG/DL (ref 0.1–1.5)
BUN SERPL-MCNC: 30 MG/DL (ref 8–22)
BUN SERPL-MCNC: 30 MG/DL (ref 8–22)
CALCIUM SERPL-MCNC: 9.5 MG/DL (ref 8.5–10.5)
CALCIUM SERPL-MCNC: 9.6 MG/DL (ref 8.5–10.5)
CHLORIDE SERPL-SCNC: 108 MMOL/L (ref 96–112)
CHLORIDE SERPL-SCNC: 108 MMOL/L (ref 96–112)
CHOLEST SERPL-MCNC: 151 MG/DL (ref 100–199)
CO2 SERPL-SCNC: 24 MMOL/L (ref 20–33)
CO2 SERPL-SCNC: 26 MMOL/L (ref 20–33)
CREAT SERPL-MCNC: 1.36 MG/DL (ref 0.5–1.4)
CREAT SERPL-MCNC: 1.38 MG/DL (ref 0.5–1.4)
EOSINOPHIL # BLD AUTO: 0.01 K/UL (ref 0–0.51)
EOSINOPHIL NFR BLD: 0.1 % (ref 0–6.9)
ERYTHROCYTE [DISTWIDTH] IN BLOOD BY AUTOMATED COUNT: 44.4 FL (ref 35.9–50)
EST. AVERAGE GLUCOSE BLD GHB EST-MCNC: 189 MG/DL
FASTING STATUS PATIENT QL REPORTED: NORMAL
GLOBULIN SER CALC-MCNC: 2.9 G/DL (ref 1.9–3.5)
GLOBULIN SER CALC-MCNC: 2.9 G/DL (ref 1.9–3.5)
GLUCOSE SERPL-MCNC: 69 MG/DL (ref 65–99)
GLUCOSE SERPL-MCNC: 71 MG/DL (ref 65–99)
HBA1C MFR BLD: 8.2 % (ref 4–5.6)
HCT VFR BLD AUTO: 37.1 % (ref 42–52)
HDLC SERPL-MCNC: 45 MG/DL
HGB BLD-MCNC: 12.1 G/DL (ref 14–18)
IMM GRANULOCYTES # BLD AUTO: 0.17 K/UL (ref 0–0.11)
IMM GRANULOCYTES NFR BLD AUTO: 2.2 % (ref 0–0.9)
LDLC SERPL CALC-MCNC: 86 MG/DL
LYMPHOCYTES # BLD AUTO: 1.11 K/UL (ref 1–4.8)
LYMPHOCYTES NFR BLD: 14.6 % (ref 22–41)
MCH RBC QN AUTO: 30.5 PG (ref 27–33)
MCHC RBC AUTO-ENTMCNC: 32.6 G/DL (ref 33.7–35.3)
MCV RBC AUTO: 93.5 FL (ref 81.4–97.8)
MONOCYTES # BLD AUTO: 1.81 K/UL (ref 0–0.85)
MONOCYTES NFR BLD AUTO: 23.9 % (ref 0–13.4)
NEUTROPHILS # BLD AUTO: 4.47 K/UL (ref 1.82–7.42)
NEUTROPHILS NFR BLD: 59.1 % (ref 44–72)
NRBC # BLD AUTO: 0 K/UL
NRBC BLD-RTO: 0 /100 WBC
PLATELET # BLD AUTO: 159 K/UL (ref 164–446)
PMV BLD AUTO: 10.7 FL (ref 9–12.9)
POTASSIUM SERPL-SCNC: 4.4 MMOL/L (ref 3.6–5.5)
POTASSIUM SERPL-SCNC: 4.5 MMOL/L (ref 3.6–5.5)
PROT SERPL-MCNC: 6.7 G/DL (ref 6–8.2)
PROT SERPL-MCNC: 6.8 G/DL (ref 6–8.2)
RBC # BLD AUTO: 3.97 M/UL (ref 4.7–6.1)
SODIUM SERPL-SCNC: 141 MMOL/L (ref 135–145)
SODIUM SERPL-SCNC: 141 MMOL/L (ref 135–145)
TRIGL SERPL-MCNC: 99 MG/DL (ref 0–149)
WBC # BLD AUTO: 7.6 K/UL (ref 4.8–10.8)

## 2021-03-22 PROCEDURE — 84403 ASSAY OF TOTAL TESTOSTERONE: CPT

## 2021-03-22 PROCEDURE — 83036 HEMOGLOBIN GLYCOSYLATED A1C: CPT

## 2021-03-22 PROCEDURE — 80053 COMPREHEN METABOLIC PANEL: CPT | Mod: 91

## 2021-03-22 PROCEDURE — 85025 COMPLETE CBC W/AUTO DIFF WBC: CPT

## 2021-03-22 PROCEDURE — 36415 COLL VENOUS BLD VENIPUNCTURE: CPT

## 2021-03-22 PROCEDURE — 80053 COMPREHEN METABOLIC PANEL: CPT

## 2021-03-22 PROCEDURE — 80061 LIPID PANEL: CPT

## 2021-03-22 PROCEDURE — 84153 ASSAY OF PSA TOTAL: CPT

## 2021-03-23 LAB
PSA SERPL-MCNC: 0.11 NG/ML (ref 0–4)
TESTOST SERPL-MCNC: <10 NG/DL (ref 175–781)

## 2021-03-24 ENCOUNTER — OFFICE VISIT (OUTPATIENT)
Dept: MEDICAL GROUP | Facility: PHYSICIAN GROUP | Age: 76
End: 2021-03-24
Payer: MEDICARE

## 2021-03-24 VITALS
TEMPERATURE: 96.9 F | SYSTOLIC BLOOD PRESSURE: 120 MMHG | WEIGHT: 170.19 LBS | DIASTOLIC BLOOD PRESSURE: 60 MMHG | OXYGEN SATURATION: 95 % | HEIGHT: 67 IN | BODY MASS INDEX: 26.71 KG/M2

## 2021-03-24 DIAGNOSIS — E55.9 VITAMIN D DEFICIENCY: ICD-10-CM

## 2021-03-24 DIAGNOSIS — I10 ESSENTIAL HYPERTENSION, BENIGN: ICD-10-CM

## 2021-03-24 DIAGNOSIS — C61 PRIMARY PROSTATE CANCER WITH METASTASIS FROM PROSTATE TO OTHER SITE (HCC): ICD-10-CM

## 2021-03-24 DIAGNOSIS — E78.5 DYSLIPIDEMIA: ICD-10-CM

## 2021-03-24 DIAGNOSIS — N18.31 STAGE 3A CHRONIC KIDNEY DISEASE: ICD-10-CM

## 2021-03-24 PROCEDURE — 99214 OFFICE O/P EST MOD 30 MIN: CPT | Performed by: FAMILY MEDICINE

## 2021-03-24 RX ORDER — ABIRATERONE ACETATE 250 MG/1
1000 TABLET ORAL DAILY
COMMUNITY
End: 2023-02-01

## 2021-03-24 RX ORDER — GLIPIZIDE 5 MG/1
TABLET ORAL
Qty: 300 TABLET | Refills: 3 | Status: SHIPPED | OUTPATIENT
Start: 2021-03-24 | End: 2022-06-28

## 2021-03-24 ASSESSMENT — PATIENT HEALTH QUESTIONNAIRE - PHQ9: CLINICAL INTERPRETATION OF PHQ2 SCORE: 0

## 2021-03-24 ASSESSMENT — FIBROSIS 4 INDEX: FIB4 SCORE: 2.63

## 2021-03-24 NOTE — PATIENT INSTRUCTIONS

## 2021-03-25 PROBLEM — C77.9 SECONDARY MALIGNANT NEOPLASM OF LYMPH NODES (HCC): Status: ACTIVE | Noted: 2020-08-31

## 2021-03-25 RX ORDER — PREDNISONE 5 MG/1
5 TABLET ORAL 2 TIMES DAILY
COMMUNITY
Start: 2021-02-24 | End: 2023-01-11

## 2021-03-25 NOTE — PROGRESS NOTES
"Subjective:      Kemal Mueller is a 76 y.o. male who presents with Follow-Up            HPI     Patient returns for follow-up of his medical problems as well as for AHA.    We follow him for impaired fasting blood sugar.  He is managing this by watching his diet.  He however had recurrence of his prostate cancer with metastasis to the lymph nodes.  He recently finished radiation treatment and then was started on Lupron shots every 3 months, Zytiga with prednisone.      He has stage IIIa chronic kidney disease which is followed by Dr. Roberts, nephrologist.  He avoids nephrotoxic agents.    Blood pressure is under control on lisinopril.    For his dyslipidemia, he continues to take atorvastatin and Zetia.    He continues to take vitamin D supplementation 1000 international units twice daily for vitamin D deficiency.    Past medical history, past surgical history, family history reviewed-no changes    Social history reviewed-no changes    Allergies reviewed-no changes    Medications reviewed-no changes    ROS     As per HPI, the rest are negative.       Objective:     /60 (BP Location: Left arm, Patient Position: Sitting, BP Cuff Size: Adult)   Temp 36.1 °C (96.9 °F) (Temporal)   Ht 1.702 m (5' 7\")   Wt 77.2 kg (170 lb 3.1 oz)   SpO2 95%   BMI 26.66 kg/m²      Physical Exam     Examined alert, awake, oriented, not in distress    Neck-supple, no lymphadenopathy, no thyromegaly  Lungs-clear to auscultation, no rales, no wheezes  Heart-regular rate and rhythm, no murmur  Extremities-no edema, clubbing, cyanosis    Hospital Outpatient Visit on 03/22/2021   Component Date Value Ref Range Status   • Prostatic Specific Antigen Tot 03/22/2021 0.11  0.00 - 4.00 ng/mL Final    Comment: Effective 3/18/2020, this assay is performed using Roche cristina e immunoassay  analyzer. tPSA values determined on patient samples by different testing  procedures cannot be directly compared with one another and could be " the  cause of erroneous medical interpretations. If there is a change in the tPSA  assay procedure used while monitoring therapy, then new baselines may need  to be established when comparing previous results with results received  after 3/17/2020.     • Sodium 03/22/2021 141  135 - 145 mmol/L Final   • Potassium 03/22/2021 4.5  3.6 - 5.5 mmol/L Final   • Chloride 03/22/2021 108  96 - 112 mmol/L Final   • Co2 03/22/2021 24  20 - 33 mmol/L Final   • Anion Gap 03/22/2021 9.0  7.0 - 16.0 Final   • Glucose 03/22/2021 71  65 - 99 mg/dL Final   • Bun 03/22/2021 30* 8 - 22 mg/dL Final   • Creatinine 03/22/2021 1.36  0.50 - 1.40 mg/dL Final   • Calcium 03/22/2021 9.6  8.5 - 10.5 mg/dL Final   • AST(SGOT) 03/22/2021 24  12 - 45 U/L Final   • ALT(SGPT) 03/22/2021 20  2 - 50 U/L Final   • Alkaline Phosphatase 03/22/2021 75  30 - 99 U/L Final   • Total Bilirubin 03/22/2021 0.3  0.1 - 1.5 mg/dL Final   • Albumin 03/22/2021 3.8  3.2 - 4.9 g/dL Final   • Total Protein 03/22/2021 6.7  6.0 - 8.2 g/dL Final   • Globulin 03/22/2021 2.9  1.9 - 3.5 g/dL Final   • A-G Ratio 03/22/2021 1.3  g/dL Final   • Testosterone,Total 03/22/2021 <10* 175 - 781 ng/dL Final   • WBC 03/22/2021 7.6  4.8 - 10.8 K/uL Final   • RBC 03/22/2021 3.97* 4.70 - 6.10 M/uL Final   • Hemoglobin 03/22/2021 12.1* 14.0 - 18.0 g/dL Final   • Hematocrit 03/22/2021 37.1* 42.0 - 52.0 % Final   • MCV 03/22/2021 93.5  81.4 - 97.8 fL Final   • MCH 03/22/2021 30.5  27.0 - 33.0 pg Final   • MCHC 03/22/2021 32.6* 33.7 - 35.3 g/dL Final   • RDW 03/22/2021 44.4  35.9 - 50.0 fL Final   • Platelet Count 03/22/2021 159* 164 - 446 K/uL Final   • MPV 03/22/2021 10.7  9.0 - 12.9 fL Final   • Neutrophils-Polys 03/22/2021 59.10  44.00 - 72.00 % Final   • Lymphocytes 03/22/2021 14.60* 22.00 - 41.00 % Final   • Monocytes 03/22/2021 23.90* 0.00 - 13.40 % Final   • Eosinophils 03/22/2021 0.10  0.00 - 6.90 % Final   • Basophils 03/22/2021 0.10  0.00 - 1.80 % Final   • Immature Granulocytes  03/22/2021 2.20* 0.00 - 0.90 % Final   • Nucleated RBC 03/22/2021 0.00  /100 WBC Final   • Neutrophils (Absolute) 03/22/2021 4.47  1.82 - 7.42 K/uL Final    Includes immature neutrophils, if present.   • Lymphs (Absolute) 03/22/2021 1.11  1.00 - 4.80 K/uL Final   • Monos (Absolute) 03/22/2021 1.81* 0.00 - 0.85 K/uL Final   • Eos (Absolute) 03/22/2021 0.01  0.00 - 0.51 K/uL Final   • Baso (Absolute) 03/22/2021 0.01  0.00 - 0.12 K/uL Final   • Immature Granulocytes (abs) 03/22/2021 0.17* 0.00 - 0.11 K/uL Final   • NRBC (Absolute) 03/22/2021 0.00  K/uL Final   • GFR If  03/22/2021 >60  >60 mL/min/1.73 m 2 Final   • GFR If Non  03/22/2021 51* >60 mL/min/1.73 m 2 Final                 Assessment/Plan:        1. Uncontrolled diabetes with stage 3 chronic kidney disease GFR 30-59 (HCC)  His hemoglobin A1c is now 8.2 which is already diabetic level and out of control.  Patient has been on prednisone to manage the recurrent prostate cancer.  This is probably contributing to the elevation of blood sugars.  We need to start to treat with medication.  He has chronic kidney disease so I will put him on glipizide and not Metformin.  Discussed the need to take the glipizide every day with breakfast to avoid hypoglycemia.  I will reevaluate in 4 months.  Advised to watch her diet closely.  He was given diet sheet to follow.  Advised to increase activity.  - Lipid Profile; Future  - Comp Metabolic Panel; Future  - HEMOGLOBIN A1C; Future  - glipiZIDE (GLUCOTROL) 5 MG Tab; TAKE 2 TABLETS BY MOUTH IN THE MORNING AND 1 TABLET IN THE EVENING WITH MEALS.  Dispense: 300 tablet; Refill: 3    2. Stage 3a chronic kidney disease  Stable chronic kidney disease stage IIIa.  Avoid nephrotoxic agents.  Advised proper hydration.  Continue follow-up with nephrologist.  - Lipid Profile; Future  - Comp Metabolic Panel; Future  - HEMOGLOBIN A1C; Future    3. Essential hypertension, benign  Controlled on lisinopril.  -  Lipid Profile; Future  - Comp Metabolic Panel; Future  - HEMOGLOBIN A1C; Future    4. Dyslipidemia  All at goal on cholesterol medications.  - Lipid Profile; Future  - Comp Metabolic Panel; Future  - HEMOGLOBIN A1C; Future    5. Vitamin D deficiency  Vitamin be adequately replaced with the last lab work in September 2020.  Continue vitamin D supplementation.    6. Primary prostate cancer with metastasis from prostate to other site (HCC)  Prostate cancer recurred with metastasis to lymph nodes.  He finished radiation treatment and is now getting Lupron shots every 3 months, Zytiga and prednisone.  He is followed closely by his urologist and radiation oncologist.    Follow-up in 3 months.      Please note that this dictation was created using voice recognition software. I have worked with consultants from the vendor as well as technical experts from [employer] to optimize the interface. I have made every reasonable attempt to correct obvious errors, but I expect that there are errors of grammar and possibly content I did not discover before finalizing the note.

## 2021-04-21 DIAGNOSIS — E78.5 HYPERLIPIDEMIA, UNSPECIFIED HYPERLIPIDEMIA TYPE: ICD-10-CM

## 2021-04-21 DIAGNOSIS — E78.2 MIXED HYPERLIPIDEMIA: ICD-10-CM

## 2021-04-21 RX ORDER — ATORVASTATIN CALCIUM 80 MG/1
TABLET, FILM COATED ORAL
Qty: 100 TABLET | Refills: 3 | Status: SHIPPED | OUTPATIENT
Start: 2021-04-21 | End: 2022-06-12

## 2021-04-21 RX ORDER — EZETIMIBE 10 MG/1
TABLET ORAL
Qty: 100 TABLET | Refills: 3 | Status: SHIPPED | OUTPATIENT
Start: 2021-04-21 | End: 2022-04-29

## 2021-05-12 ENCOUNTER — TELEPHONE (OUTPATIENT)
Dept: MEDICAL GROUP | Facility: PHYSICIAN GROUP | Age: 76
End: 2021-05-12

## 2021-06-02 DIAGNOSIS — K21.00 GASTROESOPHAGEAL REFLUX DISEASE WITH ESOPHAGITIS: ICD-10-CM

## 2021-06-02 DIAGNOSIS — K21.9 GASTROESOPHAGEAL REFLUX DISEASE WITHOUT ESOPHAGITIS: ICD-10-CM

## 2021-06-02 RX ORDER — OMEPRAZOLE 20 MG/1
CAPSULE, DELAYED RELEASE ORAL
Qty: 100 CAPSULE | Refills: 3 | Status: SHIPPED | OUTPATIENT
Start: 2021-06-02 | End: 2022-06-12

## 2021-06-08 ENCOUNTER — HOSPITAL ENCOUNTER (OUTPATIENT)
Dept: LAB | Facility: MEDICAL CENTER | Age: 76
End: 2021-06-08
Attending: UROLOGY
Payer: MEDICARE

## 2021-06-08 ENCOUNTER — HOSPITAL ENCOUNTER (OUTPATIENT)
Dept: LAB | Facility: MEDICAL CENTER | Age: 76
End: 2021-06-08
Attending: INTERNAL MEDICINE
Payer: MEDICARE

## 2021-06-08 LAB
ALBUMIN SERPL BCP-MCNC: 4.2 G/DL (ref 3.2–4.9)
BASOPHILS # BLD AUTO: 0.2 % (ref 0–1.8)
BASOPHILS # BLD: 0.01 K/UL (ref 0–0.12)
BUN SERPL-MCNC: 43 MG/DL (ref 8–22)
CALCIUM SERPL-MCNC: 9.8 MG/DL (ref 8.5–10.5)
CHLORIDE SERPL-SCNC: 104 MMOL/L (ref 96–112)
CO2 SERPL-SCNC: 24 MMOL/L (ref 20–33)
CREAT SERPL-MCNC: 1.6 MG/DL (ref 0.5–1.4)
CREAT UR-MCNC: 77.08 MG/DL
EOSINOPHIL # BLD AUTO: 0.01 K/UL (ref 0–0.51)
EOSINOPHIL NFR BLD: 0.2 % (ref 0–6.9)
ERYTHROCYTE [DISTWIDTH] IN BLOOD BY AUTOMATED COUNT: 44.8 FL (ref 35.9–50)
GLUCOSE SERPL-MCNC: 76 MG/DL (ref 65–99)
HCT VFR BLD AUTO: 37.8 % (ref 42–52)
HGB BLD-MCNC: 12.2 G/DL (ref 14–18)
IMM GRANULOCYTES # BLD AUTO: 0.07 K/UL (ref 0–0.11)
IMM GRANULOCYTES NFR BLD AUTO: 1.2 % (ref 0–0.9)
LYMPHOCYTES # BLD AUTO: 1.32 K/UL (ref 1–4.8)
LYMPHOCYTES NFR BLD: 22.8 % (ref 22–41)
MCH RBC QN AUTO: 29.9 PG (ref 27–33)
MCHC RBC AUTO-ENTMCNC: 32.3 G/DL (ref 33.7–35.3)
MCV RBC AUTO: 92.6 FL (ref 81.4–97.8)
MONOCYTES # BLD AUTO: 1.75 K/UL (ref 0–0.85)
MONOCYTES NFR BLD AUTO: 30.3 % (ref 0–13.4)
NEUTROPHILS # BLD AUTO: 2.62 K/UL (ref 1.82–7.42)
NEUTROPHILS NFR BLD: 45.3 % (ref 44–72)
NRBC # BLD AUTO: 0 K/UL
NRBC BLD-RTO: 0 /100 WBC
PHOSPHATE SERPL-MCNC: 4 MG/DL (ref 2.5–4.5)
PLATELET # BLD AUTO: 150 K/UL (ref 164–446)
PMV BLD AUTO: 11.3 FL (ref 9–12.9)
POTASSIUM SERPL-SCNC: 4.8 MMOL/L (ref 3.6–5.5)
PROT UR-MCNC: 23 MG/DL (ref 0–15)
PROT/CREAT UR: 298 MG/G (ref 15–68)
PSA SERPL-MCNC: 0.06 NG/ML (ref 0–4)
RBC # BLD AUTO: 4.08 M/UL (ref 4.7–6.1)
SODIUM SERPL-SCNC: 138 MMOL/L (ref 135–145)
WBC # BLD AUTO: 5.8 K/UL (ref 4.8–10.8)

## 2021-06-08 PROCEDURE — 80069 RENAL FUNCTION PANEL: CPT

## 2021-06-08 PROCEDURE — 85025 COMPLETE CBC W/AUTO DIFF WBC: CPT

## 2021-06-08 PROCEDURE — 84156 ASSAY OF PROTEIN URINE: CPT

## 2021-06-08 PROCEDURE — 84153 ASSAY OF PSA TOTAL: CPT

## 2021-06-08 PROCEDURE — 36415 COLL VENOUS BLD VENIPUNCTURE: CPT

## 2021-06-08 PROCEDURE — 82570 ASSAY OF URINE CREATININE: CPT

## 2021-06-08 PROCEDURE — 82306 VITAMIN D 25 HYDROXY: CPT

## 2021-06-09 LAB — 25(OH)D3 SERPL-MCNC: 64 NG/ML (ref 30–100)

## 2021-06-28 ENCOUNTER — OFFICE VISIT (OUTPATIENT)
Dept: URGENT CARE | Facility: CLINIC | Age: 76
End: 2021-06-28
Payer: MEDICARE

## 2021-06-28 VITALS
RESPIRATION RATE: 16 BRPM | HEIGHT: 67 IN | OXYGEN SATURATION: 96 % | HEART RATE: 108 BPM | DIASTOLIC BLOOD PRESSURE: 90 MMHG | TEMPERATURE: 97.3 F | SYSTOLIC BLOOD PRESSURE: 140 MMHG | WEIGHT: 172 LBS | BODY MASS INDEX: 27 KG/M2

## 2021-06-28 DIAGNOSIS — J22 LRTI (LOWER RESPIRATORY TRACT INFECTION): ICD-10-CM

## 2021-06-28 DIAGNOSIS — R05.9 COUGH: ICD-10-CM

## 2021-06-28 DIAGNOSIS — I10 ESSENTIAL HYPERTENSION: ICD-10-CM

## 2021-06-28 PROCEDURE — 99214 OFFICE O/P EST MOD 30 MIN: CPT | Performed by: NURSE PRACTITIONER

## 2021-06-28 RX ORDER — CODEINE PHOSPHATE AND GUAIFENESIN 10; 100 MG/5ML; MG/5ML
5 SOLUTION ORAL EVERY 4 HOURS PRN
Qty: 100 ML | Refills: 0 | Status: CANCELLED | OUTPATIENT
Start: 2021-06-28

## 2021-06-28 RX ORDER — CODEINE PHOSPHATE AND GUAIFENESIN 10; 100 MG/5ML; MG/5ML
5 SOLUTION ORAL EVERY 4 HOURS PRN
Qty: 100 ML | Refills: 0 | Status: SHIPPED | OUTPATIENT
Start: 2021-06-28 | End: 2021-07-01

## 2021-06-28 RX ORDER — DEXTROMETHORPHAN HYDROBROMIDE AND PROMETHAZINE HYDROCHLORIDE 15; 6.25 MG/5ML; MG/5ML
5 SYRUP ORAL EVERY 4 HOURS PRN
Qty: 120 ML | Refills: 0 | Status: SHIPPED | OUTPATIENT
Start: 2021-06-28 | End: 2021-06-28

## 2021-06-28 RX ORDER — DOXYCYCLINE HYCLATE 100 MG
100 TABLET ORAL 2 TIMES DAILY
Qty: 14 TABLET | Refills: 0 | Status: SHIPPED | OUTPATIENT
Start: 2021-06-28 | End: 2021-07-05

## 2021-06-28 ASSESSMENT — ENCOUNTER SYMPTOMS
CHILLS: 0
DIZZINESS: 0
HEADACHES: 0
VOMITING: 0
EYE REDNESS: 0
MYALGIAS: 0
WHEEZING: 0
SPUTUM PRODUCTION: 1
SHORTNESS OF BREATH: 0
COUGH: 1
SORE THROAT: 0
HEARTBURN: 0
RHINORRHEA: 1
FEVER: 0
NAUSEA: 0

## 2021-06-28 ASSESSMENT — FIBROSIS 4 INDEX: FIB4 SCORE: 2.79

## 2021-06-28 ASSESSMENT — COPD QUESTIONNAIRES: COPD: 0

## 2021-06-28 NOTE — PROGRESS NOTES
Subjective:   Kemal Mueller is a 76 y.o. male who presents for Cough (x3wks, runny nose, post nasal drip, coughing up green yellow color phlegm)      Cough  This is a new problem. Episode onset: 3weeks; granddaughters ill . no known covid exposure is fully vaccinated. The problem has been unchanged. The problem occurs constantly. The cough is productive of sputum. Associated symptoms include nasal congestion, postnasal drip and rhinorrhea. Pertinent negatives include no chest pain, chills, eye redness, fever, headaches, heartburn, myalgias, rash, sore throat, shortness of breath or wheezing. He has tried OTC cough suppressant for the symptoms. The treatment provided no relief. There is no history of bronchitis, COPD or pneumonia.       Review of Systems   Constitutional: Negative for chills and fever.   HENT: Positive for congestion, postnasal drip and rhinorrhea. Negative for sore throat.    Eyes: Negative for redness.   Respiratory: Positive for cough and sputum production. Negative for shortness of breath and wheezing.    Cardiovascular: Negative for chest pain.   Gastrointestinal: Negative for heartburn, nausea and vomiting.   Genitourinary: Negative for dysuria.   Musculoskeletal: Negative for myalgias.   Skin: Negative for rash.   Neurological: Negative for dizziness and headaches.       Medications:    • Abiraterone Acetate Tabs  • acetaZOLAMIDE Tabs  • atorvastatin  • Brimonidine Tartrate-Timolol Soln  • CALCIUM 1200 PO  • CENTRUM SILVER PO  • doxycycline Tabs  • ezetimibe Tabs  • glipiZIDE Tabs  • latanoprost Soln  • lisinopril  • omeprazole  • potassium citrate SR Tbcr  • predniSONE Tabs  • promethazine-dextromethorphan  • vitamin D Tabs    Allergies: Nkda [no known drug allergy]    Problem List: Kemal Mueller does not have any pertinent problems on file.    Surgical History:  Past Surgical History:   Procedure Laterality Date   • CATARACT PHACO WITH IOL Right 1/28/2016    Procedure: CATARACT  "PHACO WITH IOL;  Surgeon: Alfonso Hernandez M.D.;  Location: [facility];  Service:    • CATARACT PHACO WITH IOL Left 1/14/2016    Procedure: CATARACT PHACO WITH IOL;  Surgeon: Alfonso Hernandez M.D.;  Location: [facility];  Service:    • CERVICAL DISK AND FUSION ANTERIOR  10/30/2012    Performed by Selvin Ying M.D. at SURGERY [facility]   • PROSTATECTOMY, RADICAL RETRO  4/21/2010    Performed by EDYTA KUMAR at SURGERY [facility]   • NODE DISSECTION  4/21/2010    Performed by EDYTA KUMAR at [facility]   • CARPAL TUNNEL RELEASE  1990    Bilateral   • TRIGGER FINGER RELEASE  1990    Right small finger   • TONSILLECTOMY AND ADENOIDECTOMY  1953   • APPENDECTOMY     • PB REMOVAL OF KIDNEY STONE  1986/1998    x2       Past Social Hx: Kemal Mueller  reports that he quit smoking about 44 years ago. He has a 2.00 pack-year smoking history. He has never used smokeless tobacco. He reports current alcohol use of about 1.2 oz of alcohol per week. He reports that he does not use drugs.     Past Family Hx:  Kemal Mueller family history includes Alzheimer's Disease in his father; Diabetes in his mother; Stroke in his mother.     Problem list, medications, and allergies reviewed by myself today in Epic.     Objective:     /90 (BP Location: Right arm, Patient Position: Sitting, BP Cuff Size: Adult)   Pulse (!) 108   Temp 36.3 °C (97.3 °F)   Resp 16   Ht 1.702 m (5' 7\")   Wt 78 kg (172 lb)   SpO2 96%   BMI 26.94 kg/m²     Physical Exam  Vitals and nursing note reviewed.   Constitutional:       General: He is not in acute distress.     Appearance: He is well-developed.   HENT:      Head: Normocephalic and atraumatic.      Right Ear: Tympanic membrane and external ear normal.      Left Ear: Tympanic membrane and external ear normal.      Nose: Nose normal.      Right Sinus: No maxillary sinus tenderness or frontal sinus tenderness.      Left Sinus: No " maxillary sinus tenderness or frontal sinus tenderness.      Mouth/Throat:      Mouth: Mucous membranes are moist.      Pharynx: Uvula midline. No posterior oropharyngeal erythema.      Tonsils: No tonsillar exudate or tonsillar abscesses.   Eyes:      General:         Right eye: No discharge.         Left eye: No discharge.      Conjunctiva/sclera: Conjunctivae normal.   Cardiovascular:      Rate and Rhythm: Normal rate.   Pulmonary:      Effort: Pulmonary effort is normal. No respiratory distress.      Breath sounds: Normal breath sounds.      Comments: Coughing throughout exam  Abdominal:      General: There is no distension.   Musculoskeletal:         General: Normal range of motion.   Skin:     General: Skin is warm and dry.   Neurological:      General: No focal deficit present.      Mental Status: He is alert and oriented to person, place, and time. Mental status is at baseline.      Gait: Gait (gait at baseline) normal.   Psychiatric:         Judgment: Judgment normal.         Assessment/Plan:     Diagnosis and associated orders:     1. LRTI (lower respiratory tract infection)  doxycycline (VIBRAMYCIN) 100 MG Tab   2. Cough  guaifenesin-codeine (CHERATUSSIN AC) Solution oral solution    DISCONTINUED: promethazine-dextromethorphan (PROMETHAZINE-DM) 6.25-15 MG/5ML syrup   3. Essential hypertension          Comments/MDM:     • Patient is a 76-year-old male presents with stated above, patient with a persistent productive cough x3 weeks unrelieved with over-the-counter cough medication, has no chest pain, no shortness of breath, is coughing throughout exam, vital signs stable.  Patient will be started on doxycycline for suspected bacterial etiology.  He did decline Covid testing  • Advised to continue supportive care with Tylenol and/or ibuprofen for fevers and discomfort. Increased fluids and electrolytes.  Advised to monitor your blood pressure over the next several days. If your blood pressure continues to be  120/80 or higher please contact your physician for blood pressure management.  Differential diagnosis, natural history, supportive care, and indications for immediate follow-up discussed.   •            Please note that this dictation was created using voice recognition software. I have made a reasonable attempt to correct obvious errors, but I expect that there are errors of grammar and possibly content that I did not discover before finalizing the note.    This note was electronically signed by Jabier WHARTON.

## 2021-07-09 ENCOUNTER — PATIENT OUTREACH (OUTPATIENT)
Dept: HEALTH INFORMATION MANAGEMENT | Facility: OTHER | Age: 76
End: 2021-07-09

## 2021-07-09 NOTE — NON-PROVIDER
Outcome: Left Message    Please transfer to Patient Outreach Team at [phone] when patient returns call.      [facility] Verified: yes    Attempt # 1

## 2021-07-23 ENCOUNTER — HOSPITAL ENCOUNTER (OUTPATIENT)
Dept: LAB | Facility: MEDICAL CENTER | Age: 76
End: 2021-07-23
Attending: UROLOGY
Payer: MEDICARE

## 2021-07-23 ENCOUNTER — HOSPITAL ENCOUNTER (OUTPATIENT)
Dept: LAB | Facility: MEDICAL CENTER | Age: 76
End: 2021-07-23
Attending: FAMILY MEDICINE
Payer: MEDICARE

## 2021-07-23 DIAGNOSIS — N18.31 STAGE 3A CHRONIC KIDNEY DISEASE: ICD-10-CM

## 2021-07-23 DIAGNOSIS — E78.5 DYSLIPIDEMIA: ICD-10-CM

## 2021-07-23 DIAGNOSIS — I10 ESSENTIAL HYPERTENSION, BENIGN: ICD-10-CM

## 2021-07-23 LAB
ALBUMIN SERPL BCP-MCNC: 4.4 G/DL (ref 3.2–4.9)
ALBUMIN SERPL BCP-MCNC: 4.4 G/DL (ref 3.2–4.9)
ALBUMIN/GLOB SERPL: 1.7 G/DL
ALBUMIN/GLOB SERPL: 1.8 G/DL
ALP SERPL-CCNC: 69 U/L (ref 30–99)
ALP SERPL-CCNC: 70 U/L (ref 30–99)
ALT SERPL-CCNC: 18 U/L (ref 2–50)
ALT SERPL-CCNC: 19 U/L (ref 2–50)
ANION GAP SERPL CALC-SCNC: 12 MMOL/L (ref 7–16)
ANION GAP SERPL CALC-SCNC: 13 MMOL/L (ref 7–16)
AST SERPL-CCNC: 24 U/L (ref 12–45)
AST SERPL-CCNC: 24 U/L (ref 12–45)
BILIRUB SERPL-MCNC: 0.3 MG/DL (ref 0.1–1.5)
BILIRUB SERPL-MCNC: 0.4 MG/DL (ref 0.1–1.5)
BUN SERPL-MCNC: 43 MG/DL (ref 8–22)
BUN SERPL-MCNC: 44 MG/DL (ref 8–22)
CALCIUM SERPL-MCNC: 9.6 MG/DL (ref 8.5–10.5)
CALCIUM SERPL-MCNC: 9.7 MG/DL (ref 8.5–10.5)
CHLORIDE SERPL-SCNC: 103 MMOL/L (ref 96–112)
CHLORIDE SERPL-SCNC: 104 MMOL/L (ref 96–112)
CHOLEST SERPL-MCNC: 178 MG/DL (ref 100–199)
CO2 SERPL-SCNC: 22 MMOL/L (ref 20–33)
CO2 SERPL-SCNC: 22 MMOL/L (ref 20–33)
CREAT SERPL-MCNC: 1.31 MG/DL (ref 0.5–1.4)
CREAT SERPL-MCNC: 1.32 MG/DL (ref 0.5–1.4)
EST. AVERAGE GLUCOSE BLD GHB EST-MCNC: 171 MG/DL
GLOBULIN SER CALC-MCNC: 2.5 G/DL (ref 1.9–3.5)
GLOBULIN SER CALC-MCNC: 2.6 G/DL (ref 1.9–3.5)
GLUCOSE SERPL-MCNC: 51 MG/DL (ref 65–99)
GLUCOSE SERPL-MCNC: 51 MG/DL (ref 65–99)
HBA1C MFR BLD: 7.6 % (ref 4–5.6)
HDLC SERPL-MCNC: 52 MG/DL
LDLC SERPL CALC-MCNC: 93 MG/DL
POTASSIUM SERPL-SCNC: 4.2 MMOL/L (ref 3.6–5.5)
POTASSIUM SERPL-SCNC: 4.2 MMOL/L (ref 3.6–5.5)
PROT SERPL-MCNC: 6.9 G/DL (ref 6–8.2)
PROT SERPL-MCNC: 7 G/DL (ref 6–8.2)
PSA SERPL-MCNC: 0.05 NG/ML (ref 0–4)
SODIUM SERPL-SCNC: 138 MMOL/L (ref 135–145)
SODIUM SERPL-SCNC: 138 MMOL/L (ref 135–145)
TESTOST SERPL-MCNC: <10 NG/DL (ref 175–781)
TRIGL SERPL-MCNC: 167 MG/DL (ref 0–149)

## 2021-07-23 PROCEDURE — 83036 HEMOGLOBIN GLYCOSYLATED A1C: CPT

## 2021-07-23 PROCEDURE — 84403 ASSAY OF TOTAL TESTOSTERONE: CPT

## 2021-07-23 PROCEDURE — 80053 COMPREHEN METABOLIC PANEL: CPT

## 2021-07-23 PROCEDURE — 80061 LIPID PANEL: CPT

## 2021-07-23 PROCEDURE — 84153 ASSAY OF PSA TOTAL: CPT

## 2021-07-23 PROCEDURE — 36415 COLL VENOUS BLD VENIPUNCTURE: CPT

## 2021-07-23 PROCEDURE — 80053 COMPREHEN METABOLIC PANEL: CPT | Mod: 91

## 2021-07-28 ENCOUNTER — OFFICE VISIT (OUTPATIENT)
Dept: MEDICAL GROUP | Facility: PHYSICIAN GROUP | Age: 76
End: 2021-07-28
Payer: MEDICARE

## 2021-07-28 VITALS
DIASTOLIC BLOOD PRESSURE: 60 MMHG | HEART RATE: 74 BPM | BODY MASS INDEX: 26.96 KG/M2 | OXYGEN SATURATION: 97 % | WEIGHT: 171.74 LBS | SYSTOLIC BLOOD PRESSURE: 110 MMHG | TEMPERATURE: 96.9 F | HEIGHT: 67 IN

## 2021-07-28 DIAGNOSIS — E78.5 DYSLIPIDEMIA: ICD-10-CM

## 2021-07-28 DIAGNOSIS — C61 MALIGNANT NEOPLASM OF PROSTATE (HCC): ICD-10-CM

## 2021-07-28 DIAGNOSIS — N18.31 STAGE 3A CHRONIC KIDNEY DISEASE: ICD-10-CM

## 2021-07-28 DIAGNOSIS — I10 ESSENTIAL HYPERTENSION, BENIGN: ICD-10-CM

## 2021-07-28 PROCEDURE — 99214 OFFICE O/P EST MOD 30 MIN: CPT | Performed by: FAMILY MEDICINE

## 2021-07-28 ASSESSMENT — FIBROSIS 4 INDEX: FIB4 SCORE: 2.86

## 2021-07-28 NOTE — LETTER
[facility]  Moni Fragoso M.D.  [address]  [place] [address]  Fax: [phone]   Authorization for Release/Disclosure of   Protected Health Information   Name: NILE BEGUM : 1945 SSN: [id number]   Address: [address] [place] [address]  [place] [address] Phone:    [phone] (home) [phone] (work)   I authorize the entity listed below to release/disclose the PHI below to:   Critical [facility] hospital/Moni Fragoso M.D. and Moni Fragoso M.D.   Provider or Entity Name:     Address   City, State, Zip   Phone:      Fax:     Reason for request: continuity of care   Information to be released:    [  ] LAST COLONOSCOPY,  including any PATH REPORT and follow-up  [  ] LAST FIT/COLOGUARD RESULT [  ] LAST DEXA  [  ] LAST MAMMOGRAM  [  ] LAST PAP  [  ] LAST LABS [  ] RETINA EXAM REPORT  [  ] IMMUNIZATION RECORDS  [  ] Release all info      [  ] Check here and initial the line next to each item to release ALL health information INCLUDING  _____ Care and treatment for drug and / or alcohol abuse  _____ HIV testing, infection status, or AIDS  _____ Genetic Testing    DATES OF SERVICE OR TIME PERIOD TO BE DISCLOSED: _____________  I understand and acknowledge that:  * This Authorization may be revoked at any time by you in writing, except if your health information has already been used or disclosed.  * Your health information that will be used or disclosed as a result of you signing this authorization could be re-disclosed by the recipient. If this occurs, your re-disclosed health information may no longer be protected by State or Federal laws.  * You may refuse to sign this Authorization. Your refusal will not affect your ability to obtain treatment.  * This Authorization becomes effective upon signing and will  on (date) __________.      If no date is indicated, this Authorization will  one (1) year from the signature date.    Name: Nile Begum    Signature:   Date:     2021       PLEASE FAX  REQUESTED RECORDS BACK TO: [phone]

## 2021-07-28 NOTE — PROGRESS NOTES
"Subjective:      Kemal Mueller is a 76 y.o. male who presents with Diabetes            HPI     Patient returns for follow-up of his medical problems.    In terms of his diabetes mellitus type 2 with chronic kidney disease stage IIIa, is hemoglobin A1c increased to 8.24 months ago and so I increased the dose of the glipizide 5 mg 2 tablets in the morning and 1 tablet in the evening.  He is tolerating the higher dose without side effects.  The uncontrolled diabetes was most likely from the fact that he has been on prednisone as part of the management of his recurrent prostate cancer.  He continues to take prednisone 5 mg twice a day.  He denies having hypoglycemia.  He is tolerating the higher dose.  Blood work was done before the visit.  He said he already went to his eye doctor for retinal exam.  He said he goes every 6 months to follow-up with his glaucoma.    For his stage IIIa chronic kidney disease, he continues to follow-up with his nephrologist Dr. Roberts.  His kidney function has been stable.  He avoids nephrotoxic agents.    For his hypertension, this is under control on lisinopril without side effects.    He continues to take both atorvastatin and Zetia for dyslipidemia without myalgias.    For his prostate cancer, as mentioned above this has recurred.  He is on Lupron shots, Zytiga and prednisone.  His most recent PSA done by his urologist has been going down and is at 0.05.  Testosterone level was undetectable at less than 7.    Past medical history, past surgical history, family history reviewed-no changes    Social history reviewed-no changes    Allergies reviewed-no changes    Medications reviewed-no changes        ROS   As per HPI, the rest are negative.         Objective:     /60 (BP Location: Left arm, Patient Position: Sitting, BP Cuff Size: Adult)   Pulse 74   Temp 36.1 °C (96.9 °F) (Temporal)   Ht 1.702 m (5' 7\")   Wt 77.9 kg (171 lb 11.8 oz)   SpO2 97%   BMI 26.90 kg/m²  "     Physical Exam     Examined alert, awake, oriented, not in distress    Neck-supple, no lymphadenopathy, no thyromegaly  Lungs-clear to auscultation, no rales, no wheezes  Heart-regular rate and rhythm, no murmur  Extremities-no edema, clubbing, cyanosis    Monofilament testing with a 10 gram force: sensation intact: intact bilaterally  Visual Inspection: Feet without maceration, ulcers, fissures.  Positive onychomycotic toenails  Pedal pulses: intact bilaterally            Hospital Outpatient Visit on 07/23/2021   Component Date Value Ref Range Status   • Prostatic Specific Antigen Tot 07/23/2021 0.05  0.00 - 4.00 ng/mL Final    Comment: Effective 3/18/2020, this assay is performed using Roche cristina e immunoassay  analyzer. tPSA values determined on patient samples by different testing  procedures cannot be directly compared with one another and could be the  cause of erroneous medical interpretations. If there is a change in the tPSA  assay procedure used while monitoring therapy, then new baselines may need  to be established when comparing previous results with results received  after 3/17/2020.     • Sodium 07/23/2021 138  135 - 145 mmol/L Final   • Potassium 07/23/2021 4.2  3.6 - 5.5 mmol/L Final   • Chloride 07/23/2021 104  96 - 112 mmol/L Final   • Co2 07/23/2021 22  20 - 33 mmol/L Final   • Anion Gap 07/23/2021 12.0  7.0 - 16.0 Final   • Glucose 07/23/2021 51* 65 - 99 mg/dL Final   • Bun 07/23/2021 44* 8 - 22 mg/dL Final   • Creatinine 07/23/2021 1.31  0.50 - 1.40 mg/dL Final   • Calcium 07/23/2021 9.6  8.5 - 10.5 mg/dL Final   • AST(SGOT) 07/23/2021 24  12 - 45 U/L Final   • ALT(SGPT) 07/23/2021 19  2 - 50 U/L Final   • Alkaline Phosphatase 07/23/2021 69  30 - 99 U/L Final   • Total Bilirubin 07/23/2021 0.3  0.1 - 1.5 mg/dL Final   • Albumin 07/23/2021 4.4  3.2 - 4.9 g/dL Final   • Total Protein 07/23/2021 6.9  6.0 - 8.2 g/dL Final   • Globulin 07/23/2021 2.5  1.9 - 3.5 g/dL Final   • A-G Ratio  07/23/2021 1.8  g/dL Final   • Testosterone,Total 07/23/2021 <10* 175 - 781 ng/dL Final   • GFR If  07/23/2021 >60  >60 mL/min/1.73 m 2 Final   • GFR If Non  07/23/2021 53* >60 mL/min/1.73 m 2 Final              Assessment/Plan:        1. Uncontrolled diabetes with stage 3 chronic kidney disease GFR 30-59 ([facility])  Hemoglobin A1c improved and is down to 7.6.  This is now under acceptable control because with age and his diabetes complication the hemoglobin A1c goal should be below 8%.  Continue current dose of glipizide but advised to work hard on diet and exercise.  Recheck blood work next visit.  - Diabetic Monofilament Lower Extremity Exam  - Lipid Profile; Future  - HEMOGLOBIN A1C; Future  - Comp Metabolic Panel; Future    2. Stage 3a chronic kidney disease (HCC)  Kidney function is stable consistent with stage IIIa.  Continue follow-up with nephrologist.  Continue avoidance of nephrotoxic agents.  Advised proper hydration.  - Lipid Profile; Future  - HEMOGLOBIN A1C; Future  - Comp Metabolic Panel; Future    3. Essential hypertension, benign  Controlled on lisinopril.  - Lipid Profile; Future  - HEMOGLOBIN A1C; Future  - Comp Metabolic Panel; Future    4. Dyslipidemia  All at goal except mild elevation of triglycerides.  Continue Zetia and atorvastatin.  Watch the diet in terms of carbs and sweets.  - Lipid Profile; Future  - HEMOGLOBIN A1C; Future  - Comp Metabolic Panel; Future    5. Prostate cancer (HCC)  This is recurrent cancer.  Continue current treatment and he is followed closely by his urologist.  The PSA level is decreasing.  We will follow along.      Follow-up in 4 months.      Please note that this dictation was created using voice recognition software. I have worked with consultants from the vendor as well as technical experts from [employer] to optimize the interface. I have made every reasonable attempt to correct obvious errors, but I expect that there are errors  of grammar and possibly content I did not discover before finalizing the note.

## 2021-07-28 NOTE — LETTER
[facility]  Moni Fragoso M.D.  [address]  [place] [address]  Fax: [phone]   Authorization for Release/Disclosure of   Protected Health Information   Name: NILE BEGUM : 1945 SSN: [id number]   Address: [address] [place] [address]  [place] [address] Phone:    [phone] (home) [phone] (work)   I authorize the entity listed below to release/disclose the PHI below to:   [facility]/Moni Fragoso M.D. and Moni Fragoso M.D.   Provider or Entity Name:    Dr[facility]   Address   City, State, [facility]   Phone:      Fax:     Reason for request: continuity of care   Information to be released:    [  ] LAST COLONOSCOPY,  including any PATH REPORT and follow-up  [  ] LAST FIT/COLOGUARD RESULT [  ] LAST DEXA  [  ] LAST MAMMOGRAM  [  ] LAST PAP  [  ] LAST LABS [x  ] RETINA EXAM REPORT  [  ] IMMUNIZATION RECORDS  [  ] Release all info      [  ] Check here and initial the line next to each item to release ALL health information INCLUDING  _____ Care and treatment for drug and / or alcohol abuse  _____ HIV testing, infection status, or AIDS  _____ Genetic Testing    DATES OF SERVICE OR TIME PERIOD TO BE DISCLOSED: _____________  I understand and acknowledge that:  * This Authorization may be revoked at any time by you in writing, except if your health information has already been used or disclosed.  * Your health information that will be used or disclosed as a result of you signing this authorization could be re-disclosed by the recipient. If this occurs, your re-disclosed health information may no longer be protected by State or Federal laws.  * You may refuse to sign this Authorization. Your refusal will not affect your ability to obtain treatment.  * This Authorization becomes effective upon signing and will  on (date) __________.      If no date is indicated, this Authorization will  one (1) year from the signature date.    Name: Nile Begum    Signature:    Date:     7/28/2021       PLEASE FAX REQUESTED RECORDS BACK TO: [phone]

## 2021-09-03 ENCOUNTER — APPOINTMENT (OUTPATIENT)
Dept: RADIOLOGY | Facility: MEDICAL CENTER | Age: 76
End: 2021-09-03
Attending: PHYSICIAN ASSISTANT
Payer: MEDICARE

## 2021-09-24 ENCOUNTER — HOSPITAL ENCOUNTER (OUTPATIENT)
Dept: RADIOLOGY | Facility: MEDICAL CENTER | Age: 76
End: 2021-09-24
Attending: PHYSICIAN ASSISTANT
Payer: MEDICARE

## 2021-09-24 DIAGNOSIS — Z85.528 PERSONAL HISTORY OF MALIGNANT NEOPLASM OF KIDNEY: ICD-10-CM

## 2021-09-24 PROCEDURE — 76775 US EXAM ABDO BACK WALL LIM: CPT

## 2021-11-03 ENCOUNTER — TELEPHONE (OUTPATIENT)
Dept: MEDICAL GROUP | Facility: PHYSICIAN GROUP | Age: 76
End: 2021-11-03

## 2021-11-03 ENCOUNTER — HOSPITAL ENCOUNTER (OUTPATIENT)
Dept: LAB | Facility: MEDICAL CENTER | Age: 76
End: 2021-11-03
Attending: FAMILY MEDICINE
Payer: MEDICARE

## 2021-11-03 ENCOUNTER — HOSPITAL ENCOUNTER (OUTPATIENT)
Dept: LAB | Facility: MEDICAL CENTER | Age: 76
End: 2021-11-03
Attending: UROLOGY
Payer: MEDICARE

## 2021-11-03 DIAGNOSIS — I10 ESSENTIAL HYPERTENSION, BENIGN: ICD-10-CM

## 2021-11-03 DIAGNOSIS — N18.31 STAGE 3A CHRONIC KIDNEY DISEASE: ICD-10-CM

## 2021-11-03 DIAGNOSIS — E78.5 DYSLIPIDEMIA: ICD-10-CM

## 2021-11-03 LAB
ALBUMIN SERPL BCP-MCNC: 4.2 G/DL (ref 3.2–4.9)
ALBUMIN SERPL BCP-MCNC: 4.2 G/DL (ref 3.2–4.9)
ALBUMIN/GLOB SERPL: 1.6 G/DL
ALBUMIN/GLOB SERPL: 1.6 G/DL
ALP SERPL-CCNC: 68 U/L (ref 30–99)
ALP SERPL-CCNC: 71 U/L (ref 30–99)
ALT SERPL-CCNC: 13 U/L (ref 2–50)
ALT SERPL-CCNC: 16 U/L (ref 2–50)
ANION GAP SERPL CALC-SCNC: 10 MMOL/L (ref 7–16)
ANION GAP SERPL CALC-SCNC: 10 MMOL/L (ref 7–16)
AST SERPL-CCNC: 16 U/L (ref 12–45)
AST SERPL-CCNC: 17 U/L (ref 12–45)
BILIRUB SERPL-MCNC: 0.4 MG/DL (ref 0.1–1.5)
BILIRUB SERPL-MCNC: 0.4 MG/DL (ref 0.1–1.5)
BUN SERPL-MCNC: 33 MG/DL (ref 8–22)
BUN SERPL-MCNC: 33 MG/DL (ref 8–22)
CALCIUM SERPL-MCNC: 9.7 MG/DL (ref 8.5–10.5)
CALCIUM SERPL-MCNC: 9.7 MG/DL (ref 8.5–10.5)
CHLORIDE SERPL-SCNC: 107 MMOL/L (ref 96–112)
CHLORIDE SERPL-SCNC: 107 MMOL/L (ref 96–112)
CHOLEST SERPL-MCNC: 177 MG/DL (ref 100–199)
CO2 SERPL-SCNC: 23 MMOL/L (ref 20–33)
CO2 SERPL-SCNC: 24 MMOL/L (ref 20–33)
CREAT SERPL-MCNC: 1.27 MG/DL (ref 0.5–1.4)
CREAT SERPL-MCNC: 1.28 MG/DL (ref 0.5–1.4)
EST. AVERAGE GLUCOSE BLD GHB EST-MCNC: 166 MG/DL
GLOBULIN SER CALC-MCNC: 2.6 G/DL (ref 1.9–3.5)
GLOBULIN SER CALC-MCNC: 2.7 G/DL (ref 1.9–3.5)
GLUCOSE SERPL-MCNC: 96 MG/DL (ref 65–99)
GLUCOSE SERPL-MCNC: 97 MG/DL (ref 65–99)
HBA1C MFR BLD: 7.4 % (ref 4–5.6)
HDLC SERPL-MCNC: 47 MG/DL
LDLC SERPL CALC-MCNC: 105 MG/DL
POTASSIUM SERPL-SCNC: 4.4 MMOL/L (ref 3.6–5.5)
POTASSIUM SERPL-SCNC: 4.4 MMOL/L (ref 3.6–5.5)
PROT SERPL-MCNC: 6.8 G/DL (ref 6–8.2)
PROT SERPL-MCNC: 6.9 G/DL (ref 6–8.2)
PSA SERPL-MCNC: 0.03 NG/ML (ref 0–4)
SODIUM SERPL-SCNC: 140 MMOL/L (ref 135–145)
SODIUM SERPL-SCNC: 141 MMOL/L (ref 135–145)
TESTOST SERPL-MCNC: <10 NG/DL (ref 175–781)
TRIGL SERPL-MCNC: 125 MG/DL (ref 0–149)

## 2021-11-03 PROCEDURE — 80053 COMPREHEN METABOLIC PANEL: CPT

## 2021-11-03 PROCEDURE — 83036 HEMOGLOBIN GLYCOSYLATED A1C: CPT

## 2021-11-03 PROCEDURE — 84403 ASSAY OF TOTAL TESTOSTERONE: CPT

## 2021-11-03 PROCEDURE — 80061 LIPID PANEL: CPT

## 2021-11-03 PROCEDURE — 80053 COMPREHEN METABOLIC PANEL: CPT | Mod: 91

## 2021-11-03 PROCEDURE — 84153 ASSAY OF PSA TOTAL: CPT

## 2021-11-03 PROCEDURE — 36415 COLL VENOUS BLD VENIPUNCTURE: CPT

## 2021-11-03 NOTE — TELEPHONE ENCOUNTER
----- Message from Moni Fragoso M.D. sent at 11/3/2021  4:08 PM PDT -----  Kidney function is better than before.  Hemoglobin A1c improved from 7.6-7.4.  This is now under control.  Continue diabetes medication.  Liver function is normal.  The LDL or bad cholesterol increased from .  Watch the diet more closely by avoiding fatty foods.  Eat more vegetables.  Try to have fish 3 times a week.  Try to be active and exercise regularly.  Keep taking the cholesterol medications.  Keep appointment as scheduled at the end of the month.    Moni Fragoso M.D.

## 2021-11-03 NOTE — TELEPHONE ENCOUNTER
Phone Number Called: [phone] (home) [phone] (work)    Call outcome: Spoke to patient regarding message below.    Message:Spoke with patient and let them know Moni Fragoso M.D.'s message.  Patient does not have any questions at this time- appt confirmed for 11/29 with PCP.

## 2021-11-29 ENCOUNTER — OFFICE VISIT (OUTPATIENT)
Dept: MEDICAL GROUP | Facility: PHYSICIAN GROUP | Age: 76
End: 2021-11-29
Payer: MEDICARE

## 2021-11-29 VITALS
HEART RATE: 85 BPM | SYSTOLIC BLOOD PRESSURE: 120 MMHG | BODY MASS INDEX: 28.55 KG/M2 | HEIGHT: 67 IN | WEIGHT: 181.88 LBS | DIASTOLIC BLOOD PRESSURE: 60 MMHG | TEMPERATURE: 96.4 F | OXYGEN SATURATION: 96 %

## 2021-11-29 DIAGNOSIS — C61 MALIGNANT NEOPLASM OF PROSTATE (HCC): ICD-10-CM

## 2021-11-29 DIAGNOSIS — E78.5 DYSLIPIDEMIA: ICD-10-CM

## 2021-11-29 DIAGNOSIS — I10 ESSENTIAL HYPERTENSION, BENIGN: ICD-10-CM

## 2021-11-29 DIAGNOSIS — Z23 NEED FOR IMMUNIZATION AGAINST INFLUENZA: ICD-10-CM

## 2021-11-29 DIAGNOSIS — N18.31 STAGE 3A CHRONIC KIDNEY DISEASE: ICD-10-CM

## 2021-11-29 PROCEDURE — 99214 OFFICE O/P EST MOD 30 MIN: CPT | Mod: 25 | Performed by: FAMILY MEDICINE

## 2021-11-29 PROCEDURE — 90662 IIV NO PRSV INCREASED AG IM: CPT | Performed by: FAMILY MEDICINE

## 2021-11-29 PROCEDURE — G0008 ADMIN INFLUENZA VIRUS VAC: HCPCS | Performed by: FAMILY MEDICINE

## 2021-11-29 RX ORDER — OXYCODONE HYDROCHLORIDE AND ACETAMINOPHEN 5; 325 MG/1; MG/1
TABLET ORAL
COMMUNITY
Start: 2021-11-05 | End: 2022-06-22

## 2021-11-29 ASSESSMENT — FIBROSIS 4 INDEX: FIB4 SCORE: 2.45

## 2021-11-29 NOTE — PROGRESS NOTES
"Subjective     Kemal Mueller is a 76 y.o. male who presents with Diabetes            HPI     Patient returns for follow-up of his medical problems.    In terms of diabetes mellitus type 2, he continues to take glipizide 2 tablets in the morning and 1 tablet in the evening.  For his CKD stage III A, he continues to follow-up with his nephrologist Dr. Mcwilliams.  He is tolerating the glipizide with no hypoglycemia.  Blood work was done before this visit.    In terms of his hypertension, this is under control on lisinopril 40 mg daily.    For the dyslipidemia, he is on Lipitor 80 mg daily and Zetia 10 mg daily without side effects.    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  He is followed by his urologist and by his radiation oncologist.  He was recently seen by his urologist 4 weeks ago and because of new complaints of bilateral hip pain and tailbone pain, MRI of the lumbar spine, bilateral hips were ordered for further evaluation which he will do this week and he will have a follow-up to go over results next week.  He was given oxycodone/APAP 5/325 which he takes only when needed for the pain.    He needs his flu shot today.    Past medical history, past surgical history, family history reviewed-no changes    Social history reviewed-no changes    Allergies reviewed-no changes    Medications reviewed-no changes    ROS     As per HPI, the rest are negative.           Objective     /60 (BP Location: Left arm, Patient Position: Sitting, BP Cuff Size: Adult)   Pulse 85   Temp (!) 35.8 °C (96.4 °F) (Temporal)   Ht 1.702 m (5' 7\")   Wt 82.5 kg (181 lb 14.1 oz)   SpO2 96%   BMI 28.49 kg/m²      Physical Exam     Examined alert, awake, oriented, not in distress    Neck-supple, no lymphadenopathy, no thyromegaly  Lungs-clear to auscultation, no rales, no wheezes  Heart-regular rate and rhythm, no murmur  Extremities-no " edema, clubbing, cyanosis             Hospital Outpatient Visit on 11/03/2021   Component Date Value Ref Range Status   • Sodium 11/03/2021 141  135 - 145 mmol/L Final   • Potassium 11/03/2021 4.4  3.6 - 5.5 mmol/L Final   • Chloride 11/03/2021 107  96 - 112 mmol/L Final   • Co2 11/03/2021 24  20 - 33 mmol/L Final   • Anion Gap 11/03/2021 10.0  7.0 - 16.0 Final   • Glucose 11/03/2021 97  65 - 99 mg/dL Final   • Bun 11/03/2021 33* 8 - 22 mg/dL Final   • Creatinine 11/03/2021 1.28  0.50 - 1.40 mg/dL Final   • Calcium 11/03/2021 9.7  8.5 - 10.5 mg/dL Final   • AST(SGOT) 11/03/2021 16  12 - 45 U/L Final   • ALT(SGPT) 11/03/2021 16  2 - 50 U/L Final   • Alkaline Phosphatase 11/03/2021 68  30 - 99 U/L Final   • Total Bilirubin 11/03/2021 0.4  0.1 - 1.5 mg/dL Final   • Albumin 11/03/2021 4.2  3.2 - 4.9 g/dL Final   • Total Protein 11/03/2021 6.8  6.0 - 8.2 g/dL Final   • Globulin 11/03/2021 2.6  1.9 - 3.5 g/dL Final   • A-G Ratio 11/03/2021 1.6  g/dL Final   • Glycohemoglobin 11/03/2021 7.4* 4.0 - 5.6 % Final    Comment: Increased risk for diabetes:  5.7 -6.4%  Diabetes:  >6.4%  Glycemic control for adults with diabetes:  <7.0%    The above interpretations are per ADA guidelines.  Diagnosis  of diabetes mellitus on the basis of elevated Hemoglobin A1c  should be confirmed by repeating the Hb A1c test.     • Est Avg Glucose 11/03/2021 166  mg/dL Final    Comment: The eAG calculation is based on the A1c-Derived Daily Glucose  (ADAG) study.  See the ADA's website for additional information.     • Cholesterol,Tot 11/03/2021 177  100 - 199 mg/dL Final   • Triglycerides 11/03/2021 125  0 - 149 mg/dL Final   • HDL 11/03/2021 47  >=40 mg/dL Final   • LDL 11/03/2021 105* <100 mg/dL Final   • GFR If  11/03/2021 >60  >60 mL/min/1.73 m 2 Final   • GFR If Non  11/03/2021 54* >60 mL/min/1.73 m 2 Final                Assessment & Plan        1. Uncontrolled diabetes with stage 3 chronic kidney disease GFR  30-59 ([facility])  This is now under acceptable control in fact the hemoglobin A1c is even better and decreased from 7.6-7.4.  Because of his age and diabetes complication the hemoglobin A1c goal is 8% or lower.  Continue glipizide 5 mg 2 tablets in the morning and 1 tablet in the evening.  - Lipid Profile; Future  - Comp Metabolic Panel; Future  - HEMOGLOBIN A1C; Future    2. Stage 3a chronic kidney disease (HCC)  There is improvement of his kidney function.  Continue avoidance of nephrotoxic agents.  Continue follow-up with nephrologist.  Advised proper hydration.  - Lipid Profile; Future  - Comp Metabolic Panel; Future  - HEMOGLOBIN A1C; Future    3. Essential hypertension, benign  Controlled on lisinopril.  - Lipid Profile; Future  - Comp Metabolic Panel; Future  - HEMOGLOBIN A1C; Future    4. Dyslipidemia  The LDL is now slightly high above 100 at 105.  This needs to be below 100.  Advised watching the diet more closely since is already on maximum doses of his meds which are atorvastatin and Zetia.  Discussed at length the diet he needs to follow with avoidance of fatty foods, eating more vegetables, eating fish 3 times a week.  He said the only fish he eats is tunafish.  Recheck blood work next visit.  - Lipid Profile; Future  - Comp Metabolic Panel; Future  - HEMOGLOBIN A1C; Future    5. Prostate cancer (HCC)  He has new complaints of pain in the tailbone and both hips and he is going to have MRI done of the lumbar spine and the hips ordered by his urologist and we will wait for the results.  We will follow along.  He continues to take Lupron shots, Zytiga and oral steroid.  - Lipid Profile; Future  - Comp Metabolic Panel; Future  - HEMOGLOBIN A1C; Future    6. Need for immunization against influenza  High-dose flu shot was given.  - INFLUENZA VACCINE, HIGH DOSE (65+ ONLY)    Follow-up in 6 months or sooner if needed.      Please note that this dictation was created using voice recognition software. I have worked  with consultants from the vendor as well as technical experts from [facility] to optimize the interface. I have made every reasonable attempt to correct obvious errors, but I expect that there are errors of grammar and possibly content I did not discover before finalizing the note.

## 2021-12-02 ENCOUNTER — APPOINTMENT (OUTPATIENT)
Dept: RADIOLOGY | Facility: MEDICAL CENTER | Age: 76
End: 2021-12-02
Attending: UROLOGY
Payer: MEDICARE

## 2021-12-02 DIAGNOSIS — C61 MALIGNANT NEOPLASM OF PROSTATE (HCC): ICD-10-CM

## 2021-12-02 PROCEDURE — 700117 HCHG RX CONTRAST REV CODE 255: Performed by: SURGERY

## 2021-12-02 PROCEDURE — 72158 MRI LUMBAR SPINE W/O & W/DYE: CPT | Mod: MH

## 2021-12-02 PROCEDURE — 73723 MRI JOINT LWR EXTR W/O&W/DYE: CPT | Mod: LT

## 2021-12-02 PROCEDURE — 73723 MRI JOINT LWR EXTR W/O&W/DYE: CPT | Mod: RT

## 2021-12-02 PROCEDURE — A9576 INJ PROHANCE MULTIPACK: HCPCS | Performed by: SURGERY

## 2021-12-02 RX ADMIN — GADOTERIDOL 15 ML: 279.3 INJECTION, SOLUTION INTRAVENOUS at 09:20

## 2021-12-17 ENCOUNTER — APPOINTMENT (OUTPATIENT)
Dept: RADIOLOGY | Facility: MEDICAL CENTER | Age: 76
End: 2021-12-17
Attending: UROLOGY
Payer: MEDICARE

## 2022-01-05 ENCOUNTER — HOSPITAL ENCOUNTER (OUTPATIENT)
Dept: LAB | Facility: MEDICAL CENTER | Age: 77
End: 2022-01-05
Attending: NURSE PRACTITIONER
Payer: MEDICARE

## 2022-01-05 LAB
ALBUMIN SERPL BCP-MCNC: 4.4 G/DL (ref 3.2–4.9)
ALBUMIN/GLOB SERPL: 1.8 G/DL
ALP SERPL-CCNC: 84 U/L (ref 30–99)
ALT SERPL-CCNC: 23 U/L (ref 2–50)
ANION GAP SERPL CALC-SCNC: 11 MMOL/L (ref 7–16)
APPEARANCE UR: CLEAR
AST SERPL-CCNC: 28 U/L (ref 12–45)
BASOPHILS # BLD AUTO: 0.2 % (ref 0–1.8)
BASOPHILS # BLD: 0.01 K/UL (ref 0–0.12)
BILIRUB SERPL-MCNC: 0.4 MG/DL (ref 0.1–1.5)
BILIRUB UR QL STRIP.AUTO: NEGATIVE
BUN SERPL-MCNC: 31 MG/DL (ref 8–22)
CALCIUM SERPL-MCNC: 10 MG/DL (ref 8.5–10.5)
CHLORIDE SERPL-SCNC: 106 MMOL/L (ref 96–112)
CO2 SERPL-SCNC: 25 MMOL/L (ref 20–33)
COLOR UR: YELLOW
CREAT SERPL-MCNC: 1.21 MG/DL (ref 0.5–1.4)
CREAT UR-MCNC: 32.6 MG/DL
EOSINOPHIL # BLD AUTO: 0.01 K/UL (ref 0–0.51)
EOSINOPHIL NFR BLD: 0.2 % (ref 0–6.9)
ERYTHROCYTE [DISTWIDTH] IN BLOOD BY AUTOMATED COUNT: 44.6 FL (ref 35.9–50)
GLOBULIN SER CALC-MCNC: 2.4 G/DL (ref 1.9–3.5)
GLUCOSE SERPL-MCNC: 60 MG/DL (ref 65–99)
GLUCOSE UR STRIP.AUTO-MCNC: NEGATIVE MG/DL
HCT VFR BLD AUTO: 37.5 % (ref 42–52)
HGB BLD-MCNC: 12.2 G/DL (ref 14–18)
IMM GRANULOCYTES # BLD AUTO: 0.13 K/UL (ref 0–0.11)
IMM GRANULOCYTES NFR BLD AUTO: 2 % (ref 0–0.9)
KETONES UR STRIP.AUTO-MCNC: NEGATIVE MG/DL
LEUKOCYTE ESTERASE UR QL STRIP.AUTO: NEGATIVE
LYMPHOCYTES # BLD AUTO: 1.45 K/UL (ref 1–4.8)
LYMPHOCYTES NFR BLD: 22.4 % (ref 22–41)
MAGNESIUM SERPL-MCNC: 2.3 MG/DL (ref 1.5–2.5)
MCH RBC QN AUTO: 30.6 PG (ref 27–33)
MCHC RBC AUTO-ENTMCNC: 32.5 G/DL (ref 33.7–35.3)
MCV RBC AUTO: 94 FL (ref 81.4–97.8)
MICRO URNS: NORMAL
MONOCYTES # BLD AUTO: 1.92 K/UL (ref 0–0.85)
MONOCYTES NFR BLD AUTO: 29.6 % (ref 0–13.4)
NEUTROPHILS # BLD AUTO: 2.96 K/UL (ref 1.82–7.42)
NEUTROPHILS NFR BLD: 45.6 % (ref 44–72)
NITRITE UR QL STRIP.AUTO: NEGATIVE
NRBC # BLD AUTO: 0 K/UL
NRBC BLD-RTO: 0 /100 WBC
PH UR STRIP.AUTO: 7.5 [PH] (ref 5–8)
PLATELET # BLD AUTO: 204 K/UL (ref 164–446)
PMV BLD AUTO: 10.7 FL (ref 9–12.9)
POTASSIUM SERPL-SCNC: 4.8 MMOL/L (ref 3.6–5.5)
PROT SERPL-MCNC: 6.8 G/DL (ref 6–8.2)
PROT UR QL STRIP: NEGATIVE MG/DL
PROT UR-MCNC: 15 MG/DL (ref 0–15)
PROT/CREAT UR: 460 MG/G (ref 15–68)
RBC # BLD AUTO: 3.99 M/UL (ref 4.7–6.1)
RBC UR QL AUTO: NEGATIVE
SODIUM SERPL-SCNC: 142 MMOL/L (ref 135–145)
SP GR UR STRIP.AUTO: 1.01
URATE SERPL-MCNC: 6.7 MG/DL (ref 2.5–8.3)
UROBILINOGEN UR STRIP.AUTO-MCNC: 0.2 MG/DL
WBC # BLD AUTO: 6.5 K/UL (ref 4.8–10.8)

## 2022-01-05 PROCEDURE — 82570 ASSAY OF URINE CREATININE: CPT

## 2022-01-05 PROCEDURE — 80053 COMPREHEN METABOLIC PANEL: CPT

## 2022-01-05 PROCEDURE — 84156 ASSAY OF PROTEIN URINE: CPT

## 2022-01-05 PROCEDURE — 84550 ASSAY OF BLOOD/URIC ACID: CPT

## 2022-01-05 PROCEDURE — 36415 COLL VENOUS BLD VENIPUNCTURE: CPT

## 2022-01-05 PROCEDURE — 85025 COMPLETE CBC W/AUTO DIFF WBC: CPT

## 2022-01-05 PROCEDURE — 83735 ASSAY OF MAGNESIUM: CPT

## 2022-01-05 PROCEDURE — 81003 URINALYSIS AUTO W/O SCOPE: CPT

## 2022-02-12 ENCOUNTER — HOSPITAL ENCOUNTER (OUTPATIENT)
Dept: LAB | Facility: MEDICAL CENTER | Age: 77
End: 2022-02-12
Attending: UROLOGY
Payer: MEDICARE

## 2022-02-12 LAB
ALBUMIN SERPL BCP-MCNC: 4.4 G/DL (ref 3.2–4.9)
ALBUMIN/GLOB SERPL: 1.8 G/DL
ALP SERPL-CCNC: 73 U/L (ref 30–99)
ALT SERPL-CCNC: 17 U/L (ref 2–50)
ANION GAP SERPL CALC-SCNC: 10 MMOL/L (ref 7–16)
AST SERPL-CCNC: 23 U/L (ref 12–45)
BILIRUB SERPL-MCNC: 0.3 MG/DL (ref 0.1–1.5)
BUN SERPL-MCNC: 34 MG/DL (ref 8–22)
CALCIUM SERPL-MCNC: 9.7 MG/DL (ref 8.5–10.5)
CHLORIDE SERPL-SCNC: 107 MMOL/L (ref 96–112)
CO2 SERPL-SCNC: 23 MMOL/L (ref 20–33)
CREAT SERPL-MCNC: 1.18 MG/DL (ref 0.5–1.4)
GLOBULIN SER CALC-MCNC: 2.4 G/DL (ref 1.9–3.5)
GLUCOSE SERPL-MCNC: 82 MG/DL (ref 65–99)
POTASSIUM SERPL-SCNC: 4.7 MMOL/L (ref 3.6–5.5)
PROT SERPL-MCNC: 6.8 G/DL (ref 6–8.2)
PSA SERPL-MCNC: 0.03 NG/ML (ref 0–4)
SODIUM SERPL-SCNC: 140 MMOL/L (ref 135–145)
TESTOST SERPL-MCNC: <12 NG/DL (ref 175–781)

## 2022-02-12 PROCEDURE — 36415 COLL VENOUS BLD VENIPUNCTURE: CPT

## 2022-02-12 PROCEDURE — 80053 COMPREHEN METABOLIC PANEL: CPT

## 2022-02-12 PROCEDURE — 84403 ASSAY OF TOTAL TESTOSTERONE: CPT

## 2022-02-12 PROCEDURE — 84153 ASSAY OF PSA TOTAL: CPT

## 2022-03-02 ENCOUNTER — OFFICE VISIT (OUTPATIENT)
Dept: MEDICAL GROUP | Facility: PHYSICIAN GROUP | Age: 77
End: 2022-03-02
Payer: MEDICARE

## 2022-03-02 VITALS
WEIGHT: 179.9 LBS | DIASTOLIC BLOOD PRESSURE: 80 MMHG | SYSTOLIC BLOOD PRESSURE: 144 MMHG | OXYGEN SATURATION: 95 % | HEART RATE: 99 BPM | TEMPERATURE: 97.3 F | BODY MASS INDEX: 28.24 KG/M2 | HEIGHT: 67 IN

## 2022-03-02 DIAGNOSIS — G89.29 CHRONIC BILATERAL LOW BACK PAIN WITHOUT SCIATICA: ICD-10-CM

## 2022-03-02 DIAGNOSIS — M54.50 CHRONIC BILATERAL LOW BACK PAIN WITHOUT SCIATICA: ICD-10-CM

## 2022-03-02 DIAGNOSIS — J20.9 ACUTE BRONCHITIS, UNSPECIFIED ORGANISM: ICD-10-CM

## 2022-03-02 PROCEDURE — 99214 OFFICE O/P EST MOD 30 MIN: CPT | Performed by: FAMILY MEDICINE

## 2022-03-02 RX ORDER — DOXYCYCLINE HYCLATE 100 MG
100 TABLET ORAL 2 TIMES DAILY
Qty: 20 TABLET | Refills: 0 | Status: SHIPPED | OUTPATIENT
Start: 2022-03-02 | End: 2022-06-22

## 2022-03-02 RX ORDER — DOXYCYCLINE HYCLATE 100 MG
100 TABLET ORAL 2 TIMES DAILY
Qty: 20 TABLET | Refills: 0 | Status: SHIPPED | OUTPATIENT
Start: 2022-03-02 | End: 2022-03-02 | Stop reason: SDUPTHER

## 2022-03-02 ASSESSMENT — PATIENT HEALTH QUESTIONNAIRE - PHQ9: CLINICAL INTERPRETATION OF PHQ2 SCORE: 0

## 2022-03-02 ASSESSMENT — FIBROSIS 4 INDEX: FIB4 SCORE: 2.16

## 2022-03-03 PROBLEM — Z19.1 HORMONE SENSITIVE PROSTATE CANCER (HCC): Status: ACTIVE | Noted: 2022-02-14

## 2022-03-03 PROBLEM — C61 HORMONE SENSITIVE PROSTATE CANCER (HCC): Status: ACTIVE | Noted: 2022-02-14

## 2022-03-03 NOTE — PROGRESS NOTES
Subjective     Kemal Mueller is a 77 y.o. male who presents with Referral Needed ([facility])            HPI     Patient is here accompanied by his wife because of pain across the lower back going down to the sides of the hip which has been ongoing for 2 months.  He said he fell in the snow around mid December.  There is numbness of the outer half of the left foot.  Denies any shooting pain down the legs.  He has been to the chiropractor without any improvement.  Patient is status post C-spine surgery by [facility].  He called [facility] to get evaluated for his back problem but he was told he needs a referral from the PCP.  He has been taking extra strength Tylenol and Advil with minimal help.  The pain wakes him up from his sleep.    Of note, his urologist sent him for MRI of the lumbar spine and MRI of the hips back in December 2021.  These were done before he fell in the snow.  This was to evaluate for his complaint of back pain and bilateral hip pain to make sure bone mets were ruled out because of history of recurrent prostate cancer.  MRI of the lumbar spine showed tiny intraosseous hemangioma at S2 segment with metastasis difficult to exclude, otherwise no metastatic disease in the lumbar spine, lumbosacral postradiation marrow changes, multiple disc and facet degeneration and moderate to severe thecal sac stenosis L3-L4.    MRI of right hip-showed severe hamstring tendinopathy with high-grade partial tearing right hip, mild osteoarthritis with degenerative labral tear and fraying, mild iliopsoas bursitis    MRI of the left hip-severe hamstring tendinopathy with high-grade partial tearing, mild gluteus medius tendinopathy, mild hip osteoarthritis and mild degenerative labral tearing/fraying.    He also stated that he has been  coughing for over 2 weeks now probably 3 weeks with green phlegm.  He said he feels postnasal drip and when he blows his nose there is a light yellow to light green color.   "Denies any sinus pressure.  Denies any fever, chills, shortness of breath.  He has been fully vaccinated with modern of vaccine but has not received the booster.    Past medical history, past surgical history, family history reviewed-no changes    Social history reviewed-no changes    Allergies reviewed-no changes    Medications reviewed-no changes    ROS     As per HPI, the rest are negative.         Objective     /80 (BP Location: Left arm, Patient Position: Sitting, BP Cuff Size: Adult)   Pulse 99   Temp 36.3 °C (97.3 °F) (Temporal)   Ht 1.702 m (5' 7\")   Wt 81.6 kg (179 lb 14.3 oz)   SpO2 95%   BMI 28.18 kg/m²      Physical Exam     Examined alert, awake, oriented, not in distress    Nose-narrow nostril on the right side with clear nasal discharge, no obstruction, nontender frontal and maxillary sinuses  Throat-no erythema, tonsils not enlarged, no exudates  Neck-supple, no lymphadenopathy, no thyromegaly  Lungs-clear to auscultation, no rales, no wheezes  Heart-regular rate and rhythm, no murmur  Extremities-no edema, clubbing, cyanosis  Back exam-no pinpoint tenderness spinous processes lumbosacral spine, no spasm or tenderness paraspinal muscles of the lumbosacral region, no tenderness SI joints bilaterally   neuro exam-motor strength  5/5 lower extremity flexors and extensors, DTRs 2+ lower extremities, sensation intact to light touch lower extremities                        Assessment & Plan        1. Chronic bilateral low back pain without sciatica  He already had MRI of the lumbar spine with above-mentioned results but the MRI of the lumbar spine was done before he had a fall in the snow mid December.  I will get an x-ray of the lumbar spine to make sure were not dealing with compression fracture.  He wants to be referred to [facility] and referral was placed.  Continue Tylenol and Advil as needed.  Advised warm compresses 15 minutes 3-4 times a day.  - Referral to Neurosurgery  - DX-LUMBAR " SPINE-2 OR 3 VIEWS; Future    2. Acute bronchitis, unspecified organism  We will treat for possible acute bronchitis specially because of comorbidities specifically age, recurrent prostate cancer and chronic kidney disease.  Doxycycline 100 mg 1 tablet twice daily for 10 days but advise increase p.o. fluids and rest.  Advised to let me know if there is no improvement or if there is worsening of the problem after he is done with antibiotics.  - doxycycline (VIBRAMYCIN) 100 MG Tab; Take 1 Tablet by mouth 2 times a day.  Dispense: 20 Tablet; Refill: 0         I will see him as needed.      Please note that this dictation was created using voice recognition software. I have worked with consultants from the vendor as well as technical experts from [facility]  [employer] to optimize the interface. I have made every reasonable attempt to correct obvious errors, but I expect that there are errors of grammar and possibly content I did not discover before finalizing the note.

## 2022-03-04 ENCOUNTER — HOSPITAL ENCOUNTER (OUTPATIENT)
Dept: RADIOLOGY | Facility: MEDICAL CENTER | Age: 77
End: 2022-03-04
Attending: FAMILY MEDICINE
Payer: MEDICARE

## 2022-03-04 DIAGNOSIS — M54.50 CHRONIC BILATERAL LOW BACK PAIN WITHOUT SCIATICA: ICD-10-CM

## 2022-03-04 DIAGNOSIS — G89.29 CHRONIC BILATERAL LOW BACK PAIN WITHOUT SCIATICA: ICD-10-CM

## 2022-03-04 PROCEDURE — 72100 X-RAY EXAM L-S SPINE 2/3 VWS: CPT

## 2022-03-14 ENCOUNTER — OFFICE VISIT (OUTPATIENT)
Dept: MEDICAL GROUP | Facility: PHYSICIAN GROUP | Age: 77
End: 2022-03-14
Payer: MEDICARE

## 2022-03-14 VITALS
BODY MASS INDEX: 27.37 KG/M2 | HEART RATE: 94 BPM | TEMPERATURE: 95.9 F | OXYGEN SATURATION: 93 % | HEIGHT: 67 IN | WEIGHT: 174.38 LBS | DIASTOLIC BLOOD PRESSURE: 70 MMHG | SYSTOLIC BLOOD PRESSURE: 130 MMHG

## 2022-03-14 DIAGNOSIS — Z85.528 HISTORY OF MALIGNANT NEOPLASM OF KIDNEY: ICD-10-CM

## 2022-03-14 DIAGNOSIS — C61 RECURRENT PROSTATE CANCER (HCC): ICD-10-CM

## 2022-03-14 DIAGNOSIS — R05.9 COUGH: ICD-10-CM

## 2022-03-14 PROCEDURE — 99213 OFFICE O/P EST LOW 20 MIN: CPT | Performed by: FAMILY MEDICINE

## 2022-03-14 ASSESSMENT — FIBROSIS 4 INDEX: FIB4 SCORE: 2.16

## 2022-03-14 NOTE — PROGRESS NOTES
"Subjective     Kemal Mueller is a 77 y.o. male who presents with Cough            HPI     Patient is here for follow-up of his cough.  I saw him 2 weeks ago and treated him with doxycycline 100 mg 1 tablet twice a day.  He said he finished antibiotics yesterday.  He said he is still coughing and he does not feel it is significantly better.  The good thing is phlegm is not anymore light yellow in color and is now clear.  He said when he blow his nose the discharge is also clear.  He said he has been short of breath for 6 months during exertion.  He has no orthopnea or paroxysmal nocturnal dyspnea.  No swelling in the legs.  He said when he is coughing a lot then he starts to hear wheezing.  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  He also has history of left renal cancer status post ablation sometime in 2013 and without evidence of recurrence per the last ultrasound of the kidneys was in September 2021 with no evidence of abnormal findings except for 10.1 mm cyst left kidney.  Patient is followed closely by his urologist.    Past medical history, past surgical history, family history reviewed-no changes    Social history reviewed-no changes    Allergies reviewed-no changes    Medications reviewed-no changes    ROS     As per HPI, the rest are negative.         Objective     /70 (BP Location: Left arm, Patient Position: Sitting, BP Cuff Size: Adult)   Pulse 94   Temp (!) 35.5 °C (95.9 °F) (Temporal)   Ht 1.702 m (5' 7\")   Wt 79.1 kg (174 lb 6.1 oz)   SpO2 93%   BMI 27.31 kg/m²      Physical Exam     Examined alert, awake, oriented, not in distress    Neck-supple, no lymphadenopathy, no thyromegaly  Lungs-clear to auscultation, no rales, no wheezes  Heart-regular rate and rhythm, no murmur  Extremities-no edema, clubbing, cyanosis                        Assessment & Plan        1. Cough  Ongoing cough of 1 month " duration.  He has shortness of breath on exertion for 6 months now.  Patient is not hypoxic.  Normal lung exam.  We treated him for possible acute bronchitis with doxycycline.  The color of the phlegm improved from slightly yellow to clear but the cough is ongoing.  We will get a chest x-ray to further evaluate especially because of his recurrent prostate cancer with mets to the lymph node and history of malignant neoplasm of the left kidney.  Further recommendation will depend on results.  - DX-CHEST-2 VIEWS; Future    2. Recurrent prostate cancer (HCC)  Same as #1.  - DX-CHEST-2 VIEWS; Future    3. History of malignant neoplasm of kidney  Same as #1.  - DX-CHEST-2 VIEWS; Future    Keep appointment scheduled in June.  Return sooner if needed.      Please note that this dictation was created using voice recognition software. I have worked with consultants from the vendor as well as technical experts from [employer] to optimize the interface. I have made every reasonable attempt to correct obvious errors, but I expect that there are errors of grammar and possibly content I did not discover before finalizing the note.

## 2022-03-15 ENCOUNTER — HOSPITAL ENCOUNTER (OUTPATIENT)
Dept: RADIOLOGY | Facility: MEDICAL CENTER | Age: 77
End: 2022-03-15
Attending: FAMILY MEDICINE
Payer: MEDICARE

## 2022-03-15 DIAGNOSIS — Z85.528 HISTORY OF MALIGNANT NEOPLASM OF KIDNEY: ICD-10-CM

## 2022-03-15 DIAGNOSIS — C61 RECURRENT PROSTATE CANCER (HCC): ICD-10-CM

## 2022-03-15 DIAGNOSIS — R05.9 COUGH: ICD-10-CM

## 2022-03-15 PROCEDURE — 71046 X-RAY EXAM CHEST 2 VIEWS: CPT

## 2022-03-29 ENCOUNTER — TELEPHONE (OUTPATIENT)
Dept: HEALTH INFORMATION MANAGEMENT | Facility: OTHER | Age: 77
End: 2022-03-29
Payer: MEDICARE

## 2022-04-26 ENCOUNTER — TELEPHONE (OUTPATIENT)
Dept: MEDICAL GROUP | Facility: PHYSICIAN GROUP | Age: 77
End: 2022-04-26
Payer: MEDICARE

## 2022-04-26 NOTE — TELEPHONE ENCOUNTER
Patient needs to have surgical clearance and will bring in paperwork for Dr. Fragoso to see and then we will make him an appointment if needed,  Surgery is scheduled on the 19 of May.

## 2022-04-27 ENCOUNTER — OFFICE VISIT (OUTPATIENT)
Dept: MEDICAL GROUP | Facility: PHYSICIAN GROUP | Age: 77
End: 2022-04-27
Payer: MEDICARE

## 2022-04-27 VITALS
BODY MASS INDEX: 28.03 KG/M2 | HEART RATE: 75 BPM | TEMPERATURE: 95.7 F | SYSTOLIC BLOOD PRESSURE: 136 MMHG | DIASTOLIC BLOOD PRESSURE: 70 MMHG | WEIGHT: 178.57 LBS | HEIGHT: 67 IN | OXYGEN SATURATION: 97 %

## 2022-04-27 DIAGNOSIS — I10 ESSENTIAL HYPERTENSION, BENIGN: ICD-10-CM

## 2022-04-27 DIAGNOSIS — N18.31 STAGE 3A CHRONIC KIDNEY DISEASE: ICD-10-CM

## 2022-04-27 DIAGNOSIS — C61 RECURRENT PROSTATE CANCER (HCC): ICD-10-CM

## 2022-04-27 DIAGNOSIS — G89.29 CHRONIC BILATERAL LOW BACK PAIN WITH LEFT-SIDED SCIATICA: ICD-10-CM

## 2022-04-27 DIAGNOSIS — E78.5 DYSLIPIDEMIA: ICD-10-CM

## 2022-04-27 DIAGNOSIS — C77.2 SECONDARY AND UNSPECIFIED MALIGNANT NEOPLASM OF INTRA-ABDOMINAL LYMPH NODES (HCC): ICD-10-CM

## 2022-04-27 DIAGNOSIS — M54.42 CHRONIC BILATERAL LOW BACK PAIN WITH LEFT-SIDED SCIATICA: ICD-10-CM

## 2022-04-27 PROCEDURE — 99214 OFFICE O/P EST MOD 30 MIN: CPT | Performed by: FAMILY MEDICINE

## 2022-04-27 PROCEDURE — 8041 PR SCP AHA: Performed by: FAMILY MEDICINE

## 2022-04-27 ASSESSMENT — FIBROSIS 4 INDEX: FIB4 SCORE: 2.16

## 2022-04-28 RX ORDER — GABAPENTIN 100 MG/1
100 CAPSULE ORAL 3 TIMES DAILY
COMMUNITY
Start: 2022-04-11 | End: 2022-07-14

## 2022-04-29 DIAGNOSIS — E78.5 HYPERLIPIDEMIA, UNSPECIFIED HYPERLIPIDEMIA TYPE: ICD-10-CM

## 2022-04-29 DIAGNOSIS — E78.2 MIXED HYPERLIPIDEMIA: ICD-10-CM

## 2022-04-29 RX ORDER — EZETIMIBE 10 MG/1
TABLET ORAL
Qty: 100 TABLET | Refills: 3 | Status: SHIPPED | OUTPATIENT
Start: 2022-04-29 | End: 2023-06-14 | Stop reason: SDUPTHER

## 2022-04-29 NOTE — PROGRESS NOTES
Subjective     Kemal Mueller is a 77 y.o. male who presents with Other (Surgical clearance)            HPI     Patient is here for preop clearance requested by Dr. Ying for L2-S1 laminectomies for chronic bilateral low back pain with left-sided sciatica.  Patient has been having low back pain, tailbone pain and hip pain and saw his urologist who sent him for MRI of the hips, MRI of the lumbar spine to rule out metastasis because of his recurrent prostate cancer with metastasis to the lymph nodes.  There was no evidence of metastasis on MRI of these areas.  MRI of the lumbar spine showed indeterminate tiny enhancing lesion S2 segment of the sacrum probably a tiny intraosseous hemangioma, lumbosacral postradiation marrow changes, multilevel disc and facet degeneration with moderate to severe thecal sac stenosis L3-L4.  His surgery is scheduled to be done on 5/19/2022.  Patient takes gabapentin which helps with the pain.    Patient without any history of CAD, arrhythmias, lung disease.    He has diabetes mellitus type 2 with CKD stage IIIa currently controlled on glipizide.    He follows with his nephrologist for stage IIIa chronic kidney disease.  His last blood work 2 months ago which came back the GFR improved from 58-60 which was already normal.  He avoids nephrotoxic agents.    For his hypertension, this is under control on lisinopril.    In terms of dyslipidemia, he takes both atorvastatin 80 mg daily and Zetia 10 mg daily.  The last lipid panel came back the LDL increased from   in November 2021.    Patient is also here for [facility].    Annual Health Assessment Questions:    1.  Are you currently engaging in any exercise or physical activity? No    2.  How would you describe your mood or emotional well-being today? good    3.  Have you had any falls in the last year? No    4.  Have you noticed any problems with your balance or had difficulty walking? Yes    5.  In the last six months have you experienced  any leakage of urine? Yes    6. DPA/Advanced Directive: Patient has Advanced Directive on file.     I reviewed the following    Past Medical History:   Diagnosis Date   • Anemia 2015     Due to low testosterone from hormone treatment for prostate cancer as per [facility] evaluation.   • Anesthesia     Low BP; nausea; hard time waking   • Arthritis     spine and wrists   • Breath shortness     activity induced   • Bronchitis 1/15   • CATARACT    • Cervical stenosis of spine    • CKD (chronic kidney disease), stage III (HCC)     Dr. Roberts   • Degeneration of cervical intervertebral disc    • Dental disorder     upper dentures   • ED (erectile dysfunction)    • Elevated blood sugar    • Elevated BP    • Elevated cholesterol    • GERD (gastroesophageal reflux disease)    • Glaucoma    • Heart burn    • High cholesterol    • Hypertension    • Indigestion    • Prostate cancer (HCC)     prostate- removed '09; CA returned in pocket- radiation therapy done   • Renal cancer (HCC)     Had treatment done- resolved per CAT scan   • Unspecified urinary incontinence    • wrist pain 5/20/2015    bilat wrist pain        Past Surgical History:   Procedure Laterality Date   • CATARACT PHACO WITH IOL Right 1/28/2016    Procedure: CATARACT PHACO WITH IOL;  Surgeon: Alfonso Hernandez M.D.;  Location: [facility];  Service:    • CATARACT PHACO WITH IOL Left 1/14/2016    Procedure: CATARACT PHACO WITH IOL;  Surgeon: Alfonso Hernandez M.D.;  Location: [facility];  Service:    • CERVICAL DISK AND FUSION ANTERIOR  10/30/2012    Performed by Selvin Ying M.D. at [facility]   • PROSTATECTOMY, RADICAL RETRO  4/21/2010    Performed by EDYTA KUMAR at [facility]   • NODE DISSECTION  4/21/2010    Performed by EDYTA KUMAR at [facility]   • CARPAL TUNNEL RELEASE  1990    Bilateral   • TRIGGER FINGER RELEASE  1990    Right small finger   • TONSILLECTOMY AND ADENOIDECTOMY   1953   • APPENDECTOMY     • [place] REMOVAL OF KIDNEY STONE  1986/1998    x2       Allergies   Allergen Reactions   • Nkda [No Known Drug Allergy]        Current Outpatient Medications   Medication Sig Dispense Refill   • omeprazole (PRILOSEC) 20 MG delayed-release capsule TAKE ONE CAPSULE BY MOUTH DAILY 100 capsule 3   • lisinopril (PRINIVIL) 40 MG tablet TAKE ONE TABLET BY MOUTH DAILY 100 tablet 3   • atorvastatin (LIPITOR) 80 MG tablet TAKE ONE TABLET BY MOUTH EVERY EVENING 100 tablet 3   • ezetimibe (ZETIA) 10 MG Tab TAKE ONE TABLET BY MOUTH DAILY 100 tablet 3   • predniSONE (DELTASONE) 5 MG Tab      • Abiraterone Acetate 250 MG Tab Take  by mouth.     • glipiZIDE (GLUCOTROL) 5 MG Tab TAKE 2 TABLETS BY MOUTH IN THE MORNING AND 1 TABLET IN THE EVENING WITH MEALS. 300 tablet 3   • Calcium Carbonate-Vit D-Min (CALCIUM 1200 PO) Take  by mouth.     • vitamin D3, cholecalciferol, 1000 UNIT Tab Take 2 Tabs by mouth every day. 100 Tab 3   • potassium citrate SR (UROCIT-K SR) 10 MEQ (1080 MG) TBCR Take 10 mEq by mouth every day.     • Multiple Vitamins-Minerals (CENTRUM SILVER PO) Take  by mouth.     • Brimonidine Tartrate-Timolol 0.2-0.5 % Solution 1 Drop by Ophthalmic route 2 times a day.     • latanoprost (XALATAN) 0.005 % Solution Place 1 Drop in both eyes every evening.     • ACETAZOLAMIDE 125 MG PO TABS Take 125 mg by mouth every day.     • doxycycline (VIBRAMYCIN) 100 MG Tab Take 1 Tablet by mouth 2 times a day. (Patient not taking: Reported on 4/27/2022) 20 Tablet 0   • oxyCODONE-acetaminophen (PERCOCET) 5-325 MG Tab  (Patient not taking: No sig reported)       No current facility-administered medications for this visit.        Family History   Problem Relation Age of Onset   • Diabetes Mother    • Stroke Mother    • Alzheimer's Disease Father        Social History     Socioeconomic History   • Marital status:      Spouse name: Not on file   • Number of children: Not on file   • Years of education: Not on file  "  • Highest education level: Not on file   Occupational History   • Occupation: retired drugstore manager   Tobacco Use   • Smoking status: Former Smoker     Packs/day: 0.20     Years: 10.00     Pack years: 2.00     Quit date: 1977     Years since quittin.3   • Smokeless tobacco: Never Used   • Tobacco comment: 1/3 ppd for 10 years, quit    Vaping Use   • Vaping Use: Never used   Substance and Sexual Activity   • Alcohol use: Yes     Alcohol/week: 1.2 oz     Types: 2 Cans of beer per week     Comment: 1-2 per day   • Drug use: No   • Sexual activity: Not Currently     Partners: Female   Other Topics Concern   • Not on file   Social History Narrative   • Not on file     Social Determinants of Health     Financial Resource Strain: Not on file   Food Insecurity: Not on file   Transportation Needs: Not on file   Physical Activity: Not on file   Stress: Not on file   Social Connections: Not on file   Intimate Partner Violence: Not on file   Housing Stability: Not on file        ROS     As per HPI, the rest are negative.         Objective     /70   Pulse 75   Temp (!) 35.4 °C (95.7 °F) (Temporal)   Ht 1.702 m (5' 7\")   Wt 81 kg (178 lb 9.2 oz)   SpO2 97%   BMI 27.97 kg/m²      Physical Exam  Vitals and nursing note reviewed.   Constitutional:       General: He is not in acute distress.     Appearance: He is well-developed. He is not diaphoretic.   HENT:      Head: Normocephalic and atraumatic.      Right Ear: External ear normal.      Left Ear: External ear normal.      Nose: Nose normal.      Mouth/Throat:      Pharynx: No oropharyngeal exudate.   Eyes:      General: No scleral icterus.        Right eye: No discharge.         Left eye: No discharge.      Conjunctiva/sclera: Conjunctivae normal.      Pupils: Pupils are equal, round, and reactive to light.   Neck:      Thyroid: No thyromegaly.      Vascular: No JVD.      Trachea: No tracheal deviation.   Cardiovascular:      Rate and Rhythm: Normal " rate and regular rhythm.      Heart sounds: Normal heart sounds. No murmur heard.    No friction rub. No gallop.   Pulmonary:      Effort: Pulmonary effort is normal. No respiratory distress.      Breath sounds: Normal breath sounds. No stridor. No wheezing or rales.   Chest:      Chest wall: No tenderness.   Abdominal:      General: Bowel sounds are normal. There is no distension.      Palpations: Abdomen is soft. There is no mass.      Tenderness: There is no abdominal tenderness. There is no guarding or rebound.      Hernia: No hernia is present.   Musculoskeletal:         General: No tenderness or deformity. Normal range of motion.      Cervical back: Normal range of motion and neck supple.   Lymphadenopathy:      Cervical: No cervical adenopathy.   Skin:     General: Skin is warm and dry.      Coloration: Skin is not pale.      Findings: No erythema or rash.   Neurological:      Mental Status: He is alert and oriented to person, place, and time.      Cranial Nerves: No cranial nerve deficit.      Motor: No abnormal muscle tone.      Coordination: Coordination normal.      Deep Tendon Reflexes: Reflexes are normal and symmetric. Reflexes normal.   Psychiatric:         Behavior: Behavior normal.         Thought Content: Thought content normal.         Judgment: Judgment normal.          Revised cardiac risk index (RCRI)    High risk surgery: Intrathoracic, intraperitoneal, suprainguinal vascular - 0  History of ischemic heart disease -0  History of heart failure - 0  History of TIA or stroke - 0  Diabetes treated with insulin - 0  Creatinine over 2.0 mg/dL - 0  Total points  - 0                                Assessment & Plan        1. Chronic bilateral low back pain with left-sided sciatica  He will have his preop labs next week including EKG and chest x-ray and we will await the results.  He will do updated blood work including CMP, lipid profile and hemoglobin A1c.  Once all the results come back without any  significant problems, we will clear to proceed with surgery with low cardiac risk.    2. Recurrent prostate cancer (HCC)  He has recurrent prostate cancer with metastasis to the lymph nodes.  He is on treatment with Lupron shots and abiraterone and prednisone.  He is under the care of a urologist.  He may need steroid stress dosing Intra-Op and postop due to the fact that he has been on prednisone 5 mg twice daily for a year.    3. 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    4. Uncontrolled diabetes with stage 3 chronic kidney disease GFR 30-59 (HCC)  The last hemoglobin A1c came back under acceptable control based on his age and diabetes complication.  Continue glipizide.  We will do updated hemoglobin A1c.    5. Stage 3a chronic kidney disease (HCC)  This has improved in fact the GFR is back to normal.  He is followed by his urologist.  Continue avoidance of nephrotoxic agents.  Controlled on lisinopril.    6. Essential hypertension, benign  Controlled on lisinopril.    7. Dyslipidemia  He is on 2 medications which are atorvastatin and Zetia.  He will do updated blood work.      Please note that this dictation was created using voice recognition software. I have worked with consultants from the vendor as well as technical experts from [facility]  [employer] to optimize the interface. I have made every reasonable attempt to correct obvious errors, but I expect that there are errors of grammar and possibly content I did not discover before finalizing the note.

## 2022-05-03 ENCOUNTER — HOSPITAL ENCOUNTER (OUTPATIENT)
Dept: LAB | Facility: MEDICAL CENTER | Age: 77
End: 2022-05-03
Attending: FAMILY MEDICINE
Payer: MEDICARE

## 2022-05-03 ENCOUNTER — HOSPITAL ENCOUNTER (OUTPATIENT)
Dept: RADIOLOGY | Facility: MEDICAL CENTER | Age: 77
End: 2022-05-03
Attending: NEUROLOGICAL SURGERY | Admitting: NEUROLOGICAL SURGERY
Payer: MEDICARE

## 2022-05-03 ENCOUNTER — PRE-ADMISSION TESTING (OUTPATIENT)
Dept: ADMISSIONS | Facility: MEDICAL CENTER | Age: 77
End: 2022-05-03
Attending: NEUROLOGICAL SURGERY
Payer: MEDICARE

## 2022-05-03 ENCOUNTER — HOSPITAL ENCOUNTER (OUTPATIENT)
Dept: LAB | Facility: MEDICAL CENTER | Age: 77
End: 2022-05-03
Attending: UROLOGY
Payer: MEDICARE

## 2022-05-03 DIAGNOSIS — N18.31 STAGE 3A CHRONIC KIDNEY DISEASE: ICD-10-CM

## 2022-05-03 DIAGNOSIS — M47.896 OTHER SPONDYLOSIS, LUMBAR REGION: ICD-10-CM

## 2022-05-03 DIAGNOSIS — E78.5 DYSLIPIDEMIA: ICD-10-CM

## 2022-05-03 DIAGNOSIS — I10 ESSENTIAL HYPERTENSION, BENIGN: ICD-10-CM

## 2022-05-03 DIAGNOSIS — R82.90 NONSPECIFIC FINDING ON EXAMINATION OF URINE: ICD-10-CM

## 2022-05-03 DIAGNOSIS — M48.061 SPINAL STENOSIS, LUMBAR REGION, WITHOUT NEUROGENIC CLAUDICATION: ICD-10-CM

## 2022-05-03 DIAGNOSIS — Z01.812 PRE-OPERATIVE LABORATORY EXAMINATION: ICD-10-CM

## 2022-05-03 DIAGNOSIS — R79.1 ABNORMAL COAGULATION PROFILE: ICD-10-CM

## 2022-05-03 DIAGNOSIS — M54.16 LUMBAR RADICULOPATHY: ICD-10-CM

## 2022-05-03 DIAGNOSIS — Z01.811 PRE-OPERATIVE RESPIRATORY EXAMINATION: ICD-10-CM

## 2022-05-03 DIAGNOSIS — Z01.810 PRE-OPERATIVE CARDIOVASCULAR EXAMINATION: ICD-10-CM

## 2022-05-03 DIAGNOSIS — R94.31 NONSPECIFIC ABNORMAL ELECTROCARDIOGRAM (ECG) (EKG): ICD-10-CM

## 2022-05-03 DIAGNOSIS — M54.50 LOW BACK PAIN, UNSPECIFIED BACK PAIN LATERALITY, UNSPECIFIED CHRONICITY, UNSPECIFIED WHETHER SCIATICA PRESENT: ICD-10-CM

## 2022-05-03 DIAGNOSIS — C61 MALIGNANT NEOPLASM OF PROSTATE (HCC): ICD-10-CM

## 2022-05-03 LAB
ALBUMIN SERPL BCP-MCNC: 4.6 G/DL (ref 3.2–4.9)
ALBUMIN SERPL BCP-MCNC: 4.6 G/DL (ref 3.2–4.9)
ALBUMIN/GLOB SERPL: 1.9 G/DL
ALBUMIN/GLOB SERPL: 1.9 G/DL
ALP SERPL-CCNC: 65 U/L (ref 30–99)
ALP SERPL-CCNC: 65 U/L (ref 30–99)
ALT SERPL-CCNC: 17 U/L (ref 2–50)
ALT SERPL-CCNC: 17 U/L (ref 2–50)
AMORPH CRY #/AREA URNS HPF: PRESENT /HPF
ANION GAP SERPL CALC-SCNC: 10 MMOL/L (ref 7–16)
ANION GAP SERPL CALC-SCNC: 12 MMOL/L (ref 7–16)
ANION GAP SERPL CALC-SCNC: 12 MMOL/L (ref 7–16)
APPEARANCE UR: ABNORMAL
APTT PPP: 28.7 SEC (ref 24.7–36)
AST SERPL-CCNC: 16 U/L (ref 12–45)
AST SERPL-CCNC: 17 U/L (ref 12–45)
BACTERIA #/AREA URNS HPF: NEGATIVE /HPF
BASOPHILS # BLD AUTO: 0.1 % (ref 0–1.8)
BASOPHILS # BLD: 0.01 K/UL (ref 0–0.12)
BILIRUB SERPL-MCNC: 0.4 MG/DL (ref 0.1–1.5)
BILIRUB SERPL-MCNC: 0.5 MG/DL (ref 0.1–1.5)
BILIRUB UR QL STRIP.AUTO: NEGATIVE
BUN SERPL-MCNC: 35 MG/DL (ref 8–22)
BUN SERPL-MCNC: 36 MG/DL (ref 8–22)
BUN SERPL-MCNC: 38 MG/DL (ref 8–22)
CALCIUM SERPL-MCNC: 10 MG/DL (ref 8.5–10.5)
CALCIUM SERPL-MCNC: 10 MG/DL (ref 8.5–10.5)
CALCIUM SERPL-MCNC: 9.8 MG/DL (ref 8.5–10.5)
CHLORIDE SERPL-SCNC: 105 MMOL/L (ref 96–112)
CHLORIDE SERPL-SCNC: 107 MMOL/L (ref 96–112)
CHLORIDE SERPL-SCNC: 107 MMOL/L (ref 96–112)
CHOLEST SERPL-MCNC: 185 MG/DL (ref 100–199)
CO2 SERPL-SCNC: 22 MMOL/L (ref 20–33)
CO2 SERPL-SCNC: 22 MMOL/L (ref 20–33)
CO2 SERPL-SCNC: 25 MMOL/L (ref 20–33)
COLOR UR: YELLOW
CREAT SERPL-MCNC: 1.39 MG/DL (ref 0.5–1.4)
CREAT SERPL-MCNC: 1.46 MG/DL (ref 0.5–1.4)
CREAT SERPL-MCNC: 1.47 MG/DL (ref 0.5–1.4)
EKG IMPRESSION: NORMAL
EOSINOPHIL # BLD AUTO: 0.01 K/UL (ref 0–0.51)
EOSINOPHIL NFR BLD: 0.1 % (ref 0–6.9)
EPI CELLS #/AREA URNS HPF: NEGATIVE /HPF
ERYTHROCYTE [DISTWIDTH] IN BLOOD BY AUTOMATED COUNT: 47.2 FL (ref 35.9–50)
EST. AVERAGE GLUCOSE BLD GHB EST-MCNC: 160 MG/DL
GFR SERPLBLD CREATININE-BSD FMLA CKD-EPI: 48 ML/MIN/1.73 M 2
GFR SERPLBLD CREATININE-BSD FMLA CKD-EPI: 49 ML/MIN/1.73 M 2
GFR SERPLBLD CREATININE-BSD FMLA CKD-EPI: 51 ML/MIN/1.73 M 2
GLOBULIN SER CALC-MCNC: 2.4 G/DL (ref 1.9–3.5)
GLOBULIN SER CALC-MCNC: 2.4 G/DL (ref 1.9–3.5)
GLUCOSE SERPL-MCNC: 55 MG/DL (ref 65–99)
GLUCOSE SERPL-MCNC: 61 MG/DL (ref 65–99)
GLUCOSE SERPL-MCNC: 62 MG/DL (ref 65–99)
GLUCOSE UR STRIP.AUTO-MCNC: NEGATIVE MG/DL
HBA1C MFR BLD: 7.2 % (ref 4–5.6)
HCT VFR BLD AUTO: 38.4 % (ref 42–52)
HDLC SERPL-MCNC: 45 MG/DL
HGB BLD-MCNC: 12.3 G/DL (ref 14–18)
HYALINE CASTS #/AREA URNS LPF: ABNORMAL /LPF
IMM GRANULOCYTES # BLD AUTO: 0.13 K/UL (ref 0–0.11)
IMM GRANULOCYTES NFR BLD AUTO: 1.7 % (ref 0–0.9)
INR PPP: 0.98 (ref 0.87–1.13)
KETONES UR STRIP.AUTO-MCNC: NEGATIVE MG/DL
LDLC SERPL CALC-MCNC: 105 MG/DL
LEUKOCYTE ESTERASE UR QL STRIP.AUTO: NEGATIVE
LYMPHOCYTES # BLD AUTO: 1.38 K/UL (ref 1–4.8)
LYMPHOCYTES NFR BLD: 18.3 % (ref 22–41)
MCH RBC QN AUTO: 30.3 PG (ref 27–33)
MCHC RBC AUTO-ENTMCNC: 32 G/DL (ref 33.7–35.3)
MCV RBC AUTO: 94.6 FL (ref 81.4–97.8)
MICRO URNS: ABNORMAL
MONOCYTES # BLD AUTO: 1.88 K/UL (ref 0–0.85)
MONOCYTES NFR BLD AUTO: 24.9 % (ref 0–13.4)
NEUTROPHILS # BLD AUTO: 4.15 K/UL (ref 1.82–7.42)
NEUTROPHILS NFR BLD: 54.9 % (ref 44–72)
NITRITE UR QL STRIP.AUTO: NEGATIVE
NRBC # BLD AUTO: 0 K/UL
NRBC BLD-RTO: 0 /100 WBC
PH UR STRIP.AUTO: 7 [PH] (ref 5–8)
PLATELET # BLD AUTO: 168 K/UL (ref 164–446)
PMV BLD AUTO: 10.7 FL (ref 9–12.9)
POTASSIUM SERPL-SCNC: 4.5 MMOL/L (ref 3.6–5.5)
POTASSIUM SERPL-SCNC: 4.6 MMOL/L (ref 3.6–5.5)
POTASSIUM SERPL-SCNC: 4.9 MMOL/L (ref 3.6–5.5)
PROT SERPL-MCNC: 7 G/DL (ref 6–8.2)
PROT SERPL-MCNC: 7 G/DL (ref 6–8.2)
PROT UR QL STRIP: 30 MG/DL
PROTHROMBIN TIME: 12.7 SEC (ref 12–14.6)
PSA SERPL-MCNC: 0.03 NG/ML (ref 0–4)
RBC # BLD AUTO: 4.06 M/UL (ref 4.7–6.1)
RBC # URNS HPF: ABNORMAL /HPF
RBC UR QL AUTO: NEGATIVE
SODIUM SERPL-SCNC: 140 MMOL/L (ref 135–145)
SODIUM SERPL-SCNC: 141 MMOL/L (ref 135–145)
SODIUM SERPL-SCNC: 141 MMOL/L (ref 135–145)
SP GR UR STRIP.AUTO: 1.01
TESTOST SERPL-MCNC: <12 NG/DL (ref 175–781)
TRIGL SERPL-MCNC: 175 MG/DL (ref 0–149)
UROBILINOGEN UR STRIP.AUTO-MCNC: 0.2 MG/DL
WBC # BLD AUTO: 7.6 K/UL (ref 4.8–10.8)
WBC #/AREA URNS HPF: ABNORMAL /HPF

## 2022-05-03 PROCEDURE — 72110 X-RAY EXAM L-2 SPINE 4/>VWS: CPT

## 2022-05-03 PROCEDURE — 93010 ELECTROCARDIOGRAM REPORT: CPT | Performed by: INTERNAL MEDICINE

## 2022-05-03 PROCEDURE — 71046 X-RAY EXAM CHEST 2 VIEWS: CPT

## 2022-05-03 PROCEDURE — 84403 ASSAY OF TOTAL TESTOSTERONE: CPT

## 2022-05-03 PROCEDURE — 80061 LIPID PANEL: CPT

## 2022-05-03 PROCEDURE — 80048 BASIC METABOLIC PNL TOTAL CA: CPT

## 2022-05-03 PROCEDURE — 80053 COMPREHEN METABOLIC PANEL: CPT

## 2022-05-03 PROCEDURE — 81001 URINALYSIS AUTO W/SCOPE: CPT

## 2022-05-03 PROCEDURE — 84153 ASSAY OF PSA TOTAL: CPT

## 2022-05-03 PROCEDURE — 93005 ELECTROCARDIOGRAM TRACING: CPT

## 2022-05-03 PROCEDURE — 85025 COMPLETE CBC W/AUTO DIFF WBC: CPT

## 2022-05-03 PROCEDURE — 85730 THROMBOPLASTIN TIME PARTIAL: CPT

## 2022-05-03 PROCEDURE — 80053 COMPREHEN METABOLIC PANEL: CPT | Mod: 91

## 2022-05-03 PROCEDURE — 85610 PROTHROMBIN TIME: CPT

## 2022-05-03 PROCEDURE — 36415 COLL VENOUS BLD VENIPUNCTURE: CPT

## 2022-05-03 PROCEDURE — 83036 HEMOGLOBIN GLYCOSYLATED A1C: CPT

## 2022-05-03 ASSESSMENT — FIBROSIS 4 INDEX: FIB4 SCORE: 2.16

## 2022-05-11 ENCOUNTER — TELEPHONE (OUTPATIENT)
Dept: MEDICAL GROUP | Facility: PHYSICIAN GROUP | Age: 77
End: 2022-05-11
Payer: MEDICARE

## 2022-05-11 NOTE — TELEPHONE ENCOUNTER
[facility] [place] called and Dr. Valerio wanted you to know that Patient did not need Stress steroid doseing as Anasthesia and surgeon would take care of it

## 2022-05-12 ENCOUNTER — TELEPHONE (OUTPATIENT)
Dept: MEDICAL GROUP | Facility: PHYSICIAN GROUP | Age: 77
End: 2022-05-12
Payer: MEDICARE

## 2022-05-13 NOTE — TELEPHONE ENCOUNTER
I reviewed patient's labs, chest x-ray and preop EKG.    Hospital Outpatient Visit on 05/03/2022   Component Date Value Ref Range Status   • Cholesterol,Tot 05/03/2022 185  100 - 199 mg/dL Final   • Triglycerides 05/03/2022 175 (A) 0 - 149 mg/dL Final   • HDL 05/03/2022 45  >=40 mg/dL Final   • LDL 05/03/2022 105 (A) <100 mg/dL Final   • Sodium 05/03/2022 141  135 - 145 mmol/L Final   • Potassium 05/03/2022 4.6  3.6 - 5.5 mmol/L Final   • Chloride 05/03/2022 107  96 - 112 mmol/L Final   • Co2 05/03/2022 22  20 - 33 mmol/L Final   • Anion Gap 05/03/2022 12.0  7.0 - 16.0 Final   • Glucose 05/03/2022 61 (A) 65 - 99 mg/dL Final   • Bun 05/03/2022 36 (A) 8 - 22 mg/dL Final   • Creatinine 05/03/2022 1.46 (A) 0.50 - 1.40 mg/dL Final   • Calcium 05/03/2022 10.0  8.5 - 10.5 mg/dL Final   • AST(SGOT) 05/03/2022 16  12 - 45 U/L Final   • ALT(SGPT) 05/03/2022 17  2 - 50 U/L Final   • Alkaline Phosphatase 05/03/2022 65  30 - 99 U/L Final   • Total Bilirubin 05/03/2022 0.5  0.1 - 1.5 mg/dL Final   • Albumin 05/03/2022 4.6  3.2 - 4.9 g/dL Final   • Total Protein 05/03/2022 7.0  6.0 - 8.2 g/dL Final   • Globulin 05/03/2022 2.4  1.9 - 3.5 g/dL Final   • A-G Ratio 05/03/2022 1.9  g/dL Final   • Glycohemoglobin 05/03/2022 7.2 (A) 4.0 - 5.6 % Final    Comment: Increased risk for diabetes:  5.7 -6.4%  Diabetes:  >6.4%  Glycemic control for adults with diabetes:  <7.0%    The above interpretations are per ADA guidelines.  Diagnosis  of diabetes mellitus on the basis of elevated Hemoglobin A1c  should be confirmed by repeating the Hb A1c test.     • Est Avg Glucose 05/03/2022 160  mg/dL Final    Comment: The eAG calculation is based on the A1c-Derived Daily Glucose  (ADAG) study.  See the ADA's website for additional information.     • GFR (CKD-EPI) 05/03/2022 49 (A) >60 mL/min/1.73 m 2 Final    Comment: Effective 3/15/2022, estimated Glomerular Filtration Rate  is calculated using race neutral CKD-EPI 2021 equation  per NKF-ASN  recommendations.        Latest Reference Range & Units 05/03/22 11:19   PT 12.0 - 14.6 sec 12.7   INR 0.87 - 1.13  0.98 [1]   APTT 24.7 - 36.0 sec 28.7   [1] INR - Non-therapeutic Reference Range: 0.87-1.13   INR - Therapeutic Reference Range: 2.0-4.0     Narrative & Impression     5/3/2022 12:25 PM     HISTORY/REASON FOR EXAM:  Preop laminectomy and shortness of breath     TECHNIQUE/EXAM DESCRIPTION: PA and lateral views of the chest.     COMPARISON:  Chest x-ray 3/15/2022     FINDINGS:  The lungs are clear.  The cardiac silhouette is normal in size.  No effusions or pneumothoraces are present.  There are no significant osseous abnormalities.  The visualized portions of the upper abdomen are within normal limits.        IMPRESSION:     NEGATIVE TWO VIEWS OF THE CHEST.    EKG showed sinus arrhythmia rate of 59-82, multiple premature ventricular and supraventricular complexes, no acute ST-T wave changes    I reached out to [facility] [place] regarding steroid stress dosing Intra-Op and postop due to the fact the patient is on daily prednisone 5 mg twice a day together with abiraterone.  Per [facility] [place], stress dosing will be taken care of by surgeon and anesthesiologist if necessary.    Patient can proceed with the planned surgery with minimal risk.    Form sent to [facility] [place] for clearance initiated on 5/12/2022.      Moni Fragoso M.D.

## 2022-05-17 NOTE — LETTER
February 18, 2020         Kemal Mueller  [address]  [place] [address]        Dear Sam   Your Vitamin D is normal.   Your cholesterol levels are great.   Your kidney functions and blood sugar are stable.   Your A1C looks better at 6.0.   Your urine microalbumin is 98. Keep seeing the kidney specialist.   Your anemia is improving.     Carlos Peters M.D.     Resulted Orders   HEMOGLOBIN A1C   Result Value Ref Range    Glycohemoglobin 6.0 (H) 0.0 - 5.6 %      Comment:      Increased risk for diabetes:  5.7 -6.4%  Diabetes:  >6.4%  Glycemic control for adults with diabetes:  <7.0%  The above interpretations are per ADA guidelines.  Diagnosis  of diabetes mellitus on the basis of elevated Hemoglobin A1c  should be confirmed by repeating the Hb A1c test.      Est Avg Glucose 126 mg/dL      Comment:      The eAG calculation is based on the A1c-Derived Daily Glucose  (ADAG) study.  See the ADA's website for additional information.      Narrative    Fast 8-10 hours, OK to drink water as needed during fast,  take medications per your provider's instructions.  Request patient fasting?->No   MICROALBUMIN CREAT RATIO URINE   Result Value Ref Range    Creatinine, Urine 77.80 mg/dL    Microalbumin, Urine Random 7.6 mg/dL    Micro Alb Creat Ratio 98 (H) 0 - 30 mg/g   Lipid Profile   Result Value Ref Range    Cholesterol,Tot 141 100 - 199 mg/dL    Triglycerides 110 0 - 149 mg/dL    HDL 37 (A) >=40 mg/dL    LDL 82 <100 mg/dL    Narrative    Fast 8-10 hours, OK to drink water as needed during fast,  take medications per your provider's instructions.  Request patient fasting?->No   Comp Metabolic Panel   Result Value Ref Range    Sodium 140 135 - 145 mmol/L    Potassium 5.0 3.6 - 5.5 mmol/L    Chloride 109 96 - 112 mmol/L    Co2 23 20 - 33 mmol/L    Anion Gap 8.0 0.0 - 11.9    Glucose 63 (L) 65 - 99 mg/dL    Bun 41 (H) 8 - 22 mg/dL    Creatinine 1.62 (H) 0.50 - 1.40 mg/dL    Calcium 10.1 8.5 - 10.5 mg/dL    AST(SGOT) 21 12 - 45 U/L    ALT(SGPT) 17 2 - 50 U/L    Alkaline Phosphatase 66 30 - 99 U/L    Total Bilirubin 0.4 0.1 - 1.5 mg/dL    Albumin 4.4 3.2 - 4.9 g/dL    Total Protein 7.8 6.0 - 8.2 g/dL    Globulin 3.4 1.9 - 3.5 g/dL    A-G Ratio 1.3 g/dL    Narrative    Fast 8-10 hours, OK to drink water as needed during fast,  take medications per your provider's instructions.  Request patient fasting?->No   CBC WITH DIFFERENTIAL   Result Value Ref Range    WBC 6.1 4.8 - 10.8 K/uL    RBC 4.80 4.70 - 6.10 M/uL    Hemoglobin 13.8 (L) 14.0 - 18.0 g/dL    Hematocrit 44.0 42.0 - 52.0 %    MCV 91.7 81.4 - 97.8 fL    MCH 28.8 27.0 - 33.0 pg    MCHC 31.4 (L) 33.7 - 35.3 g/dL    RDW 43.8 35.9 - 50.0 fL    Platelet Count 199 164 - 446 K/uL    MPV 10.9 9.0 - 12.9 fL    Neutrophils-Polys 30.20 (L) 44.00 - 72.00 %    Lymphocytes 48.50 (H) 22.00 - 41.00 %    Monocytes 19.50 (H) 0.00 - 13.40 %    Eosinophils 1.00 0.00 - 6.90 %    Basophils 0.30 0.00 - 1.80 %    Immature Granulocytes 0.50 0.00 - 0.90 %    Nucleated RBC 0.00 /100 WBC    Neutrophils (Absolute) 1.84 1.82 - 7.42 K/uL      Comment:      Includes immature neutrophils, if present.    Lymphs (Absolute) 2.96 1.00 - 4.80 K/uL    Monos (Absolute) 1.19 (H) 0.00 - 0.85 K/uL    Eos (Absolute) 0.06 0.00 - 0.51 K/uL    Baso (Absolute) 0.02 0.00 - 0.12 K/uL    Immature Granulocytes (abs) 0.03 0.00 - 0.11 K/uL    NRBC (Absolute) 0.00 K/uL    Narrative    Fast 8-10 hours, OK to drink water as needed during fast,  take medications per your provider's instructions.  Request patient fasting?->No   FASTING STATUS   Result Value Ref Range    Fasting Status Fasting     Narrative    Fast 8-10 hours, OK to drink water as needed during fast,  take medications per your provider's instructions.  Request patient fasting?->No   VITAMIN D,25 HYDROXY   Result Value Ref Range    25-Hydroxy   Vitamin D 25 46 30 - 100 ng/mL      Comment:      Adult Ranges:   <20 ng/mL - Deficiency  20-29 ng/mL -  Insufficiency   ng/mL - Sufficiency  The [employer] Vitamin D Assay is standardized to the  Critical access hospital reference measurement procedures, a  reference method for the Vitamin D Standardization Program  (VDSP).  The VDSP aligns patient results among 25 (OH)  Vitamin D methods.      Narrative    Fast 8-10 hours, OK to drink water as needed during fast,  take medications per your provider's instructions.  Request patient fasting?->No   ESTIMATED GFR   Result Value Ref Range    GFR If African American 50 (A) >60 mL/min/1.73 m 2    GFR If Non  42 (A) >60 mL/min/1.73 m 2    Narrative    Fast 8-10 hours, OK to drink water as needed during fast,  take medications per your provider's instructions.  Request patient fasting?->No       Electronically Signed   Minocycline Pregnancy And Lactation Text: This medication is Pregnancy Category D and not consider safe during pregnancy. It is also excreted in breast milk.

## 2022-05-19 ENCOUNTER — HOSPITAL ENCOUNTER (OUTPATIENT)
Facility: MEDICAL CENTER | Age: 77
End: 2022-05-19
Attending: NEUROLOGICAL SURGERY | Admitting: NEUROLOGICAL SURGERY
Payer: MEDICARE

## 2022-05-19 ENCOUNTER — APPOINTMENT (OUTPATIENT)
Dept: RADIOLOGY | Facility: MEDICAL CENTER | Age: 77
End: 2022-05-19
Attending: NEUROLOGICAL SURGERY
Payer: MEDICARE

## 2022-05-19 VITALS
RESPIRATION RATE: 16 BRPM | TEMPERATURE: 96.3 F | HEART RATE: 87 BPM | DIASTOLIC BLOOD PRESSURE: 78 MMHG | HEIGHT: 67 IN | WEIGHT: 176.37 LBS | BODY MASS INDEX: 27.68 KG/M2 | SYSTOLIC BLOOD PRESSURE: 145 MMHG | OXYGEN SATURATION: 97 %

## 2022-05-19 LAB — GLUCOSE BLD STRIP.AUTO-MCNC: 133 MG/DL (ref 65–99)

## 2022-05-19 PROCEDURE — 82962 GLUCOSE BLOOD TEST: CPT

## 2022-05-19 RX ORDER — SODIUM CHLORIDE, SODIUM LACTATE, POTASSIUM CHLORIDE, CALCIUM CHLORIDE 600; 310; 30; 20 MG/100ML; MG/100ML; MG/100ML; MG/100ML
INJECTION, SOLUTION INTRAVENOUS CONTINUOUS
Status: DISCONTINUED | OUTPATIENT
Start: 2022-05-19 | End: 2022-05-19 | Stop reason: HOSPADM

## 2022-05-19 ASSESSMENT — PAIN DESCRIPTION - PAIN TYPE: TYPE: CHRONIC PAIN

## 2022-05-19 ASSESSMENT — FIBROSIS 4 INDEX: FIB4 SCORE: 1.82479184551145761

## 2022-05-19 NOTE — OR NURSING
Discharging patient per order. Surgery canceled r/t patient's SOB and anesthesia requesting work up prior to surgery.  Patient walked off unit with family.

## 2022-05-19 NOTE — PROGRESS NOTES
Anesthesia recommending further medical workup and cardiac consult with stress test. Cancelling elective back surgery for today.   Dr. Ying aware.   Called in Gabapentin 300mg tid at [facility] on [address]    Nurse requesting d/c order.

## 2022-06-01 ENCOUNTER — APPOINTMENT (RX ONLY)
Dept: URBAN - METROPOLITAN AREA CLINIC 4 | Facility: CLINIC | Age: 77
Setting detail: DERMATOLOGY
End: 2022-06-01

## 2022-06-01 DIAGNOSIS — D69.2 OTHER NONTHROMBOCYTOPENIC PURPURA: ICD-10-CM

## 2022-06-01 DIAGNOSIS — Z87.2 PERSONAL HISTORY OF DISEASES OF THE SKIN AND SUBCUTANEOUS TISSUE: ICD-10-CM

## 2022-06-01 DIAGNOSIS — L82.1 OTHER SEBORRHEIC KERATOSIS: ICD-10-CM

## 2022-06-01 DIAGNOSIS — D22 MELANOCYTIC NEVI: ICD-10-CM

## 2022-06-01 DIAGNOSIS — L81.4 OTHER MELANIN HYPERPIGMENTATION: ICD-10-CM

## 2022-06-01 PROBLEM — D48.5 NEOPLASM OF UNCERTAIN BEHAVIOR OF SKIN: Status: ACTIVE | Noted: 2022-06-01

## 2022-06-01 PROBLEM — D22.5 MELANOCYTIC NEVI OF TRUNK: Status: ACTIVE | Noted: 2022-06-01

## 2022-06-01 PROBLEM — D22.39 MELANOCYTIC NEVI OF OTHER PARTS OF FACE: Status: ACTIVE | Noted: 2022-06-01

## 2022-06-01 PROCEDURE — ? COUNSELING

## 2022-06-01 PROCEDURE — 99203 OFFICE O/P NEW LOW 30 MIN: CPT | Mod: 25

## 2022-06-01 PROCEDURE — 11102 TANGNTL BX SKIN SINGLE LES: CPT

## 2022-06-01 PROCEDURE — ? BIOPSY BY SHAVE METHOD

## 2022-06-01 ASSESSMENT — LOCATION SIMPLE DESCRIPTION DERM
LOCATION SIMPLE: LEFT FOREHEAD
LOCATION SIMPLE: RIGHT HAND
LOCATION SIMPLE: LEFT BUTTOCK
LOCATION SIMPLE: RIGHT UPPER BACK
LOCATION SIMPLE: ABDOMEN
LOCATION SIMPLE: LEFT FOREARM
LOCATION SIMPLE: RIGHT FOREARM
LOCATION SIMPLE: SCALP
LOCATION SIMPLE: RIGHT BUTTOCK

## 2022-06-01 ASSESSMENT — LOCATION ZONE DERM
LOCATION ZONE: HAND
LOCATION ZONE: TRUNK
LOCATION ZONE: FACE
LOCATION ZONE: ARM
LOCATION ZONE: SCALP

## 2022-06-01 ASSESSMENT — LOCATION DETAILED DESCRIPTION DERM
LOCATION DETAILED: EPIGASTRIC SKIN
LOCATION DETAILED: LEFT PROXIMAL DORSAL FOREARM
LOCATION DETAILED: RIGHT RADIAL DORSAL HAND
LOCATION DETAILED: RIGHT DISTAL DORSAL FOREARM
LOCATION DETAILED: LEFT BUTTOCK
LOCATION DETAILED: RIGHT DISTAL RADIAL DORSAL FOREARM
LOCATION DETAILED: LEFT INFERIOR MEDIAL FOREHEAD
LOCATION DETAILED: LEFT RIB CAGE
LOCATION DETAILED: RIGHT PROXIMAL DORSAL FOREARM
LOCATION DETAILED: RIGHT INFERIOR MEDIAL UPPER BACK
LOCATION DETAILED: LEFT FOREHEAD
LOCATION DETAILED: RIGHT BUTTOCK
LOCATION DETAILED: LEFT DISTAL DORSAL FOREARM
LOCATION DETAILED: RIGHT CENTRAL FRONTAL SCALP

## 2022-06-08 ENCOUNTER — TELEPHONE (OUTPATIENT)
Dept: CARDIOLOGY | Facility: MEDICAL CENTER | Age: 77
End: 2022-06-08
Payer: MEDICARE

## 2022-06-08 NOTE — TELEPHONE ENCOUNTER
Spoke to patient in regards to records for NP appointment with [facility]. Patient has never been treated by a cardiologist, all recent records in [facility] including labs and imaging. Confirmed patient's appointment date, time and location.

## 2022-06-10 ENCOUNTER — HOSPITAL ENCOUNTER (OUTPATIENT)
Dept: LAB | Facility: MEDICAL CENTER | Age: 77
End: 2022-06-10
Attending: PHYSICIAN ASSISTANT
Payer: MEDICARE

## 2022-06-10 LAB
ANION GAP SERPL CALC-SCNC: 10 MMOL/L (ref 7–16)
BUN SERPL-MCNC: 43 MG/DL (ref 8–22)
CALCIUM SERPL-MCNC: 9.8 MG/DL (ref 8.5–10.5)
CHLORIDE SERPL-SCNC: 104 MMOL/L (ref 96–112)
CO2 SERPL-SCNC: 24 MMOL/L (ref 20–33)
CREAT SERPL-MCNC: 1.31 MG/DL (ref 0.5–1.4)
GFR SERPLBLD CREATININE-BSD FMLA CKD-EPI: 55 ML/MIN/1.73 M 2
GLUCOSE SERPL-MCNC: 182 MG/DL (ref 65–99)
POTASSIUM SERPL-SCNC: 5.3 MMOL/L (ref 3.6–5.5)
SODIUM SERPL-SCNC: 138 MMOL/L (ref 135–145)

## 2022-06-10 PROCEDURE — 80048 BASIC METABOLIC PNL TOTAL CA: CPT

## 2022-06-10 PROCEDURE — 36415 COLL VENOUS BLD VENIPUNCTURE: CPT

## 2022-06-22 ENCOUNTER — OFFICE VISIT (OUTPATIENT)
Dept: MEDICAL GROUP | Facility: PHYSICIAN GROUP | Age: 77
End: 2022-06-22
Payer: MEDICARE

## 2022-06-22 VITALS
HEART RATE: 87 BPM | TEMPERATURE: 96.6 F | OXYGEN SATURATION: 97 % | BODY MASS INDEX: 27.99 KG/M2 | DIASTOLIC BLOOD PRESSURE: 50 MMHG | SYSTOLIC BLOOD PRESSURE: 100 MMHG | HEIGHT: 67 IN | WEIGHT: 178.35 LBS

## 2022-06-22 DIAGNOSIS — C77.2 SECONDARY AND UNSPECIFIED MALIGNANT NEOPLASM OF INTRA-ABDOMINAL LYMPH NODES (HCC): ICD-10-CM

## 2022-06-22 DIAGNOSIS — N18.31 STAGE 3A CHRONIC KIDNEY DISEASE: ICD-10-CM

## 2022-06-22 DIAGNOSIS — E78.5 DYSLIPIDEMIA: ICD-10-CM

## 2022-06-22 DIAGNOSIS — C61 RECURRENT PROSTATE CANCER (HCC): ICD-10-CM

## 2022-06-22 DIAGNOSIS — R06.09 EXERTIONAL DYSPNEA: ICD-10-CM

## 2022-06-22 DIAGNOSIS — I10 ESSENTIAL HYPERTENSION, BENIGN: ICD-10-CM

## 2022-06-22 PROCEDURE — 99214 OFFICE O/P EST MOD 30 MIN: CPT | Performed by: FAMILY MEDICINE

## 2022-06-22 RX ORDER — GABAPENTIN 300 MG/1
300 CAPSULE ORAL 3 TIMES DAILY
COMMUNITY
Start: 2022-06-20 | End: 2023-02-01

## 2022-06-22 ASSESSMENT — FIBROSIS 4 INDEX: FIB4 SCORE: 1.82479184551145761

## 2022-06-22 NOTE — PROGRESS NOTES
Subjective     Kemal Mueller is a 77 y.o. 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 Ying scheduled on 5/19/2022.  Surgery was canceled by the anesthesiologist on the day of the surgery because of patient's complaint of exertional dyspnea with recommendation to see the cardiologist for further evaluation and to get a stress test done.  Patient stated that he has been having exertional dyspnea noted when climbing the stairs, doing housework such as vacuuming and scrubbing the floors. No dyspnea with ADLs such as showering, eating, driving or walking from the parking lot to our office.  Denies any chest pain, palpitations.  Denies any orthopnea or paroxysmal nocturnal dyspnea.  Denies any lower extremity edema.  We have already done preop clearance including EKG, chest x-ray and labs which did not show any concerning findings and patient's RCRI score was 0.  No prior history of CAD, arrhythmias.  No history of premature CAD in the family.  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  Used to smoke 4 cigarettes/day for 10 years and quit in 1977.    Patient has diabetes mellitus type 2 with CKD stage IIIa on glipizide.  His last hemoglobin A1c was 7.2 a month ago which is considered under acceptable control based on his age and diabetes complication.    Patient with hypertension under good control on lisinopril.    He has dyslipidemia for which he takes atorvastatin and Zetia.  The last lipid panel came back the LDL was slightly high at 105 a month ago.    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    Past medical  "history, past surgical history, family history reviewed-no changes    Social history reviewed-no changes    Allergies reviewed-no changes    Medications reviewed-no changes    ROS     As per HPI, the rest are negative.           Objective     /50 (BP Location: Left arm, Patient Position: Sitting, BP Cuff Size: Adult)   Pulse 87   Temp 35.9 °C (96.6 °F) (Temporal)   Ht 1.702 m (5' 7\")   Wt 80.9 kg (178 lb 5.6 oz)   SpO2 97%   BMI 27.93 kg/m²      Physical Exam     Examined alert, awake, oriented, not in distress    Neck-supple, no lymphadenopathy, no thyromegaly  Lungs-clear to auscultation, no rales, no wheezes  Heart-regular rate and rhythm, no murmur  Extremities-no edema, clubbing, cyanosis                        Assessment & Plan        1. Exertional dyspnea  His surgery was canceled by anesthesiologist because of concern for exertional dyspnea with concern for heart disease.  Patient already has an appointment with the cardiology clinic on 6/30/2 which is next week.  Patient will keep that appointment.  We will follow along and wait for the recommendation.2    2. Uncontrolled diabetes with stage 3 chronic kidney disease GFR 30-59 (HCC)  Under acceptable control per last hemoglobin A1c based on age and diabetes complication.  He is on glipizide.    3. Stage 3a chronic kidney disease (HCC)  Stable function.  He is on lisinopril.  Avoid nephrotoxic agents.    4. Recurrent prostate cancer (HCC)  Patient is followed closely by urologist.  He is on above-mentioned treatment with Lupron, abiraterone alone and prednisone.    5. 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    6.  Dyslipidemia  Continue medications which are atorvastatin and Zetia.    7.  Essential hypertension, benign  Stable and controlled on lisinopril.    Follow-up in 3 months.      Please note that this dictation was created using voice recognition software. I have worked with consultants " from the vendor as well as technical experts from [facility] to optimize the interface. I have made every reasonable attempt to correct obvious errors, but I expect that there are errors of grammar and possibly content I did not discover before finalizing the note.

## 2022-06-29 ASSESSMENT — ENCOUNTER SYMPTOMS
PALPITATIONS: 0
HEADACHES: 0
WHEEZING: 0
NAUSEA: 0
CLAUDICATION: 0
DIZZINESS: 0
FEVER: 0
HEMOPTYSIS: 0
CHILLS: 0
VOMITING: 0
ORTHOPNEA: 0
SPUTUM PRODUCTION: 0
PND: 0

## 2022-06-29 NOTE — PROGRESS NOTES
Chief Complaint   Patient presents with   • Hyperlipidemia     NP Dx:Essential hypertension, benign           Subjective     Kemal Mueller is a 77 y.o. male who was kindly referred by Dr. Selvin Ying and Dr. Moni Fragoso for exertional dyspnea.  Patient was to have lower back surgery when he complained of exertional dyspnea.  Surgery was canceled with recommendations to follow-up with cardiology for stress test.    Patient comes in today noting shortness of breath with activity.  He has an occasional productive cough.  He does not have overt chest pain but breathlessness with activity which could be his anginal equivalent.    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   • Anesthesia     Low BP; nausea; hard time waking   • Arthritis     spine and wrists   • Breath shortness     activity induced   • Bronchitis 1/15   • Cancer ([facility])     prostate   • CATARACT    • Cervical stenosis of spine    • CKD (chronic kidney disease), stage III ([facility])     Dr. Roberts   • Degeneration of cervical intervertebral disc    • Dental disorder     upper dentures   • Diabetes ([facility]) 2022   • ED (erectile dysfunction)    • Elevated blood sugar    • Elevated BP    • Elevated cholesterol    • GERD (gastroesophageal reflux disease)    • Glaucoma    • Heart burn    • High cholesterol    • Hypertension    • Indigestion    • Prostate cancer ([facility])     prostate- removed ; CA returned in pocket- radiation therapy done   • Renal cancer ([facility])     Had treatment done- resolved per CAT scan   • Unspecified urinary incontinence 2022    • wrist pain 2015    bilat wrist pain     Past Surgical History:   Procedure Laterality Date   • CATARACT PHACO WITH IOL Right 2016    Procedure: CATARACT PHACO WITH IOL;  Surgeon: Alfonso Hernandez M.D.;  Location: SURGERY [facility];  Service:    • CATARACT PHACO WITH IOL Left 2016    Procedure: CATARACT PHACO WITH IOL;  Surgeon: Alfonso Hernandez M.D.;  Location: SURGERY [facility];  Service:    • CERVICAL DISK AND FUSION ANTERIOR  10/30/2012    Performed by Selvin Ying M.D. at SURGERY [facility]   • PROSTATECTOMY, RADICAL RETRO  2010    Performed by EDYTA KUMAR at SURGERY [facility]   • NODE DISSECTION  2010    Performed by EDYTA KUMAR at SURGERY [facility]   • CARPAL TUNNEL RELEASE      Bilateral   • TRIGGER FINGER RELEASE      Right small finger   • TONSILLECTOMY AND ADENOIDECTOMY     • APPENDECTOMY     • AL REMOVAL OF KIDNEY STONE  1986/1998    x2     Family History   Problem Relation Age of Onset   • Diabetes Mother    • Stroke Mother    • Alzheimer's Disease Father      Social History     Socioeconomic History   • Marital status:      Spouse name: Not on file   • Number of children: Not on file   • Years of education: Not on file   • Highest education level: Not on file   Occupational History   • Occupation: retired drugstore manager   Tobacco Use   • Smoking status: Former Smoker     Packs/day: 0.20     Years: 10.00     Pack years: 2.00     Quit date: 1977     Years since quittin.5   • Smokeless tobacco: Never Used   • Tobacco comment: 1/3 ppd for 10 years, quit    Vaping Use   • Vaping Use: Never used   Substance and Sexual Activity   • Alcohol use: Yes     Alcohol/week: 1.2 oz     Types: 2 Cans of beer per week     Comment: 1-2 per day   • Drug use: No   • Sexual activity: Not Currently     Partners: Female   Other Topics Concern   • Not on file   Social History Narrative   • Not on file     Social Determinants  [facility]     Financial Resource Strain: Not on file   Food Insecurity: Not on file   Transportation Needs: Not on file   Physical Activity: Not on file   Stress: Not on file   Social Connections: Not on file   Intimate Partner Violence: Not on file   Housing Stability: Not on file     Allergies   Allergen Reactions   • Nkda [No Known Drug Allergy]      Outpatient Encounter Medications as of 6/30/2022   Medication Sig Dispense Refill   • glipiZIDE (GLUCOTROL) 5 MG Tab TAKE TWO TABLETS BY MOUTH IN THE MORNING AND ONE TABLET IN THE EVENING WITH MEALS 300 Tablet 1   • gabapentin (NEURONTIN) 300 MG Cap      • omeprazole (PRILOSEC) 20 MG delayed-release capsule TAKE ONE CAPSULE BY MOUTH DAILY 100 Capsule 3   • atorvastatin (LIPITOR) 80 MG tablet TAKE ONE TABLET BY MOUTH EVERY EVENING 100 Tablet 3   • lisinopril (PRINIVIL) 40 MG tablet TAKE ONE TABLET BY MOUTH DAILY 100 Tablet 3   • ezetimibe (ZETIA) 10 MG Tab TAKE ONE TABLET BY MOUTH DAILY 100 Tablet 3   • gabapentin (NEURONTIN) 100 MG Cap Take 100 mg by mouth 3 times a day. 300 mg 3x a day     • predniSONE (DELTASONE) 5 MG Tab      • Abiraterone Acetate 250 MG Tab Take  by mouth.     • Calcium Carbonate-Vit D-Min (CALCIUM 1200 PO) Take  by mouth.     • vitamin D3, cholecalciferol, 1000 UNIT Tab Take 2 Tabs by mouth every day. 100 Tab 3   • potassium citrate SR (UROCIT-K SR) 10 MEQ (1080 MG) TBCR Take 10 mEq by mouth every day.     • Multiple Vitamins-Minerals (CENTRUM SILVER PO) Take  by mouth.     • Brimonidine Tartrate-Timolol 0.2-0.5 % Solution 1 Drop by Ophthalmic route 2 times a day.     • latanoprost (XALATAN) 0.005 % Solution Place 1 Drop in both eyes every evening.     • ACETAZOLAMIDE 125 MG PO TABS Take 125 mg by mouth every day.       No facility-administered encounter medications on file as of 6/30/2022.     Review of Systems   Constitutional: Negative for chills and fever.   Respiratory: Positive for cough and shortness of breath. Negative for hemoptysis,  "sputum production and wheezing.    Cardiovascular: Negative for chest pain, palpitations, orthopnea, claudication, leg swelling and PND.   Gastrointestinal: Negative for nausea and vomiting.   Neurological: Negative for dizziness and headaches.   All other systems reviewed and are negative.             Objective     /80 (BP Location: Left arm, Patient Position: Sitting, BP Cuff Size: Adult)   Pulse 80   Resp 12   Ht 1.702 m (5' 7\")   Wt 80.4 kg (177 lb 4.8 oz)   SpO2 98%   BMI 27.77 kg/m²     Physical Exam  Vitals and nursing note reviewed.   Constitutional:       Appearance: He is well-developed.   Neck:      Vascular: No JVD.   Cardiovascular:      Rate and Rhythm: Normal rate and regular rhythm.      Heart sounds: Normal heart sounds. No murmur heard.  Pulmonary:      Effort: Pulmonary effort is normal.      Breath sounds: Normal breath sounds.   Abdominal:      Palpations: Abdomen is soft.   Musculoskeletal:      Cervical back: Neck supple.   Skin:     General: Skin is warm and dry.   Neurological:      Comments: CN II-XII grossly intact   Psychiatric:         Behavior: Behavior normal.         Thought Content: Thought content normal.         Judgment: Judgment normal.                Assessment & Plan     1. Pure hypercholesterolemia     2. Essential hypertension, benign     3. Stage 3 chronic kidney disease, unspecified whether stage 3a or 3b CKD (HCC)     4. SOB (shortness of breath) on exertion  NM-CARDIAC STRESS TEST       Medical Decision Making: Today's Assessment/Status/Plan:   1.  Obtain nuclear stress test  2.  Follow-up with results.  3.  Continue continue lisinopril 40 mg daily, Zetia 10 mg daily, atorvastatin 80 mg daily                   "

## 2022-06-30 ENCOUNTER — OFFICE VISIT (OUTPATIENT)
Dept: CARDIOLOGY | Facility: MEDICAL CENTER | Age: 77
End: 2022-06-30
Payer: MEDICARE

## 2022-06-30 VITALS
SYSTOLIC BLOOD PRESSURE: 118 MMHG | BODY MASS INDEX: 27.83 KG/M2 | RESPIRATION RATE: 12 BRPM | HEART RATE: 80 BPM | WEIGHT: 177.3 LBS | DIASTOLIC BLOOD PRESSURE: 80 MMHG | HEIGHT: 67 IN | OXYGEN SATURATION: 98 %

## 2022-06-30 DIAGNOSIS — E78.00 PURE HYPERCHOLESTEROLEMIA: ICD-10-CM

## 2022-06-30 DIAGNOSIS — R06.02 SOB (SHORTNESS OF BREATH) ON EXERTION: ICD-10-CM

## 2022-06-30 DIAGNOSIS — I10 ESSENTIAL HYPERTENSION, BENIGN: ICD-10-CM

## 2022-06-30 DIAGNOSIS — N18.30 STAGE 3 CHRONIC KIDNEY DISEASE, UNSPECIFIED WHETHER STAGE 3A OR 3B CKD: ICD-10-CM

## 2022-06-30 PROCEDURE — 99214 OFFICE O/P EST MOD 30 MIN: CPT | Performed by: NURSE PRACTITIONER

## 2022-06-30 ASSESSMENT — ENCOUNTER SYMPTOMS
COUGH: 1
SHORTNESS OF BREATH: 1

## 2022-06-30 ASSESSMENT — FIBROSIS 4 INDEX: FIB4 SCORE: 1.82479184551145761

## 2022-07-01 ENCOUNTER — HOSPITAL ENCOUNTER (OUTPATIENT)
Dept: RADIOLOGY | Facility: MEDICAL CENTER | Age: 77
End: 2022-07-01
Attending: NURSE PRACTITIONER
Payer: MEDICARE

## 2022-07-01 DIAGNOSIS — R06.02 SOB (SHORTNESS OF BREATH) ON EXERTION: ICD-10-CM

## 2022-07-01 PROCEDURE — 78452 HT MUSCLE IMAGE SPECT MULT: CPT | Mod: 26 | Performed by: INTERNAL MEDICINE

## 2022-07-01 PROCEDURE — 93018 CV STRESS TEST I&R ONLY: CPT | Performed by: INTERNAL MEDICINE

## 2022-07-01 PROCEDURE — A9502 TC99M TETROFOSMIN: HCPCS

## 2022-07-01 PROCEDURE — 700111 HCHG RX REV CODE 636 W/ 250 OVERRIDE (IP)

## 2022-07-01 RX ORDER — REGADENOSON 0.08 MG/ML
INJECTION, SOLUTION INTRAVENOUS
Status: COMPLETED
Start: 2022-07-01 | End: 2022-07-01

## 2022-07-01 RX ADMIN — REGADENOSON 0.4 MG: 0.08 INJECTION, SOLUTION INTRAVENOUS at 10:17

## 2022-07-13 ENCOUNTER — APPOINTMENT (RX ONLY)
Dept: URBAN - METROPOLITAN AREA CLINIC 4 | Facility: CLINIC | Age: 77
Setting detail: DERMATOLOGY
End: 2022-07-13

## 2022-07-13 ENCOUNTER — TELEPHONE (OUTPATIENT)
Dept: CARDIOLOGY | Facility: MEDICAL CENTER | Age: 77
End: 2022-07-13
Payer: MEDICARE

## 2022-07-13 DIAGNOSIS — D22 MELANOCYTIC NEVI: ICD-10-CM

## 2022-07-13 PROBLEM — D22.5 MELANOCYTIC NEVI OF TRUNK: Status: ACTIVE | Noted: 2022-07-13

## 2022-07-13 PROCEDURE — ? COUNSELING

## 2022-07-13 PROCEDURE — 11402 EXC TR-EXT B9+MARG 1.1-2 CM: CPT

## 2022-07-13 PROCEDURE — 12032 INTMD RPR S/A/T/EXT 2.6-7.5: CPT

## 2022-07-13 PROCEDURE — ? EXCISION

## 2022-07-13 PROCEDURE — ? DIAGNOSIS COMMENT

## 2022-07-13 ASSESSMENT — LOCATION ZONE DERM: LOCATION ZONE: TRUNK

## 2022-07-13 ASSESSMENT — LOCATION SIMPLE DESCRIPTION DERM: LOCATION SIMPLE: ABDOMEN

## 2022-07-13 ASSESSMENT — LOCATION DETAILED DESCRIPTION DERM: LOCATION DETAILED: EPIGASTRIC SKIN

## 2022-07-13 NOTE — PROCEDURE: DIAGNOSIS COMMENT
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
Render Risk Assessment In Note?: no
Detail Level: Detailed

## 2022-07-13 NOTE — TELEPHONE ENCOUNTER
DB    Caller: Kemal Mueller    Topic/issue: STRESS TEST RESULTS    Patient is requesting to have the stress test results relayed prior to his appointment tomorrow afternoon 7/14. Please advise.    Thank you,  Umang BERNAL    Callback Number: [phone] (home)

## 2022-07-13 NOTE — PROCEDURE: EXCISION
Medical Necessity Information: It is in your best interest to select a reason for this procedure from the list below. All of these items fulfill various CMS LCD requirements except lesion extends to a margin.
Include Z78.9 (Other Specified Conditions Influencing Health Status) As An Associated Diagnosis?: Yes
Add Nub Associated Diagnoses If Applicable When Selecting Medical Necessity: No
Medical Necessity Clause: This procedure was medically necessary because the lesion that was treated was:
Lab: [id number]
Lab Facility: 
Surgeon Performing Repair (Optional): Dr. Kerry Gonsales MD
Size Of Lesion In Cm: 1.1
X Size Of Lesion In Cm (Optional): 0
Size Of Margin In Cm: 0.2
Anesthesia Volume In Cc: 9
Eye Clamp Note Details: An eye clamp was used during the procedure.
Excision Method: Fusiform
Saucerization Depth: dermis and superficial adipose tissue
Repair Type: Intermediate
Suturegard Retention Suture: 2-0 Nylon
Retention Suture Bite Size: 3 mm
Length To Time In Minutes Device Was In Place: 10
Number Of Hemigard Strips Per Side: 1
Intermediate / Complex Repair - Final Wound Length In Cm: 4.8
Undermining Type: Entire Wound
Debridement Text: The wound edges were debrided prior to proceeding with the closure to facilitate wound healing.
Helical Rim Text: The closure involved the helical rim.
Vermilion Border Text: The closure involved the vermilion border.
Nostril Rim Text: The closure involved the nostril rim.
Retention Suture Text: Retention sutures were placed to support the closure and prevent dehiscence.
Suture Removal: 14 days
Epidermal Closure Graft Donor Site (Optional): simple interrupted
Graft Donor Site Bandage (Optional-Leave Blank If You Don't Want In Note): Steri-strips and a pressure bandage were applied to the donor site.
Detail Level: Detailed
Excision Depth: adipose tissue
Scalpel Size: 15 blade
Anesthesia Type: 1% lidocaine with 1:100,000 epinephrine and a 1:10 solution of 8.4% sodium bicarbonate
Additional Anesthesia Volume In Cc: 6
Hemostasis: Electrocautery
Estimated Blood Loss (Cc): minimal
Anesthesia Type: 1% lidocaine with epinephrine
Deep Sutures: 4-0 Vicryl
Number Of Deep Sutures (Optional): 3
Epidermal Sutures: 4-0 Nylon
Epidermal Closure: running cuticular
Wound Care: Petrolatum
Dressing: dry sterile dressing
Suturegard Intro: Intraoperative tissue expansion was performed, utilizing the SUTUREGARD device, in order to reduce wound tension.
Suturegard Body: The suture ends were repeatedly re-tightened and re-clamped to achieve the desired tissue expansion.
Hemigard Intro: Due to skin fragility and wound tension, it was decided to use HEMIGARD adhesive retention suture devices to permit a linear closure. The skin was cleaned and dried for a 6cm distance away from the wound. Excessive hair, if present, was removed to allow for adhesion.
Hemigard Postcare Instructions: The HEMIGARD strips are to remain completely dry for at least 5-7 days.
Positioning (Leave Blank If You Do Not Want): The patient was placed in a comfortable position exposing the surgical site.
Complex Repair Preamble Text (Leave Blank If You Do Not Want): Extensive wide undermining was performed.
Intermediate Repair Preamble Text (Leave Blank If You Do Not Want): Undermining was performed with blunt dissection.
Fusiform Excision Additional Text (Leave Blank If You Do Not Want): The margin was drawn around the clinically apparent lesion.  A fusiform shape was then drawn on the skin incorporating the lesion and margins.  Incisions were then made along these lines to the appropriate tissue plane and the lesion was extirpated.
Eliptical Excision Additional Text (Leave Blank If You Do Not Want): The margin was drawn around the clinically apparent lesion.  An elliptical shape was then drawn on the skin incorporating the lesion and margins.  Incisions were then made along these lines to the appropriate tissue plane and the lesion was extirpated.
Saucerization Excision Additional Text (Leave Blank If You Do Not Want): The margin was drawn around the clinically apparent lesion.  Incisions were then made along these lines, in a tangential fashion, to the appropriate tissue plane and the lesion was extirpated.
Slit Excision Additional Text (Leave Blank If You Do Not Want): A linear line was drawn on the skin overlying the lesion. An incision was made slowly until the lesion was visualized.  Once visualized, the lesion was removed with blunt dissection.
Excisional Biopsy Additional Text (Leave Blank If You Do Not Want): The margin was drawn around the clinically apparent lesion. An elliptical shape was then drawn on the skin incorporating the lesion and margins.  Incisions were then made along these lines to the appropriate tissue plane and the lesion was extirpated.
Perilesional Excision Additional Text (Leave Blank If You Do Not Want): The margin was drawn around the clinically apparent lesion. Incisions were then made along these lines to the appropriate tissue plane and the lesion was extirpated.
Repair Performed By Another Provider Text (Leave Blank If You Do Not Want): After the tissue was excised the defect was repaired by another provider.
No Repair - Repaired With Adjacent Surgical Defect Text (Leave Blank If You Do Not Want): After the excision the defect was repaired concurrently with another surgical defect which was in close approximation.
Adjacent Tissue Transfer Text: The defect edges were debeveled with a #15 scalpel blade.  Given the location of the defect and the proximity to free margins an adjacent tissue transfer was deemed most appropriate.  Using a sterile surgical marker, an appropriate flap was drawn incorporating the defect and placing the expected incisions within the relaxed skin tension lines where possible.    The area thus outlined was incised deep to adipose tissue with a #15 scalpel blade.  The skin margins were undermined to an appropriate distance in all directions utilizing iris scissors.
Advancement Flap (Single) Text: The defect edges were debeveled with a #15 scalpel blade.  Given the location of the defect and the proximity to free margins a single advancement flap was deemed most appropriate.  Using a sterile surgical marker, an appropriate advancement flap was drawn incorporating the defect and placing the expected incisions within the relaxed skin tension lines where possible.    The area thus outlined was incised deep to adipose tissue with a #15 scalpel blade.  The skin margins were undermined to an appropriate distance in all directions utilizing iris scissors.
Advancement Flap (Double) Text: The defect edges were debeveled with a #15 scalpel blade.  Given the location of the defect and the proximity to free margins a double advancement flap was deemed most appropriate.  Using a sterile surgical marker, the appropriate advancement flaps were drawn incorporating the defect and placing the expected incisions within the relaxed skin tension lines where possible.    The area thus outlined was incised deep to adipose tissue with a #15 scalpel blade.  The skin margins were undermined to an appropriate distance in all directions utilizing iris scissors.
Burow's Advancement Flap Text: The defect edges were debeveled with a #15 scalpel blade.  Given the location of the defect and the proximity to free margins a Burow's advancement flap was deemed most appropriate.  Using a sterile surgical marker, the appropriate advancement flap was drawn incorporating the defect and placing the expected incisions within the relaxed skin tension lines where possible.    The area thus outlined was incised deep to adipose tissue with a #15 scalpel blade.  The skin margins were undermined to an appropriate distance in all directions utilizing iris scissors.
Chonodrocutaneous Helical Advancement Flap Text: The defect edges were debeveled with a #15 scalpel blade.  Given the location of the defect and the proximity to free margins a chondrocutaneous helical advancement flap was deemed most appropriate.  Using a sterile surgical marker, the appropriate advancement flap was drawn incorporating the defect and placing the expected incisions within the relaxed skin tension lines where possible.    The area thus outlined was incised deep to adipose tissue with a #15 scalpel blade.  The skin margins were undermined to an appropriate distance in all directions utilizing iris scissors.
Crescentic Advancement Flap Text: The defect edges were debeveled with a #15 scalpel blade.  Given the location of the defect and the proximity to free margins a crescentic advancement flap was deemed most appropriate.  Using a sterile surgical marker, the appropriate advancement flap was drawn incorporating the defect and placing the expected incisions within the relaxed skin tension lines where possible.    The area thus outlined was incised deep to adipose tissue with a #15 scalpel blade.  The skin margins were undermined to an appropriate distance in all directions utilizing iris scissors.
A-T Advancement Flap Text: The defect edges were debeveled with a #15 scalpel blade.  Given the location of the defect, shape of the defect and the proximity to free margins an A-T advancement flap was deemed most appropriate.  Using a sterile surgical marker, an appropriate advancement flap was drawn incorporating the defect and placing the expected incisions within the relaxed skin tension lines where possible.    The area thus outlined was incised deep to adipose tissue with a #15 scalpel blade.  The skin margins were undermined to an appropriate distance in all directions utilizing iris scissors.
O-T Advancement Flap Text: The defect edges were debeveled with a #15 scalpel blade.  Given the location of the defect, shape of the defect and the proximity to free margins an O-T advancement flap was deemed most appropriate.  Using a sterile surgical marker, an appropriate advancement flap was drawn incorporating the defect and placing the expected incisions within the relaxed skin tension lines where possible.    The area thus outlined was incised deep to adipose tissue with a #15 scalpel blade.  The skin margins were undermined to an appropriate distance in all directions utilizing iris scissors.
O-L Flap Text: The defect edges were debeveled with a #15 scalpel blade.  Given the location of the defect, shape of the defect and the proximity to free margins an O-L flap was deemed most appropriate.  Using a sterile surgical marker, an appropriate advancement flap was drawn incorporating the defect and placing the expected incisions within the relaxed skin tension lines where possible.    The area thus outlined was incised deep to adipose tissue with a #15 scalpel blade.  The skin margins were undermined to an appropriate distance in all directions utilizing iris scissors.
O-Z Flap Text: The defect edges were debeveled with a #15 scalpel blade.  Given the location of the defect, shape of the defect and the proximity to free margins an O-Z flap was deemed most appropriate.  Using a sterile surgical marker, an appropriate transposition flap was drawn incorporating the defect and placing the expected incisions within the relaxed skin tension lines where possible. The area thus outlined was incised deep to adipose tissue with a #15 scalpel blade.  The skin margins were undermined to an appropriate distance in all directions utilizing iris scissors.
Double O-Z Flap Text: The defect edges were debeveled with a #15 scalpel blade.  Given the location of the defect, shape of the defect and the proximity to free margins a Double O-Z flap was deemed most appropriate.  Using a sterile surgical marker, an appropriate transposition flap was drawn incorporating the defect and placing the expected incisions within the relaxed skin tension lines where possible. The area thus outlined was incised deep to adipose tissue with a #15 scalpel blade.  The skin margins were undermined to an appropriate distance in all directions utilizing iris scissors.
V-Y Flap Text: The defect edges were debeveled with a #15 scalpel blade.  Given the location of the defect, shape of the defect and the proximity to free margins a V-Y flap was deemed most appropriate.  Using a sterile surgical marker, an appropriate advancement flap was drawn incorporating the defect and placing the expected incisions within the relaxed skin tension lines where possible.    The area thus outlined was incised deep to adipose tissue with a #15 scalpel blade.  The skin margins were undermined to an appropriate distance in all directions utilizing iris scissors.
Advancement-Rotation Flap Text: The defect edges were debeveled with a #15 scalpel blade.  Given the location of the defect, shape of the defect and the proximity to free margins an advancement-rotation flap was deemed most appropriate.  Using a sterile surgical marker, an appropriate flap was drawn incorporating the defect and placing the expected incisions within the relaxed skin tension lines where possible. The area thus outlined was incised deep to adipose tissue with a #15 scalpel blade.  The skin margins were undermined to an appropriate distance in all directions utilizing iris scissors.
Mercedes Flap Text: The defect edges were debeveled with a #15 scalpel blade.  Given the location of the defect, shape of the defect and the proximity to free margins a Mercedes flap was deemed most appropriate.  Using a sterile surgical marker, an appropriate advancement flap was drawn incorporating the defect and placing the expected incisions within the relaxed skin tension lines where possible. The area thus outlined was incised deep to adipose tissue with a #15 scalpel blade.  The skin margins were undermined to an appropriate distance in all directions utilizing iris scissors.
Modified Advancement Flap Text: The defect edges were debeveled with a #15 scalpel blade.  Given the location of the defect, shape of the defect and the proximity to free margins a modified advancement flap was deemed most appropriate.  Using a sterile surgical marker, an appropriate advancement flap was drawn incorporating the defect and placing the expected incisions within the relaxed skin tension lines where possible.    The area thus outlined was incised deep to adipose tissue with a #15 scalpel blade.  The skin margins were undermined to an appropriate distance in all directions utilizing iris scissors.
Mucosal Advancement Flap Text: Given the location of the defect, shape of the defect and the proximity to free margins a mucosal advancement flap was deemed most appropriate. Incisions were made with a 15 blade scalpel in the appropriate fashion along the cutaneous vermillion border and the mucosal lip. The remaining actinically damaged mucosal tissue was excised.  The mucosal advancement flap was then elevated to the gingival sulcus with care taken to preserve the neurovascular structures and advanced into the primary defect. Care was taken to ensure that precise realignment of the vermilion border was achieved.
Peng Advancement Flap Text: The defect edges were debeveled with a #15 scalpel blade.  Given the location of the defect, shape of the defect and the proximity to free margins a Peng advancement flap was deemed most appropriate.  Using a sterile surgical marker, an appropriate advancement flap was drawn incorporating the defect and placing the expected incisions within the relaxed skin tension lines where possible. The area thus outlined was incised deep to adipose tissue with a #15 scalpel blade.  The skin margins were undermined to an appropriate distance in all directions utilizing iris scissors.
Hatchet Flap Text: The defect edges were debeveled with a #15 scalpel blade.  Given the location of the defect, shape of the defect and the proximity to free margins a hatchet flap was deemed most appropriate.  Using a sterile surgical marker, an appropriate hatchet flap was drawn incorporating the defect and placing the expected incisions within the relaxed skin tension lines where possible.    The area thus outlined was incised deep to adipose tissue with a #15 scalpel blade.  The skin margins were undermined to an appropriate distance in all directions utilizing iris scissors.
Rotation Flap Text: The defect edges were debeveled with a #15 scalpel blade.  Given the location of the defect, shape of the defect and the proximity to free margins a rotation flap was deemed most appropriate.  Using a sterile surgical marker, an appropriate rotation flap was drawn incorporating the defect and placing the expected incisions within the relaxed skin tension lines where possible.    The area thus outlined was incised deep to adipose tissue with a #15 scalpel blade.  The skin margins were undermined to an appropriate distance in all directions utilizing iris scissors.
Spiral Flap Text: The defect edges were debeveled with a #15 scalpel blade.  Given the location of the defect, shape of the defect and the proximity to free margins a spiral flap was deemed most appropriate.  Using a sterile surgical marker, an appropriate rotation flap was drawn incorporating the defect and placing the expected incisions within the relaxed skin tension lines where possible. The area thus outlined was incised deep to adipose tissue with a #15 scalpel blade.  The skin margins were undermined to an appropriate distance in all directions utilizing iris scissors.
Staged Advancement Flap Text: The defect edges were debeveled with a #15 scalpel blade.  Given the location of the defect, shape of the defect and the proximity to free margins a staged advancement flap was deemed most appropriate.  Using a sterile surgical marker, an appropriate advancement flap was drawn incorporating the defect and placing the expected incisions within the relaxed skin tension lines where possible. The area thus outlined was incised deep to adipose tissue with a #15 scalpel blade.  The skin margins were undermined to an appropriate distance in all directions utilizing iris scissors.
Star Wedge Flap Text: The defect edges were debeveled with a #15 scalpel blade.  Given the location of the defect, shape of the defect and the proximity to free margins a star wedge flap was deemed most appropriate.  Using a sterile surgical marker, an appropriate rotation flap was drawn incorporating the defect and placing the expected incisions within the relaxed skin tension lines where possible. The area thus outlined was incised deep to adipose tissue with a #15 scalpel blade.  The skin margins were undermined to an appropriate distance in all directions utilizing iris scissors.
Transposition Flap Text: The defect edges were debeveled with a #15 scalpel blade.  Given the location of the defect and the proximity to free margins a transposition flap was deemed most appropriate.  Using a sterile surgical marker, an appropriate transposition flap was drawn incorporating the defect.    The area thus outlined was incised deep to adipose tissue with a #15 scalpel blade.  The skin margins were undermined to an appropriate distance in all directions utilizing iris scissors.
Muscle Hinge Flap Text: The defect edges were debeveled with a #15 scalpel blade.  Given the size, depth and location of the defect and the proximity to free margins a muscle hinge flap was deemed most appropriate.  Using a sterile surgical marker, an appropriate hinge flap was drawn incorporating the defect. The area thus outlined was incised with a #15 scalpel blade.  The skin margins were undermined to an appropriate distance in all directions utilizing iris scissors.
Mustarde Flap Text: The defect edges were debeveled with a #15 scalpel blade.  Given the size, depth and location of the defect and the proximity to free margins a Mustarde flap was deemed most appropriate.  Using a sterile surgical marker, an appropriate flap was drawn incorporating the defect. The area thus outlined was incised with a #15 scalpel blade.  The skin margins were undermined to an appropriate distance in all directions utilizing iris scissors.
Nasal Turnover Hinge Flap Text: The defect edges were debeveled with a #15 scalpel blade.  Given the size, depth, location of the defect and the defect being full thickness a nasal turnover hinge flap was deemed most appropriate.  Using a sterile surgical marker, an appropriate hinge flap was drawn incorporating the defect. The area thus outlined was incised with a #15 scalpel blade. The flap was designed to recreate the nasal mucosal lining and the alar rim. The skin margins were undermined to an appropriate distance in all directions utilizing iris scissors.
Nasalis-Muscle-Based Myocutaneous Island Pedicle Flap Text: Using a #15 blade, an incision was made around the donor flap to the level of the nasalis muscle. Wide lateral undermining was then performed in both the subcutaneous plane above the nasalis muscle, and in a submuscular plane just above periosteum. This allowed the formation of a free nasalis muscle axial pedicle (based on the angular artery) which was still attached to the actual cutaneous flap, increasing its mobility and vascular viability. Hemostasis was obtained with pinpoint electrocoagulation. The flap was mobilized into position and the pivotal anchor points positioned and stabilized with buried interrupted sutures. Subcutaneous and dermal tissues were closed in a multilayered fashion with sutures. Tissue redundancies were excised, and the epidermal edges were apposed without significant tension and sutured with sutures.
Orbicularis Oris Muscle Flap Text: The defect edges were debeveled with a #15 scalpel blade.  Given that the defect affected the competency of the oral sphincter an orbicularis oris muscle flap was deemed most appropriate to restore this competency and normal muscle function.  Using a sterile surgical marker, an appropriate flap was drawn incorporating the defect. The area thus outlined was incised with a #15 scalpel blade.
Melolabial Transposition Flap Text: The defect edges were debeveled with a #15 scalpel blade.  Given the location of the defect and the proximity to free margins a melolabial flap was deemed most appropriate.  Using a sterile surgical marker, an appropriate melolabial transposition flap was drawn incorporating the defect.    The area thus outlined was incised deep to adipose tissue with a #15 scalpel blade.  The skin margins were undermined to an appropriate distance in all directions utilizing iris scissors.
Rhombic Flap Text: The defect edges were debeveled with a #15 scalpel blade.  Given the location of the defect and the proximity to free margins a rhombic flap was deemed most appropriate.  Using a sterile surgical marker, an appropriate rhombic flap was drawn incorporating the defect.    The area thus outlined was incised deep to adipose tissue with a #15 scalpel blade.  The skin margins were undermined to an appropriate distance in all directions utilizing iris scissors.
Rhomboid Transposition Flap Text: The defect edges were debeveled with a #15 scalpel blade.  Given the location of the defect and the proximity to free margins a rhomboid transposition flap was deemed most appropriate.  Using a sterile surgical marker, an appropriate rhomboid flap was drawn incorporating the defect.    The area thus outlined was incised deep to adipose tissue with a #15 scalpel blade.  The skin margins were undermined to an appropriate distance in all directions utilizing iris scissors.
Bi-Rhombic Flap Text: The defect edges were debeveled with a #15 scalpel blade.  Given the location of the defect and the proximity to free margins a bi-rhombic flap was deemed most appropriate.  Using a sterile surgical marker, an appropriate rhombic flap was drawn incorporating the defect. The area thus outlined was incised deep to adipose tissue with a #15 scalpel blade.  The skin margins were undermined to an appropriate distance in all directions utilizing iris scissors.
Helical Rim Advancement Flap Text: The defect edges were debeveled with a #15 blade scalpel.  Given the location of the defect and the proximity to free margins (helical rim) a double helical rim advancement flap was deemed most appropriate.  Using a sterile surgical marker, the appropriate advancement flaps were drawn incorporating the defect and placing the expected incisions between the helical rim and antihelix where possible.  The area thus outlined was incised through and through with a #15 scalpel blade.  With a skin hook and iris scissors, the flaps were gently and sharply undermined and freed up.
Bilateral Helical Rim Advancement Flap Text: The defect edges were debeveled with a #15 blade scalpel.  Given the location of the defect and the proximity to free margins (helical rim) a bilateral helical rim advancement flap was deemed most appropriate.  Using a sterile surgical marker, the appropriate advancement flaps were drawn incorporating the defect and placing the expected incisions between the helical rim and antihelix where possible.  The area thus outlined was incised through and through with a #15 scalpel blade.  With a skin hook and iris scissors, the flaps were gently and sharply undermined and freed up.
Ear Star Wedge Flap Text: The defect edges were debeveled with a #15 blade scalpel.  Given the location of the defect and the proximity to free margins (helical rim) an ear star wedge flap was deemed most appropriate.  Using a sterile surgical marker, the appropriate flap was drawn incorporating the defect and placing the expected incisions between the helical rim and antihelix where possible.  The area thus outlined was incised through and through with a #15 scalpel blade.
Banner Transposition Flap Text: The defect edges were debeveled with a #15 scalpel blade.  Given the location of the defect and the proximity to free margins a Banner transposition flap was deemed most appropriate.  Using a sterile surgical marker, an appropriate flap drawn around the defect. The area thus outlined was incised deep to adipose tissue with a #15 scalpel blade.  The skin margins were undermined to an appropriate distance in all directions utilizing iris scissors.
Bilobed Flap Text: The defect edges were debeveled with a #15 scalpel blade.  Given the location of the defect and the proximity to free margins a bilobe flap was deemed most appropriate.  Using a sterile surgical marker, an appropriate bilobe flap drawn around the defect.    The area thus outlined was incised deep to adipose tissue with a #15 scalpel blade.  The skin margins were undermined to an appropriate distance in all directions utilizing iris scissors.
Bilobed Transposition Flap Text: The defect edges were debeveled with a #15 scalpel blade.  Given the location of the defect and the proximity to free margins a bilobed transposition flap was deemed most appropriate.  Using a sterile surgical marker, an appropriate bilobe flap drawn around the defect.    The area thus outlined was incised deep to adipose tissue with a #15 scalpel blade.  The skin margins were undermined to an appropriate distance in all directions utilizing iris scissors.
Trilobed Flap Text: The defect edges were debeveled with a #15 scalpel blade.  Given the location of the defect and the proximity to free margins a trilobed flap was deemed most appropriate.  Using a sterile surgical marker, an appropriate trilobed flap drawn around the defect.    The area thus outlined was incised deep to adipose tissue with a #15 scalpel blade.  The skin margins were undermined to an appropriate distance in all directions utilizing iris scissors.
Dorsal Nasal Flap Text: The defect edges were debeveled with a #15 scalpel blade.  Given the location of the defect and the proximity to free margins a dorsal nasal flap was deemed most appropriate.  Using a sterile surgical marker, an appropriate dorsal nasal flap was drawn around the defect.    The area thus outlined was incised deep to adipose tissue with a #15 scalpel blade.  The skin margins were undermined to an appropriate distance in all directions utilizing iris scissors.
Island Pedicle Flap Text: The defect edges were debeveled with a #15 scalpel blade.  Given the location of the defect, shape of the defect and the proximity to free margins an island pedicle advancement flap was deemed most appropriate.  Using a sterile surgical marker, an appropriate advancement flap was drawn incorporating the defect, outlining the appropriate donor tissue and placing the expected incisions within the relaxed skin tension lines where possible.    The area thus outlined was incised deep to adipose tissue with a #15 scalpel blade.  The skin margins were undermined to an appropriate distance in all directions around the primary defect and laterally outward around the island pedicle utilizing iris scissors.  There was minimal undermining beneath the pedicle flap.
Island Pedicle Flap With Canthal Suspension Text: The defect edges were debeveled with a #15 scalpel blade.  Given the location of the defect, shape of the defect and the proximity to free margins an island pedicle advancement flap was deemed most appropriate.  Using a sterile surgical marker, an appropriate advancement flap was drawn incorporating the defect, outlining the appropriate donor tissue and placing the expected incisions within the relaxed skin tension lines where possible. The area thus outlined was incised deep to adipose tissue with a #15 scalpel blade.  The skin margins were undermined to an appropriate distance in all directions around the primary defect and laterally outward around the island pedicle utilizing iris scissors.  There was minimal undermining beneath the pedicle flap. A suspension suture was placed in the canthal tendon to prevent tension and prevent ectropion.
Alar Island Pedicle Flap Text: The defect edges were debeveled with a #15 scalpel blade.  Given the location of the defect, shape of the defect and the proximity to the alar rim an island pedicle advancement flap was deemed most appropriate.  Using a sterile surgical marker, an appropriate advancement flap was drawn incorporating the defect, outlining the appropriate donor tissue and placing the expected incisions within the nasal ala running parallel to the alar rim. The area thus outlined was incised with a #15 scalpel blade.  The skin margins were undermined minimally to an appropriate distance in all directions around the primary defect and laterally outward around the island pedicle utilizing iris scissors.  There was minimal undermining beneath the pedicle flap.
Double Island Pedicle Flap Text: The defect edges were debeveled with a #15 scalpel blade.  Given the location of the defect, shape of the defect and the proximity to free margins a double island pedicle advancement flap was deemed most appropriate.  Using a sterile surgical marker, an appropriate advancement flap was drawn incorporating the defect, outlining the appropriate donor tissue and placing the expected incisions within the relaxed skin tension lines where possible.    The area thus outlined was incised deep to adipose tissue with a #15 scalpel blade.  The skin margins were undermined to an appropriate distance in all directions around the primary defect and laterally outward around the island pedicle utilizing iris scissors.  There was minimal undermining beneath the pedicle flap.
Island Pedicle Flap-Requiring Vessel Identification Text: The defect edges were debeveled with a #15 scalpel blade.  Given the location of the defect, shape of the defect and the proximity to free margins an island pedicle advancement flap was deemed most appropriate.  Using a sterile surgical marker, an appropriate advancement flap was drawn, based on the axial vessel mentioned above, incorporating the defect, outlining the appropriate donor tissue and placing the expected incisions within the relaxed skin tension lines where possible.    The area thus outlined was incised deep to adipose tissue with a #15 scalpel blade.  The skin margins were undermined to an appropriate distance in all directions around the primary defect and laterally outward around the island pedicle utilizing iris scissors.  There was minimal undermining beneath the pedicle flap.
Keystone Flap Text: The defect edges were debeveled with a #15 scalpel blade.  Given the location of the defect, shape of the defect a keystone flap was deemed most appropriate.  Using a sterile surgical marker, an appropriate keystone flap was drawn incorporating the defect, outlining the appropriate donor tissue and placing the expected incisions within the relaxed skin tension lines where possible. The area thus outlined was incised deep to adipose tissue with a #15 scalpel blade.  The skin margins were undermined to an appropriate distance in all directions around the primary defect and laterally outward around the flap utilizing iris scissors.
O-T Plasty Text: The defect edges were debeveled with a #15 scalpel blade.  Given the location of the defect, shape of the defect and the proximity to free margins an O-T plasty was deemed most appropriate.  Using a sterile surgical marker, an appropriate O-T plasty was drawn incorporating the defect and placing the expected incisions within the relaxed skin tension lines where possible.    The area thus outlined was incised deep to adipose tissue with a #15 scalpel blade.  The skin margins were undermined to an appropriate distance in all directions utilizing iris scissors.
O-Z Plasty Text: The defect edges were debeveled with a #15 scalpel blade.  Given the location of the defect, shape of the defect and the proximity to free margins an O-Z plasty (double transposition flap) was deemed most appropriate.  Using a sterile surgical marker, the appropriate transposition flaps were drawn incorporating the defect and placing the expected incisions within the relaxed skin tension lines where possible.    The area thus outlined was incised deep to adipose tissue with a #15 scalpel blade.  The skin margins were undermined to an appropriate distance in all directions utilizing iris scissors.  Hemostasis was achieved with electrocautery.  The flaps were then transposed into place, one clockwise and the other counterclockwise, and anchored with interrupted buried subcutaneous sutures.
Double O-Z Plasty Text: The defect edges were debeveled with a #15 scalpel blade.  Given the location of the defect, shape of the defect and the proximity to free margins a Double O-Z plasty (double transposition flap) was deemed most appropriate.  Using a sterile surgical marker, the appropriate transposition flaps were drawn incorporating the defect and placing the expected incisions within the relaxed skin tension lines where possible. The area thus outlined was incised deep to adipose tissue with a #15 scalpel blade.  The skin margins were undermined to an appropriate distance in all directions utilizing iris scissors.  Hemostasis was achieved with electrocautery.  The flaps were then transposed into place, one clockwise and the other counterclockwise, and anchored with interrupted buried subcutaneous sutures.
V-Y Plasty Text: The defect edges were debeveled with a #15 scalpel blade.  Given the location of the defect, shape of the defect and the proximity to free margins an V-Y advancement flap was deemed most appropriate.  Using a sterile surgical marker, an appropriate advancement flap was drawn incorporating the defect and placing the expected incisions within the relaxed skin tension lines where possible.    The area thus outlined was incised deep to adipose tissue with a #15 scalpel blade.  The skin margins were undermined to an appropriate distance in all directions utilizing iris scissors.
H Plasty Text: Given the location of the defect, shape of the defect and the proximity to free margins a H-plasty was deemed most appropriate for repair.  Using a sterile surgical marker, the appropriate advancement arms of the H-plasty were drawn incorporating the defect and placing the expected incisions within the relaxed skin tension lines where possible. The area thus outlined was incised deep to adipose tissue with a #15 scalpel blade. The skin margins were undermined to an appropriate distance in all directions utilizing iris scissors.  The opposing advancement arms were then advanced into place in opposite direction and anchored with interrupted buried subcutaneous sutures.
W Plasty Text: The lesion was extirpated to the level of the fat with a #15 scalpel blade.  Given the location of the defect, shape of the defect and the proximity to free margins a W-plasty was deemed most appropriate for repair.  Using a sterile surgical marker, the appropriate transposition arms of the W-plasty were drawn incorporating the defect and placing the expected incisions within the relaxed skin tension lines where possible.    The area thus outlined was incised deep to adipose tissue with a #15 scalpel blade.  The skin margins were undermined to an appropriate distance in all directions utilizing iris scissors.  The opposing transposition arms were then transposed into place in opposite direction and anchored with interrupted buried subcutaneous sutures.
Z Plasty Text: The lesion was extirpated to the level of the fat with a #15 scalpel blade.  Given the location of the defect, shape of the defect and the proximity to free margins a Z-plasty was deemed most appropriate for repair.  Using a sterile surgical marker, the appropriate transposition arms of the Z-plasty were drawn incorporating the defect and placing the expected incisions within the relaxed skin tension lines where possible.    The area thus outlined was incised deep to adipose tissue with a #15 scalpel blade.  The skin margins were undermined to an appropriate distance in all directions utilizing iris scissors.  The opposing transposition arms were then transposed into place in opposite direction and anchored with interrupted buried subcutaneous sutures.
Zygomaticofacial Flap Text: Given the location of the defect, shape of the defect and the proximity to free margins a zygomaticofacial flap was deemed most appropriate for repair.  Using a sterile surgical marker, the appropriate flap was drawn incorporating the defect and placing the expected incisions within the relaxed skin tension lines where possible. The area thus outlined was incised deep to adipose tissue with a #15 scalpel blade with preservation of a vascular pedicle.  The skin margins were undermined to an appropriate distance in all directions utilizing iris scissors.  The flap was then placed into the defect and anchored with interrupted buried subcutaneous sutures.
Cheek Interpolation Flap Text: A decision was made to reconstruct the defect utilizing an interpolation axial flap and a staged reconstruction.  A telfa template was made of the defect.  This telfa template was then used to outline the Cheek Interpolation flap.  The donor area for the pedicle flap was then injected with anesthesia.  The flap was excised through the skin and subcutaneous tissue down to the layer of the underlying musculature.  The interpolation flap was carefully excised within this deep plane to maintain its blood supply.  The edges of the donor site were undermined.   The donor site was closed in a primary fashion.  The pedicle was then rotated into position and sutured.  Once the tube was sutured into place, adequate blood supply was confirmed with blanching and refill.  The pedicle was then wrapped with xeroform gauze and dressed appropriately with a telfa and gauze bandage to ensure continued blood supply and protect the attached pedicle.
Cheek-To-Nose Interpolation Flap Text: A decision was made to reconstruct the defect utilizing an interpolation axial flap and a staged reconstruction.  A telfa template was made of the defect.  This telfa template was then used to outline the Cheek-To-Nose Interpolation flap.  The donor area for the pedicle flap was then injected with anesthesia.  The flap was excised through the skin and subcutaneous tissue down to the layer of the underlying musculature.  The interpolation flap was carefully excised within this deep plane to maintain its blood supply.  The edges of the donor site were undermined.   The donor site was closed in a primary fashion.  The pedicle was then rotated into position and sutured.  Once the tube was sutured into place, adequate blood supply was confirmed with blanching and refill.  The pedicle was then wrapped with xeroform gauze and dressed appropriately with a telfa and gauze bandage to ensure continued blood supply and protect the attached pedicle.
Interpolation Flap Text: A decision was made to reconstruct the defect utilizing an interpolation axial flap and a staged reconstruction.  A telfa template was made of the defect.  This telfa template was then used to outline the interpolation flap.  The donor area for the pedicle flap was then injected with anesthesia.  The flap was excised through the skin and subcutaneous tissue down to the layer of the underlying musculature.  The interpolation flap was carefully excised within this deep plane to maintain its blood supply.  The edges of the donor site were undermined.   The donor site was closed in a primary fashion.  The pedicle was then rotated into position and sutured.  Once the tube was sutured into place, adequate blood supply was confirmed with blanching and refill.  The pedicle was then wrapped with xeroform gauze and dressed appropriately with a telfa and gauze bandage to ensure continued blood supply and protect the attached pedicle.
Melolabial Interpolation Flap Text: A decision was made to reconstruct the defect utilizing an interpolation axial flap and a staged reconstruction.  A telfa template was made of the defect.  This telfa template was then used to outline the melolabial interpolation flap.  The donor area for the pedicle flap was then injected with anesthesia.  The flap was excised through the skin and subcutaneous tissue down to the layer of the underlying musculature.  The pedicle flap was carefully excised within this deep plane to maintain its blood supply.  The edges of the donor site were undermined.   The donor site was closed in a primary fashion.  The pedicle was then rotated into position and sutured.  Once the tube was sutured into place, adequate blood supply was confirmed with blanching and refill.  The pedicle was then wrapped with xeroform gauze and dressed appropriately with a telfa and gauze bandage to ensure continued blood supply and protect the attached pedicle.
Mastoid Interpolation Flap Text: A decision was made to reconstruct the defect utilizing an interpolation axial flap and a staged reconstruction.  A telfa template was made of the defect.  This telfa template was then used to outline the mastoid interpolation flap.  The donor area for the pedicle flap was then injected with anesthesia.  The flap was excised through the skin and subcutaneous tissue down to the layer of the underlying musculature.  The pedicle flap was carefully excised within this deep plane to maintain its blood supply.  The edges of the donor site were undermined.   The donor site was closed in a primary fashion.  The pedicle was then rotated into position and sutured.  Once the tube was sutured into place, adequate blood supply was confirmed with blanching and refill.  The pedicle was then wrapped with xeroform gauze and dressed appropriately with a telfa and gauze bandage to ensure continued blood supply and protect the attached pedicle.
Posterior Auricular Interpolation Flap Text: A decision was made to reconstruct the defect utilizing an interpolation axial flap and a staged reconstruction.  A telfa template was made of the defect.  This telfa template was then used to outline the posterior auricular interpolation flap.  The donor area for the pedicle flap was then injected with anesthesia.  The flap was excised through the skin and subcutaneous tissue down to the layer of the underlying musculature.  The pedicle flap was carefully excised within this deep plane to maintain its blood supply.  The edges of the donor site were undermined.   The donor site was closed in a primary fashion.  The pedicle was then rotated into position and sutured.  Once the tube was sutured into place, adequate blood supply was confirmed with blanching and refill.  The pedicle was then wrapped with xeroform gauze and dressed appropriately with a telfa and gauze bandage to ensure continued blood supply and protect the attached pedicle.
Paramedian Forehead Flap Text: A decision was made to reconstruct the defect utilizing an interpolation axial flap and a staged reconstruction.  A telfa template was made of the defect.  This telfa template was then used to outline the paramedian forehead pedicle flap.  The donor area for the pedicle flap was then injected with anesthesia.  The flap was excised through the skin and subcutaneous tissue down to the layer of the underlying musculature.  The pedicle flap was carefully excised within this deep plane to maintain its blood supply.  The edges of the donor site were undermined.   The donor site was closed in a primary fashion.  The pedicle was then rotated into position and sutured.  Once the tube was sutured into place, adequate blood supply was confirmed with blanching and refill.  The pedicle was then wrapped with xeroform gauze and dressed appropriately with a telfa and gauze bandage to ensure continued blood supply and protect the attached pedicle.
Abbe Flap (Upper To Lower Lip) Text: The defect of the lower lip was assessed and measured.  Given the location and size of the defect, an Abbe flap was deemed most appropriate.  Using a sterile surgical marker, an appropriate Abbe flap was measured and drawn on the upper lip. Local anesthesia was then infiltrated.  A scalpel was then used to incise the upper lip through and through the skin, vermilion, muscle and mucosa, leaving the flap pedicled on the opposite side.  The flap was then rotated and transferred to the lower lip defect.  The flap was then sutured into place with a three layer technique, closing the orbicularis oris muscle layer with subcutaneous buried sutures, followed by a mucosal layer and an epidermal layer.
Abbe Flap (Lower To Upper Lip) Text: The defect of the upper lip was assessed and measured.  Given the location and size of the defect, an Abbe flap was deemed most appropriate.  Using a sterile surgical marker, an appropriate Abbe flap was measured and drawn on the lower lip. Local anesthesia was then infiltrated. A scalpel was then used to incise the upper lip through and through the skin, vermilion, muscle and mucosa, leaving the flap pedicled on the opposite side.  The flap was then rotated and transferred to the lower lip defect.  The flap was then sutured into place with a three layer technique, closing the orbicularis oris muscle layer with subcutaneous buried sutures, followed by a mucosal layer and an epidermal layer.
Estlander Flap (Upper To Lower Lip) Text: The defect of the lower lip was assessed and measured.  Given the location and size of the defect, an Estlander flap was deemed most appropriate.  Using a sterile surgical marker, an appropriate Estlander flap was measured and drawn on the upper lip. Local anesthesia was then infiltrated. A scalpel was then used to incise the lateral aspect of the flap, through skin, muscle and mucosa, leaving the flap pedicled medially.  The flap was then rotated and positioned to fill the lower lip defect.  The flap was then sutured into place with a three layer technique, closing the orbicularis oris muscle layer with subcutaneous buried sutures, followed by a mucosal layer and an epidermal layer.
Lip Wedge Excision Repair Text: Given the location of the defect and the proximity to free margins a full thickness wedge repair was deemed most appropriate.  Using a sterile surgical marker, the appropriate repair was drawn incorporating the defect and placing the expected incisions perpendicular to the vermilion border.  The vermilion border was also meticulously outlined to ensure appropriate reapproximation during the repair.  The area thus outlined was incised through and through with a #15 scalpel blade.  The muscularis and dermis were reaproximated with deep sutures following hemostasis. Care was taken to realign the vermilion border before proceeding with the superficial closure.  Once the vermilion was realigned the superfical and mucosal closure was finished.
Ftsg Text: The defect edges were debeveled with a #15 scalpel blade.  Given the location of the defect, shape of the defect and the proximity to free margins a full thickness skin graft was deemed most appropriate.  Using a sterile surgical marker, the primary defect shape was transferred to the donor site. The area thus outlined was incised deep to adipose tissue with a #15 scalpel blade.  The harvested graft was then trimmed of adipose tissue until only dermis and epidermis was left.  The skin margins of the secondary defect were undermined to an appropriate distance in all directions utilizing iris scissors.  The secondary defect was closed with interrupted buried subcutaneous sutures.  The skin edges were then re-apposed with running  sutures.  The skin graft was then placed in the primary defect and oriented appropriately.
Split-Thickness Skin Graft Text: The defect edges were debeveled with a #15 scalpel blade.  Given the location of the defect, shape of the defect and the proximity to free margins a split thickness skin graft was deemed most appropriate.  Using a sterile surgical marker, the primary defect shape was transferred to the donor site. The split thickness graft was then harvested.  The skin graft was then placed in the primary defect and oriented appropriately.
Burow's Graft Text: The defect edges were debeveled with a #15 scalpel blade.  Given the location of the defect, shape of the defect, the proximity to free margins and the presence of a standing cone deformity a Burow's skin graft was deemed most appropriate. The standing cone was removed and this tissue was then trimmed to the shape of the primary defect. The adipose tissue was also removed until only dermis and epidermis were left.  The skin margins of the secondary defect were undermined to an appropriate distance in all directions utilizing iris scissors.  The secondary defect was closed with interrupted buried subcutaneous sutures.  The skin edges were then re-apposed with running  sutures.  The skin graft was then placed in the primary defect and oriented appropriately.
Cartilage Graft Text: The defect edges were debeveled with a #15 scalpel blade.  Given the location of the defect, shape of the defect, the fact the defect involved a full thickness cartilage defect a cartilage graft was deemed most appropriate.  An appropriate donor site was identified, cleansed, and anesthetized. The cartilage graft was then harvested and transferred to the recipient site, oriented appropriately and then sutured into place.  The secondary defect was then repaired using a primary closure.
Composite Graft Text: The defect edges were debeveled with a #15 scalpel blade.  Given the location of the defect, shape of the defect, the proximity to free margins and the fact the defect was full thickness a composite graft was deemed most appropriate.  The defect was outline and then transferred to the donor site.  A full thickness graft was then excised from the donor site. The graft was then placed in the primary defect, oriented appropriately and then sutured into place.  The secondary defect was then repaired using a primary closure.
Epidermal Autograft Text: The defect edges were debeveled with a #15 scalpel blade.  Given the location of the defect, shape of the defect and the proximity to free margins an epidermal autograft was deemed most appropriate.  Using a sterile surgical marker, the primary defect shape was transferred to the donor site. The epidermal graft was then harvested.  The skin graft was then placed in the primary defect and oriented appropriately.
Dermal Autograft Text: The defect edges were debeveled with a #15 scalpel blade.  Given the location of the defect, shape of the defect and the proximity to free margins a dermal autograft was deemed most appropriate.  Using a sterile surgical marker, the primary defect shape was transferred to the donor site. The area thus outlined was incised deep to adipose tissue with a #15 scalpel blade.  The harvested graft was then trimmed of adipose and epidermal tissue until only dermis was left.  The skin graft was then placed in the primary defect and oriented appropriately.
Skin Substitute Text: The defect edges were debeveled with a #15 scalpel blade.  Given the location of the defect, shape of the defect and the proximity to free margins a skin substitute graft was deemed most appropriate.  The graft material was trimmed to fit the size of the defect. The graft was then placed in the primary defect and oriented appropriately.
Tissue Cultured Epidermal Autograft Text: The defect edges were debeveled with a #15 scalpel blade.  Given the location of the defect, shape of the defect and the proximity to free margins a tissue cultured epidermal autograft was deemed most appropriate.  The graft was then trimmed to fit the size of the defect.  The graft was then placed in the primary defect and oriented appropriately.
Xenograft Text: The defect edges were debeveled with a #15 scalpel blade.  Given the location of the defect, shape of the defect and the proximity to free margins a xenograft was deemed most appropriate.  The graft was then trimmed to fit the size of the defect.  The graft was then placed in the primary defect and oriented appropriately.
Purse String (Intermediate) Text: Given the location of the defect and the characteristics of the surrounding skin a purse string intermediate closure was deemed most appropriate.  Undermining was performed circumferentially around the surgical defect.  A purse string suture was then placed and tightened.
Purse String (Simple) Text: Given the location of the defect and the characteristics of the surrounding skin a purse string simple closure was deemed most appropriate.  Undermining was performed circumferentially around the surgical defect.  A purse string suture was then placed and tightened.
Complex Repair And Single Advancement Flap Text: The defect edges were debeveled with a #15 scalpel blade.  The primary defect was closed partially with a complex linear closure.  Given the location of the remaining defect, shape of the defect and the proximity to free margins a single advancement flap was deemed most appropriate for complete closure of the defect.  Using a sterile surgical marker, an appropriate advancement flap was drawn incorporating the defect and placing the expected incisions within the relaxed skin tension lines where possible.    The area thus outlined was incised deep to adipose tissue with a #15 scalpel blade.  The skin margins were undermined to an appropriate distance in all directions utilizing iris scissors.
Complex Repair And Double Advancement Flap Text: The defect edges were debeveled with a #15 scalpel blade.  The primary defect was closed partially with a complex linear closure.  Given the location of the remaining defect, shape of the defect and the proximity to free margins a double advancement flap was deemed most appropriate for complete closure of the defect.  Using a sterile surgical marker, an appropriate advancement flap was drawn incorporating the defect and placing the expected incisions within the relaxed skin tension lines where possible.    The area thus outlined was incised deep to adipose tissue with a #15 scalpel blade.  The skin margins were undermined to an appropriate distance in all directions utilizing iris scissors.
Complex Repair And Modified Advancement Flap Text: The defect edges were debeveled with a #15 scalpel blade.  The primary defect was closed partially with a complex linear closure.  Given the location of the remaining defect, shape of the defect and the proximity to free margins a modified advancement flap was deemed most appropriate for complete closure of the defect.  Using a sterile surgical marker, an appropriate advancement flap was drawn incorporating the defect and placing the expected incisions within the relaxed skin tension lines where possible.    The area thus outlined was incised deep to adipose tissue with a #15 scalpel blade.  The skin margins were undermined to an appropriate distance in all directions utilizing iris scissors.
Complex Repair And A-T Advancement Flap Text: The defect edges were debeveled with a #15 scalpel blade.  The primary defect was closed partially with a complex linear closure.  Given the location of the remaining defect, shape of the defect and the proximity to free margins an A-T advancement flap was deemed most appropriate for complete closure of the defect.  Using a sterile surgical marker, an appropriate advancement flap was drawn incorporating the defect and placing the expected incisions within the relaxed skin tension lines where possible.    The area thus outlined was incised deep to adipose tissue with a #15 scalpel blade.  The skin margins were undermined to an appropriate distance in all directions utilizing iris scissors.
Complex Repair And O-T Advancement Flap Text: The defect edges were debeveled with a #15 scalpel blade.  The primary defect was closed partially with a complex linear closure.  Given the location of the remaining defect, shape of the defect and the proximity to free margins an O-T advancement flap was deemed most appropriate for complete closure of the defect.  Using a sterile surgical marker, an appropriate advancement flap was drawn incorporating the defect and placing the expected incisions within the relaxed skin tension lines where possible.    The area thus outlined was incised deep to adipose tissue with a #15 scalpel blade.  The skin margins were undermined to an appropriate distance in all directions utilizing iris scissors.
Complex Repair And O-L Flap Text: The defect edges were debeveled with a #15 scalpel blade.  The primary defect was closed partially with a complex linear closure.  Given the location of the remaining defect, shape of the defect and the proximity to free margins an O-L flap was deemed most appropriate for complete closure of the defect.  Using a sterile surgical marker, an appropriate flap was drawn incorporating the defect and placing the expected incisions within the relaxed skin tension lines where possible.    The area thus outlined was incised deep to adipose tissue with a #15 scalpel blade.  The skin margins were undermined to an appropriate distance in all directions utilizing iris scissors.
Complex Repair And Bilobe Flap Text: The defect edges were debeveled with a #15 scalpel blade.  The primary defect was closed partially with a complex linear closure.  Given the location of the remaining defect, shape of the defect and the proximity to free margins a bilobe flap was deemed most appropriate for complete closure of the defect.  Using a sterile surgical marker, an appropriate advancement flap was drawn incorporating the defect and placing the expected incisions within the relaxed skin tension lines where possible.    The area thus outlined was incised deep to adipose tissue with a #15 scalpel blade.  The skin margins were undermined to an appropriate distance in all directions utilizing iris scissors.
Complex Repair And Melolabial Flap Text: The defect edges were debeveled with a #15 scalpel blade.  The primary defect was closed partially with a complex linear closure.  Given the location of the remaining defect, shape of the defect and the proximity to free margins a melolabial flap was deemed most appropriate for complete closure of the defect.  Using a sterile surgical marker, an appropriate advancement flap was drawn incorporating the defect and placing the expected incisions within the relaxed skin tension lines where possible.    The area thus outlined was incised deep to adipose tissue with a #15 scalpel blade.  The skin margins were undermined to an appropriate distance in all directions utilizing iris scissors.
Complex Repair And Rotation Flap Text: The defect edges were debeveled with a #15 scalpel blade.  The primary defect was closed partially with a complex linear closure.  Given the location of the remaining defect, shape of the defect and the proximity to free margins a rotation flap was deemed most appropriate for complete closure of the defect.  Using a sterile surgical marker, an appropriate advancement flap was drawn incorporating the defect and placing the expected incisions within the relaxed skin tension lines where possible.    The area thus outlined was incised deep to adipose tissue with a #15 scalpel blade.  The skin margins were undermined to an appropriate distance in all directions utilizing iris scissors.
Complex Repair And Rhombic Flap Text: The defect edges were debeveled with a #15 scalpel blade.  The primary defect was closed partially with a complex linear closure.  Given the location of the remaining defect, shape of the defect and the proximity to free margins a rhombic flap was deemed most appropriate for complete closure of the defect.  Using a sterile surgical marker, an appropriate advancement flap was drawn incorporating the defect and placing the expected incisions within the relaxed skin tension lines where possible.    The area thus outlined was incised deep to adipose tissue with a #15 scalpel blade.  The skin margins were undermined to an appropriate distance in all directions utilizing iris scissors.
Complex Repair And Transposition Flap Text: The defect edges were debeveled with a #15 scalpel blade.  The primary defect was closed partially with a complex linear closure.  Given the location of the remaining defect, shape of the defect and the proximity to free margins a transposition flap was deemed most appropriate for complete closure of the defect.  Using a sterile surgical marker, an appropriate advancement flap was drawn incorporating the defect and placing the expected incisions within the relaxed skin tension lines where possible.    The area thus outlined was incised deep to adipose tissue with a #15 scalpel blade.  The skin margins were undermined to an appropriate distance in all directions utilizing iris scissors.
Complex Repair And V-Y Plasty Text: The defect edges were debeveled with a #15 scalpel blade.  The primary defect was closed partially with a complex linear closure.  Given the location of the remaining defect, shape of the defect and the proximity to free margins a V-Y plasty was deemed most appropriate for complete closure of the defect.  Using a sterile surgical marker, an appropriate advancement flap was drawn incorporating the defect and placing the expected incisions within the relaxed skin tension lines where possible.    The area thus outlined was incised deep to adipose tissue with a #15 scalpel blade.  The skin margins were undermined to an appropriate distance in all directions utilizing iris scissors.
Complex Repair And M Plasty Text: The defect edges were debeveled with a #15 scalpel blade.  The primary defect was closed partially with a complex linear closure.  Given the location of the remaining defect, shape of the defect and the proximity to free margins an M plasty was deemed most appropriate for complete closure of the defect.  Using a sterile surgical marker, an appropriate advancement flap was drawn incorporating the defect and placing the expected incisions within the relaxed skin tension lines where possible.    The area thus outlined was incised deep to adipose tissue with a #15 scalpel blade.  The skin margins were undermined to an appropriate distance in all directions utilizing iris scissors.
Complex Repair And Double M Plasty Text: The defect edges were debeveled with a #15 scalpel blade.  The primary defect was closed partially with a complex linear closure.  Given the location of the remaining defect, shape of the defect and the proximity to free margins a double M plasty was deemed most appropriate for complete closure of the defect.  Using a sterile surgical marker, an appropriate advancement flap was drawn incorporating the defect and placing the expected incisions within the relaxed skin tension lines where possible.    The area thus outlined was incised deep to adipose tissue with a #15 scalpel blade.  The skin margins were undermined to an appropriate distance in all directions utilizing iris scissors.
Complex Repair And W Plasty Text: The defect edges were debeveled with a #15 scalpel blade.  The primary defect was closed partially with a complex linear closure.  Given the location of the remaining defect, shape of the defect and the proximity to free margins a W plasty was deemed most appropriate for complete closure of the defect.  Using a sterile surgical marker, an appropriate advancement flap was drawn incorporating the defect and placing the expected incisions within the relaxed skin tension lines where possible.    The area thus outlined was incised deep to adipose tissue with a #15 scalpel blade.  The skin margins were undermined to an appropriate distance in all directions utilizing iris scissors.
Complex Repair And Z Plasty Text: The defect edges were debeveled with a #15 scalpel blade.  The primary defect was closed partially with a complex linear closure.  Given the location of the remaining defect, shape of the defect and the proximity to free margins a Z plasty was deemed most appropriate for complete closure of the defect.  Using a sterile surgical marker, an appropriate advancement flap was drawn incorporating the defect and placing the expected incisions within the relaxed skin tension lines where possible.    The area thus outlined was incised deep to adipose tissue with a #15 scalpel blade.  The skin margins were undermined to an appropriate distance in all directions utilizing iris scissors.
Complex Repair And Dorsal Nasal Flap Text: The defect edges were debeveled with a #15 scalpel blade.  The primary defect was closed partially with a complex linear closure.  Given the location of the remaining defect, shape of the defect and the proximity to free margins a dorsal nasal flap was deemed most appropriate for complete closure of the defect.  Using a sterile surgical marker, an appropriate flap was drawn incorporating the defect and placing the expected incisions within the relaxed skin tension lines where possible.    The area thus outlined was incised deep to adipose tissue with a #15 scalpel blade.  The skin margins were undermined to an appropriate distance in all directions utilizing iris scissors.
Complex Repair And Ftsg Text: The defect edges were debeveled with a #15 scalpel blade.  The primary defect was closed partially with a complex linear closure.  Given the location of the defect, shape of the defect and the proximity to free margins a full thickness skin graft was deemed most appropriate to repair the remaining defect.  The graft was trimmed to fit the size of the remaining defect.  The graft was then placed in the primary defect, oriented appropriately, and sutured into place.
Complex Repair And Burow's Graft Text: The defect edges were debeveled with a #15 scalpel blade.  The primary defect was closed partially with a complex linear closure.  Given the location of the defect, shape of the defect, the proximity to free margins and the presence of a standing cone deformity a Burow's graft was deemed most appropriate to repair the remaining defect.  The graft was trimmed to fit the size of the remaining defect.  The graft was then placed in the primary defect, oriented appropriately, and sutured into place.
Complex Repair And Split-Thickness Skin Graft Text: The defect edges were debeveled with a #15 scalpel blade.  The primary defect was closed partially with a complex linear closure.  Given the location of the defect, shape of the defect and the proximity to free margins a split thickness skin graft was deemed most appropriate to repair the remaining defect.  The graft was trimmed to fit the size of the remaining defect.  The graft was then placed in the primary defect, oriented appropriately, and sutured into place.
Complex Repair And Epidermal Autograft Text: The defect edges were debeveled with a #15 scalpel blade.  The primary defect was closed partially with a complex linear closure.  Given the location of the defect, shape of the defect and the proximity to free margins an epidermal autograft was deemed most appropriate to repair the remaining defect.  The graft was trimmed to fit the size of the remaining defect.  The graft was then placed in the primary defect, oriented appropriately, and sutured into place.
Complex Repair And Dermal Autograft Text: The defect edges were debeveled with a #15 scalpel blade.  The primary defect was closed partially with a complex linear closure.  Given the location of the defect, shape of the defect and the proximity to free margins an dermal autograft was deemed most appropriate to repair the remaining defect.  The graft was trimmed to fit the size of the remaining defect.  The graft was then placed in the primary defect, oriented appropriately, and sutured into place.
Complex Repair And Tissue Cultured Epidermal Autograft Text: The defect edges were debeveled with a #15 scalpel blade.  The primary defect was closed partially with a complex linear closure.  Given the location of the defect, shape of the defect and the proximity to free margins an tissue cultured epidermal autograft was deemed most appropriate to repair the remaining defect.  The graft was trimmed to fit the size of the remaining defect.  The graft was then placed in the primary defect, oriented appropriately, and sutured into place.
Complex Repair And Xenograft Text: The defect edges were debeveled with a #15 scalpel blade.  The primary defect was closed partially with a complex linear closure.  Given the location of the defect, shape of the defect and the proximity to free margins a xenograft was deemed most appropriate to repair the remaining defect.  The graft was trimmed to fit the size of the remaining defect.  The graft was then placed in the primary defect, oriented appropriately, and sutured into place.
Complex Repair And Skin Substitute Graft Text: The defect edges were debeveled with a #15 scalpel blade.  The primary defect was closed partially with a complex linear closure.  Given the location of the remaining defect, shape of the defect and the proximity to free margins a skin substitute graft was deemed most appropriate to repair the remaining defect.  The graft was trimmed to fit the size of the remaining defect.  The graft was then placed in the primary defect, oriented appropriately, and sutured into place.
Path Notes (To The Dermatopathologist): Please check margins.
Consent was obtained from the patient. The risks and benefits to therapy were discussed in detail. Specifically, the risks of infection, scarring, bleeding, prolonged wound healing, incomplete removal, allergy to anesthesia, nerve injury and recurrence were addressed. Prior to the procedure, the treatment site was clearly identified and confirmed by the patient. All components of Universal Protocol/PAUSE Rule completed.
Post-Care Instructions: I reviewed with the patient in detail post-care instructions. Patient is not to engage in any heavy lifting, exercise, or swimming for the next 14 days. Should the patient develop any fevers, chills, bleeding, severe pain patient will contact the office immediately.
Home Suture Removal Text: Patient was provided a home suture removal kit and will remove their sutures at home.  If they have any questions or difficulties they will call the office.
Where Do You Want The Question To Include Opioid Counseling Located?: Case Summary Tab
Billing Type: Third-Party Bill
Information: Selecting Yes will display possible errors in your note based on the variables you have selected. This validation is only offered as a suggestion for you. PLEASE NOTE THAT THE VALIDATION TEXT WILL BE REMOVED WHEN YOU FINALIZE YOUR NOTE. IF YOU WANT TO FAX A PRELIMINARY NOTE YOU WILL NEED TO TOGGLE THIS TO 'NO' IF YOU DO NOT WANT IT IN YOUR FAXED NOTE.

## 2022-07-14 ENCOUNTER — OFFICE VISIT (OUTPATIENT)
Dept: CARDIOLOGY | Facility: MEDICAL CENTER | Age: 77
End: 2022-07-14
Payer: MEDICARE

## 2022-07-14 VITALS
SYSTOLIC BLOOD PRESSURE: 138 MMHG | HEART RATE: 92 BPM | WEIGHT: 178 LBS | OXYGEN SATURATION: 95 % | BODY MASS INDEX: 27.94 KG/M2 | HEIGHT: 67 IN | DIASTOLIC BLOOD PRESSURE: 82 MMHG | RESPIRATION RATE: 16 BRPM

## 2022-07-14 DIAGNOSIS — Z01.810 PREOPERATIVE CARDIOVASCULAR EXAMINATION: ICD-10-CM

## 2022-07-14 DIAGNOSIS — R06.02 SOB (SHORTNESS OF BREATH): ICD-10-CM

## 2022-07-14 PROCEDURE — 99214 OFFICE O/P EST MOD 30 MIN: CPT | Performed by: INTERNAL MEDICINE

## 2022-07-14 RX ORDER — PENICILLIN V POTASSIUM 500 MG/1
TABLET ORAL
Status: ON HOLD | COMMUNITY
Start: 2022-07-09 | End: 2022-08-18

## 2022-07-14 ASSESSMENT — FIBROSIS 4 INDEX: FIB4 SCORE: 1.82479184551145761

## 2022-07-14 NOTE — PROGRESS NOTES
"CARDIOLOGY OUTPATIENT FOLLOWUP    PCP: Moni Fragoso M.D.    1. SOB (shortness of breath)    2. Preoperative cardiovascular examination        Kemal Mueller has shortness of breath related to abdominal obesity without any identifiable cardiac pathology.  I advised graded aerobic exercise training and weight loss.  He is at low risk for perioperative complications during the upcoming intermediate risk surgery.  No additional cardiovascular precautions are advised before the operation.    Follow up: as needed    Chief Complaint   Patient presents with   • Hyperlipidemia     F/V Dx: Pure hypercholesterolemia   • Hypertension     F/V Dx: Essential hypertension, benign             History: Kemal Mueller is a 77 y.o. male with history of metastatic prostate cancer needing treatment as well as spine disease presenting for follow-up.  He recently presented with shortness of breath which was identified in the preoperative setting before undergoing decompressive spinal surgery.  He has since undergone a nuclear perfusion scan which showed no evidence of ischemia.  He is accompanied by his wife and great granddaughter today and notices much of the shortness of breath has occurred after prednisone was added and he began increasing his abdominal girth.      ROS:   10 point review systems is otherwise negative except as per the HPI    PE:  /82 (BP Location: Left arm, Patient Position: Sitting, BP Cuff Size: Adult)   Pulse 92   Resp 16   Ht 1.702 m (5' 7\")   Wt 80.7 kg (178 lb)   SpO2 95%   BMI 27.88 kg/m²   Gen: no acute distress  HEENT: Symmetric face. Anicteric sclerae. Moist mucus membranes  NECK: No JVD. No lymphadenopathy  CARDIAC: Regular, Normal S1, S2, No murmur  VASCULATURE: carotids are normal bilaterally without bruit  RESP: Clear to auscultation bilaterally  ABD: Soft, non-tender, non-distended  EXT: No edema, no clubbing or cyanosis  SKIN: Warm and dry  NEURO: No gross deficits  PSYCH: " Appropriate affect, participates in conversation    The 10-year ASCVD risk score (Celeste DIEGO Brothers., et al., 2013) is: 62.3%    Past Medical History:   Diagnosis Date   • Anemia 2015     Due to low testosterone from hormone treatment for prostate cancer as per [facility] Hematology evaluation.   • Anesthesia     Low BP; nausea; hard time waking   • Arthritis     spine and wrists   • Breath shortness     activity induced   • Bronchitis 1/15   • Cancer ([facility]) 2010    prostate   • CATARACT    • Cervical stenosis of spine    • CKD (chronic kidney disease), stage III ([facility])     Dr. Roberts   • Degeneration of cervical intervertebral disc    • Dental disorder     upper dentures   • Diabetes ([facility]) 05/03/2022   • ED (erectile dysfunction)    • Elevated blood sugar    • Elevated BP    • Elevated cholesterol    • GERD (gastroesophageal reflux disease)    • Glaucoma    • Heart burn    • High cholesterol    • Hypertension    • Indigestion    • Prostate cancer ([facility])     prostate- removed '09; CA returned in pocket- radiation therapy done   • Renal cancer ([facility])     Had treatment done- resolved per CAT scan   • Unspecified urinary incontinence 05/03/2022   • wrist pain 5/20/2015    bilat wrist pain     Allergies   Allergen Reactions   • Nkda [No Known Drug Allergy]      Outpatient Encounter Medications as of 7/14/2022   Medication Sig Dispense Refill   • penicillin v potassium (VEETID) 500 MG Tab TAKE 2 STAT, THEN 1 TABLET BY MOUTH 3 TIMES A DAY UNTIL FINISHED     • glipiZIDE (GLUCOTROL) 5 MG Tab TAKE TWO TABLETS BY MOUTH IN THE MORNING AND ONE TABLET IN THE EVENING WITH MEALS 300 Tablet 1   • gabapentin (NEURONTIN) 300 MG Cap      • omeprazole (PRILOSEC) 20 MG delayed-release capsule TAKE ONE CAPSULE BY MOUTH DAILY 100 Capsule 3   • atorvastatin (LIPITOR) 80 MG tablet TAKE ONE TABLET BY MOUTH EVERY EVENING 100 Tablet 3   • lisinopril (PRINIVIL) 40 MG tablet TAKE ONE TABLET BY MOUTH DAILY 100 Tablet 3   • ezetimibe (ZETIA) 10 MG Tab TAKE ONE  TABLET BY MOUTH DAILY 100 Tablet 3   • predniSONE (DELTASONE) 5 MG Tab      • Abiraterone Acetate 250 MG Tab Take  by mouth.     • Calcium Carbonate-Vit D-Min (CALCIUM 1200 PO) Take  by mouth.     • vitamin D3, cholecalciferol, 1000 UNIT Tab Take 2 Tabs by mouth every day. 100 Tab 3   • potassium citrate SR (UROCIT-K SR) 10 MEQ (1080 MG) TBCR Take 10 mEq by mouth every day.     • Multiple Vitamins-Minerals (CENTRUM SILVER PO) Take  by mouth.     • Brimonidine Tartrate-Timolol 0.2-0.5 % Solution 1 Drop by Ophthalmic route 2 times a day.     • latanoprost (XALATAN) 0.005 % Solution Place 1 Drop in both eyes every evening.     • ACETAZOLAMIDE 125 MG PO TABS Take 125 mg by mouth every day.     • [DISCONTINUED] gabapentin (NEURONTIN) 100 MG Cap Take 100 mg by mouth 3 times a day. 300 mg 3x a day (Patient not taking: Reported on 2022)       No facility-administered encounter medications on file as of 2022.     Social History     Socioeconomic History   • Marital status:      Spouse name: Not on file   • Number of children: Not on file   • Years of education: Not on file   • Highest education level: Not on file   Occupational History   • Occupation: retired drugstore manager   Tobacco Use   • Smoking status: Former Smoker     Packs/day: 0.20     Years: 10.00     Pack years: 2.00     Quit date: 1977     Years since quittin.5   • Smokeless tobacco: Never Used   • Tobacco comment: 1/3 ppd for 10 years, quit    Vaping Use   • Vaping Use: Never used   Substance and Sexual Activity   • Alcohol use: Yes     Alcohol/week: 1.2 oz     Types: 2 Cans of beer per week     Comment: 1-2 per day   • Drug use: No   • Sexual activity: Not Currently     Partners: Female   Other Topics Concern   • Not on file   Social History Narrative   • Not on file     Social Determinants of Health     Financial Resource Strain: Not on file   Food Insecurity: Not on file   Transportation Needs: Not on file   Physical Activity:  Not on file   Stress: Not on file   Social Connections: Not on file   Intimate Partner Violence: Not on file   Housing Stability: Not on file       Studies  Lab Results   Component Value Date/Time    CHOLSTRLTOT 185 05/03/2022 12:24 PM     (H) 05/03/2022 12:24 PM    HDL 45 05/03/2022 12:24 PM    TRIGLYCERIDE 175 (H) 05/03/2022 12:24 PM       Lab Results   Component Value Date/Time    SODIUM 138 06/10/2022 12:33 PM    POTASSIUM 5.3 06/10/2022 12:33 PM    CHLORIDE 104 06/10/2022 12:33 PM    CO2 24 06/10/2022 12:33 PM    GLUCOSE 182 (H) 06/10/2022 12:33 PM    BUN 43 (H) 06/10/2022 12:33 PM    CREATININE 1.31 06/10/2022 12:33 PM    CREATININE 1.48 (H) 03/18/2011 12:00 AM    BUNCREATRAT 18 03/18/2011 12:00 AM    GLOMRATE 47 (L) 03/18/2011 12:00 AM     Lab Results   Component Value Date/Time    ALKPHOSPHAT 65 05/03/2022 12:24 PM    ASTSGOT 16 05/03/2022 12:24 PM    ALTSGPT 17 05/03/2022 12:24 PM    TBILIRUBIN 0.5 05/03/2022 12:24 PM        For this encounter I reviewed the following  (Primary care and neurosurgery) notes, BMP, Lipid profile and LFT   For this encounter I directly reviewed ECG tracings.  Normal sinus rhythm, unremarkable. I otherwise agree with the interpretation in the EHR.     's

## 2022-07-15 ENCOUNTER — TELEPHONE (OUTPATIENT)
Dept: CARDIOLOGY | Facility: MEDICAL CENTER | Age: 77
End: 2022-07-15
Payer: MEDICARE

## 2022-07-15 NOTE — TELEPHONE ENCOUNTER
BE      Caller: Janet from [facility]  Topic/issue: They were calling about the patient's visit yesterday and if it had been sent over yet due to the patient needing clearance. She asked for a call back to discuss if they needed to send a request to us or not  Callback Number: [phone]      Thank you      -Bill LENNON

## 2022-07-18 NOTE — TELEPHONE ENCOUNTER
Spoke to Janet over phone to verify fax number. BE last office visit note addressing clearance faxed to [phone], receipt confirmed.

## 2022-07-27 ENCOUNTER — APPOINTMENT (RX ONLY)
Dept: URBAN - METROPOLITAN AREA CLINIC 4 | Facility: CLINIC | Age: 77
Setting detail: DERMATOLOGY
End: 2022-07-27

## 2022-07-27 DIAGNOSIS — Z48.02 ENCOUNTER FOR REMOVAL OF SUTURES: ICD-10-CM

## 2022-07-27 PROCEDURE — ? POST-OP WOUND CHECK (NO GLOBAL PERIOD)

## 2022-07-27 PROCEDURE — ? PRESCRIPTION

## 2022-07-27 RX ORDER — MUPIROCIN 20 MG/G
OINTMENT TOPICAL
Qty: 22 | Refills: 0 | Status: ERX | COMMUNITY
Start: 2022-07-27

## 2022-07-27 RX ADMIN — MUPIROCIN: 20 OINTMENT TOPICAL at 00:00

## 2022-07-27 ASSESSMENT — LOCATION ZONE DERM: LOCATION ZONE: TRUNK

## 2022-07-27 ASSESSMENT — LOCATION SIMPLE DESCRIPTION DERM: LOCATION SIMPLE: ABDOMEN

## 2022-07-27 ASSESSMENT — LOCATION DETAILED DESCRIPTION DERM: LOCATION DETAILED: EPIGASTRIC SKIN

## 2022-08-04 ENCOUNTER — HOSPITAL ENCOUNTER (OUTPATIENT)
Dept: RADIOLOGY | Facility: MEDICAL CENTER | Age: 77
End: 2022-08-04
Attending: NEUROLOGICAL SURGERY | Admitting: NEUROLOGICAL SURGERY
Payer: MEDICARE

## 2022-08-04 ENCOUNTER — PRE-ADMISSION TESTING (OUTPATIENT)
Dept: ADMISSIONS | Facility: MEDICAL CENTER | Age: 77
End: 2022-08-04
Attending: NEUROLOGICAL SURGERY
Payer: MEDICARE

## 2022-08-04 DIAGNOSIS — Z01.810 PRE-OPERATIVE CARDIOVASCULAR EXAMINATION: ICD-10-CM

## 2022-08-04 DIAGNOSIS — Z01.811 PRE-OPERATIVE RESPIRATORY EXAMINATION: ICD-10-CM

## 2022-08-04 DIAGNOSIS — Z01.812 PRE-OPERATIVE LABORATORY EXAMINATION: ICD-10-CM

## 2022-08-04 LAB
ANION GAP SERPL CALC-SCNC: 11 MMOL/L (ref 7–16)
APPEARANCE UR: CLEAR
APTT PPP: 26.1 SEC (ref 24.7–36)
BACTERIA #/AREA URNS HPF: NEGATIVE /HPF
BASOPHILS # BLD AUTO: 0 % (ref 0–1.8)
BASOPHILS # BLD: 0 K/UL (ref 0–0.12)
BILIRUB UR QL STRIP.AUTO: NEGATIVE
BUN SERPL-MCNC: 31 MG/DL (ref 8–22)
CALCIUM SERPL-MCNC: 9.2 MG/DL (ref 8.5–10.5)
CHLORIDE SERPL-SCNC: 104 MMOL/L (ref 96–112)
CO2 SERPL-SCNC: 21 MMOL/L (ref 20–33)
COLOR UR: YELLOW
CREAT SERPL-MCNC: 1.28 MG/DL (ref 0.5–1.4)
EKG IMPRESSION: NORMAL
EOSINOPHIL # BLD AUTO: 0.01 K/UL (ref 0–0.51)
EOSINOPHIL NFR BLD: 0.1 % (ref 0–6.9)
EPI CELLS #/AREA URNS HPF: NEGATIVE /HPF
ERYTHROCYTE [DISTWIDTH] IN BLOOD BY AUTOMATED COUNT: 48.3 FL (ref 35.9–50)
GFR SERPLBLD CREATININE-BSD FMLA CKD-EPI: 57 ML/MIN/1.73 M 2
GLUCOSE SERPL-MCNC: 324 MG/DL (ref 65–99)
GLUCOSE UR STRIP.AUTO-MCNC: 250 MG/DL
HCT VFR BLD AUTO: 35.8 % (ref 42–52)
HGB BLD-MCNC: 11.7 G/DL (ref 14–18)
HYALINE CASTS #/AREA URNS LPF: ABNORMAL /LPF
IMM GRANULOCYTES # BLD AUTO: 0.18 K/UL (ref 0–0.11)
IMM GRANULOCYTES NFR BLD AUTO: 2.6 % (ref 0–0.9)
INR PPP: 0.95 (ref 0.87–1.13)
KETONES UR STRIP.AUTO-MCNC: NEGATIVE MG/DL
LEUKOCYTE ESTERASE UR QL STRIP.AUTO: NEGATIVE
LYMPHOCYTES # BLD AUTO: 1.1 K/UL (ref 1–4.8)
LYMPHOCYTES NFR BLD: 15.8 % (ref 22–41)
MCH RBC QN AUTO: 31.4 PG (ref 27–33)
MCHC RBC AUTO-ENTMCNC: 32.7 G/DL (ref 33.7–35.3)
MCV RBC AUTO: 96 FL (ref 81.4–97.8)
MICRO URNS: ABNORMAL
MONOCYTES # BLD AUTO: 1.27 K/UL (ref 0–0.85)
MONOCYTES NFR BLD AUTO: 18.2 % (ref 0–13.4)
NEUTROPHILS # BLD AUTO: 4.41 K/UL (ref 1.82–7.42)
NEUTROPHILS NFR BLD: 63.3 % (ref 44–72)
NITRITE UR QL STRIP.AUTO: NEGATIVE
NRBC # BLD AUTO: 0 K/UL
NRBC BLD-RTO: 0 /100 WBC
PH UR STRIP.AUTO: 6.5 [PH] (ref 5–8)
PLATELET # BLD AUTO: 178 K/UL (ref 164–446)
PMV BLD AUTO: 10.3 FL (ref 9–12.9)
POTASSIUM SERPL-SCNC: 4.4 MMOL/L (ref 3.6–5.5)
PROT UR QL STRIP: 100 MG/DL
PROTHROMBIN TIME: 12.6 SEC (ref 12–14.6)
RBC # BLD AUTO: 3.73 M/UL (ref 4.7–6.1)
RBC # URNS HPF: ABNORMAL /HPF
RBC UR QL AUTO: NEGATIVE
SODIUM SERPL-SCNC: 136 MMOL/L (ref 135–145)
SP GR UR STRIP.AUTO: 1.02
UROBILINOGEN UR STRIP.AUTO-MCNC: 0.2 MG/DL
WBC # BLD AUTO: 7 K/UL (ref 4.8–10.8)
WBC #/AREA URNS HPF: ABNORMAL /HPF

## 2022-08-04 PROCEDURE — 85025 COMPLETE CBC W/AUTO DIFF WBC: CPT

## 2022-08-04 PROCEDURE — 93005 ELECTROCARDIOGRAM TRACING: CPT

## 2022-08-04 PROCEDURE — 36415 COLL VENOUS BLD VENIPUNCTURE: CPT

## 2022-08-04 PROCEDURE — 85730 THROMBOPLASTIN TIME PARTIAL: CPT

## 2022-08-04 PROCEDURE — 71046 X-RAY EXAM CHEST 2 VIEWS: CPT

## 2022-08-04 PROCEDURE — 93010 ELECTROCARDIOGRAM REPORT: CPT | Performed by: INTERNAL MEDICINE

## 2022-08-04 PROCEDURE — 85610 PROTHROMBIN TIME: CPT

## 2022-08-04 PROCEDURE — 80048 BASIC METABOLIC PNL TOTAL CA: CPT

## 2022-08-04 PROCEDURE — 81001 URINALYSIS AUTO W/SCOPE: CPT

## 2022-08-04 ASSESSMENT — FIBROSIS 4 INDEX: FIB4 SCORE: 1.82479184551145761

## 2022-08-10 ENCOUNTER — HOSPITAL ENCOUNTER (OUTPATIENT)
Dept: LAB | Facility: MEDICAL CENTER | Age: 77
End: 2022-08-10
Attending: NEUROLOGICAL SURGERY
Payer: MEDICARE

## 2022-08-10 LAB
ANION GAP SERPL CALC-SCNC: 13 MMOL/L (ref 7–16)
BUN SERPL-MCNC: 32 MG/DL (ref 8–22)
CALCIUM SERPL-MCNC: 9.8 MG/DL (ref 8.4–10.2)
CHLORIDE SERPL-SCNC: 104 MMOL/L (ref 96–112)
CO2 SERPL-SCNC: 21 MMOL/L (ref 20–33)
CREAT SERPL-MCNC: 1.22 MG/DL (ref 0.5–1.4)
GFR SERPLBLD CREATININE-BSD FMLA CKD-EPI: 60 ML/MIN/1.73 M 2
GLUCOSE SERPL-MCNC: 71 MG/DL (ref 65–99)
POTASSIUM SERPL-SCNC: 4 MMOL/L (ref 3.6–5.5)
SODIUM SERPL-SCNC: 138 MMOL/L (ref 135–145)

## 2022-08-10 PROCEDURE — 36415 COLL VENOUS BLD VENIPUNCTURE: CPT

## 2022-08-10 PROCEDURE — 80048 BASIC METABOLIC PNL TOTAL CA: CPT

## 2022-08-18 ENCOUNTER — APPOINTMENT (OUTPATIENT)
Dept: RADIOLOGY | Facility: MEDICAL CENTER | Age: 77
End: 2022-08-18
Attending: NEUROLOGICAL SURGERY
Payer: MEDICARE

## 2022-08-18 ENCOUNTER — ANESTHESIA EVENT (OUTPATIENT)
Dept: SURGERY | Facility: MEDICAL CENTER | Age: 77
End: 2022-08-18
Payer: MEDICARE

## 2022-08-18 ENCOUNTER — ANESTHESIA (OUTPATIENT)
Dept: SURGERY | Facility: MEDICAL CENTER | Age: 77
End: 2022-08-18
Payer: MEDICARE

## 2022-08-18 ENCOUNTER — HOSPITAL ENCOUNTER (OUTPATIENT)
Facility: MEDICAL CENTER | Age: 77
End: 2022-08-19
Attending: NEUROLOGICAL SURGERY | Admitting: NEUROLOGICAL SURGERY
Payer: MEDICARE

## 2022-08-18 DIAGNOSIS — M47.26 OTHER SPONDYLOSIS WITH RADICULOPATHY, LUMBAR REGION: ICD-10-CM

## 2022-08-18 DIAGNOSIS — G89.18 POST-OP PAIN: ICD-10-CM

## 2022-08-18 LAB
GLUCOSE BLD STRIP.AUTO-MCNC: 238 MG/DL (ref 65–99)
GLUCOSE BLD STRIP.AUTO-MCNC: 284 MG/DL (ref 65–99)
GLUCOSE BLD STRIP.AUTO-MCNC: 67 MG/DL (ref 65–99)

## 2022-08-18 PROCEDURE — 110454 HCHG SHELL REV 250: Performed by: NEUROLOGICAL SURGERY

## 2022-08-18 PROCEDURE — 96365 THER/PROPH/DIAG IV INF INIT: CPT | Mod: XU

## 2022-08-18 PROCEDURE — 160036 HCHG PACU - EA ADDL 30 MINS PHASE I: Performed by: NEUROLOGICAL SURGERY

## 2022-08-18 PROCEDURE — 700111 HCHG RX REV CODE 636 W/ 250 OVERRIDE (IP): Performed by: PHYSICIAN ASSISTANT

## 2022-08-18 PROCEDURE — 700101 HCHG RX REV CODE 250: Performed by: NEUROLOGICAL SURGERY

## 2022-08-18 PROCEDURE — 00630 ANES PX LUMBAR REGION NOS: CPT | Performed by: ANESTHESIOLOGY

## 2022-08-18 PROCEDURE — 160029 HCHG SURGERY MINUTES - 1ST 30 MINS LEVEL 4: Performed by: NEUROLOGICAL SURGERY

## 2022-08-18 PROCEDURE — 700101 HCHG RX REV CODE 250: Performed by: ANESTHESIOLOGY

## 2022-08-18 PROCEDURE — 82962 GLUCOSE BLOOD TEST: CPT

## 2022-08-18 PROCEDURE — 160009 HCHG ANES TIME/MIN: Performed by: NEUROLOGICAL SURGERY

## 2022-08-18 PROCEDURE — 96372 THER/PROPH/DIAG INJ SC/IM: CPT | Mod: XU

## 2022-08-18 PROCEDURE — 700102 HCHG RX REV CODE 250 W/ 637 OVERRIDE(OP): Performed by: ANESTHESIOLOGY

## 2022-08-18 PROCEDURE — 700105 HCHG RX REV CODE 258: Performed by: ANESTHESIOLOGY

## 2022-08-18 PROCEDURE — 95861 NEEDLE EMG 2 EXTREMITIES: CPT | Mod: XU | Performed by: NEUROLOGICAL SURGERY

## 2022-08-18 PROCEDURE — 95940 IONM IN OPERATNG ROOM 15 MIN: CPT | Mod: XU | Performed by: NEUROLOGICAL SURGERY

## 2022-08-18 PROCEDURE — 700111 HCHG RX REV CODE 636 W/ 250 OVERRIDE (IP): Performed by: ANESTHESIOLOGY

## 2022-08-18 PROCEDURE — 99100 ANES PT EXTEME AGE<1 YR&>70: CPT | Performed by: ANESTHESIOLOGY

## 2022-08-18 PROCEDURE — 160002 HCHG RECOVERY MINUTES (STAT): Performed by: NEUROLOGICAL SURGERY

## 2022-08-18 PROCEDURE — 95939 C MOTOR EVOKED UPR&LWR LIMBS: CPT | Mod: XU | Performed by: NEUROLOGICAL SURGERY

## 2022-08-18 PROCEDURE — 95955 EEG DURING SURGERY: CPT | Mod: XU | Performed by: NEUROLOGICAL SURGERY

## 2022-08-18 PROCEDURE — A9270 NON-COVERED ITEM OR SERVICE: HCPCS | Performed by: ANESTHESIOLOGY

## 2022-08-18 PROCEDURE — 700101 HCHG RX REV CODE 250: Performed by: PHYSICIAN ASSISTANT

## 2022-08-18 PROCEDURE — 72020 X-RAY EXAM OF SPINE 1 VIEW: CPT

## 2022-08-18 PROCEDURE — 95938 SOMATOSENSORY TESTING: CPT | Mod: XU | Performed by: NEUROLOGICAL SURGERY

## 2022-08-18 PROCEDURE — 160048 HCHG OR STATISTICAL LEVEL 1-5: Performed by: NEUROLOGICAL SURGERY

## 2022-08-18 PROCEDURE — C1755 CATHETER, INTRASPINAL: HCPCS | Performed by: NEUROLOGICAL SURGERY

## 2022-08-18 PROCEDURE — 700105 HCHG RX REV CODE 258: Performed by: NEUROLOGICAL SURGERY

## 2022-08-18 PROCEDURE — G0378 HOSPITAL OBSERVATION PER HR: HCPCS

## 2022-08-18 PROCEDURE — 700102 HCHG RX REV CODE 250 W/ 637 OVERRIDE(OP): Performed by: PHYSICIAN ASSISTANT

## 2022-08-18 PROCEDURE — 160041 HCHG SURGERY MINUTES - EA ADDL 1 MIN LEVEL 4: Performed by: NEUROLOGICAL SURGERY

## 2022-08-18 PROCEDURE — 160035 HCHG PACU - 1ST 60 MINS PHASE I: Performed by: NEUROLOGICAL SURGERY

## 2022-08-18 PROCEDURE — A9270 NON-COVERED ITEM OR SERVICE: HCPCS | Performed by: PHYSICIAN ASSISTANT

## 2022-08-18 PROCEDURE — 110371 HCHG SHELL REV 272: Performed by: NEUROLOGICAL SURGERY

## 2022-08-18 PROCEDURE — 700111 HCHG RX REV CODE 636 W/ 250 OVERRIDE (IP): Performed by: NEUROLOGICAL SURGERY

## 2022-08-18 PROCEDURE — 95937 NEUROMUSCULAR JUNCTION TEST: CPT | Mod: XU | Performed by: NEUROLOGICAL SURGERY

## 2022-08-18 RX ORDER — HYDROMORPHONE HYDROCHLORIDE 1 MG/ML
0.1 INJECTION, SOLUTION INTRAMUSCULAR; INTRAVENOUS; SUBCUTANEOUS
Status: DISCONTINUED | OUTPATIENT
Start: 2022-08-18 | End: 2022-08-18

## 2022-08-18 RX ORDER — AMOXICILLIN 250 MG
1 CAPSULE ORAL NIGHTLY
Status: DISCONTINUED | OUTPATIENT
Start: 2022-08-18 | End: 2022-08-19 | Stop reason: HOSPADM

## 2022-08-18 RX ORDER — LISINOPRIL 20 MG/1
40 TABLET ORAL DAILY
Status: DISCONTINUED | OUTPATIENT
Start: 2022-08-19 | End: 2022-08-19 | Stop reason: HOSPADM

## 2022-08-18 RX ORDER — ONDANSETRON 2 MG/ML
INJECTION INTRAMUSCULAR; INTRAVENOUS PRN
Status: DISCONTINUED | OUTPATIENT
Start: 2022-08-18 | End: 2022-08-18 | Stop reason: SURG

## 2022-08-18 RX ORDER — SODIUM CHLORIDE, SODIUM LACTATE, POTASSIUM CHLORIDE, CALCIUM CHLORIDE 600; 310; 30; 20 MG/100ML; MG/100ML; MG/100ML; MG/100ML
INJECTION, SOLUTION INTRAVENOUS CONTINUOUS
Status: ACTIVE | OUTPATIENT
Start: 2022-08-18 | End: 2022-08-18

## 2022-08-18 RX ORDER — VITAMIN B COMPLEX
1000 TABLET ORAL DAILY
Status: DISCONTINUED | OUTPATIENT
Start: 2022-08-19 | End: 2022-08-19 | Stop reason: HOSPADM

## 2022-08-18 RX ORDER — DEXTROSE MONOHYDRATE 100 MG/ML
INJECTION, SOLUTION INTRAVENOUS
Status: DISCONTINUED | OUTPATIENT
Start: 2022-08-18 | End: 2022-08-18 | Stop reason: SURG

## 2022-08-18 RX ORDER — OXYCODONE HYDROCHLORIDE 5 MG/1
5 TABLET ORAL EVERY 4 HOURS PRN
Status: DISCONTINUED | OUTPATIENT
Start: 2022-08-18 | End: 2022-08-19 | Stop reason: HOSPADM

## 2022-08-18 RX ORDER — SODIUM CHLORIDE, SODIUM LACTATE, POTASSIUM CHLORIDE, CALCIUM CHLORIDE 600; 310; 30; 20 MG/100ML; MG/100ML; MG/100ML; MG/100ML
INJECTION, SOLUTION INTRAVENOUS CONTINUOUS
Status: DISCONTINUED | OUTPATIENT
Start: 2022-08-18 | End: 2022-08-18

## 2022-08-18 RX ORDER — BRIMONIDINE TARTRATE AND TIMOLOL MALEATE 2; 5 MG/ML; MG/ML
1 SOLUTION OPHTHALMIC 2 TIMES DAILY
Status: DISCONTINUED | OUTPATIENT
Start: 2022-08-18 | End: 2022-08-18

## 2022-08-18 RX ORDER — POTASSIUM CITRATE 10 MEQ/1
10 TABLET, EXTENDED RELEASE ORAL EVERY EVENING
Status: DISCONTINUED | OUTPATIENT
Start: 2022-08-18 | End: 2022-08-19 | Stop reason: HOSPADM

## 2022-08-18 RX ORDER — HYDROMORPHONE HYDROCHLORIDE 1 MG/ML
0.4 INJECTION, SOLUTION INTRAMUSCULAR; INTRAVENOUS; SUBCUTANEOUS
Status: DISCONTINUED | OUTPATIENT
Start: 2022-08-18 | End: 2022-08-18

## 2022-08-18 RX ORDER — ONDANSETRON 2 MG/ML
4 INJECTION INTRAMUSCULAR; INTRAVENOUS
Status: COMPLETED | OUTPATIENT
Start: 2022-08-18 | End: 2022-08-18

## 2022-08-18 RX ORDER — ENOXAPARIN SODIUM 100 MG/ML
40 INJECTION SUBCUTANEOUS DAILY
Status: DISCONTINUED | OUTPATIENT
Start: 2022-08-19 | End: 2022-08-19 | Stop reason: HOSPADM

## 2022-08-18 RX ORDER — VANCOMYCIN HYDROCHLORIDE 1 G/20ML
INJECTION, POWDER, LYOPHILIZED, FOR SOLUTION INTRAVENOUS
Status: COMPLETED | OUTPATIENT
Start: 2022-08-18 | End: 2022-08-18

## 2022-08-18 RX ORDER — PREDNISONE 5 MG/1
5 TABLET ORAL 2 TIMES DAILY
Status: DISCONTINUED | OUTPATIENT
Start: 2022-08-18 | End: 2022-08-19 | Stop reason: HOSPADM

## 2022-08-18 RX ORDER — CEFAZOLIN SODIUM 2 G/100ML
2 INJECTION, SOLUTION INTRAVENOUS EVERY 8 HOURS
Status: DISCONTINUED | OUTPATIENT
Start: 2022-08-18 | End: 2022-08-19 | Stop reason: HOSPADM

## 2022-08-18 RX ORDER — GLIPIZIDE 5 MG/1
5 TABLET ORAL 2 TIMES DAILY
Status: DISCONTINUED | OUTPATIENT
Start: 2022-08-18 | End: 2022-08-19 | Stop reason: HOSPADM

## 2022-08-18 RX ORDER — EZETIMIBE 10 MG/1
10 TABLET ORAL DAILY
Status: DISCONTINUED | OUTPATIENT
Start: 2022-08-19 | End: 2022-08-19 | Stop reason: HOSPADM

## 2022-08-18 RX ORDER — HYDROMORPHONE HYDROCHLORIDE 2 MG/ML
INJECTION, SOLUTION INTRAMUSCULAR; INTRAVENOUS; SUBCUTANEOUS PRN
Status: DISCONTINUED | OUTPATIENT
Start: 2022-08-18 | End: 2022-08-18 | Stop reason: SURG

## 2022-08-18 RX ORDER — DEXAMETHASONE SODIUM PHOSPHATE 4 MG/ML
INJECTION, SOLUTION INTRA-ARTICULAR; INTRALESIONAL; INTRAMUSCULAR; INTRAVENOUS; SOFT TISSUE PRN
Status: DISCONTINUED | OUTPATIENT
Start: 2022-08-18 | End: 2022-08-18 | Stop reason: SURG

## 2022-08-18 RX ORDER — LABETALOL HYDROCHLORIDE 5 MG/ML
10 INJECTION, SOLUTION INTRAVENOUS
Status: DISCONTINUED | OUTPATIENT
Start: 2022-08-18 | End: 2022-08-19 | Stop reason: HOSPADM

## 2022-08-18 RX ORDER — ONDANSETRON 4 MG/1
4 TABLET, ORALLY DISINTEGRATING ORAL EVERY 4 HOURS PRN
Status: DISCONTINUED | OUTPATIENT
Start: 2022-08-18 | End: 2022-08-19 | Stop reason: HOSPADM

## 2022-08-18 RX ORDER — SODIUM CHLORIDE AND POTASSIUM CHLORIDE 150; 900 MG/100ML; MG/100ML
INJECTION, SOLUTION INTRAVENOUS CONTINUOUS
Status: DISCONTINUED | OUTPATIENT
Start: 2022-08-18 | End: 2022-08-19 | Stop reason: HOSPADM

## 2022-08-18 RX ORDER — OXYCODONE HCL 5 MG/5 ML
5 SOLUTION, ORAL ORAL
Status: COMPLETED | OUTPATIENT
Start: 2022-08-18 | End: 2022-08-18

## 2022-08-18 RX ORDER — ABIRATERONE ACETATE 250 MG/1
1000 TABLET ORAL DAILY
Status: DISCONTINUED | OUTPATIENT
Start: 2022-08-18 | End: 2022-08-18

## 2022-08-18 RX ORDER — GABAPENTIN 300 MG/1
300 CAPSULE ORAL 3 TIMES DAILY
Status: DISCONTINUED | OUTPATIENT
Start: 2022-08-18 | End: 2022-08-19 | Stop reason: HOSPADM

## 2022-08-18 RX ORDER — ENEMA 19; 7 G/133ML; G/133ML
1 ENEMA RECTAL
Status: DISCONTINUED | OUTPATIENT
Start: 2022-08-18 | End: 2022-08-19 | Stop reason: HOSPADM

## 2022-08-18 RX ORDER — MORPHINE SULFATE 4 MG/ML
4 INJECTION INTRAVENOUS
Status: DISCONTINUED | OUTPATIENT
Start: 2022-08-18 | End: 2022-08-19 | Stop reason: HOSPADM

## 2022-08-18 RX ORDER — HALOPERIDOL 5 MG/ML
1 INJECTION INTRAMUSCULAR
Status: DISCONTINUED | OUTPATIENT
Start: 2022-08-18 | End: 2022-08-18

## 2022-08-18 RX ORDER — CEFAZOLIN SODIUM 1 G/3ML
INJECTION, POWDER, FOR SOLUTION INTRAMUSCULAR; INTRAVENOUS
Status: DISCONTINUED | OUTPATIENT
Start: 2022-08-18 | End: 2022-08-18 | Stop reason: HOSPADM

## 2022-08-18 RX ORDER — METHOCARBAMOL 750 MG/1
750 TABLET, FILM COATED ORAL EVERY 8 HOURS PRN
Status: DISCONTINUED | OUTPATIENT
Start: 2022-08-18 | End: 2022-08-19 | Stop reason: HOSPADM

## 2022-08-18 RX ORDER — SUCCINYLCHOLINE CHLORIDE 20 MG/ML
INJECTION INTRAMUSCULAR; INTRAVENOUS PRN
Status: DISCONTINUED | OUTPATIENT
Start: 2022-08-18 | End: 2022-08-18 | Stop reason: SURG

## 2022-08-18 RX ORDER — SODIUM CHLORIDE, SODIUM LACTATE, POTASSIUM CHLORIDE, CALCIUM CHLORIDE 600; 310; 30; 20 MG/100ML; MG/100ML; MG/100ML; MG/100ML
INJECTION, SOLUTION INTRAVENOUS
Status: DISCONTINUED | OUTPATIENT
Start: 2022-08-18 | End: 2022-08-18 | Stop reason: SURG

## 2022-08-18 RX ORDER — POLYETHYLENE GLYCOL 3350 17 G/17G
1 POWDER, FOR SOLUTION ORAL 2 TIMES DAILY PRN
Status: DISCONTINUED | OUTPATIENT
Start: 2022-08-19 | End: 2022-08-19 | Stop reason: HOSPADM

## 2022-08-18 RX ORDER — BUPIVACAINE HYDROCHLORIDE AND EPINEPHRINE 5; 5 MG/ML; UG/ML
INJECTION, SOLUTION EPIDURAL; INTRACAUDAL; PERINEURAL
Status: DISCONTINUED | OUTPATIENT
Start: 2022-08-18 | End: 2022-08-18 | Stop reason: HOSPADM

## 2022-08-18 RX ORDER — CALCIUM CARBONATE 500 MG/1
500 TABLET, CHEWABLE ORAL 2 TIMES DAILY
Status: DISCONTINUED | OUTPATIENT
Start: 2022-08-18 | End: 2022-08-19 | Stop reason: HOSPADM

## 2022-08-18 RX ORDER — OMEPRAZOLE 20 MG/1
20 CAPSULE, DELAYED RELEASE ORAL DAILY
Status: DISCONTINUED | OUTPATIENT
Start: 2022-08-19 | End: 2022-08-19 | Stop reason: HOSPADM

## 2022-08-18 RX ORDER — DIPHENHYDRAMINE HYDROCHLORIDE 50 MG/ML
25 INJECTION INTRAMUSCULAR; INTRAVENOUS EVERY 6 HOURS PRN
Status: DISCONTINUED | OUTPATIENT
Start: 2022-08-18 | End: 2022-08-19 | Stop reason: HOSPADM

## 2022-08-18 RX ORDER — ACETAZOLAMIDE 125 MG/1
125 TABLET ORAL DAILY
Status: DISCONTINUED | OUTPATIENT
Start: 2022-08-19 | End: 2022-08-19 | Stop reason: HOSPADM

## 2022-08-18 RX ORDER — HYDROCODONE BITARTRATE AND ACETAMINOPHEN 5; 325 MG/1; MG/1
1 TABLET ORAL EVERY 4 HOURS PRN
Status: DISCONTINUED | OUTPATIENT
Start: 2022-08-18 | End: 2022-08-19 | Stop reason: HOSPADM

## 2022-08-18 RX ORDER — AMOXICILLIN 250 MG
1 CAPSULE ORAL
Status: DISCONTINUED | OUTPATIENT
Start: 2022-08-18 | End: 2022-08-19 | Stop reason: HOSPADM

## 2022-08-18 RX ORDER — CEFAZOLIN SODIUM 1 G/3ML
INJECTION, POWDER, FOR SOLUTION INTRAMUSCULAR; INTRAVENOUS PRN
Status: DISCONTINUED | OUTPATIENT
Start: 2022-08-18 | End: 2022-08-18 | Stop reason: SURG

## 2022-08-18 RX ORDER — LATANOPROST 50 UG/ML
1 SOLUTION/ DROPS OPHTHALMIC NIGHTLY
Status: DISCONTINUED | OUTPATIENT
Start: 2022-08-18 | End: 2022-08-19 | Stop reason: HOSPADM

## 2022-08-18 RX ORDER — TIMOLOL MALEATE 5 MG/ML
1 SOLUTION/ DROPS OPHTHALMIC 2 TIMES DAILY
Status: DISCONTINUED | OUTPATIENT
Start: 2022-08-19 | End: 2022-08-19 | Stop reason: HOSPADM

## 2022-08-18 RX ORDER — BRIMONIDINE TARTRATE 2 MG/ML
1 SOLUTION/ DROPS OPHTHALMIC EVERY 12 HOURS
Status: DISCONTINUED | OUTPATIENT
Start: 2022-08-19 | End: 2022-08-19 | Stop reason: HOSPADM

## 2022-08-18 RX ORDER — ATORVASTATIN CALCIUM 40 MG/1
80 TABLET, FILM COATED ORAL EVERY EVENING
Status: DISCONTINUED | OUTPATIENT
Start: 2022-08-18 | End: 2022-08-19 | Stop reason: HOSPADM

## 2022-08-18 RX ORDER — HYDROMORPHONE HYDROCHLORIDE 1 MG/ML
0.2 INJECTION, SOLUTION INTRAMUSCULAR; INTRAVENOUS; SUBCUTANEOUS
Status: DISCONTINUED | OUTPATIENT
Start: 2022-08-18 | End: 2022-08-18

## 2022-08-18 RX ORDER — ACETAMINOPHEN 500 MG
1200 TABLET ORAL DAILY
Status: DISCONTINUED | OUTPATIENT
Start: 2022-08-18 | End: 2022-08-18

## 2022-08-18 RX ORDER — DIPHENHYDRAMINE HCL 25 MG
25 TABLET ORAL EVERY 6 HOURS PRN
Status: DISCONTINUED | OUTPATIENT
Start: 2022-08-18 | End: 2022-08-19 | Stop reason: HOSPADM

## 2022-08-18 RX ORDER — ONDANSETRON 2 MG/ML
4 INJECTION INTRAMUSCULAR; INTRAVENOUS EVERY 4 HOURS PRN
Status: DISCONTINUED | OUTPATIENT
Start: 2022-08-18 | End: 2022-08-19 | Stop reason: HOSPADM

## 2022-08-18 RX ORDER — MEPERIDINE HYDROCHLORIDE 25 MG/ML
12.5 INJECTION INTRAMUSCULAR; INTRAVENOUS; SUBCUTANEOUS
Status: DISCONTINUED | OUTPATIENT
Start: 2022-08-18 | End: 2022-08-18

## 2022-08-18 RX ORDER — BISACODYL 10 MG
10 SUPPOSITORY, RECTAL RECTAL
Status: DISCONTINUED | OUTPATIENT
Start: 2022-08-18 | End: 2022-08-19 | Stop reason: HOSPADM

## 2022-08-18 RX ORDER — OXYCODONE HCL 5 MG/5 ML
10 SOLUTION, ORAL ORAL
Status: COMPLETED | OUTPATIENT
Start: 2022-08-18 | End: 2022-08-18

## 2022-08-18 RX ORDER — KETAMINE HYDROCHLORIDE 50 MG/ML
INJECTION, SOLUTION INTRAMUSCULAR; INTRAVENOUS PRN
Status: DISCONTINUED | OUTPATIENT
Start: 2022-08-18 | End: 2022-08-18 | Stop reason: SURG

## 2022-08-18 RX ORDER — ACETAMINOPHEN 500 MG
500 TABLET ORAL EVERY 4 HOURS PRN
Status: DISCONTINUED | OUTPATIENT
Start: 2022-08-18 | End: 2022-08-19 | Stop reason: HOSPADM

## 2022-08-18 RX ORDER — ACETAMINOPHEN 500 MG
1500 TABLET ORAL EVERY 4 HOURS PRN
Status: ON HOLD | COMMUNITY
End: 2022-08-19

## 2022-08-18 RX ORDER — DOCUSATE SODIUM 100 MG/1
100 CAPSULE, LIQUID FILLED ORAL 2 TIMES DAILY
Status: DISCONTINUED | OUTPATIENT
Start: 2022-08-18 | End: 2022-08-19 | Stop reason: HOSPADM

## 2022-08-18 RX ADMIN — POTASSIUM CHLORIDE AND SODIUM CHLORIDE: 900; 150 INJECTION, SOLUTION INTRAVENOUS at 14:45

## 2022-08-18 RX ADMIN — PREDNISONE 5 MG: 5 TABLET ORAL at 17:19

## 2022-08-18 RX ADMIN — ONDANSETRON 4 MG: 2 INJECTION INTRAMUSCULAR; INTRAVENOUS at 09:50

## 2022-08-18 RX ADMIN — GLIPIZIDE 5 MG: 5 TABLET ORAL at 17:19

## 2022-08-18 RX ADMIN — GABAPENTIN 300 MG: 300 CAPSULE ORAL at 14:45

## 2022-08-18 RX ADMIN — ONDANSETRON 4 MG: 2 INJECTION INTRAMUSCULAR; INTRAVENOUS at 07:01

## 2022-08-18 RX ADMIN — DEXAMETHASONE SODIUM PHOSPHATE 8 MG: 4 INJECTION, SOLUTION INTRA-ARTICULAR; INTRALESIONAL; INTRAMUSCULAR; INTRAVENOUS; SOFT TISSUE at 07:01

## 2022-08-18 RX ADMIN — SUCCINYLCHOLINE CHLORIDE 100 MG: 20 INJECTION, SOLUTION INTRAMUSCULAR; INTRAVENOUS; PARENTERAL at 07:01

## 2022-08-18 RX ADMIN — METHOCARBAMOL 750 MG: 750 TABLET ORAL at 21:19

## 2022-08-18 RX ADMIN — DEXTROSE MONOHYDRATE: 100 INJECTION, SOLUTION INTRAVENOUS at 07:01

## 2022-08-18 RX ADMIN — SODIUM CHLORIDE, POTASSIUM CHLORIDE, SODIUM LACTATE AND CALCIUM CHLORIDE: 600; 310; 30; 20 INJECTION, SOLUTION INTRAVENOUS at 06:58

## 2022-08-18 RX ADMIN — OXYCODONE 5 MG: 5 TABLET ORAL at 17:18

## 2022-08-18 RX ADMIN — POTASSIUM CITRATE 10 MEQ: 10 TABLET, EXTENDED RELEASE ORAL at 18:23

## 2022-08-18 RX ADMIN — HYDROMORPHONE HYDROCHLORIDE 0.5 MG: 2 INJECTION INTRAMUSCULAR; INTRAVENOUS; SUBCUTANEOUS at 09:01

## 2022-08-18 RX ADMIN — SODIUM CHLORIDE, POTASSIUM CHLORIDE, SODIUM LACTATE AND CALCIUM CHLORIDE: 600; 310; 30; 20 INJECTION, SOLUTION INTRAVENOUS at 06:34

## 2022-08-18 RX ADMIN — SODIUM CHLORIDE, POTASSIUM CHLORIDE, SODIUM LACTATE AND CALCIUM CHLORIDE: 600; 310; 30; 20 INJECTION, SOLUTION INTRAVENOUS at 09:51

## 2022-08-18 RX ADMIN — CEFAZOLIN SODIUM 2 G: 2 INJECTION, SOLUTION INTRAVENOUS at 14:52

## 2022-08-18 RX ADMIN — GABAPENTIN 300 MG: 300 CAPSULE ORAL at 17:19

## 2022-08-18 RX ADMIN — CEFAZOLIN SODIUM 2 G: 2 INJECTION, SOLUTION INTRAVENOUS at 21:19

## 2022-08-18 RX ADMIN — EPHEDRINE SULFATE 10 MG: 50 INJECTION, SOLUTION INTRAVENOUS at 07:16

## 2022-08-18 RX ADMIN — ATORVASTATIN CALCIUM 80 MG: 40 TABLET, FILM COATED ORAL at 17:19

## 2022-08-18 RX ADMIN — EPHEDRINE SULFATE 15 MG: 50 INJECTION, SOLUTION INTRAVENOUS at 07:31

## 2022-08-18 RX ADMIN — INSULIN HUMAN 2 UNITS: 100 INJECTION, SOLUTION PARENTERAL at 21:25

## 2022-08-18 RX ADMIN — INSULIN HUMAN 3 UNITS: 100 INJECTION, SOLUTION PARENTERAL at 17:25

## 2022-08-18 RX ADMIN — PROPOFOL 75 MCG/KG/MIN: 10 INJECTION, EMULSION INTRAVENOUS at 07:19

## 2022-08-18 RX ADMIN — OXYCODONE HYDROCHLORIDE 10 MG: 5 SOLUTION ORAL at 09:50

## 2022-08-18 RX ADMIN — KETAMINE HYDROCHLORIDE 50 MG: 50 INJECTION INTRAMUSCULAR; INTRAVENOUS at 07:27

## 2022-08-18 RX ADMIN — CEFAZOLIN 2 G: 330 INJECTION, POWDER, FOR SOLUTION INTRAMUSCULAR; INTRAVENOUS at 07:01

## 2022-08-18 RX ADMIN — HYDROMORPHONE HYDROCHLORIDE 0.5 MG: 2 INJECTION INTRAMUSCULAR; INTRAVENOUS; SUBCUTANEOUS at 08:46

## 2022-08-18 RX ADMIN — LATANOPROST 1 DROP: 50 SOLUTION OPHTHALMIC at 21:19

## 2022-08-18 ASSESSMENT — PAIN DESCRIPTION - PAIN TYPE
TYPE: SURGICAL PAIN
TYPE: ACUTE PAIN;SURGICAL PAIN
TYPE: SURGICAL PAIN
TYPE: ACUTE PAIN;SURGICAL PAIN
TYPE: SURGICAL PAIN

## 2022-08-18 ASSESSMENT — FIBROSIS 4 INDEX: FIB4 SCORE: 1.72

## 2022-08-18 NOTE — OR NURSING
Pre-op assessment complete, VSS stable, pt and family updated on POC.     Notified Dr. Gansert fsbs 67, asymptomatic. Order for amp D50, will give in OR.    Call light within reach. Denies needs at this time.

## 2022-08-18 NOTE — OP REPORT
DATE OF SERVICE:  08/18/2022     PREOPERATIVE DIAGNOSES:   1.  Left L3-4 radiculopathies.  2.  Neurogenic claudication.  3.  L2-L5 lumbar stenosis with bilateral recess stenosis.  4.  Failed conservative care.     POSTOPERATIVE DIAGNOSES:    1.  Left L3-4 radiculopathies.  2.  Neurogenic claudication.  3.  L2-L5 lumbar stenosis with bilateral recess stenosis.  4.  Failed conservative care.     PROCEDURES:   1.  L2 through L5 decompressive lumbar laminectomies with bilateral   foraminotomies.  2.  Left L3 transpedicular approach far lateral decompression of left L3 nerve   root.  3.  Left L4 transpedicular approach far lateral decompression of left L4 nerve   root.  4.  Microscope for microdissection of spinal canal.  5.  SSEPs, EMG monitoring performed by neuro monitoring associates, which   remained stable throughout.     SURGEON:  Selvin Ying MD, Neurosurgery and Spine Surgery.     ASSISTANT:  Justine Dahl PA-C     ANESTHESIA:  General endotracheal anesthesia.     ANESTHESIOLOGIST:  Tobey B. Gansert, MD     COMPLICATIONS:  None.     ESTIMATED BLOOD LOSS:  Less than 50 mL.     PREOPERATIVE NOTE:  Extremely pleasant 77-year-old male who presents with   neurogenic claudication, left L3-4 radiculopathies, all related to severe   lumbar stenosis, L2-L5 levels.  The worst level is L3-4, all fitting with the   patient's clinical presentation and neurological deficits, the patient failed   extensive conservative care including therapy and injections and given this   and the patient's quality of life and neurological deficits, I offered the   patient a simple decompressive lumbar laminectomy.  I discussed surgical   procedure, alternatives, goals, risks, benefits, complications in detail with   the patient, used a bone model, MRI and educational handouts to assist the   patient with decision making.  We used [facility] custom 3D animations to   also assist the patient with his understanding surgical procedure.   The   patient understood and consented to surgery.     DESCRIPTION OF PROCEDURE:  The patient was brought to the operating room and   placed under general anesthesia.  He was placed prone on the operating OSI   table with care taken about the bony prominences, peripheral nerves.  Lumbar   region was prepped and draped in the usual sterile fashion.  Following   localization of cross table fluoroscopy, a small limited midline incision was   made over L3-4 and at the dorsal elements of L2-L5 were exposed in   subperiosteal fashion out to the facets bilaterally.  Self-retaining retractor   applied.  Intraoperative x-ray confirmed appropriate levels.  Decompressive   lumbar laminectomies of L2, L3, L4, L5 were completed.  Marked facet   ligamentous hypertrophy was undercut and removed.  Microscope was used for   microdissection of spinal canal.  Bilateral wide foraminotomies were   completed.  Huge ligamentum hypertrophy was undercut and removed.  There was   severe stenosis in left side at L3-4 compressing the nerve roots.  Hence, I   drilled down the left L3 pedicle for transpedicular decompression of left L3   nerve root.  Separately and distinctly, I drilled down the left L4 pedicle for   transpedicular decompression of left L4 nerve root.  This required extra   degree of expertise, effort and time, hence a modifier-59 is required for CPT   codes 48112-27822.  The edges of bone were bone waxed.  Thrombin Gelfoam   placed in epidural gutters for hemostasis.  Miller ball hook was easily placed   over the exiting nerve roots.  The wound was irrigated, hemostasis achieved.    Thecal sac and nerve roots were nice and freed up throughout.     I then placed an epidural catheter with Marcaine and fentanyl for   postoperative analgesia, infiltrated the muscle with local anesthetic.  I   placed thrombin Gelfoam in the epidural gutters and then placed vancomycin   powder in the wound after epidural was given for pain control.   The wound was   then closed over a drain brought through a separate incision with 0 Vicryl   deep fascia, 2-0 Vicryl deep dermal layer, Steri-Strips to skin.  Small   sterile dressing was applied.  Swabs, needles, instruments correct by two   count.  No complications were encountered.  The patient tolerated the   procedure, was stable and transferred to recovery room.  The patient will be   observed at [facility] until he meets discharge criteria.    The patient will follow up at [facility] in 2 weeks and 6 weeks' time as   arranged.     INTRAOPERATIVE FINDINGS:  Severe stenosis, L2-L5 levels, which was   decompressed as described.  No complications were encountered.        ______________________________  VICTORIA LIU MD    JJL/NIT    DD:  08/18/2022 09:40  DT:  08/18/2022 10:05    Job#:  [id number]    CC:Moni Fragoso MD(User)  Kemal Mueller

## 2022-08-18 NOTE — ANESTHESIA PREPROCEDURE EVALUATION
Case: [id number] Date/Time: 08/18/22 0700    Procedure: LUMBAR 2 - SACRAL 1 LAMINECTOMY    Diagnosis:       Lumbar stenosis [M48.061]      Radiculopathy [M54.10]      Lumbosacral spondylosis with radiculopathy [M47.27]    Pre-op diagnosis: LUMBAR STENOSIS, RADICULOPATHY AND SPONDYLOSIS    Location: [facility] OR  / SURGERY [facility]    Surgeons: Selvin Ying M.D.          Relevant Problems   NEURO   (positive) History of malignant neoplasm of kidney   (positive) History of urinary stone      CARDIAC   (positive) Essential hypertension, benign      GI   (positive) GERD (gastroesophageal reflux disease)         (positive) CKD (chronic kidney disease), stage III (HCC)   (positive) Chronic kidney disease   (positive) Nephrolithiasis      Other   (positive) Arthritis   (positive) 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HCC)       Physical Exam    Airway   Mallampati: II  TM distance: >3 FB  Neck ROM: full       Cardiovascular - normal exam  Rhythm: regular  Rate: normal  (-) murmur     Dental - normal exam           Pulmonary - normal exam  Breath sounds clear to auscultation     Abdominal    Neurological - normal exam                 Anesthesia Plan    ASA 2       Plan - general       Airway plan will be ETT          Induction: intravenous    Postoperative Plan: Postoperative administration of opioids is intended.    Pertinent diagnostic labs and testing reviewed    Informed Consent:    Anesthetic plan and risks discussed with patient.    Use of blood products discussed with: patient whom consented to blood products.

## 2022-08-18 NOTE — ANESTHESIA PROCEDURE NOTES
Airway    Date/Time: 8/18/2022 7:03 AM  Performed by: Tobey Gansert, M.D.  Authorized by: Tobey Gansert, M.D.     Location:  OR  Urgency:  Elective  Indications for Airway Management:  Anesthesia      Spontaneous Ventilation: absent    Sedation Level:  Deep  Preoxygenated: Yes    Patient Position:  Sniffing  Final Airway Type:  Endotracheal airway  Final Endotracheal Airway:  ETT  Cuffed: Yes    Technique Used for Successful ETT Placement:  Direct laryngoscopy    Insertion Site:  Oral  Blade Type:  Jarrett  Laryngoscope Blade/Videolaryngoscope Blade Size:  3  ETT Size (mm):  8.0  Measured from:  Teeth  ETT to Teeth (cm):  24  Placement Verified by: auscultation and capnometry    Cormack-Lehane Classification:  Grade I - full view of glottis  Number of Attempts at Approach:  1

## 2022-08-18 NOTE — ANESTHESIA TIME REPORT
Anesthesia Start and Stop Event Times     Date Time Event    8/18/2022 0650 Ready for Procedure     0658 Anesthesia Start     0908 Anesthesia Stop        Responsible Staff  08/18/22    Name Role Begin End    Tobey Gansert, M.D. Anesth 0658 0908        Overtime Reason:  no overtime (within assigned shift)    Comments:

## 2022-08-18 NOTE — OR NURSING
0908 Pt arrived from OR via [facility]. Report given by anesthesia and RN. Sleeping, perrla 6L mask, opa, even non labored breathing, lungs clear, airway patent. SR. Skin pink warm dry. PIV patent. Back dressing cdi no drainage, no hematoma. X1 hemovac to compression, minimal sanguinous drainage. Dentures in chart.    0915 no changes.     0933 arousable, gag reflex intact. Opa removed. Spontaneous even non labored breathing.     0936 Justine SINGH at bedside. Follows commands. BLE wiggles toes, moderate dorsi flex/ext, lifts BLE. DENIES numbness tingling.     0940 clear speech, follows commands, oriented x4.     0950 awake, c/o pain and nausea. Treated per mar    1003 sleeping, face relaxed, even non labored breathing, skin pink warm dry.     1030 no changes.     1042 hemovac 20cc bloody drainage.     1059 updated Karen, spouse. O2 tank full for transfer. Personal belongings with pt .    1103 awake denies pain and nausea, resting comfortably with eyes open.     1152 no changes.     1153 pt talking to Karen, spouse on smart phone.     1211 Karen, spouse at bedside. Pt drinking water    1229 hemovac 40cc sanguinous drainage.     1230 Back dressing cdi no drainage, no hematoma. BLE neuro and cms intact.     1240 pt transferred to bed. Given lunch.     1246 hand off to mal rn    1315 resumed care of pt awake alert, eating lunch. RA even non labored breathing    1415 updated Karen, spouse.     1500 resting comfortably.     1600 report given to barry ORELLANA, T331. Pt sitting edge of bed. Awake alert, 2L nc even non labored breathing. Denies pain nausea. Ready for transfer to room.

## 2022-08-18 NOTE — OR SURGEON
Immediate Post OP Note    PreOp Diagnosis: lumbar stenosis, spondylosis, radiculopathy, low back pain, neurogenic claudication       PostOp Diagnosis: same       Procedure(s):  L2-L5 LAMINECTOMY - Wound Class: Clean with Drain    Surgeon(s):  Selvin Ying M.D.    Anesthesiologist/Type of Anesthesia:  Anesthesiologist: Tobey Gansert, M.D./General    Surgical Staff:  Assistant: Justine Dahl P.A.-C.  Circulator: America Fuentes R.N.  Relief Scrub: Yany Ace  Scrub Person: Natalie Perdomo [place]: Trenton Munoz  Radiology Technologist: Babar Sosa    Specimens removed if any:  * No specimens in log *    Estimated Blood Loss: <100cc    Findings: went well, see dictation     Complications: none         8/18/2022 9:20 AM Justien Dahl P.A.-C.

## 2022-08-18 NOTE — ANESTHESIA POSTPROCEDURE EVALUATION
Patient: Kemal Mueller    Procedure Summary     Date: 08/18/22 Room / Location: [place] [address] / SURGERY [facility]    Anesthesia Start: 0658 Anesthesia Stop: 0908    Procedure: LUMBAR 2 - SACRAL 1 LAMINECTOMY (Back) Diagnosis:       Lumbar stenosis      Radiculopathy      Lumbosacral spondylosis with radiculopathy      (LUMBAR STENOSIS, RADICULOPATHY AND SPONDYLOSIS RADICULOPATHY AND SPONDYLOSIS)    Surgeons: Selvin Ying M.D. Responsible Provider: Tobey Gansert, M.D.    Anesthesia Type: general ASA Status: 2          Final Anesthesia Type: general  Last vitals  BP   Blood Pressure : 100/61    Temp   36.6 °C (97.9 °F)    Pulse   79   Resp   16    SpO2   96 %      Anesthesia Post Evaluation    Patient location during evaluation: PACU  Patient participation: complete - patient participated  Level of consciousness: awake and alert    Airway patency: patent  Anesthetic complications: no  Cardiovascular status: hemodynamically stable  Respiratory status: acceptable  Hydration status: euvolemic    PONV: none          No notable events documented.     Nurse Pain Score: 0 (NPRS)

## 2022-08-19 VITALS
DIASTOLIC BLOOD PRESSURE: 74 MMHG | TEMPERATURE: 96.8 F | HEIGHT: 67 IN | OXYGEN SATURATION: 95 % | BODY MASS INDEX: 27.3 KG/M2 | RESPIRATION RATE: 17 BRPM | HEART RATE: 93 BPM | WEIGHT: 173.94 LBS | SYSTOLIC BLOOD PRESSURE: 130 MMHG

## 2022-08-19 LAB
ANION GAP SERPL CALC-SCNC: 10 MMOL/L (ref 7–16)
BUN SERPL-MCNC: 27 MG/DL (ref 8–22)
CALCIUM SERPL-MCNC: 8.9 MG/DL (ref 8.5–10.5)
CHLORIDE SERPL-SCNC: 107 MMOL/L (ref 96–112)
CO2 SERPL-SCNC: 21 MMOL/L (ref 20–33)
CREAT SERPL-MCNC: 1.26 MG/DL (ref 0.5–1.4)
ERYTHROCYTE [DISTWIDTH] IN BLOOD BY AUTOMATED COUNT: 50 FL (ref 35.9–50)
GFR SERPLBLD CREATININE-BSD FMLA CKD-EPI: 58 ML/MIN/1.73 M 2
GLUCOSE BLD STRIP.AUTO-MCNC: 121 MG/DL (ref 65–99)
GLUCOSE BLD STRIP.AUTO-MCNC: 161 MG/DL (ref 65–99)
GLUCOSE SERPL-MCNC: 178 MG/DL (ref 65–99)
HCT VFR BLD AUTO: 34.3 % (ref 42–52)
HGB BLD-MCNC: 10.8 G/DL (ref 14–18)
MCH RBC QN AUTO: 30.9 PG (ref 27–33)
MCHC RBC AUTO-ENTMCNC: 31.5 G/DL (ref 33.7–35.3)
MCV RBC AUTO: 98 FL (ref 81.4–97.8)
PLATELET # BLD AUTO: 155 K/UL (ref 164–446)
PMV BLD AUTO: 10.2 FL (ref 9–12.9)
POTASSIUM SERPL-SCNC: 4.9 MMOL/L (ref 3.6–5.5)
RBC # BLD AUTO: 3.5 M/UL (ref 4.7–6.1)
SODIUM SERPL-SCNC: 138 MMOL/L (ref 135–145)
WBC # BLD AUTO: 11.6 K/UL (ref 4.8–10.8)

## 2022-08-19 PROCEDURE — 97162 PT EVAL MOD COMPLEX 30 MIN: CPT

## 2022-08-19 PROCEDURE — 85027 COMPLETE CBC AUTOMATED: CPT

## 2022-08-19 PROCEDURE — A9270 NON-COVERED ITEM OR SERVICE: HCPCS | Performed by: PHYSICIAN ASSISTANT

## 2022-08-19 PROCEDURE — G0378 HOSPITAL OBSERVATION PER HR: HCPCS

## 2022-08-19 PROCEDURE — 80048 BASIC METABOLIC PNL TOTAL CA: CPT

## 2022-08-19 PROCEDURE — 700111 HCHG RX REV CODE 636 W/ 250 OVERRIDE (IP): Performed by: PHYSICIAN ASSISTANT

## 2022-08-19 PROCEDURE — 97165 OT EVAL LOW COMPLEX 30 MIN: CPT

## 2022-08-19 PROCEDURE — 700102 HCHG RX REV CODE 250 W/ 637 OVERRIDE(OP): Performed by: PHYSICIAN ASSISTANT

## 2022-08-19 PROCEDURE — 700101 HCHG RX REV CODE 250: Performed by: PHYSICIAN ASSISTANT

## 2022-08-19 PROCEDURE — 82962 GLUCOSE BLOOD TEST: CPT | Mod: 91

## 2022-08-19 PROCEDURE — 96372 THER/PROPH/DIAG INJ SC/IM: CPT | Mod: XU

## 2022-08-19 RX ORDER — METHOCARBAMOL 750 MG/1
750 TABLET, FILM COATED ORAL EVERY 8 HOURS PRN
Qty: 90 TABLET | Refills: 0 | Status: SHIPPED | OUTPATIENT
Start: 2022-08-19 | End: 2022-09-18

## 2022-08-19 RX ORDER — HYDROCODONE BITARTRATE AND ACETAMINOPHEN 5; 325 MG/1; MG/1
1 TABLET ORAL EVERY 4 HOURS PRN
Qty: 56 TABLET | Refills: 0 | Status: SHIPPED | OUTPATIENT
Start: 2022-08-19 | End: 2022-09-02

## 2022-08-19 RX ADMIN — EZETIMIBE 10 MG: 10 TABLET ORAL at 06:10

## 2022-08-19 RX ADMIN — INSULIN HUMAN 1 UNITS: 100 INJECTION, SOLUTION PARENTERAL at 06:17

## 2022-08-19 RX ADMIN — GABAPENTIN 300 MG: 300 CAPSULE ORAL at 09:12

## 2022-08-19 RX ADMIN — HYDROCODONE BITARTRATE AND ACETAMINOPHEN 1 TABLET: 5; 325 TABLET ORAL at 01:59

## 2022-08-19 RX ADMIN — Medication 1000 UNITS: at 06:10

## 2022-08-19 RX ADMIN — PREDNISONE 5 MG: 5 TABLET ORAL at 09:12

## 2022-08-19 RX ADMIN — CALCIUM CARBONATE 500 MG: 500 TABLET, CHEWABLE ORAL at 06:10

## 2022-08-19 RX ADMIN — GABAPENTIN 300 MG: 300 CAPSULE ORAL at 01:56

## 2022-08-19 RX ADMIN — BRIMONIDINE TARTRATE 1 DROP: 2 SOLUTION OPHTHALMIC at 06:10

## 2022-08-19 RX ADMIN — CEFAZOLIN SODIUM 2 G: 2 INJECTION, SOLUTION INTRAVENOUS at 06:11

## 2022-08-19 RX ADMIN — ENOXAPARIN SODIUM 40 MG: 40 INJECTION SUBCUTANEOUS at 09:13

## 2022-08-19 RX ADMIN — OMEPRAZOLE 20 MG: 20 CAPSULE, DELAYED RELEASE ORAL at 06:10

## 2022-08-19 RX ADMIN — TIMOLOL MALEATE 1 DROP: 5 SOLUTION OPHTHALMIC at 06:11

## 2022-08-19 RX ADMIN — GLIPIZIDE 5 MG: 5 TABLET ORAL at 06:10

## 2022-08-19 RX ADMIN — ACETAZOLAMIDE 125 MG: 125 TABLET ORAL at 06:11

## 2022-08-19 RX ADMIN — POTASSIUM CHLORIDE AND SODIUM CHLORIDE: 900; 150 INJECTION, SOLUTION INTRAVENOUS at 06:11

## 2022-08-19 RX ADMIN — HYDROCODONE BITARTRATE AND ACETAMINOPHEN 1 TABLET: 5; 325 TABLET ORAL at 09:13

## 2022-08-19 ASSESSMENT — COGNITIVE AND FUNCTIONAL STATUS - GENERAL
STANDING UP FROM CHAIR USING ARMS: A LITTLE
MOVING TO AND FROM BED TO CHAIR: A LITTLE
MOBILITY SCORE: 18
MOBILITY SCORE: 20
PERSONAL GROOMING: A LITTLE
DRESSING REGULAR LOWER BODY CLOTHING: A LITTLE
SUGGESTED CMS G CODE MODIFIER MOBILITY: CJ
HELP NEEDED FOR BATHING: A LITTLE
MOVING FROM LYING ON BACK TO SITTING ON SIDE OF FLAT BED: A LITTLE
DAILY ACTIVITIY SCORE: 21
MOVING FROM LYING ON BACK TO SITTING ON SIDE OF FLAT BED: A LITTLE
STANDING UP FROM CHAIR USING ARMS: A LITTLE
WALKING IN HOSPITAL ROOM: A LITTLE
MOVING TO AND FROM BED TO CHAIR: A LITTLE
SUGGESTED CMS G CODE MODIFIER MOBILITY: CK
SUGGESTED CMS G CODE MODIFIER DAILY ACTIVITY: CI
CLIMB 3 TO 5 STEPS WITH RAILING: A LITTLE
TURNING FROM BACK TO SIDE WHILE IN FLAT BAD: A LITTLE
CLIMB 3 TO 5 STEPS WITH RAILING: A LITTLE
SUGGESTED CMS G CODE MODIFIER DAILY ACTIVITY: CJ
DAILY ACTIVITIY SCORE: 23
DRESSING REGULAR LOWER BODY CLOTHING: A LITTLE

## 2022-08-19 ASSESSMENT — GAIT ASSESSMENTS
DISTANCE (FEET): 150
ASSISTIVE DEVICE: FRONT WHEEL WALKER
GAIT LEVEL OF ASSIST: SUPERVISED
DEVIATION: BRADYKINETIC;ATAXIC

## 2022-08-19 ASSESSMENT — ACTIVITIES OF DAILY LIVING (ADL): TOILETING: INDEPENDENT

## 2022-08-19 ASSESSMENT — PAIN DESCRIPTION - PAIN TYPE: TYPE: ACUTE PAIN;SURGICAL PAIN

## 2022-08-19 NOTE — THERAPY
Physical Therapy   Initial Evaluation     Patient Name: Kemal Mueller  Age:  77 y.o., Sex:  male  Medical Record #: [id number]  Today's Date: 8/19/2022     Precautions  Precautions: Fall Risk;Spinal / Back Precautions   Comments: no brace per order    Assessment  Mr. Mueller is a 76 y/o male who presents to acute secondary to L2-5 laminectomy. Reviewed spinal precautions, pt demonstrated understanding. Increased numbness in R LE compared to L, however able to discern light touch. Pt with slight buckling in B knees. Cues to emphasize heel contact to lock knee in SLS, able to follow these cues and buckling ceased. FWW used for balance. Pt able to perform gait and transfers without physical assist. Reviewed activity recommendations upon discharge, pt demonstrated understanding.    Plan    Recommend Physical Therapy for Evaluation only     DC Equipment Recommendations: None  Discharge Recommendations: Recommend outpatient physical therapy services to address higher level deficits          Objective       08/19/22 0930   Prior Living Situation   Prior Services None   Housing / Facility 1 Story House   Steps Into Home 1   Equipment Owned Front-Wheel Walker   Lives with - Patient's Self Care Capacity Spouse   Prior Level of Functional Mobility   Bed Mobility Independent   Transfer Status Independent   Ambulation Independent   Distance Ambulation (Feet) 150   Assistive Devices Used Front-Wheel Walker   Stairs Independent   Cognition    Level of Consciousness Alert   Comments pleasant and agreeable   Passive ROM Lower Body   Passive ROM Lower Body WDL   Active ROM Lower Body    Active ROM Lower Body  WDL   Strength Lower Body   Lower Body Strength  X   Comments R LE 4-/5, L LE 4/5   Sensation Lower Body   Lower Extremity Sensation   X   Comments Increased numbness R compared to L, however able to discern light touch in all dermatomes   Balance Assessment   Sitting Balance (Static) Good   Sitting Balance (Dynamic) Good    Standing Balance (Static) Fair +   Standing Balance (Dynamic) Fair   Weight Shift Sitting Good   Weight Shift Standing Fair   Comments FWW   Gait Analysis   Gait Level Of Assist Supervised   Assistive Device Front Wheel Walker   Distance (Feet) 150   # of Times Distance was Traveled 1   Deviation Bradykinetic;Ataxic   Weight Bearing Status no restrictions   Bed Mobility    Supine to Sit   (up in chair)   Sit to Supine   (up in chair)   Functional Mobility   Sit to Stand Supervised   Bed, Chair, Wheelchair Transfer Supervised

## 2022-08-19 NOTE — PROGRESS NOTES
Neurosurgery Progress Note    Subjective:  POD#1 L2-5 laminectomies     He is doing well today.  Reports minimal surgical back pain, reports radicular pain is improving.  Hemovac drain with minimal drainage.   He has been oob and ambulating, worked with therapies.       Exam:  Pleasant and cooperative, in no acute distress.  Motor strength 5/5 upper extremities.  Sensation intact   Dressing c/d/I. Hemovac drain functioning.       BP  Min: 103/61  Max: 139/67  Pulse  Av.6  Min: 70  Max: 106  Resp  Av.1  Min: 15  Max: 20  Temp  Av.6 °C (97.9 °F)  Min: 36 °C (96.8 °F)  Max: 37.4 °C (99.4 °F)  SpO2  Av.5 %  Min: 90 %  Max: 98 %    No data recorded    Recent Labs     22  0415   WBC 11.6*   RBC 3.50*   HEMOGLOBIN 10.8*   HEMATOCRIT 34.3*   MCV 98.0*   MCH 30.9   MCHC 31.5*   RDW 50.0   PLATELETCT 155*   MPV 10.2     Recent Labs     22  0415   SODIUM 138   POTASSIUM 4.9   CHLORIDE 107   CO2 21   GLUCOSE 178*   BUN 27*   CREATININE 1.26   CALCIUM 8.9               Intake/Output                         22 0700 - 22 0659 22 07 - 22 0659     [id number] Total 0522-9927 [id number] Total                 Intake    I.V.  250  -- 250  --  -- --    Volume (mL) (dextrose 10% infusion) 250 -- 250 -- -- --    Total Intake 250 -- 250 -- -- --       Output    Urine  --  -- --  --  -- --    Number of Times Voided 2 x -- 2 x -- -- --    Drains  60  95 155  --  -- --    Output (mL) (Closed/Suction Drain 1 Posterior Back Hemovac) 60 95 155 -- -- --    Total Output 60 95 155 -- -- --       Net I/O     190 -95 95 -- -- --              Intake/Output Summary (Last 24 hours) at 2022 1046  Last data filed at 2022 0400  Gross per 24 hour   Intake --   Output 155 ml   Net -155 ml             acetaminophen  500 mg Q4HRS PRN    acetaZOLAMIDE  125 mg DAILY    atorvastatin  80 mg Q EVENING    ezetimibe  10 mg DAILY    gabapentin  300 mg TID    glipiZIDE  5 mg BID    latanoprost   1 Drop Nightly    lisinopril  40 mg DAILY    omeprazole  20 mg DAILY    potassium citrate SR  10 mEq Q EVENING    predniSONE  5 mg BID    vitamin D3  1,000 Units DAILY    Pharmacy Consult Request  1 Each PHARMACY TO DOSE    docusate sodium  100 mg BID    senna-docusate  1 Tablet Nightly    senna-docusate  1 Tablet Q24HRS PRN    polyethylene glycol/lytes  1 Packet BID PRN    magnesium hydroxide  30 mL QDAY PRN    bisacodyl  10 mg Q24HRS PRN    sodium phosphate  1 Each Once PRN    0.9 % NaCl with KCl 20 mEq 1,000 mL   Continuous    enoxaparin (LOVENOX) injection  40 mg DAILY AT 1800    ceFAZolin  2 g Q8HR    diphenhydrAMINE  25 mg Q6HRS PRN    Or    diphenhydrAMINE  25 mg Q6HRS PRN    ondansetron  4 mg Q4HRS PRN    ondansetron  4 mg Q4HRS PRN    methocarbamol  750 mg Q8HRS PRN    labetalol  10 mg Q HOUR PRN    benzocaine-menthol  1 Lozenge Q2HRS PRN    calcium carbonate  500 mg BID    insulin regular  1-6 Units 4X/DAY ACHS    And    dextrose bolus  25 g Q15 MIN PRN    HYDROcodone-acetaminophen  1 Tablet Q4HRS PRN    oxyCODONE immediate-release  5 mg Q4HRS PRN    morphine injection  4 mg Q3HRS PRN    brimonidine  1 Drop Q12HRS    timolol  1 Drop BID       Assessment and Plan:  Hospital day #2  POD# 1  He is doing well  Stable for d/c home today  He has FWW that he brought with him, to use at home   F/u with Dr. Ying/ rene in 2 wks time   Rx sent to pharmacy

## 2022-08-19 NOTE — DISCHARGE SUMMARY
Date of Admission: 8/18/2022.  Date of Discharge: 8/19/2022.    Discharge Diagnosis: lumbar spondylosis, lumbar radiculopathy, low back pain, neurogenic claudication .    Surgery Performed: L2-5 laminectomies.    Complications: none.    Reason for Admission: This is a pleasant 77 -year-old male who gives history of progressive back pain with lower extremtiy pain, paresthesias and weakness. The patient has tried and failed extensive conservative management. Their preoperative work-up showed significant degenerative changes at L2-5 and the patient was hereby indicated for the above surgery. The patient understood risks versus benefits and treatment alternatives and elected to proceed with the operation.    Hospital Course: The patient was admitted on the day of surgery and underwent the above surgery without complication.  After surgery the patient was transferred to the [facility].  Here on the [facility], the patient has made a good recovery postoperatively. Now, on postoperative day #1, the patient's pain is well controlled on oral pain medications.  The patient is ambulating, voiding, tolerating oral food and medications well. Motor exam is 5/5. The patient's incision is clean, dry, and intact. The surgical drain has been removed. The patient is now cleared for discharge home under stable condition after an uneventful hospital stay.    Discharge Instructions: The patient is to ambulate as much as tolerated.   The patient should avoid repetitive bending at the waist. They should avoid pushing, pulling or lifting greater than 15 pounds. It is okay to shower but the patient is not to submerge the wound.  The patient shoud ice their incision for 10-15 minutes multiple times per day. It is okay to drive in 2 weeks' time or when comfortable and when no longer taking narcotic pain medication. They should take over-the-counter stool softeners while taking narcotic pain medications. The patient is to eat a normal  diet as tolerated. The patient has a postoperative appointment in 2 weeks' time at [facility]. The patient should call our office or go to the nearest emergency department with any emergent changes. The patient was given these discharge instructions verbally and voiced understanding of them.      Discharge Medications: In addition to the patient's normal home medications, they will be discharged home with prescriptons for,    1. Hydrocodone-Acetaminophen 5-325 mg tablet, take 1 tablet q4-6 hours prn pain for 14 days, #56, 0 refills.   2. Methocarbamol 750 mg tablet, take 1 tablet q8 hours prn spasm for 30 days, #90, 0 refills.

## 2022-08-19 NOTE — CARE PLAN
Problem: Knowledge Deficit - Standard  Goal: Patient and family/care givers will demonstrate understanding of plan of care, disease process/condition, diagnostic tests and medications  Outcome: Progressing  Note: POC discussed with pt-pt verbalized understanding     Problem: Pain - Standard  Goal: Alleviation of pain or a reduction in pain to the patient’s comfort goal  Outcome: Progressing  Note: Pain managed with medication and rest   The patient is Stable - Low risk of patient condition declining or worsening    Shift Goals  Clinical Goals: pain control, mobility, monitor drain  Patient Goals: pain control, rest  Family Goals: did not state    Progress made toward(s) clinical / shift goals:      Patient is not progressing towards the following goals:

## 2022-08-19 NOTE — DISCHARGE INSTRUCTIONS
Discharge Instructions    Discharged to home by car with relative. Discharged via wheelchair, hospital escort: Yes.  Special equipment needed: Not Applicable    Be sure to schedule a follow-up appointment with your primary care doctor or any specialists as instructed.     Discharge Plan:        I understand that a diet low in cholesterol, fat, and sodium is recommended for good health. Unless I have been given specific instructions below for another diet, I accept this instruction as my diet prescription.   Other diet: diabetic diet    Special Instructions: Discharge instructions for the Orthopedic Patient    Follow up with Primary Care Physician within 2 weeks of discharge to home, regarding:  Review of medications and diagnostic testing.  Surveillance for medical complications.  Workup and treatment of osteoporosis, if appropriate.     -Is this a Hip/Knee/Shoulder Joint Replacement patient? No    -Is this patient being discharged with medication to prevent blood clots?  No    -Is this patient being discharged with medication to prevent blood clots?  No    Is patient discharged on Warfarin / Coumadin?   No

## 2022-08-19 NOTE — DISCHARGE PLANNING
[facility] TCN chart review completed. Collaborated with Christelle CORTEZ prior to meeting with the pt. The most current review of medical record, knowledge of pt's PLOF and social support, LACE+ score of 52, 6 clicks scores of 20 mobility were considered.      Pt seen at bedside. Introduced TCN program. Provided education regarding post acute levels of care. Discussed [facility] plan benefits (Meds to Beds, medical uber and [facility] transitional care). Pt verbalizes understanding.  Pt reports he has worked with therapies in AM and reports no functional concerns for dc to home with outpatient follow ups in 2 weeks. He reports having appropriate/recommended DME for dc home and reports no concerns with transportation at time of dc or for outpatient follow ups.     Pt has been seen my therapies in AM.  No choice obtained as pt planning for outpatient follow ups and reports spouse will assist as needed (pt declines HH choice). TCN will continue to follow and collaborate with discharge planning team as additional post acute needs arise. Thank you.     Completed today:  Choice obtained: none  [facility] referral not sent given outpatient in ~2wks

## 2022-08-19 NOTE — THERAPY
Occupational Therapy   Initial Evaluation     Patient Name: Kemal Mueller  Age:  77 y.o., Sex:  male  Medical Record #: [id number]  Today's Date: 8/19/2022     Precautions  Precautions: Fall Risk, Spinal / Back Precautions   Comments: no brace ordered    Assessment  Patient is 77 y.o. male s/p L2-S5 decompressive laminectomies w/ bilateral foraminotomies. PMHx includes HYT and arthritis. Pt w/ fair recall of spinal precautions; recalled 2/3 w/o v/cs and fair adherence to spinal precautions. Pt education on adaptive techniques for ADLs, ADL transfers, AE, safety w/ walker, importance of OOB activity, spinal precautions and provided w/ handout. Pt performed functional mobility and ADL transfers w/ FWW and SBA-SPV. Pt performed ADLs w/ Min A- SPV; pt req max v/cs and min tactile cue for use of adaptive strategy and adherence to spinal precautions during oral care. Recommend HH OT for reinforcement of adaptive techniques for ADLs and spinal precautions. Pt reports lives w/ wife who is available to assist 24/7, PRN intially after d/c; she works part time (w/ the begining of the month being the busiest). Pt also reports caring for great granddaughter (2 yrs old) during the day.    Plan    Recommend Occupational Therapy  d/c needs only.      DC Equipment Recommendations: Grab Bar(s) by Toilet  Discharge Recommendations: Recommend home health for continued occupational therapy services; (as long as wife can assist PRN)      Objective       08/19/22 0848   Prior Living Situation   Prior Services Home-Independent   Housing / Facility [address]   Steps Into Home 1  (threshold)   Steps In Home 0   Bathroom Set up Walk In Shower;Shower Chair   Equipment Owned Front-Wheel Walker;Tub / Shower Seat;Other (Comments)  (adjustable bed)   Lives with - Patient's Self Care Capacity Spouse   Comments Pt reports lives w/ wife who is available to assist 24/7, PRN intially after d/c; she works part time (w/ the begining of the month  being the busiest). Pt also reports caring for great granddaughter (2 yrs old) during the day.   Prior Level of ADL Function   Self Feeding Independent   Grooming / Hygiene Independent   Bathing Independent   Dressing Independent   Toileting Independent   Prior Level of IADL Function   Medication Management Independent   Laundry Independent   Kitchen Mobility Independent   Finances Independent   Home Management Independent   Shopping Independent   Prior Level Of Mobility Independent With Device in Community;Independent Without Device in Home   Driving / Transportation Driving Independent   Occupation (Pre-Hospital Vocational) Retired Due To Age   History of Falls   History of Falls No   Precautions   Precautions Fall Risk;Spinal / Back Precautions    Comments no brace ordered   Vitals   O2 Delivery Device None - Room Air   Pain 0 - 10 Group   Location Back   Location Orientation Lower   Therapist Pain Assessment Post Activity Pain Same as Prior to Activity;Nurse Notified  (not quantified)   Cognition    Cognition / Consciousness WDL   Level of Consciousness Alert   Comments Pt pleasant, cooperative, and receptive to edu. Pt w/ fair recall of spinal precautions; recalled 2/3 w/o v/cs and fair adherence to spinal precautions.   Passive ROM Upper Body   Passive ROM Upper Body X   Comments R shoulder limited at baseline, functional for light ADLs   Active ROM Upper Body   Active ROM Upper Body  X   Dominant Hand Right  (R shoulder limited at baseline, functional for light ADLs)   Strength Upper Body   Upper Body Strength  WDL   Comments functional for light ADls, limited by precautions   Coordination Upper Body   Coordination WDL   Balance Assessment   Sitting Balance (Static) Good   Sitting Balance (Dynamic) Fair +   Standing Balance (Static) Fair +   Standing Balance (Dynamic) Fair +   Weight Shift Sitting Good   Weight Shift Standing Fair   Comments w/FWW   Bed Mobility    Supine to Sit Standby Assist   Sit to Supine    (left pt up in chair)   Scooting Supervised  (EOB)   Rolling Standby Assist   Comments edu on and preformed log roll technique; w/HOB elevated; reports has adjustable bed at home   ADL Assessment   Eating Supervision  (drinking)   Grooming Minimal Assist;Standing  (oral care at sink req max v/cs and tactile cues to use adaptive technique to adhere to bending precaution)   Lower Body Dressing Minimal Assist  (R sock)   Toileting Supervision   Comments Pt education on adaptive techniques for ADLs, ADL txfs, AE, safety w/ walker, importance of OOB activity, spinal precautions and provided w/ handout   Functional Mobility   Sit to Stand Supervised   Bed, Chair, Wheelchair Transfer Standby Assist   Toilet Transfers Standby Assist   Transfer Method Stand Step   Mobility supine>EOB>toilet>sink>chair w/ FWW   Edema / Skin Assessment   Edema / Skin  Not Assessed   Comments drain in place pre/post session   Activity Tolerance   Sitting in Chair left pt up in chair   Standing ~9 min   Comments no c/o fatigue   Education Group   Education Provided Role of Occupational Therapist;Back Safety;Transfers;Activities of Daily Living;Adaptive Equipment;Pathology of bedrest   Role of Occupational Therapist Patient Response Patient;Acceptance;Explanation;Verbal Demonstration;Reinforcement Needed   Back Safety Patient Response Patient;Acceptance;Explanation;Demonstration;Handout;Verbal Demonstration;Reinforcement Needed   Transfers Patient Response Patient;Acceptance;Explanation;Verbal Demonstration;Action Demonstration;Reinforcement Needed   ADL Patient Response Patient;Acceptance;Explanation;Demonstration;Handout;Verbal Demonstration;Reinforcement Needed;No Learning Evidence   Adaptive Equipment Patient Response Patient;Acceptance;Explanation;Handout;Verbal Demonstration;Reinforcement Needed  (long handled sponge)   Pathology of Bedrest Patient Response Patient;Acceptance;Explanation;Verbal Demonstration;Action  Demonstration;Reinforcement Needed

## 2022-08-24 ENCOUNTER — HOSPITAL ENCOUNTER (OUTPATIENT)
Dept: LAB | Facility: MEDICAL CENTER | Age: 77
End: 2022-08-24
Attending: PHYSICIAN ASSISTANT
Payer: MEDICARE

## 2022-08-24 LAB
ALBUMIN SERPL BCP-MCNC: 4.1 G/DL (ref 3.2–4.9)
ALBUMIN/GLOB SERPL: 1.5 G/DL
ALP SERPL-CCNC: 63 U/L (ref 30–99)
ALT SERPL-CCNC: 15 U/L (ref 2–50)
ANION GAP SERPL CALC-SCNC: 10 MMOL/L (ref 7–16)
AST SERPL-CCNC: 25 U/L (ref 12–45)
BILIRUB SERPL-MCNC: 0.3 MG/DL (ref 0.1–1.5)
BUN SERPL-MCNC: 30 MG/DL (ref 8–22)
CALCIUM SERPL-MCNC: 9.7 MG/DL (ref 8.5–10.5)
CHLORIDE SERPL-SCNC: 105 MMOL/L (ref 96–112)
CO2 SERPL-SCNC: 24 MMOL/L (ref 20–33)
CREAT SERPL-MCNC: 1.23 MG/DL (ref 0.5–1.4)
FASTING STATUS PATIENT QL REPORTED: NORMAL
GFR SERPLBLD CREATININE-BSD FMLA CKD-EPI: 59 ML/MIN/1.73 M 2
GLOBULIN SER CALC-MCNC: 2.7 G/DL (ref 1.9–3.5)
GLUCOSE SERPL-MCNC: 62 MG/DL (ref 65–99)
POTASSIUM SERPL-SCNC: 4.9 MMOL/L (ref 3.6–5.5)
PROT SERPL-MCNC: 6.8 G/DL (ref 6–8.2)
PSA SERPL-MCNC: 0.03 NG/ML (ref 0–4)
SODIUM SERPL-SCNC: 139 MMOL/L (ref 135–145)
TESTOST SERPL-MCNC: <12 NG/DL (ref 175–781)

## 2022-08-24 PROCEDURE — 36415 COLL VENOUS BLD VENIPUNCTURE: CPT

## 2022-08-24 PROCEDURE — 80053 COMPREHEN METABOLIC PANEL: CPT

## 2022-08-24 PROCEDURE — 84153 ASSAY OF PSA TOTAL: CPT

## 2022-08-24 PROCEDURE — 84403 ASSAY OF TOTAL TESTOSTERONE: CPT

## 2022-08-26 ENCOUNTER — DOCUMENTATION (OUTPATIENT)
Dept: HEALTH INFORMATION MANAGEMENT | Facility: OTHER | Age: 77
End: 2022-08-26
Payer: MEDICARE

## 2022-09-07 ENCOUNTER — APPOINTMENT (RX ONLY)
Dept: URBAN - METROPOLITAN AREA CLINIC 4 | Facility: CLINIC | Age: 77
Setting detail: DERMATOLOGY
End: 2022-09-07

## 2022-09-07 DIAGNOSIS — B35.8 OTHER DERMATOPHYTOSES: ICD-10-CM

## 2022-09-07 PROCEDURE — ? OTC TREATMENT REGIMEN

## 2022-09-07 PROCEDURE — ? KOH PREP

## 2022-09-07 PROCEDURE — ? COUNSELING

## 2022-09-07 PROCEDURE — 99213 OFFICE O/P EST LOW 20 MIN: CPT

## 2022-09-07 ASSESSMENT — LOCATION DETAILED DESCRIPTION DERM: LOCATION DETAILED: NASAL SUPRATIP

## 2022-09-07 ASSESSMENT — LOCATION SIMPLE DESCRIPTION DERM: LOCATION SIMPLE: NOSE

## 2022-09-07 ASSESSMENT — LOCATION ZONE DERM: LOCATION ZONE: NOSE

## 2022-09-07 NOTE — PROCEDURE: OTC TREATMENT REGIMEN
Patient Specific Otc Recommendations (Will Not Stick From Patient To Patient): Recommend Lamisil cream twice daily for 14 days
Detail Level: Zone

## 2022-09-07 NOTE — HPI: SKIN LESION
Is This A New Presentation, Or A Follow-Up?: Skin Lesion
What Type Of Note Output Would You Prefer (Optional)?: Standard Output
How Severe Is Your Skin Lesion?: moderate
Has Your Skin Lesion Been Treated?: not been treated
Additional History: Went to PCP last week (around the 27th) because pts wife noticed lesion/rash on pts nose 2 days before appt. Pt states he does not have any pets but does visit with daughters dog throughout the day.

## 2022-09-27 ENCOUNTER — OFFICE VISIT (OUTPATIENT)
Dept: MEDICAL GROUP | Facility: PHYSICIAN GROUP | Age: 77
End: 2022-09-27
Payer: MEDICARE

## 2022-09-27 VITALS
OXYGEN SATURATION: 96 % | HEIGHT: 67 IN | WEIGHT: 179.4 LBS | DIASTOLIC BLOOD PRESSURE: 68 MMHG | HEART RATE: 92 BPM | BODY MASS INDEX: 28.16 KG/M2 | SYSTOLIC BLOOD PRESSURE: 128 MMHG | TEMPERATURE: 98.5 F

## 2022-09-27 DIAGNOSIS — Z23 NEED FOR VACCINATION: ICD-10-CM

## 2022-09-27 DIAGNOSIS — N18.31 STAGE 3A CHRONIC KIDNEY DISEASE: ICD-10-CM

## 2022-09-27 DIAGNOSIS — E11.9 TYPE 2 DIABETES MELLITUS WITHOUT COMPLICATION, WITHOUT LONG-TERM CURRENT USE OF INSULIN (HCC): ICD-10-CM

## 2022-09-27 DIAGNOSIS — D64.9 ANEMIA, UNSPECIFIED TYPE: ICD-10-CM

## 2022-09-27 DIAGNOSIS — I70.0 ATHEROSCLEROSIS OF AORTA (HCC): ICD-10-CM

## 2022-09-27 DIAGNOSIS — I10 ESSENTIAL HYPERTENSION, BENIGN: ICD-10-CM

## 2022-09-27 PROCEDURE — 90662 IIV NO PRSV INCREASED AG IM: CPT

## 2022-09-27 PROCEDURE — 99214 OFFICE O/P EST MOD 30 MIN: CPT | Mod: 25

## 2022-09-27 PROCEDURE — G0008 ADMIN INFLUENZA VIRUS VAC: HCPCS

## 2022-09-27 ASSESSMENT — FIBROSIS 4 INDEX: FIB4 SCORE: 3.29

## 2022-09-27 NOTE — ASSESSMENT & PLAN NOTE
Chronic condition unstable  - Continue glipizide 5 mg twice daily  - Recheck A1c and next lab draw  - Recommend diligent efforts towards healthy diet and exercise as tolerated  Monofilament testing with a 10 gram force: sensation intact: intact bilaterally  Visual Inspection: Feet without maceration, ulcers, fissures.  Pedal pulses: intact bilaterally

## 2022-09-27 NOTE — ASSESSMENT & PLAN NOTE
Chronic, controlled.  Blood pressure is at goal under 140/90 in the office today.   Medications: Lisinopril 40 mg daily, no adverse side effects noted.  We will continue the current regimen.  Recommend home blood pressure monitoring:  - check your blood pressure every day and put it in a log  - pick a different time each day so we can see how it varies throughout the day  - when you check your blood pressure: make sure you sit quietly for 5-10 minutes beforehand, keep both feet flat on the ground, and make sure you use an arm cuff at heart height    Lifestyle factors to help manage blood pressure:  1) reduce stress with daily activity, consider yoga, or meditation  2) reduce Na to <2000 mg/day-avoid putting extra salt on food, avoid packaged/processed foods, avoid excessive sugar intake  3) Mediterranean diet   4) 30 minutes of cardio and resistance training most days of the week, which you can build up to at least 150 min./week

## 2022-09-27 NOTE — ASSESSMENT & PLAN NOTE
Chronic, stable  -Continue lipitor 80 mg daily  -Continue efforts towards healthy diet and exercise  -Continue to follow with cardiology as needed

## 2022-09-27 NOTE — ASSESSMENT & PLAN NOTE
Chronic stable condition  - Recommend continuation of nephrotic diet along with follow-up with Dr. Roberts nephrologist  -Recommend 64 ounces of water daily

## 2022-09-27 NOTE — PROGRESS NOTES
Subjective:     CC:  Diagnoses of Need for vaccination, Atherosclerosis of aorta (HCC), Type 2 diabetes mellitus without complication, without long-term current use of insulin (HCC), Stage 3a chronic kidney disease (HCC), Anemia, unspecified type, and Essential hypertension, benign were pertinent to this visit.    HISTORY OF THE PRESENT ILLNESS: Patient is a 77 y.o. male. This pleasant patient is here today to establish care and discuss the following problems:    Problem   Atherosclerosis of Aorta (Hcc)    Chronic condition stable  - This was an incidental finding on presurgical testing  - He has seen cardiology and done stress testing which came back normal  - We will continue to follow a healthy diet and take medications as directed     Type 2 Diabetes Mellitus (Hcc)    A1c 7.2 at most recent lab draw  - Patient taking glipizide 5 mg 2 tabs in the morning and 1 tab at night he states he is doing well on this medication without reported side effects.  He denies hypoglycemia.  He does not take blood sugars at home.  - Reports to eat healthy diet and exercises as tolerated due to recent back surgery     Ckd (Chronic Kidney Disease), Stage III (Hcc)    Chronic, stable  -Seeing Dr. 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 Currently taking all meds as directed.   He is not taking baby aspirin daily.   He is not monitoring BP at home.   Denies symptoms low BP: light-headed, tunnel-vision, unusual fatigue.   Denies symptoms high BP:pounding headache, visual changes, palpitations, flushed face.   Denies medicine side effects: unusual fatigue, slow heartbeat, foot/leg swelling, cough.           Health Maintenance: Completed influenza vaccine, recommend [facility] Plus for AWV    ROS:   Review  "of Systems   All other systems reviewed and are negative.      Objective:     Exam: /68 (BP Location: Left arm, Patient Position: Sitting, BP Cuff Size: Adult)   Pulse 92   Temp 36.9 °C (98.5 °F) (Temporal)   Ht 1.702 m (5' 7\")   Wt 81.4 kg (179 lb 6.4 oz)   SpO2 96%  Body mass index is 28.1 kg/m².    Physical Exam  Constitutional:       Appearance: Normal appearance.   HENT:      Head: Normocephalic.   Eyes:      Conjunctiva/sclera: Conjunctivae normal.      Pupils: Pupils are equal, round, and reactive to light.   Cardiovascular:      Rate and Rhythm: Normal rate and regular rhythm.      Heart sounds: No murmur heard.  Pulmonary:      Effort: Pulmonary effort is normal. No respiratory distress.      Breath sounds: Normal breath sounds.   Musculoskeletal:         General: Normal range of motion.      Right lower leg: Edema (1+) present.   Skin:     General: Skin is warm and dry.   Neurological:      General: No focal deficit present.      Mental Status: He is alert and oriented to person, place, and time.   Psychiatric:         Mood and Affect: Mood normal.         Behavior: Behavior normal.         Labs:   No visits with results within 1 Month(s) from this visit.   Latest known visit with results is:   Hospital Outpatient Visit on 08/24/2022   Component Date Value    Sodium 08/24/2022 139     Potassium 08/24/2022 4.9     Chloride 08/24/2022 105     Co2 08/24/2022 24     Anion Gap 08/24/2022 10.0     Glucose 08/24/2022 62 (A)    Bun 08/24/2022 30 (A)    Creatinine 08/24/2022 1.23     Calcium 08/24/2022 9.7     AST(SGOT) 08/24/2022 25     ALT(SGPT) 08/24/2022 15     Alkaline Phosphatase 08/24/2022 63     Total Bilirubin 08/24/2022 0.3     Albumin 08/24/2022 4.1     Total Protein 08/24/2022 6.8     Globulin 08/24/2022 2.7     A-G Ratio 08/24/2022 1.5     Prostatic Specific Antig* 08/24/2022 0.03     Testosterone,Total 08/24/2022 <12 (A)    Fasting Status 08/24/2022 Fasting     GFR (CKD-EPI) 08/24/2022 59 " (A)         Assessment & Plan: Medical Decision Making   77 y.o. male with the following -    Problem List Items Addressed This Visit       Essential hypertension, benign     Chronic, controlled.  Blood pressure is at goal under 140/90 in the office today.   Medications: Lisinopril 40 mg daily, no adverse side effects noted.  We will continue the current regimen.  Recommend home blood pressure monitoring:  - check your blood pressure every day and put it in a log  - pick a different time each day so we can see how it varies throughout the day  - when you check your blood pressure: make sure you sit quietly for 5-10 minutes beforehand, keep both feet flat on the ground, and make sure you use an arm cuff at heart height    Lifestyle factors to help manage blood pressure:  1) reduce stress with daily activity, consider yoga, or meditation  2) reduce Na to <2000 mg/day-avoid putting extra salt on food, avoid packaged/processed foods, avoid excessive sugar intake  3) Mediterranean diet   4) 30 minutes of cardio and resistance training most days of the week, which you can build up to at least 150 min./week                 Anemia     Chronic, unstable  -Repeat CBC          Relevant Orders    CBC WITHOUT DIFFERENTIAL    CKD (chronic kidney disease), stage III (HCC)     Chronic stable condition  - Recommend continuation of nephrotic diet along with follow-up with Dr. Roberts nephrologist  -Recommend 64 ounces of water daily         Atherosclerosis of aorta (HCC)     Chronic, stable  -Continue lipitor 80 mg daily  -Continue efforts towards healthy diet and exercise  -Continue to follow with cardiology as needed         Relevant Orders    Lipid Profile    Type 2 diabetes mellitus (HCC)     Chronic condition unstable  - Continue glipizide 5 mg twice daily  - Recheck A1c and next lab draw  - Recommend diligent efforts towards healthy diet and exercise as tolerated  Monofilament testing with a 10 gram force: sensation intact:  intact bilaterally  Visual Inspection: Feet without maceration, ulcers, fissures.  Pedal pulses: intact bilaterally           Relevant Orders    Diabetic Monofilament LE Exam (Completed)    HEMOGLOBIN A1C    Comp Metabolic Panel     Other Visit Diagnoses       Need for vaccination        Relevant Orders    INFLUENZA VACCINE, HIGH DOSE (65+ ONLY) (Completed)          HCC Gap Form    Diagnosis to address: I70.0 - Atherosclerosis of aorta (HCC)  Assessment and plan: Chronic, stable, as based on today's assessment and impact on other conditions evaluated today. Continue with current treatment plan: Cardiology- worked him up recently, passed stress test July 2022.  Follow-up with specialist as directed, but at least annually.  Last edited 09/27/22 10:06 PDT by Kenneth Connolly D.N.P.         Differential diagnosis, natural history, supportive care, and indications for immediate follow-up discussed.  Shared decision making approach was utilized, and patient is amendable with plan of care.  Patient understands to return to clinic or go to the emergency department if symptoms worsen. All questions and concerns addressed.      Return in about 3 months (around 12/27/2022).    Please note that this dictation was created using voice recognition software. I have made every reasonable attempt to correct obvious errors, but I expect that there are errors of grammar and possibly content that I did not discover before finalizing the note.

## 2022-12-08 ENCOUNTER — HOSPITAL ENCOUNTER (OUTPATIENT)
Dept: LAB | Facility: MEDICAL CENTER | Age: 77
End: 2022-12-08
Attending: UROLOGY
Payer: MEDICARE

## 2022-12-08 LAB
ALBUMIN SERPL BCP-MCNC: 4.4 G/DL (ref 3.2–4.9)
ALBUMIN/GLOB SERPL: 1.7 G/DL
ALP SERPL-CCNC: 64 U/L (ref 30–99)
ALT SERPL-CCNC: 16 U/L (ref 2–50)
ANION GAP SERPL CALC-SCNC: 11 MMOL/L (ref 7–16)
AST SERPL-CCNC: 17 U/L (ref 12–45)
BILIRUB SERPL-MCNC: 0.3 MG/DL (ref 0.1–1.5)
BUN SERPL-MCNC: 39 MG/DL (ref 8–22)
CALCIUM SERPL-MCNC: 10 MG/DL (ref 8.5–10.5)
CHLORIDE SERPL-SCNC: 106 MMOL/L (ref 96–112)
CO2 SERPL-SCNC: 24 MMOL/L (ref 20–33)
CREAT SERPL-MCNC: 1.2 MG/DL (ref 0.5–1.4)
GFR SERPLBLD CREATININE-BSD FMLA CKD-EPI: 61 ML/MIN/1.73 M 2
GLOBULIN SER CALC-MCNC: 2.6 G/DL (ref 1.9–3.5)
GLUCOSE SERPL-MCNC: 216 MG/DL (ref 65–99)
POTASSIUM SERPL-SCNC: 4.7 MMOL/L (ref 3.6–5.5)
PROT SERPL-MCNC: 7 G/DL (ref 6–8.2)
PSA SERPL-MCNC: <0.02 NG/ML (ref 0–4)
SODIUM SERPL-SCNC: 141 MMOL/L (ref 135–145)
TESTOST SERPL-MCNC: <12 NG/DL (ref 175–781)

## 2022-12-08 PROCEDURE — 84403 ASSAY OF TOTAL TESTOSTERONE: CPT

## 2022-12-08 PROCEDURE — 80053 COMPREHEN METABOLIC PANEL: CPT

## 2022-12-08 PROCEDURE — 36415 COLL VENOUS BLD VENIPUNCTURE: CPT

## 2022-12-08 PROCEDURE — 84153 ASSAY OF PSA TOTAL: CPT

## 2023-01-10 ENCOUNTER — TELEPHONE (OUTPATIENT)
Dept: MEDICAL GROUP | Facility: PHYSICIAN GROUP | Age: 78
End: 2023-01-10
Payer: MEDICARE

## 2023-01-10 NOTE — TELEPHONE ENCOUNTER
Patients wife called and  stated that her  has had nausea dily since right after gutierrez.  Wife would like you to call her (Karen [phone]) Please call

## 2023-01-11 ENCOUNTER — OFFICE VISIT (OUTPATIENT)
Dept: MEDICAL GROUP | Facility: PHYSICIAN GROUP | Age: 78
End: 2023-01-11
Payer: MEDICARE

## 2023-01-11 VITALS
DIASTOLIC BLOOD PRESSURE: 50 MMHG | BODY MASS INDEX: 27.34 KG/M2 | RESPIRATION RATE: 16 BRPM | HEART RATE: 76 BPM | HEIGHT: 67 IN | OXYGEN SATURATION: 97 % | TEMPERATURE: 98.7 F | WEIGHT: 174.2 LBS | SYSTOLIC BLOOD PRESSURE: 106 MMHG

## 2023-01-11 DIAGNOSIS — R11.0 NAUSEA: ICD-10-CM

## 2023-01-11 DIAGNOSIS — R63.0 LOSS OF APPETITE: ICD-10-CM

## 2023-01-11 DIAGNOSIS — M25.551 RIGHT HIP PAIN: ICD-10-CM

## 2023-01-11 DIAGNOSIS — Z71.9 ENCOUNTER FOR CONSULTATION: ICD-10-CM

## 2023-01-11 DIAGNOSIS — M25.561 RIGHT KNEE PAIN, UNSPECIFIED CHRONICITY: ICD-10-CM

## 2023-01-11 PROCEDURE — 99214 OFFICE O/P EST MOD 30 MIN: CPT | Performed by: STUDENT IN AN ORGANIZED HEALTH CARE EDUCATION/TRAINING PROGRAM

## 2023-01-11 RX ORDER — METHOCARBAMOL 750 MG/1
TABLET, FILM COATED ORAL
COMMUNITY
End: 2023-02-01

## 2023-01-11 RX ORDER — PENICILLIN V POTASSIUM 500 MG/1
TABLET ORAL
COMMUNITY
End: 2023-02-01

## 2023-01-11 RX ORDER — HYDROCODONE BITARTRATE AND ACETAMINOPHEN 5; 325 MG/1; MG/1
TABLET ORAL
COMMUNITY
End: 2023-02-01

## 2023-01-11 RX ORDER — ONDANSETRON 4 MG/1
TABLET, ORALLY DISINTEGRATING ORAL
COMMUNITY
End: 2023-02-01

## 2023-01-11 RX ORDER — DOXYCYCLINE HYCLATE 100 MG
TABLET ORAL
COMMUNITY
End: 2023-02-01

## 2023-01-11 RX ORDER — ONDANSETRON 4 MG/1
TABLET, ORALLY DISINTEGRATING ORAL
COMMUNITY
Start: 2023-01-09 | End: 2023-09-05

## 2023-01-11 RX ORDER — PREDNISONE 2.5 MG/1
TABLET ORAL
Qty: 25 TABLET | Refills: 0 | Status: SHIPPED | OUTPATIENT
Start: 2023-01-11 | End: 2023-03-01

## 2023-01-11 ASSESSMENT — PATIENT HEALTH QUESTIONNAIRE - PHQ9: CLINICAL INTERPRETATION OF PHQ2 SCORE: 0

## 2023-01-11 ASSESSMENT — FIBROSIS 4 INDEX: FIB4 SCORE: 2.19

## 2023-01-11 NOTE — PROGRESS NOTES
Subjective:     CC:  Diagnoses of Nausea, Right hip pain, Right knee pain, unspecified chronicity, and Loss of appetite were pertinent to this visit.    HISTORY OF THE PRESENT ILLNESS: Patient is a 78 y.o. male. This pleasant patient is here today to discuss:    1. Nausea  2. Right hip pain  3. Right knee pain, unspecified chronicity  4. Loss of appetite  Mr. Mueller discontinued his chemotherapy and prednisone per oncology instructions on 12/24/2022.  He and his wife tell me that he was taking 10 mg of prednisone twice daily although our records indicate 5 mg of prednisone twice daily.  He had been using prednisone for 26 months.    Within the next 48 to 72 hours he developed nausea that comes and goes but is always present.  This is associated with decreased appetite.  His right hip and knee also began to hurt enough that he started using a walker again that he had not used since he had had surgery in August.    He has been using ondansetron 8 mg with incomplete success.    No vomiting, he has been able to keep down water and small meals.      Active Diagnosis:    Patient Active Problem List   Diagnosis    Hyperlipidemia    Essential hypertension, benign    Calculus of kidney    Reflux esophagitis    Proteinuria    Nephrolithiasis    Glaucoma    GERD (gastroesophageal reflux disease)    Prostate cancer (HCC)    ED (erectile dysfunction)    Cataracts, bilateral    Degeneration of cervical intervertebral disc    Cervical stenosis of spine    Left wrist pain    Right renal mass    Age-related nuclear cataract of eye    Herpes zoster without complication    Anemia    History of malignant neoplasm of kidney    Arthritis    History of urinary stone    Vitamin D deficiency    Colon cancer screening    CKD (chronic kidney disease), stage III (HCC)    Dyslipidemia    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HCC)    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HCC)    Other spondylosis with radiculopathy, lumbar region     Atherosclerosis of aorta (HCC)    Type 2 diabetes mellitus (HCC)      Current Outpatient Medications Ordered in Epic   Medication Sig Dispense Refill    doxycycline (VIBRAMYCIN) 100 MG Tab doxycycline hyclate 100 mg tablet   TAKE 1 TABLET BY MOUTH TWICE A DAY      HYDROcodone-acetaminophen (NORCO) 5-325 MG Tab per tablet hydrocodone 5 mg-acetaminophen 325 mg tablet   TAKE 1 TABLET BY MOUTH EVERY FOUR HOURS AS NEEDED (SEVERE PAIN) FOR UP TO 14 DAYS.      methocarbamol (ROBAXIN) 750 MG Tab methocarbamol 750 mg tablet   TAKE 1 TABLET BY MOUTH EVERY 8 HOURS AS NEEDED (MUSCLE SPASMS) FOR UP TO 30 DAYS.      mupirocin (BACTROBAN) 2 % Ointment mupirocin 2 % topical ointment   APPLY TO AFFECTED AREA ON THE ABDOMEN 2 TIMES A DAY FOR 10 DAYS      ondansetron (ZOFRAN ODT) 4 MG TABLET DISPERSIBLE ondansetron 4 mg disintegrating tablet   DISSOLVE ONE TABLET IN MOUTH EVERY SIX HOURS AS NEEDED for nausea      ondansetron (ZOFRAN ODT) 4 MG TABLET DISPERSIBLE       penicillin v potassium (VEETID) 500 MG Tab penicillin V potassium 500 mg tablet   TAKE 2 STAT, THEN 1 TABLET BY MOUTH 3 TIMES A DAY UNTIL FINISHED      prednisONE (DELTASONE) 2.5 MG Tab Take one tablet twice daily for week one. Take one tablet daily for week 2. Take one tablet every other day for the third week. 25 Tablet 0    glipiZIDE (GLUCOTROL) 5 MG Tab TAKE TWO TABLETS BY MOUTH IN THE MORNING AND ONE TABLET IN THE EVENING WITH MEALS 300 Tablet 1    gabapentin (NEURONTIN) 300 MG Cap Take 300 mg by mouth 3 times a day.      omeprazole (PRILOSEC) 20 MG delayed-release capsule TAKE ONE CAPSULE BY MOUTH DAILY 100 Capsule 3    atorvastatin (LIPITOR) 80 MG tablet TAKE ONE TABLET BY MOUTH EVERY EVENING 100 Tablet 3    lisinopril (PRINIVIL) 40 MG tablet TAKE ONE TABLET BY MOUTH DAILY 100 Tablet 3    ezetimibe (ZETIA) 10 MG Tab TAKE ONE TABLET BY MOUTH DAILY 100 Tablet 3    Abiraterone Acetate 250 MG Tab Take 1,000 mg by mouth every day.      Calcium Carbonate-Vit D-Min  "(CALCIUM 1200 PO) Take 1 Tablet by mouth every day.      vitamin D3, cholecalciferol, 1000 UNIT Tab Take 2 Tabs by mouth every day. 100 Tab 3    potassium citrate SR (UROCIT-K SR) 10 MEQ (1080 MG) TBCR Take 10 mEq by mouth every evening.      Multiple Vitamins-Minerals (CENTRUM SILVER PO) Take 1 Tablet by mouth every day.      Brimonidine Tartrate-Timolol 0.2-0.5 % Solution Administer 1 Drop into both eyes 2 times a day.      latanoprost (XALATAN) 0.005 % Solution Place 1 Drop in both eyes every evening.      ACETAZOLAMIDE 125 MG PO TABS Take 125 mg by mouth every day.       No current [facility]-ordered facility-administered medications on file.     ROS:   Gen: No fevers/chills, no changes in weight  See HPI.    Objective:     Exam: /50 (BP Location: Left arm, Patient Position: Sitting, BP Cuff Size: Adult)   Pulse 76   Temp 37.1 °C (98.7 °F) (Temporal)   Resp 16   Ht 1.702 m (5' 7\")   Wt 79 kg (174 lb 3.2 oz)   SpO2 97%  Body mass index is 27.28 kg/m².    Previous weight from his visit on 9/27/2022 noted to be 179 pounds.    General: Normal appearing. No distress.  HEENT: Normocephalic. Eyes conjunctiva clear lids without ptosis. Pupils equal and reactive to light accommodation.   Neck: Supple without JVD. Thyroid is not enlarged.  Abdomen: Normoactive bowel sounds in all 4 quadrants.  Nondistended, nontender.  Negative Amanda's.  neurologic: Grossly nonfocal.  CN II through XII intact.  Skin: Warm and dry.  No obvious lesions.  Musculoskeletal: Walker assisted gait.  +1 edema of the bilateral ankles.    A chaperone was offered to the patient during today's exam. Patient declined chaperone.    Labs:   12/8/2022:  -CMP showing glucose 216, BUN 39.    Assessment & Plan:   78 y.o. male with the following -    1. Nausea  2. Right hip pain  3. Right knee pain, unspecified chronicity  4. Loss of appetite  -Acute.  -Even this patient was on 20 mg of prednisone daily per his own recollection then rapid " discontinuation may have been completely appropriate but I do believe that this is responsible for unmasking right hip and knee pain as well as causing his nausea and subsequent lack of appetite.  We will restart him on low-dose prednisone and taper over 3 weeks.  He already has a follow-up appointment with his primary care towards the end of this taper.  - prednisONE (DELTASONE) 2.5 MG Tab; Take one tablet twice daily for week one. Take one tablet daily for week 2. Take one tablet every other day for the third week.  Dispense: 25 Tablet; Refill: 0  -Continue ondansetron.    Return in about 16 days (around 1/27/2023).    Please note that this dictation was created using voice recognition software. I have made every reasonable attempt to correct obvious errors, but I expect that there are errors of grammar and possibly content that I did not discover before finalizing the note.      Stephen Hylton PA-C 1/11/2023

## 2023-01-12 ENCOUNTER — E-CONSULT (OUTPATIENT)
Dept: ENDOCRINOLOGY | Facility: MEDICAL CENTER | Age: 78
End: 2023-01-12
Payer: MEDICARE

## 2023-01-12 DIAGNOSIS — Z71.9 ENCOUNTER FOR CONSULTATION: ICD-10-CM

## 2023-01-13 RX ORDER — PREDNISONE 10 MG/1
TABLET ORAL
Qty: 42 TABLET | Refills: 0 | Status: SHIPPED | OUTPATIENT
Start: 2023-01-13 | End: 2023-03-01

## 2023-01-13 NOTE — PROGRESS NOTES
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   Plan for gradual steroid taper.  2.5mg reduction every 7 days until patient is down to 5mg daily which is a physiologic dose  then can switch to hydcortisone (shorter acting steroid to allow for recovery of HPA axis) followed by another taper    Example 10mg bid then  after 1 week  10mg plus 7.5   then 10mg plus 5mg   then 10mg plus 2.5   then 10mg  only  Then 7.5mg  Then 5mg  Then switch to HC 10mg /5  Then HC 10mg  Then stop    Check morning cortisol if > 10 can stop HC        E-Consult Time: 31 minutes were spent with >50% of the total time spent discussing plan of care with requesting physician (Use code [id number])    Stephen Brown M.D.

## 2023-01-19 ENCOUNTER — TELEPHONE (OUTPATIENT)
Dept: MEDICAL GROUP | Facility: PHYSICIAN GROUP | Age: 78
End: 2023-01-19
Payer: MEDICARE

## 2023-01-19 DIAGNOSIS — E11.65 POORLY CONTROLLED DIABETES MELLITUS (HCC): ICD-10-CM

## 2023-01-19 RX ORDER — PREDNISONE 5 MG/1
5 TABLET ORAL DAILY
Qty: 30 TABLET | Refills: 0 | Status: SHIPPED | OUTPATIENT
Start: 2023-01-19 | End: 2023-01-19 | Stop reason: SDUPTHER

## 2023-01-19 RX ORDER — PREDNISONE 5 MG/1
5 TABLET ORAL DAILY
Qty: 30 TABLET | Refills: 0 | Status: SHIPPED | OUTPATIENT
Start: 2023-01-19 | End: 2023-03-01

## 2023-01-19 RX ORDER — GLIPIZIDE 5 MG/1
TABLET ORAL
Qty: 300 TABLET | Refills: 1 | Status: SHIPPED | OUTPATIENT
Start: 2023-01-19 | End: 2023-01-20 | Stop reason: SDUPTHER

## 2023-01-19 NOTE — TELEPHONE ENCOUNTER
Received request via: Pharmacy    Was the patient seen in the last year in this department? Yes    Does the patient have an active prescription (recently filled or refills available) for medication(s) requested? No    Does the patient have FDC Plus and need 100 day supply (blood pressure, diabetes and cholesterol meds only)? Yes, quantity updated to 100 days

## 2023-01-20 DIAGNOSIS — E11.65 POORLY CONTROLLED DIABETES MELLITUS (HCC): ICD-10-CM

## 2023-01-20 RX ORDER — GLIPIZIDE 5 MG/1
TABLET ORAL
Qty: 300 TABLET | Refills: 1 | Status: SHIPPED
Start: 2023-01-20 | End: 2023-04-05

## 2023-01-20 NOTE — TELEPHONE ENCOUNTER
Received request via: Pharmacy    Was the patient seen in the last year in this department? Yes    Does the patient have an active prescription (recently filled or refills available) for medication(s) requested? No    Does the patient have half-way Plus and need 100 day supply (blood pressure, diabetes and cholesterol meds only)? Yes, quantity updated to 100 days

## 2023-01-20 NOTE — TELEPHONE ENCOUNTER
DOCUMENTATION OF PAR STATUS:    1. Name of Medication & Dose: Glipizide     2. Name of Prescription Coverage Company & phone #:  [employer]    3. Date Prior Auth Submitted: 01/19/2023    4. What information was given to obtain insurance decision?     5. Prior Auth Status? Pending    6. Patient Notified: no

## 2023-01-20 NOTE — TELEPHONE ENCOUNTER
Patient would like you to order him some 5 mg prednisone so he does not run out while ramping down on his med.

## 2023-01-27 ENCOUNTER — HOSPITAL ENCOUNTER (OUTPATIENT)
Dept: LAB | Facility: MEDICAL CENTER | Age: 78
End: 2023-01-27
Payer: MEDICARE

## 2023-01-27 DIAGNOSIS — I70.0 ATHEROSCLEROSIS OF AORTA (HCC): ICD-10-CM

## 2023-01-27 DIAGNOSIS — E11.9 TYPE 2 DIABETES MELLITUS WITHOUT COMPLICATION, WITHOUT LONG-TERM CURRENT USE OF INSULIN (HCC): ICD-10-CM

## 2023-01-27 DIAGNOSIS — D64.9 ANEMIA, UNSPECIFIED TYPE: ICD-10-CM

## 2023-01-27 LAB
ALBUMIN SERPL BCP-MCNC: 4.7 G/DL (ref 3.2–4.9)
ALBUMIN/GLOB SERPL: 1.7 G/DL
ALP SERPL-CCNC: 65 U/L (ref 30–99)
ALT SERPL-CCNC: 19 U/L (ref 2–50)
ANION GAP SERPL CALC-SCNC: 10 MMOL/L (ref 7–16)
AST SERPL-CCNC: 20 U/L (ref 12–45)
BILIRUB SERPL-MCNC: 0.3 MG/DL (ref 0.1–1.5)
BUN SERPL-MCNC: 34 MG/DL (ref 8–22)
CALCIUM ALBUM COR SERPL-MCNC: 9.7 MG/DL (ref 8.5–10.5)
CALCIUM SERPL-MCNC: 10.3 MG/DL (ref 8.5–10.5)
CHLORIDE SERPL-SCNC: 110 MMOL/L (ref 96–112)
CHOLEST SERPL-MCNC: 193 MG/DL (ref 100–199)
CO2 SERPL-SCNC: 24 MMOL/L (ref 20–33)
CREAT SERPL-MCNC: 1.14 MG/DL (ref 0.5–1.4)
ERYTHROCYTE [DISTWIDTH] IN BLOOD BY AUTOMATED COUNT: 46.3 FL (ref 35.9–50)
EST. AVERAGE GLUCOSE BLD GHB EST-MCNC: 160 MG/DL
FASTING STATUS PATIENT QL REPORTED: NORMAL
GFR SERPLBLD CREATININE-BSD FMLA CKD-EPI: 65 ML/MIN/1.73 M 2
GLOBULIN SER CALC-MCNC: 2.8 G/DL (ref 1.9–3.5)
GLUCOSE SERPL-MCNC: 44 MG/DL (ref 65–99)
HBA1C MFR BLD: 7.2 % (ref 4–5.6)
HCT VFR BLD AUTO: 40.9 % (ref 42–52)
HDLC SERPL-MCNC: 48 MG/DL
HGB BLD-MCNC: 12.8 G/DL (ref 14–18)
LDLC SERPL CALC-MCNC: 97 MG/DL
MCH RBC QN AUTO: 30.7 PG (ref 27–33)
MCHC RBC AUTO-ENTMCNC: 31.3 G/DL (ref 33.7–35.3)
MCV RBC AUTO: 98.1 FL (ref 81.4–97.8)
PLATELET # BLD AUTO: 235 K/UL (ref 164–446)
PMV BLD AUTO: 10.7 FL (ref 9–12.9)
POTASSIUM SERPL-SCNC: 5.2 MMOL/L (ref 3.6–5.5)
PROT SERPL-MCNC: 7.5 G/DL (ref 6–8.2)
RBC # BLD AUTO: 4.17 M/UL (ref 4.7–6.1)
SODIUM SERPL-SCNC: 144 MMOL/L (ref 135–145)
TRIGL SERPL-MCNC: 239 MG/DL (ref 0–149)
WBC # BLD AUTO: 13 K/UL (ref 4.8–10.8)

## 2023-01-27 PROCEDURE — 80061 LIPID PANEL: CPT

## 2023-01-27 PROCEDURE — 80053 COMPREHEN METABOLIC PANEL: CPT

## 2023-01-27 PROCEDURE — 36415 COLL VENOUS BLD VENIPUNCTURE: CPT

## 2023-01-27 PROCEDURE — 85027 COMPLETE CBC AUTOMATED: CPT

## 2023-01-27 PROCEDURE — 83036 HEMOGLOBIN GLYCOSYLATED A1C: CPT

## 2023-02-01 ENCOUNTER — OFFICE VISIT (OUTPATIENT)
Dept: MEDICAL GROUP | Facility: PHYSICIAN GROUP | Age: 78
End: 2023-02-01
Payer: MEDICARE

## 2023-02-01 VITALS
WEIGHT: 171.4 LBS | DIASTOLIC BLOOD PRESSURE: 60 MMHG | TEMPERATURE: 96.9 F | HEIGHT: 67 IN | OXYGEN SATURATION: 97 % | HEART RATE: 85 BPM | BODY MASS INDEX: 26.9 KG/M2 | SYSTOLIC BLOOD PRESSURE: 100 MMHG

## 2023-02-01 DIAGNOSIS — E78.5 DYSLIPIDEMIA: ICD-10-CM

## 2023-02-01 DIAGNOSIS — C61 MALIGNANT NEOPLASM OF PROSTATE (HCC): ICD-10-CM

## 2023-02-01 DIAGNOSIS — J06.9 URTI (ACUTE UPPER RESPIRATORY INFECTION): ICD-10-CM

## 2023-02-01 DIAGNOSIS — E11.9 TYPE 2 DIABETES MELLITUS WITHOUT COMPLICATION, WITHOUT LONG-TERM CURRENT USE OF INSULIN (HCC): ICD-10-CM

## 2023-02-01 PROBLEM — N39.46 MIXED STRESS AND URGE URINARY INCONTINENCE: Status: ACTIVE | Noted: 2022-12-12

## 2023-02-01 PROBLEM — M54.50 LOW BACK PAIN: Status: ACTIVE | Noted: 2022-11-14

## 2023-02-01 PROCEDURE — 99214 OFFICE O/P EST MOD 30 MIN: CPT

## 2023-02-01 ASSESSMENT — ENCOUNTER SYMPTOMS
COUGH: 1
MYALGIAS: 0
WEAKNESS: 0
BLURRED VISION: 0
WEIGHT LOSS: 0
CHILLS: 0
DIZZINESS: 0
SHORTNESS OF BREATH: 0
SPUTUM PRODUCTION: 1
ABDOMINAL PAIN: 0
CONSTIPATION: 0
VOMITING: 0
DIARRHEA: 0
HEADACHES: 0
NAUSEA: 0
FEVER: 0

## 2023-02-01 ASSESSMENT — FIBROSIS 4 INDEX: FIB4 SCORE: 1.56

## 2023-02-01 NOTE — PROGRESS NOTES
Subjective:     CC: Follow up for steroid taper and lab review    HPI:   Kemal presents today with    Problem   Urti (Acute Upper Respiratory Infection)    Patient reports few day history of upper respiratory tract symptoms including semiproductive cough and congestion.  He denies any shortness of breath or chest pain.  Lung sounds are clear.  No recent fever or systemic symptoms.  -White blood cell count slightly elevated at 13     Mixed Stress and Urge Urinary Incontinence   Low Back Pain   Type 2 Diabetes Mellitus (Hcc)    A1c 7.2 at most recent lab draw  - Patient taking glipizide 5 mg 2 tabs in the morning and 1 tab at night he states he is doing well on this medication without reported side effects.  He denies hypoglycemia.  He does not take blood sugars at home.  - Reports to eat healthy diet and exercises as tolerated due to recent back surgery     Dyslipidemia    Chronic condition for which patient takes atorvastatin 80 mg daily most recent lipid panel reveals the following:  Lab Results   Component Value Date/Time    CHOLSTRLTOT 193 01/27/2023 11:21 AM    CHOLSTRLTOT 185 05/03/2022 12:24 PM    LDL 97 01/27/2023 11:21 AM     (H) 05/03/2022 12:24 PM    HDL 48 01/27/2023 11:21 AM    HDL 45 05/03/2022 12:24 PM    TRIGLYCERIDE 239 (H) 01/27/2023 11:21 AM    TRIGLYCERIDE 175 (H) 05/03/2022 12:24 PM            Prostate cancer (HCC)    Had Radical Prostatectomy with Dr Andersen 4/21/2010, Seeing Dr. Valerio now.  Was on abiraterone for 26 months as well as corticosteroids.  He is done with treatment and abruptly taken off steroids.  He had quite a severe reaction fatigue, N/V, joint pain.  On 1/11/2023 patient was seen by Stephen Burt in our clinic and placed back on steroids per the expert recommendation of endocrinology.  Was taking 17.5 mg per week for one week  -15 mg per day week 3  -12.5 mg per day week 4  -10 mg week 5  -7.5 mg week 6  5 mg week 7  Then swith to HC 10 mg/5 day week 8  Then HC 10 mg  "week 9  Then stop week 10   Check morning cortisol if > 10 can stop HC         Health Maintenance: Completed    ROS:  Review of Systems   Constitutional:  Positive for malaise/fatigue. Negative for chills, fever and weight loss.   Eyes:  Negative for blurred vision.   Respiratory:  Positive for cough (semi prod green) and sputum production. Negative for shortness of breath.    Cardiovascular:  Negative for chest pain.   Gastrointestinal:  Negative for abdominal pain, constipation, diarrhea, nausea and vomiting.   Musculoskeletal:  Negative for myalgias.   Neurological:  Negative for dizziness, weakness and headaches.     Objective:     Exam:  /60 (BP Location: Left arm, Patient Position: Sitting, BP Cuff Size: Adult)   Pulse 85   Temp 36.1 °C (96.9 °F) (Temporal)   Ht 1.702 m (5' 7\")   Wt 77.7 kg (171 lb 6.4 oz)   SpO2 97%   BMI 26.85 kg/m²  Body mass index is 26.85 kg/m².    Physical Exam  Constitutional:       Appearance: Normal appearance.   HENT:      Head: Normocephalic.   Eyes:      Conjunctiva/sclera: Conjunctivae normal.      Pupils: Pupils are equal, round, and reactive to light.   Cardiovascular:      Rate and Rhythm: Normal rate and regular rhythm.      Heart sounds: No murmur heard.  Pulmonary:      Effort: Pulmonary effort is normal. No respiratory distress.      Breath sounds: Normal breath sounds.   Musculoskeletal:         General: Normal range of motion.   Skin:     General: Skin is warm and dry.   Neurological:      General: No focal deficit present.      Mental Status: He is alert and oriented to person, place, and time.   Psychiatric:         Mood and Affect: Mood normal.         Behavior: Behavior normal.       Labs:   Hospital Outpatient Visit on 01/27/2023   Component Date Value    WBC 01/27/2023 13.0 (H)     RBC 01/27/2023 4.17 (L)     Hemoglobin 01/27/2023 12.8 (L)     Hematocrit 01/27/2023 40.9 (L)     MCV 01/27/2023 98.1 (H)     MCH 01/27/2023 30.7     MCHC 01/27/2023 31.3 (L)  "    RDW 01/27/2023 46.3     Platelet Count 01/27/2023 235     MPV 01/27/2023 10.7     Cholesterol,Tot 01/27/2023 193     Triglycerides 01/27/2023 239 (H)     HDL 01/27/2023 48     LDL 01/27/2023 97     Sodium 01/27/2023 144     Potassium 01/27/2023 5.2     Chloride 01/27/2023 110     Co2 01/27/2023 24     Anion Gap 01/27/2023 10.0     Glucose 01/27/2023 44 (L)     Bun 01/27/2023 34 (H)     Creatinine 01/27/2023 1.14     Calcium 01/27/2023 10.3     AST(SGOT) 01/27/2023 20     ALT(SGPT) 01/27/2023 19     Alkaline Phosphatase 01/27/2023 65     Total Bilirubin 01/27/2023 0.3     Albumin 01/27/2023 4.7     Total Protein 01/27/2023 7.5     Globulin 01/27/2023 2.8     A-G Ratio 01/27/2023 1.7     Glycohemoglobin 01/27/2023 7.2 (H)     Est Avg Glucose 01/27/2023 160     Fasting Status 01/27/2023 Fasting     Correct Calcium 01/27/2023 9.7     GFR (CKD-EPI) 01/27/2023 65          Assessment & Plan: Medical Decision Making     78 y.o. male with the following -     Problem List Items Addressed This Visit       Prostate cancer (HCC)     Chronic stable at this time  - Patient is taking 12.5 mg/day at this time, he and his wife have the weaning schedule.  We will continue to follow.  We will see him back in clinic in 4 weeks and switch him to hydrocortisone as suggested by Dr. Valles endocrinology  -Return to clinic should symptoms worsen or fail to improve  -Continue to follow with Dr. Raymundo for management of prostate cancer         Dyslipidemia     Chronic condition uncontrolled  - Recommend diligent efforts towards healthy diet and exercise  - We will continue atorvastatin 80 mg daily         Type 2 diabetes mellitus (HCC)     - Chronic with A1c slightly above goal at 7.2%, otherwise stable  - Continue glipizide 5 mg 2 tabs in the morning 1 tab in the evening  - Continue efforts towards healthy diet and exercise as tolerated         Relevant Orders    Referral for Retinal Screening Exam    Microalbumin Creat Ratio Urine (Lab  Collect)    URTI (acute upper respiratory infection)     Self-limited minor condition  - Continue over-the-counter remedies such as Mucinex and NyQuil if needed for relief  - ED precautions given and known for worsening or progression of condition            Differential diagnosis, natural history, supportive care, and indications for immediate follow-up discussed.  Shared decision making approach utilized, and patient is amendable with plan of care.  Patient understands to return to clinic or go to the emergency department if symptoms worsen. All questions and concerns addressed.    Return in about 4 weeks (around 3/1/2023).    Please note that this dictation was created using voice recognition software. I have made every reasonable attempt to correct obvious errors, but I expect that there are errors of grammar and possibly content that I did not discover before finalizing the note.

## 2023-02-02 NOTE — ASSESSMENT & PLAN NOTE
Chronic stable at this time  - Patient is taking 12.5 mg/day at this time, he and his wife have the weaning schedule.  We will continue to follow.  We will see him back in clinic in 4 weeks and switch him to hydrocortisone as suggested by Dr. Valles endocrinology  -Return to clinic should symptoms worsen or fail to improve  -Continue to follow with Dr. Raymundo for management of prostate cancer

## 2023-02-02 NOTE — ASSESSMENT & PLAN NOTE
Chronic condition uncontrolled  - Recommend diligent efforts towards healthy diet and exercise  - We will continue atorvastatin 80 mg daily

## 2023-02-02 NOTE — ASSESSMENT & PLAN NOTE
Self-limited minor condition  - Continue over-the-counter remedies such as Mucinex and NyQuil if needed for relief  - ED precautions given and known for worsening or progression of condition

## 2023-02-02 NOTE — ASSESSMENT & PLAN NOTE
- Chronic with A1c slightly above goal at 7.2%, otherwise stable  - Continue glipizide 5 mg 2 tabs in the morning 1 tab in the evening  - Continue efforts towards healthy diet and exercise as tolerated

## 2023-02-17 ENCOUNTER — HOSPITAL ENCOUNTER (OUTPATIENT)
Facility: MEDICAL CENTER | Age: 78
End: 2023-02-17
Payer: MEDICARE

## 2023-02-17 ENCOUNTER — HOSPITAL ENCOUNTER (OUTPATIENT)
Dept: LAB | Facility: MEDICAL CENTER | Age: 78
End: 2023-02-17
Attending: RADIOLOGY
Payer: MEDICARE

## 2023-02-17 DIAGNOSIS — E11.9 TYPE 2 DIABETES MELLITUS WITHOUT COMPLICATION, WITHOUT LONG-TERM CURRENT USE OF INSULIN (HCC): ICD-10-CM

## 2023-02-17 LAB
CREAT UR-MCNC: 91.53 MG/DL
MICROALBUMIN UR-MCNC: 38.5 MG/DL
MICROALBUMIN/CREAT UR: 421 MG/G (ref 0–30)
PSA SERPL-MCNC: 0.02 NG/ML (ref 0–4)
TESTOST SERPL-MCNC: <12 NG/DL (ref 175–781)

## 2023-02-17 PROCEDURE — 82570 ASSAY OF URINE CREATININE: CPT

## 2023-02-17 PROCEDURE — 84153 ASSAY OF PSA TOTAL: CPT

## 2023-02-17 PROCEDURE — 36415 COLL VENOUS BLD VENIPUNCTURE: CPT

## 2023-02-17 PROCEDURE — 82043 UR ALBUMIN QUANTITATIVE: CPT

## 2023-02-17 PROCEDURE — 84403 ASSAY OF TOTAL TESTOSTERONE: CPT

## 2023-03-01 ENCOUNTER — OFFICE VISIT (OUTPATIENT)
Dept: MEDICAL GROUP | Facility: PHYSICIAN GROUP | Age: 78
End: 2023-03-01
Payer: MEDICARE

## 2023-03-01 VITALS
DIASTOLIC BLOOD PRESSURE: 50 MMHG | HEART RATE: 94 BPM | TEMPERATURE: 96.1 F | OXYGEN SATURATION: 98 % | WEIGHT: 175.8 LBS | SYSTOLIC BLOOD PRESSURE: 100 MMHG | BODY MASS INDEX: 27.59 KG/M2 | HEIGHT: 67 IN

## 2023-03-01 DIAGNOSIS — C61 MALIGNANT NEOPLASM OF PROSTATE (HCC): ICD-10-CM

## 2023-03-01 DIAGNOSIS — I10 ESSENTIAL HYPERTENSION, BENIGN: ICD-10-CM

## 2023-03-01 DIAGNOSIS — D64.9 ANEMIA, UNSPECIFIED TYPE: ICD-10-CM

## 2023-03-01 DIAGNOSIS — I70.0 ATHEROSCLEROSIS OF AORTA (HCC): ICD-10-CM

## 2023-03-01 DIAGNOSIS — E11.22 TYPE 2 DIABETES MELLITUS WITH CHRONIC KIDNEY DISEASE, WITHOUT LONG-TERM CURRENT USE OF INSULIN, UNSPECIFIED CKD STAGE (HCC): ICD-10-CM

## 2023-03-01 DIAGNOSIS — N18.31 STAGE 3A CHRONIC KIDNEY DISEASE: ICD-10-CM

## 2023-03-01 PROCEDURE — 99214 OFFICE O/P EST MOD 30 MIN: CPT

## 2023-03-01 RX ORDER — HYDROCORTISONE 10 MG/1
10 TABLET ORAL DAILY
Qty: 15 TABLET | Refills: 0 | Status: SHIPPED
Start: 2023-03-01 | End: 2023-03-01

## 2023-03-01 RX ORDER — HYDROCORTISONE 10 MG/1
10 TABLET ORAL DAILY
Qty: 15 TABLET | Refills: 0 | Status: SHIPPED | OUTPATIENT
Start: 2023-03-01 | End: 2023-04-05

## 2023-03-01 RX ORDER — LISINOPRIL 30 MG/1
30 TABLET ORAL DAILY
Qty: 90 TABLET | Refills: 0 | Status: SHIPPED | OUTPATIENT
Start: 2023-03-01 | End: 2023-03-03 | Stop reason: SDUPTHER

## 2023-03-01 ASSESSMENT — ENCOUNTER SYMPTOMS
BLURRED VISION: 0
WEAKNESS: 0
ABDOMINAL PAIN: 0
CHILLS: 0
NAUSEA: 0
VOMITING: 0
COUGH: 0
FEVER: 0
WEIGHT LOSS: 0
SHORTNESS OF BREATH: 0
MYALGIAS: 0
CONSTIPATION: 0
HEADACHES: 0
DIZZINESS: 0
DIARRHEA: 0

## 2023-03-01 ASSESSMENT — FIBROSIS 4 INDEX: FIB4 SCORE: 1.56

## 2023-03-01 NOTE — ASSESSMENT & PLAN NOTE
Chronic stable at this time  - Patient is taking Prednisone 5 mg/day at this time, he and his wife have the weaning schedule.  We will continue to follow and next week start hydrocortisone at 10 mg daily for 5 days and then taper as directed by Dr. Valles.  We will see him back in clinic in 4 weeks and review labs.  If cortisol level >10 will stop HC  -Return to clinic should symptoms worsen or fail to improve  -Continue to follow with  and Dr. Mancera for management of prostate cancer

## 2023-03-01 NOTE — PROGRESS NOTES
Subjective:     CC: follow-up steroid taper and chronic health topics    HPI:   Kemal presents today with    Problem   Type 2 Diabetes Mellitus With Chronic Kidney Disease, Without Long-Term Current Use of Insulin, Unspecified Ckd Stage ([facility])    A1c 7.2 at most recent lab draw  - Patient taking glipizide 5 mg 2 tabs in the morning and 1 tab at night he states he is doing well on this medication without reported side effects.  He denies hypoglycemia.  He does not take blood sugars at home.  - Reports to eat healthy diet and exercises as tolerated due to recent back surgery     Prostate cancer (HCC)    Had Radical Prostatectomy with Dr Andersen 4/21/2010, Seeing Dr. Valerio now.  Was on abiraterone for 26 months as well as corticosteroids.  He is done with treatment and abruptly taken off steroids.  He had quite a severe reaction fatigue, N/V, joint pain.  On 1/11/2023 patient was seen by Stephen Burt in our clinic and placed back on steroids per the expert recommendation of endocrinology.  Was taking 17.5 mg per week for one week  -15 mg per day week 3  -12.5 mg per day week 4  -10 mg week 5  -7.5 mg week 6  5 mg week 7  Then swith to HC 10 mg/5 day week 8  Then HC 10 mg week 9  Then stop week 10   Check morning cortisol if > 10 can stop HC     Essential Hypertension, Benign    Chronic, taking lisinopril 40 mg daily without reported side effects.  BP today is 100/50. Denies dizziness/lightheadedness. BP at home has been 100-126/52-67  -Does report ongoing BLE after back surgery despite efforts to elevate lower extremities           Health Maintenance: Completed    ROS:  Review of Systems   Constitutional:  Negative for chills, fever, malaise/fatigue and weight loss.   Eyes:  Negative for blurred vision.   Respiratory:  Negative for cough and shortness of breath.    Cardiovascular:  Positive for leg swelling. Negative for chest pain.   Gastrointestinal:  Negative for abdominal pain, constipation, diarrhea, nausea and  "vomiting.   Musculoskeletal:  Positive for joint pain. Negative for myalgias.   Neurological:  Negative for dizziness, weakness and headaches.     Objective:     Exam:  /50 (BP Location: Left arm, Patient Position: Sitting, BP Cuff Size: Adult)   Pulse 94   Temp (!) 35.6 °C (96.1 °F) (Temporal)   Ht 1.702 m (5' 7\")   Wt 79.7 kg (175 lb 12.8 oz)   SpO2 98%   BMI 27.53 kg/m²  Body mass index is 27.53 kg/m².    Physical Exam  Constitutional:       Appearance: Normal appearance.   HENT:      Head: Normocephalic.   Eyes:      Conjunctiva/sclera: Conjunctivae normal.      Pupils: Pupils are equal, round, and reactive to light.   Pulmonary:      Effort: Pulmonary effort is normal.   Musculoskeletal:         General: Normal range of motion.   Skin:     General: Skin is warm and dry.   Neurological:      General: No focal deficit present.      Mental Status: He is alert and oriented to person, place, and time.   Psychiatric:         Mood and Affect: Mood normal.         Behavior: Behavior normal.       Labs:   Hospital Outpatient Visit on 02/17/2023   Component Date Value    Creatinine, Urine 02/17/2023 91.53     Microalbumin, Urine Rand* 02/17/2023 38.5     Micro Alb Creat Ratio 02/17/2023 421 (H)    Hospital Outpatient Visit on 02/17/2023   Component Date Value    Prostatic Specific Antig* 02/17/2023 0.02     Testosterone,Total 02/17/2023 <12 (L)          Assessment & Plan: Medical Decision Making     78 y.o. male with the following -     Problem List Items Addressed This Visit       Essential hypertension, benign     Chronic, stable  -Given BP having been trending lower, will decrease lisinopril to 30 mg daily. Will consider adding HCTZ at next visit should BLE persist.  -Continue monitoring of home BP and bring log to next visit  -Instructed to hold BP med if pressure <100/50 or symptomatic (lightheaded/dizzy).         Relevant Medications    lisinopril (PRINIVIL) 30 MG tablet    Other Relevant Orders    " IRON/TOTAL IRON BIND    FERRITIN    VIT B12,  FOLIC ACID    Prostate cancer (HCC)     Chronic stable at this time  - Patient is taking Prednisone 5 mg/day at this time, he and his wife have the weaning schedule.  We will continue to follow and next week start hydrocortisone at 10 mg daily for 5 days and then taper as directed by Dr. Valles.  We will see him back in clinic in 4 weeks and review labs.  If cortisol level >10 will stop HC  -Return to clinic should symptoms worsen or fail to improve  -Continue to follow with  and Dr. Mancera for management of prostate cancer         Relevant Medications    hydrocortisone (CORTEF) 10 MG Tab    Other Relevant Orders    IRON/TOTAL IRON BIND    FERRITIN    VIT B12,  FOLIC ACID    CORTISOL - AM    Anemia    Relevant Orders    IRON/TOTAL IRON BIND    FERRITIN    VIT B12,  FOLIC ACID    CORTISOL - AM    CKD (chronic kidney disease), stage III (HCC)    Atherosclerosis of aorta (HCC)    Relevant Medications    lisinopril (PRINIVIL) 30 MG tablet    Type 2 diabetes mellitus with chronic kidney disease, without long-term current use of insulin, unspecified CKD stage (HCC)     - Chronic with A1c slightly above goal at 7.2%, otherwise stable  - Continue glipizide 5 mg 2 tabs in the morning 1 tab in the evening  - Continue efforts towards healthy diet and exercise as tolerated            HCC Gap Form    Diagnosis to address: I70.0 - Atherosclerosis of aorta (HCC)  Assessment and plan: Chronic, stable. Continue with current defined treatment plan: . Follow-up at least annually.  Diagnosis: N18.31 - Stage 3a chronic kidney disease (HCC)  Assessment and plan: Chronic, improving. Continue with current defined treatment plan: .  Follow-up at least annually.  Diagnosis: E11.22 - Type 2 diabetes mellitus with diabetic chronic kidney disease (HCC)  Assessment and plan: Chronic, improving. Continue with current defined treatment plan: Follow-up at least annually.  Last edited  03/01/23 12:11 PST by Kenneth Connolly D.N.P.           Differential diagnosis, natural history, supportive care, and indications for immediate follow-up discussed.  Shared decision making approach utilized, and patient is amendable with plan of care.  Patient understands to return to clinic or go to the emergency department if symptoms worsen. All questions and concerns addressed to the best of my knowledge.    Return in about 5 weeks (around 4/5/2023).    Please note that this dictation was created using voice recognition software. I have made every reasonable attempt to correct obvious errors, but I expect that there are errors of grammar and possibly content that I did not discover before finalizing the note.

## 2023-03-01 NOTE — ASSESSMENT & PLAN NOTE
Chronic, stable  -Given BP having been trending lower, will decrease lisinopril to 30 mg daily. Will consider adding HCTZ at next visit should BLE persist.  -Continue monitoring of home BP and bring log to next visit  -Instructed to hold BP med if pressure <100/50 or symptomatic (lightheaded/dizzy).

## 2023-03-01 NOTE — LETTER
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   Plan for gradual steroid taper.  2.5mg reduction every 7 days until patient is down to 5mg daily which is a physiologic dose  then can switch to hydcortisone (shorter acting steroid to allow for recovery of HPA axis) followed by another taper     Example 10mg bid then  after 1 week  10mg plus 7.5   then 10mg plus 5mg   then 10mg plus 2.5   then 10mg  only  Then 7.5mg  Then 5mg  Then switch to HC 10mg /5  Then HC 10mg  Then stop     Check morning cortisol if > 10 can stop HC

## 2023-03-03 RX ORDER — LISINOPRIL 30 MG/1
30 TABLET ORAL DAILY
Qty: 100 TABLET | Refills: 2 | Status: SHIPPED
Start: 2023-03-03 | End: 2023-04-05

## 2023-03-03 NOTE — TELEPHONE ENCOUNTER
Received request via: Pharmacy    Was the patient seen in the last year in this department? Yes    Does the patient have an active prescription (recently filled or refills available) for medication(s) requested?  PT INSURANCE REQUESTING 100 DAY SUPPLY THEY WILL NOT PAY FOR 90 DAY SUPPLY PLEASE ADVISE.     Does the patient have residential Plus and need 100 day supply (blood pressure, diabetes and cholesterol meds only)? Yes, quantity updated to 100 days

## 2023-04-03 ENCOUNTER — HOSPITAL ENCOUNTER (OUTPATIENT)
Dept: LAB | Facility: MEDICAL CENTER | Age: 78
End: 2023-04-03
Payer: MEDICARE

## 2023-04-03 DIAGNOSIS — I10 ESSENTIAL HYPERTENSION, BENIGN: ICD-10-CM

## 2023-04-03 DIAGNOSIS — D64.9 ANEMIA, UNSPECIFIED TYPE: ICD-10-CM

## 2023-04-03 DIAGNOSIS — C61 MALIGNANT NEOPLASM OF PROSTATE (HCC): ICD-10-CM

## 2023-04-03 LAB
CORTIS SERPL-MCNC: 7.7 UG/DL (ref 0–23)
FERRITIN SERPL-MCNC: 822 NG/ML (ref 22–322)
FOLATE SERPL-MCNC: >40 NG/ML
IRON SATN MFR SERPL: 30 % (ref 15–55)
IRON SERPL-MCNC: 80 UG/DL (ref 50–180)
TIBC SERPL-MCNC: 265 UG/DL (ref 250–450)
UIBC SERPL-MCNC: 185 UG/DL (ref 110–370)
VIT B12 SERPL-MCNC: 1035 PG/ML (ref 211–911)

## 2023-04-03 PROCEDURE — 36415 COLL VENOUS BLD VENIPUNCTURE: CPT

## 2023-04-03 PROCEDURE — 82533 TOTAL CORTISOL: CPT

## 2023-04-03 PROCEDURE — 82746 ASSAY OF FOLIC ACID SERUM: CPT

## 2023-04-03 PROCEDURE — 83540 ASSAY OF IRON: CPT

## 2023-04-03 PROCEDURE — 82607 VITAMIN B-12: CPT

## 2023-04-03 PROCEDURE — 82728 ASSAY OF FERRITIN: CPT

## 2023-04-03 PROCEDURE — 83550 IRON BINDING TEST: CPT

## 2023-04-05 ENCOUNTER — OFFICE VISIT (OUTPATIENT)
Dept: MEDICAL GROUP | Facility: PHYSICIAN GROUP | Age: 78
End: 2023-04-05
Payer: MEDICARE

## 2023-04-05 VITALS
HEART RATE: 106 BPM | SYSTOLIC BLOOD PRESSURE: 80 MMHG | OXYGEN SATURATION: 93 % | TEMPERATURE: 97.3 F | BODY MASS INDEX: 27.03 KG/M2 | DIASTOLIC BLOOD PRESSURE: 50 MMHG | WEIGHT: 172.2 LBS | HEIGHT: 67 IN

## 2023-04-05 DIAGNOSIS — E11.65 POORLY CONTROLLED DIABETES MELLITUS (HCC): ICD-10-CM

## 2023-04-05 DIAGNOSIS — Z19.1 HORMONE SENSITIVE PROSTATE CANCER (HCC): ICD-10-CM

## 2023-04-05 DIAGNOSIS — C61 MALIGNANT NEOPLASM OF PROSTATE (HCC): ICD-10-CM

## 2023-04-05 DIAGNOSIS — I10 ESSENTIAL HYPERTENSION, BENIGN: ICD-10-CM

## 2023-04-05 DIAGNOSIS — C61 HORMONE SENSITIVE PROSTATE CANCER (HCC): ICD-10-CM

## 2023-04-05 DIAGNOSIS — E11.22 TYPE 2 DIABETES MELLITUS WITH CHRONIC KIDNEY DISEASE, WITHOUT LONG-TERM CURRENT USE OF INSULIN, UNSPECIFIED CKD STAGE (HCC): ICD-10-CM

## 2023-04-05 PROCEDURE — 99214 OFFICE O/P EST MOD 30 MIN: CPT

## 2023-04-05 RX ORDER — HYDROCORTISONE 5 MG/1
5 TABLET ORAL DAILY
Qty: 30 TABLET | Refills: 0 | Status: SHIPPED | OUTPATIENT
Start: 2023-04-05 | End: 2023-05-03

## 2023-04-05 RX ORDER — LISINOPRIL 10 MG/1
10 TABLET ORAL DAILY
Qty: 100 TABLET | Refills: 1 | Status: SHIPPED
Start: 2023-04-05 | End: 2023-05-31

## 2023-04-05 RX ORDER — GLIPIZIDE 5 MG/1
TABLET ORAL
Qty: 300 TABLET | Refills: 1
Start: 2023-04-05 | End: 2023-05-31

## 2023-04-05 ASSESSMENT — ENCOUNTER SYMPTOMS
WEIGHT LOSS: 0
NAUSEA: 0
DIZZINESS: 0
CONSTIPATION: 0
BLURRED VISION: 0
WEAKNESS: 1
COUGH: 0
DIARRHEA: 0
FEVER: 0
CHILLS: 0
HEADACHES: 0
VOMITING: 0
ABDOMINAL PAIN: 0
SHORTNESS OF BREATH: 0
MYALGIAS: 0

## 2023-04-05 ASSESSMENT — FIBROSIS 4 INDEX: FIB4 SCORE: 1.56

## 2023-04-05 NOTE — ASSESSMENT & PLAN NOTE
Chronic, stable  -Will continue to follow with Oncology and Urologist as scheduled  -Continue steroid taper as discussed: 5 mg per day for 7 days, Week 2: 5 mg per day for 6 days, Week 3: 5 mg per day for 5 days, Week 4: 5 mg per day for 4 days, Week 5: 5 mg per day for 3 days, Week 6: 5 mg per day for 2 days, Week 7: 5 mg day for one day then repeat labs.

## 2023-04-05 NOTE — ASSESSMENT & PLAN NOTE
Chronic condition stable  -Due to poor appetite we will decrease glipizide to 5 mg in the morning, hold for afternoon meals  -Continue healthy diet as discussed  -Repeat hemoglobin A1c in 7 weeks

## 2023-04-05 NOTE — ASSESSMENT & PLAN NOTE
Chronic condition with evident hypotension therefore I am decreasing lisinopril to 10 mg/day.  Patient to continue home blood pressure monitoring and if blood pressure is below 110/50 he is to hold his lisinopril.  -Return to clinic in 8 weeks with blood pressure log

## 2023-04-05 NOTE — PROGRESS NOTES
Subjective:     CC: follow up on steroid taper    HPI:   Kemal presents today with    Problem   Type 2 Diabetes Mellitus With Chronic Kidney Disease, Without Long-Term Current Use of Insulin, Unspecified Ckd Stage ([facility])    A1c 7.2 at most recent lab draw  - Patient taking glipizide 5 mg 2 tabs in the morning and 1 tab at night he states he has had poor appetite along with fatigue and nausea.  He denies hypoglycemia.  He does not take blood sugars at home.  - Reports to eat healthy diet but has been not feeling well lately     Hormone Sensitive Prostate Cancer (Hcc)    Had Radical Prostatectomy with Dr Andersen 4/21/2010, Seeing Dr. Valerio now.  Was on abiraterone for 26 months as well as corticosteroids.  He is done with treatment and abruptly taken off steroids.  He had quite a severe reaction fatigue, N/V, joint pain.  On 1/11/2023 patient was seen by Stephen Burt in our clinic and placed back on steroids per the expert recommendation of endocrinology.  Was taking 17.5 mg per week for one week  -15 mg per day week 3  -12.5 mg per day week 4  -10 mg week 5  -7.5 mg week 6  5 mg week 7  Then swith to HC 10 mg/5 day week 8  Then HC 10 mg week 9  Then stop week 10   Check morning cortisol if > 10 can stop HC  Most recent cortisol is 7.7, per recommendation of endocrinology will resume hormone taper at 5 mg per day eliminating one day per week and off in 7 weeks with repeat cortisol levels.    -He is not feeling good today in clinic since stopping steroids, has fatigue, nausea, poor appetite and hypotension.         Prostate cancer (HCC)   Essential Hypertension, Benign    Chronic, taking lisinopril 40 mg daily without reported side effects.  BP today is 80/50. Denies dizziness/lightheadedness. Reports significant fatigue BP at home has been 101-126/48-72  -Does report ongoing BLE after back surgery despite efforts to elevate lower extremities           Health Maintenance: Completed    ROS:  Review of Systems  "  Constitutional:  Positive for malaise/fatigue. Negative for chills, fever and weight loss.   Eyes:  Negative for blurred vision.   Respiratory:  Negative for cough and shortness of breath.    Cardiovascular:  Negative for chest pain.   Gastrointestinal:  Negative for abdominal pain, constipation, diarrhea, nausea and vomiting.   Musculoskeletal:  Negative for myalgias.   Neurological:  Positive for weakness. Negative for dizziness and headaches.     Objective:     Exam:  BP (!) 80/50 (BP Location: Left arm, Patient Position: Sitting, BP Cuff Size: Adult)   Pulse (!) 106   Temp 36.3 °C (97.3 °F) (Temporal)   Ht 1.702 m (5' 7\")   Wt 78.1 kg (172 lb 3.2 oz)   SpO2 93%   BMI 26.97 kg/m²  Body mass index is 26.97 kg/m².    Physical Exam  Constitutional:       General: He is not in acute distress.     Appearance: He is ill-appearing.   HENT:      Head: Normocephalic.   Eyes:      Conjunctiva/sclera: Conjunctivae normal.      Pupils: Pupils are equal, round, and reactive to light.   Pulmonary:      Effort: Pulmonary effort is normal.   Musculoskeletal:         General: Normal range of motion.   Skin:     General: Skin is warm and dry.      Coloration: Skin is pale.   Neurological:      General: No focal deficit present.      Mental Status: He is alert and oriented to person, place, and time.   Psychiatric:         Mood and Affect: Mood normal.         Behavior: Behavior normal.       Labs:   Lab Results   Component Value Date/Time    CHOLSTRLTOT 193 01/27/2023 11:21 AM    LDL 97 01/27/2023 11:21 AM    HDL 48 01/27/2023 11:21 AM    TRIGLYCERIDE 239 (H) 01/27/2023 11:21 AM       Lab Results   Component Value Date/Time    SODIUM 144 01/27/2023 11:21 AM    POTASSIUM 5.2 01/27/2023 11:21 AM    CHLORIDE 110 01/27/2023 11:21 AM    CO2 24 01/27/2023 11:21 AM    GLUCOSE 44 (L) 01/27/2023 11:21 AM    BUN 34 (H) 01/27/2023 11:21 AM    CREATININE 1.14 01/27/2023 11:21 AM    CREATININE 1.48 (H) 03/18/2011 12:00 AM    " BUNCREATRAT 18 03/18/2011 12:00 AM    GLOMRATE 47 (L) 03/18/2011 12:00 AM     Lab Results   Component Value Date/Time    ALKPHOSPHAT 65 01/27/2023 11:21 AM    ASTSGOT 20 01/27/2023 11:21 AM    ALTSGPT 19 01/27/2023 11:21 AM    TBILIRUBIN 0.3 01/27/2023 11:21 AM      Lab Results   Component Value Date/Time    HBA1C 7.2 (H) 01/27/2023 11:21 AM    HBA1C 7.2 (H) 05/03/2022 12:24 PM    HBA1C 7.4 (H) 11/03/2021 09:17 AM     No results found for: TSH  No results found for: FREET4      Assessment & Plan: Medical Decision Making     78 y.o. male with the following -     Problem List Items Addressed This Visit       Essential hypertension, benign     Chronic condition with evident hypotension therefore I am decreasing lisinopril to 10 mg/day.  Patient to continue home blood pressure monitoring and if blood pressure is below 110/50 he is to hold his lisinopril.  -Return to clinic in 8 weeks with blood pressure log         Relevant Medications    lisinopril (PRINIVIL) 10 MG Tab    Other Relevant Orders    CBC WITH DIFFERENTIAL    Comp Metabolic Panel    Prostate cancer (HCC)    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HCC)     Chronic, stable  -Will continue to follow with Oncology and Urologist as scheduled  -Continue steroid taper as discussed: 5 mg per day for 7 days, Week 2: 5 mg per day for 6 days, Week 3: 5 mg per day for 5 days, Week 4: 5 mg per day for 4 days, Week 5: 5 mg per day for 3 days, Week 6: 5 mg per day for 2 days, Week 7: 5 mg day for one day then repeat labs.         Relevant Medications    hydrocortisone (CORTEF) 5 MG Tab    Other Relevant Orders    CORTISOL - AM    CBC WITH DIFFERENTIAL    Referral to Endocrinology    Type 2 diabetes mellitus with chronic kidney disease, without long-term current use of insulin, unspecified CKD stage (HCC)     Chronic condition stable  -Due to poor appetite we will decrease glipizide to 5 mg in the morning, hold for afternoon meals  -Continue healthy diet as discussed  -Repeat  hemoglobin A1c in 7 weeks         Relevant Medications    glipiZIDE (GLUCOTROL) 5 MG Tab    Other Relevant Orders    CBC WITH DIFFERENTIAL    Comp Metabolic Panel    HEMOGLOBIN A1C     Other Visit Diagnoses       Poorly controlled diabetes mellitus (HCC)        Relevant Medications    glipiZIDE (GLUCOTROL) 5 MG Tab            Differential diagnosis, natural history, supportive care, and indications for immediate follow-up discussed.  Shared decision making approach utilized, and patient is amendable with plan of care.  Patient understands to return to clinic or go to the emergency department if symptoms worsen. All questions and concerns addressed to the best of my knowledge.    Return in about 7 weeks (around 5/24/2023).    Please note that this dictation was created using voice recognition software. I have made every reasonable attempt to correct obvious errors, but I expect that there are errors of grammar and possibly content that I did not discover before finalizing the note.

## 2023-04-10 NOTE — TELEPHONE ENCOUNTER
Received request via: Pharmacy    Was the patient seen in the last year in this department? Yes    Does the patient have an active prescription (recently filled or refills available) for medication(s) requested? No  
100

## 2023-05-03 ENCOUNTER — OFFICE VISIT (OUTPATIENT)
Dept: ENDOCRINOLOGY | Facility: MEDICAL CENTER | Age: 78
End: 2023-05-03
Payer: MEDICARE

## 2023-05-03 VITALS
BODY MASS INDEX: 25.72 KG/M2 | SYSTOLIC BLOOD PRESSURE: 114 MMHG | WEIGHT: 163.9 LBS | OXYGEN SATURATION: 96 % | HEART RATE: 77 BPM | HEIGHT: 67 IN | DIASTOLIC BLOOD PRESSURE: 60 MMHG

## 2023-05-03 DIAGNOSIS — E27.3 ADRENAL INSUFFICIENCY DUE TO CORTICOSTEROID WITHDRAWAL (HCC): ICD-10-CM

## 2023-05-03 DIAGNOSIS — T38.0X5A ADRENAL INSUFFICIENCY DUE TO CORTICOSTEROID WITHDRAWAL (HCC): ICD-10-CM

## 2023-05-03 DIAGNOSIS — E55.9 VITAMIN D DEFICIENCY: ICD-10-CM

## 2023-05-03 DIAGNOSIS — E29.1 SECONDARY MALE HYPOGONADISM: ICD-10-CM

## 2023-05-03 PROCEDURE — 99211 OFF/OP EST MAY X REQ PHY/QHP: CPT

## 2023-05-03 PROCEDURE — 99214 OFFICE O/P EST MOD 30 MIN: CPT

## 2023-05-03 RX ORDER — PREDNISONE 1 MG/1
1 TABLET ORAL DAILY
Qty: 30 TABLET | Refills: 0 | Status: SHIPPED | OUTPATIENT
Start: 2023-05-03 | End: 2023-05-23 | Stop reason: SDUPTHER

## 2023-05-03 RX ORDER — 0.9 % SODIUM CHLORIDE 0.9 %
3 VIAL (ML) INJECTION PRN
Status: CANCELLED | OUTPATIENT
Start: 2023-05-03

## 2023-05-03 RX ORDER — COSYNTROPIN 0.25 MG/ML
250 INJECTION, POWDER, FOR SOLUTION INTRAMUSCULAR; INTRAVENOUS ONCE
Status: CANCELLED | OUTPATIENT
Start: 2023-05-03 | End: 2023-05-03

## 2023-05-03 RX ORDER — 0.9 % SODIUM CHLORIDE 0.9 %
VIAL (ML) INJECTION PRN
Status: CANCELLED | OUTPATIENT
Start: 2023-05-03

## 2023-05-03 RX ORDER — 0.9 % SODIUM CHLORIDE 0.9 %
10 VIAL (ML) INJECTION PRN
Status: CANCELLED | OUTPATIENT
Start: 2023-05-03

## 2023-05-03 ASSESSMENT — FIBROSIS 4 INDEX: FIB4 SCORE: 1.56

## 2023-05-03 NOTE — PROGRESS NOTES
New Patient Consult Note for Endocrinology  Referred by: Kenneth Connolly D.N.P.    Reason for consult: Chronic glucocorticoids    HPI:  Kemal Mueller is a 78 y.o. old patient who is seeing us today for care.  This is a pleasant patient and I appreciate the opportunity to participate in the care of this patient.  This is a new patient with me today.    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    He was E-consulted by Dr Brown with a plan for gradual steroid taper.  2.5mg reduction every 7 days until patient is down to 5mg daily which is a physiologic dose  then can switch to hydcortisone (shorter acting steroid to allow for recovery of HPA axis) followed by another taper     10mg bid then  after 1 week  10mg plus 7.5   then 10mg plus 5mg   then 10mg plus 2.5   then 10mg  only  Then 7.5mg  Then 5mg  Then switch to HC 10mg /5  Then HC 10mg  Then stop     Check morning cortisol if > 10 can stop HC    He was able to taper from 5mg hydrocortisone/day x 7 days to  5mg/day x 6 days to  5mg/day x 5 days to  5mg/day x 4 days where is started feeling nausea and vomiting. He took his last dose of day 4 today.     ACTH stim test was not done.     Reports: nausea and vomiting, however today he is feeling good.   Currently taking: Cortef 5mg 4 times a day with tapering.    Latest Reference Range & Units 04/03/23 11:33   Cortisol 0.0 - 23.0 ug/dL 7.7     2. Secondary Hypogonadism   Latest Reference Range & Units 02/17/23 10:34   Testosterone,Total 175 - 781 ng/dL <12 (L)     3. Vitamin D deficiency  Currently taking Vitamin D 3 1000 IU BID   Latest Reference Range & Units 06/08/21 07:35   25-Hydroxy   Vitamin D 25 30 - 100 ng/mL 64     ROS:   Constitutional: No change in weight ,  No fatigue, No night sweats.  HEENT: No Headache.  Eyes:  No blurred vision, No visual changes.  Cardiac: No chest pain, No palpitations.  Resp: No shortness of breath, No cough,   Gastro: No nausea or vomiting, No diarrhea.  Neuro: Denies numbness or tinging in bilateral feet or hands, and no loss of sensation.  Endo: No heat or cold intolerance.  : No polyuria, No polydipsia, No chronic UTI's.  Lower extremities: No lower leg edema bilateral.  All other systems were reviewed and were negative.    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 ([facility]) 05/03/2023    Mixed stress and urge urinary incontinence 12/12/2022    Low back pain 11/14/2022    Atherosclerosis of aorta ([facility]) 09/27/2022    Type 2 diabetes mellitus with chronic kidney disease, without long-term current use of insulin, unspecified CKD stage ([facility])     Other spondylosis with radiculopathy, lumbar region 08/18/2022    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[facility]) 02/14/2022    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[facility]) 08/31/2020    Dyslipidemia 03/19/2020    CKD (chronic kidney disease), stage III ([facility])     Vitamin D deficiency 02/12/2020    Colon cancer screening 02/12/2020    Arthritis 01/15/2020    History of urinary stone 11/28/2018    History of malignant neoplasm of kidney 04/30/2018    Anemia 08/17/2017    Herpes zoster without complication 11/15/2016    Age-related nuclear cataract of eye 01/14/2016    Right renal mass 01/11/2016    Degeneration of cervical intervertebral disc 10/30/2012    Cervical stenosis of spine 10/30/2012    Cataracts, bilateral 01/13/2012    ED (erectile dysfunction) 01/11/2011    Prostate cancer ([facility]) 09/21/2010    Nephrolithiasis 09/12/2010    Glaucoma 09/12/2010    GERD (gastroesophageal reflux disease) 09/12/2010    Hyperlipidemia 05/21/2009    Essential hypertension, benign 05/21/2009    Calculus of kidney 05/21/2009    Reflux  esophagitis 2009    Proteinuria 2009       Past Surgical History:  Past Surgical History:   Procedure Laterality Date    LUMBAR LAMINECTOMY DISKECTOMY N/A 2022    Procedure: LUMBAR 2 - SACRAL 1 LAMINECTOMY;  Surgeon: Selvin Ying M.D.;  Location: SURGERY [facility];  Service: Neurosurgery    CATARACT PHACO WITH IOL Right 2016    Procedure: CATARACT PHACO WITH IOL;  Surgeon: Alfonso Hernandez M.D.;  Location: SURGERY [facility];  Service:     CATARACT PHACO WITH IOL Left 2016    Procedure: CATARACT PHACO WITH IOL;  Surgeon: Alfonso Hernandez M.D.;  Location: SURGERY [facility];  Service:     CERVICAL DISK AND FUSION ANTERIOR  10/30/2012    Performed by Selvin Ying M.D. at SURGERY [facility] ORS    PROSTATECTOMY, RADICAL RETRO  2010    Performed by EDYTA KUMAR at SURGERY [facility] ORS    NODE DISSECTION  2010    Performed by EDYTA KUMAR at SURGERY [facility] ORS    CARPAL TUNNEL RELEASE      Bilateral    TRIGGER FINGER RELEASE      Right small finger    TONSILLECTOMY AND ADENOIDECTOMY      APPENDECTOMY      OTHER      radiology procedure - cryoablation bladder tumor    FL REMOVAL OF KIDNEY STONE  1986/1998    x2       Allergies:  Nkda [no known drug allergy]    Social History:  Social History     Socioeconomic History    Marital status:      Spouse name: Not on file    Number of children: Not on file    Years of education: Not on file    Highest education level: Not on file   Occupational History    Occupation: retired drugstore manager   Tobacco Use    Smoking status: Former     Packs/day: 0.20     Years: 10.00     Pack years: 2.00     Types: Cigarettes     Quit date: 1977     Years since quittin.3    Smokeless tobacco: Never    Tobacco comments:     13 ppd for 10 years, quit    Vaping Use    Vaping Use: Never used   Substance and Sexual Activity    Alcohol use: Yes     Alcohol/week: 1.2 oz     Types: 2 Cans of beer  per week     Comment: 1-2 per day    Drug use: No    Sexual activity: Not Currently     Partners: Female   Other Topics Concern    Not on file   Social History Narrative    Not on file     Social Determinants of Health     Financial Resource Strain: Not on file   Food Insecurity: Not on file   Transportation Needs: Not on file   Physical Activity: Not on file   Stress: Not on file   Social Connections: Not on file   Intimate Partner Violence: Not on file   Housing Stability: Not on file       Family History:  Family History   Problem Relation Age of Onset    Diabetes Mother     Stroke Mother     Alzheimer's Disease Father        Medications:    Current Outpatient Medications:     predniSONE (DELTASONE) 1 MG Tab, Take 1 Tablet by mouth every day., Disp: 30 Tablet, Rfl: 0    lisinopril (PRINIVIL) 10 MG Tab, Take 1 Tablet by mouth every day., Disp: 100 Tablet, Rfl: 1    glipiZIDE (GLUCOTROL) 5 MG Tab, TAKE one tablet by mouth in the morning, Disp: 300 Tablet, Rfl: 1    ondansetron (ZOFRAN ODT) 4 MG TABLET DISPERSIBLE, , Disp: , Rfl:     omeprazole (PRILOSEC) 20 MG delayed-release capsule, TAKE ONE CAPSULE BY MOUTH DAILY, Disp: 100 Capsule, Rfl: 3    atorvastatin (LIPITOR) 80 MG tablet, TAKE ONE TABLET BY MOUTH EVERY EVENING, Disp: 100 Tablet, Rfl: 3    ezetimibe (ZETIA) 10 MG Tab, TAKE ONE TABLET BY MOUTH DAILY, Disp: 100 Tablet, Rfl: 3    Calcium Carbonate-Vit D-Min (CALCIUM 1200 PO), Take 1 Tablet by mouth every day., Disp: , Rfl:     vitamin D3, cholecalciferol, 1000 UNIT Tab, Take 2 Tabs by mouth every day., Disp: 100 Tab, Rfl: 3    potassium citrate SR (UROCIT-K SR) 10 MEQ (1080 MG) TBCR, Take 10 mEq by mouth every evening., Disp: , Rfl:     Multiple Vitamins-Minerals (CENTRUM SILVER PO), Take 1 Tablet by mouth every day., Disp: , Rfl:     Brimonidine Tartrate-Timolol 0.2-0.5 % Solution, Administer 1 Drop into both eyes 2 times a day., Disp: , Rfl:     latanoprost (XALATAN) 0.005 % Solution, Place 1 Drop in both  "eyes every evening., Disp: , Rfl:     ACETAZOLAMIDE 125 MG PO TABS, Take 125 mg by mouth every day., Disp: , Rfl:       Physical Examination:   Vital signs: /60 (BP Location: Right arm, Patient Position: Sitting)   Pulse 77   Ht 1.702 m (5' 7\")   Wt 74.3 kg (163 lb 14.4 oz)   SpO2 96%   BMI 25.67 kg/m²   General: No distress, cooperative, well dressed and well nourished.   Eyes: No scleral icterus or discharge, No hyposphagma  ENMT: Normal on external inspection of nose, lips, No nasal drainage   Neck: No abnormal masses on inspection  Resp: Normal effort, Bilateral clear to auscultation, No wheezing, No rales  CVS: Regular rate and rhythm, S1 S2 normal, No murmur. No gallop  Extremities: No edema bilateral extremities  Neuro: Alert and oriented  Skin: No rash, No Ulcers  Psych: Normal mood and affect    Assessment and Plan:    1. 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 ACTH stim test was not ordered.   I will get the ACTH stim test to evaluate the maximum cortisol secretion.  I will stop the hydrocortisone and start him on prednisone until the ACTH stim test is completed and the patient follows up with me.   - TSH; Future  - FREE THYROXINE; Future  - predniSONE (DELTASONE) 1 MG Tab; Take 1 Tablet by mouth every day.  Dispense: 30 Tablet; Refill: 0    2. 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   I will continue to monitor.   - Testosterone, Free & Total, Adult Male (w/SHBG); Future     Return in about 4 weeks (around 5/31/2023). Patient will have blood work done 2 weeks prior to next apt in 4 weeks.     This patient during there office visit was started on new medication.  Side effects of " new medications were discussed with the patient today in the office. The patient was supplied paperwork on this new medication.    Thank you kindly for allowing me to participate in the diabetes care plan for this patient.    Antonio Abarca, APRN   05/03/23    CC:   Kenneth Connolly D.N.P.

## 2023-05-23 ENCOUNTER — TELEPHONE (OUTPATIENT)
Dept: ENDOCRINOLOGY | Facility: MEDICAL CENTER | Age: 78
End: 2023-05-23
Payer: MEDICARE

## 2023-05-23 DIAGNOSIS — E27.3 ADRENAL INSUFFICIENCY DUE TO CORTICOSTEROID WITHDRAWAL (HCC): ICD-10-CM

## 2023-05-23 DIAGNOSIS — T38.0X5A ADRENAL INSUFFICIENCY DUE TO CORTICOSTEROID WITHDRAWAL (HCC): ICD-10-CM

## 2023-05-23 RX ORDER — PREDNISONE 1 MG/1
1 TABLET ORAL DAILY
Qty: 30 TABLET | Refills: 0 | Status: SHIPPED
Start: 2023-05-23 | End: 2023-06-02

## 2023-05-25 ENCOUNTER — HOSPITAL ENCOUNTER (OUTPATIENT)
Dept: LAB | Facility: MEDICAL CENTER | Age: 78
End: 2023-05-25
Payer: MEDICARE

## 2023-05-25 DIAGNOSIS — T38.0X5A ADRENAL INSUFFICIENCY DUE TO CORTICOSTEROID WITHDRAWAL (HCC): ICD-10-CM

## 2023-05-25 DIAGNOSIS — E29.1 SECONDARY MALE HYPOGONADISM: ICD-10-CM

## 2023-05-25 DIAGNOSIS — E27.3 ADRENAL INSUFFICIENCY DUE TO CORTICOSTEROID WITHDRAWAL (HCC): ICD-10-CM

## 2023-05-25 DIAGNOSIS — I10 ESSENTIAL HYPERTENSION, BENIGN: ICD-10-CM

## 2023-05-25 DIAGNOSIS — C61 HORMONE SENSITIVE PROSTATE CANCER (HCC): ICD-10-CM

## 2023-05-25 DIAGNOSIS — E11.22 TYPE 2 DIABETES MELLITUS WITH CHRONIC KIDNEY DISEASE, WITHOUT LONG-TERM CURRENT USE OF INSULIN, UNSPECIFIED CKD STAGE (HCC): ICD-10-CM

## 2023-05-25 DIAGNOSIS — E55.9 VITAMIN D DEFICIENCY: ICD-10-CM

## 2023-05-25 DIAGNOSIS — Z19.1 HORMONE SENSITIVE PROSTATE CANCER (HCC): ICD-10-CM

## 2023-05-25 LAB
25(OH)D3 SERPL-MCNC: 56 NG/ML (ref 30–100)
ALBUMIN SERPL BCP-MCNC: 3.8 G/DL (ref 3.2–4.9)
ALBUMIN/GLOB SERPL: 1.3 G/DL
ALP SERPL-CCNC: 91 U/L (ref 30–99)
ALT SERPL-CCNC: 23 U/L (ref 2–50)
ANION GAP SERPL CALC-SCNC: 13 MMOL/L (ref 7–16)
AST SERPL-CCNC: 25 U/L (ref 12–45)
BASOPHILS # BLD AUTO: 0.2 % (ref 0–1.8)
BASOPHILS # BLD: 0.01 K/UL (ref 0–0.12)
BILIRUB SERPL-MCNC: 0.3 MG/DL (ref 0.1–1.5)
BUN SERPL-MCNC: 24 MG/DL (ref 8–22)
CALCIUM ALBUM COR SERPL-MCNC: 10.4 MG/DL (ref 8.5–10.5)
CALCIUM SERPL-MCNC: 10.2 MG/DL (ref 8.5–10.5)
CHLORIDE SERPL-SCNC: 107 MMOL/L (ref 96–112)
CO2 SERPL-SCNC: 22 MMOL/L (ref 20–33)
CORTIS SERPL-MCNC: 11.4 UG/DL (ref 0–23)
CREAT SERPL-MCNC: 1.17 MG/DL (ref 0.5–1.4)
EOSINOPHIL # BLD AUTO: 0.02 K/UL (ref 0–0.51)
EOSINOPHIL NFR BLD: 0.4 % (ref 0–6.9)
ERYTHROCYTE [DISTWIDTH] IN BLOOD BY AUTOMATED COUNT: 41.1 FL (ref 35.9–50)
EST. AVERAGE GLUCOSE BLD GHB EST-MCNC: 140 MG/DL
GFR SERPLBLD CREATININE-BSD FMLA CKD-EPI: 63 ML/MIN/1.73 M 2
GLOBULIN SER CALC-MCNC: 2.9 G/DL (ref 1.9–3.5)
GLUCOSE SERPL-MCNC: 111 MG/DL (ref 65–99)
HBA1C MFR BLD: 6.5 % (ref 4–5.6)
HCT VFR BLD AUTO: 35.8 % (ref 42–52)
HGB BLD-MCNC: 11.2 G/DL (ref 14–18)
IMM GRANULOCYTES # BLD AUTO: 0.04 K/UL (ref 0–0.11)
IMM GRANULOCYTES NFR BLD AUTO: 0.8 % (ref 0–0.9)
LYMPHOCYTES # BLD AUTO: 1.6 K/UL (ref 1–4.8)
LYMPHOCYTES NFR BLD: 33.8 % (ref 22–41)
MCH RBC QN AUTO: 29.2 PG (ref 27–33)
MCHC RBC AUTO-ENTMCNC: 31.3 G/DL (ref 32.3–36.5)
MCV RBC AUTO: 93.2 FL (ref 81.4–97.8)
MONOCYTES # BLD AUTO: 1.32 K/UL (ref 0–0.85)
MONOCYTES NFR BLD AUTO: 27.9 % (ref 0–13.4)
NEUTROPHILS # BLD AUTO: 1.74 K/UL (ref 1.82–7.42)
NEUTROPHILS NFR BLD: 36.9 % (ref 44–72)
NRBC # BLD AUTO: 0 K/UL
NRBC BLD-RTO: 0 /100 WBC (ref 0–0.2)
PLATELET # BLD AUTO: 200 K/UL (ref 164–446)
PMV BLD AUTO: 10.8 FL (ref 9–12.9)
POTASSIUM SERPL-SCNC: 4.6 MMOL/L (ref 3.6–5.5)
PROT SERPL-MCNC: 6.7 G/DL (ref 6–8.2)
RBC # BLD AUTO: 3.84 M/UL (ref 4.7–6.1)
SODIUM SERPL-SCNC: 142 MMOL/L (ref 135–145)
T4 FREE SERPL-MCNC: 0.97 NG/DL (ref 0.93–1.7)
TSH SERPL DL<=0.005 MIU/L-ACNC: 1.53 UIU/ML (ref 0.38–5.33)
WBC # BLD AUTO: 4.7 K/UL (ref 4.8–10.8)

## 2023-05-25 PROCEDURE — 84439 ASSAY OF FREE THYROXINE: CPT

## 2023-05-25 PROCEDURE — 83036 HEMOGLOBIN GLYCOSYLATED A1C: CPT

## 2023-05-25 PROCEDURE — 84403 ASSAY OF TOTAL TESTOSTERONE: CPT

## 2023-05-25 PROCEDURE — 84270 ASSAY OF SEX HORMONE GLOBUL: CPT

## 2023-05-25 PROCEDURE — 82533 TOTAL CORTISOL: CPT

## 2023-05-25 PROCEDURE — 36415 COLL VENOUS BLD VENIPUNCTURE: CPT

## 2023-05-25 PROCEDURE — 85025 COMPLETE CBC W/AUTO DIFF WBC: CPT

## 2023-05-25 PROCEDURE — 80053 COMPREHEN METABOLIC PANEL: CPT

## 2023-05-25 PROCEDURE — 84443 ASSAY THYROID STIM HORMONE: CPT

## 2023-05-25 PROCEDURE — 84402 ASSAY OF FREE TESTOSTERONE: CPT

## 2023-05-25 PROCEDURE — 82306 VITAMIN D 25 HYDROXY: CPT

## 2023-05-26 LAB
SHBG SERPL-SCNC: 46 NMOL/L (ref 19–76)
TESTOST FREE MFR SERPL: <1.4 % (ref 1.6–2.9)
TESTOST FREE SERPL-MCNC: <1 PG/ML (ref 47–244)
TESTOST SERPL-MCNC: <3 NG/DL (ref 300–720)

## 2023-05-30 ENCOUNTER — HOSPITAL ENCOUNTER (OUTPATIENT)
Facility: MEDICAL CENTER | Age: 78
End: 2023-05-30
Payer: MEDICARE

## 2023-05-30 PROCEDURE — 82043 UR ALBUMIN QUANTITATIVE: CPT

## 2023-05-30 PROCEDURE — 82570 ASSAY OF URINE CREATININE: CPT

## 2023-05-31 ENCOUNTER — OFFICE VISIT (OUTPATIENT)
Dept: MEDICAL GROUP | Facility: PHYSICIAN GROUP | Age: 78
End: 2023-05-31
Payer: MEDICARE

## 2023-05-31 VITALS
TEMPERATURE: 97.5 F | HEIGHT: 67 IN | WEIGHT: 168.6 LBS | SYSTOLIC BLOOD PRESSURE: 100 MMHG | HEART RATE: 83 BPM | DIASTOLIC BLOOD PRESSURE: 50 MMHG | OXYGEN SATURATION: 99 % | BODY MASS INDEX: 26.46 KG/M2

## 2023-05-31 DIAGNOSIS — N18.31 STAGE 3A CHRONIC KIDNEY DISEASE: ICD-10-CM

## 2023-05-31 DIAGNOSIS — E11.29 TYPE 2 DIABETES MELLITUS WITH MICROALBUMINURIA, WITHOUT LONG-TERM CURRENT USE OF INSULIN (HCC): ICD-10-CM

## 2023-05-31 DIAGNOSIS — I10 ESSENTIAL HYPERTENSION, BENIGN: ICD-10-CM

## 2023-05-31 DIAGNOSIS — E78.5 HYPERLIPIDEMIA, UNSPECIFIED HYPERLIPIDEMIA TYPE: ICD-10-CM

## 2023-05-31 DIAGNOSIS — R80.9 TYPE 2 DIABETES MELLITUS WITH MICROALBUMINURIA, WITHOUT LONG-TERM CURRENT USE OF INSULIN (HCC): ICD-10-CM

## 2023-05-31 DIAGNOSIS — D64.9 ANEMIA, UNSPECIFIED TYPE: ICD-10-CM

## 2023-05-31 LAB
CREAT UR-MCNC: 100.56 MG/DL
MICROALBUMIN UR-MCNC: 55.3 MG/DL
MICROALBUMIN/CREAT UR: 550 MG/G (ref 0–30)

## 2023-05-31 PROCEDURE — 3078F DIAST BP <80 MM HG: CPT

## 2023-05-31 PROCEDURE — 99214 OFFICE O/P EST MOD 30 MIN: CPT

## 2023-05-31 PROCEDURE — 3074F SYST BP LT 130 MM HG: CPT

## 2023-05-31 RX ORDER — GLIPIZIDE 2.5 MG/1
2.5 TABLET, EXTENDED RELEASE ORAL DAILY
COMMUNITY
End: 2023-09-05

## 2023-05-31 RX ORDER — LISINOPRIL 5 MG/1
5 TABLET ORAL DAILY
Qty: 100 TABLET | Refills: 1 | Status: SHIPPED | OUTPATIENT
Start: 2023-05-31 | End: 2024-01-09

## 2023-05-31 ASSESSMENT — ENCOUNTER SYMPTOMS
DIARRHEA: 0
MYALGIAS: 0
VOMITING: 0
BLURRED VISION: 0
WEIGHT LOSS: 0
NAUSEA: 0
ABDOMINAL PAIN: 0
HEADACHES: 0
WEAKNESS: 0
CONSTIPATION: 0
FEVER: 0
SHORTNESS OF BREATH: 0
CHILLS: 0
DIZZINESS: 0
COUGH: 0

## 2023-05-31 ASSESSMENT — FIBROSIS 4 INDEX: FIB4 SCORE: 2.085144140570747627

## 2023-05-31 NOTE — PROGRESS NOTES
Subjective:     CC: Lab review and chronic health topics    HPI:   Kemal presents today with    Problem   Type 2 Diabetes Mellitus With Microalbuminuria (Hcc)    Chronic condition with most recent A1c at 6.4%.  Patient was dropped down to 5 mg of glipizide daily at last visit and is doing quite well on this dose.  He denies any recent hypoglycemia.  He does show to have micro albuminuria for which we will continue to monitor.  He would definitely benefit from SGLT2 inhibitor.  We will decrease patient's glipizide to 2.5 mg daily and add an SGLT2 inhibitor empagliflozin 10 mg daily.       Ckd (Chronic Kidney Disease), Stage III (Hcc)    Chronic, stable  -Seeing Dr. Jones-Nephrologist  -Most recent GFR 63  -No nephrotoxic substance       Anemia    Chronic, active  -Appears to have been low secondary to low testosterone, and was on Luprin shots for prostate CA-currently under the care of endocrinology   Latest Reference Range & Units 05/25/23 08:57   RBC 4.70 - 6.10 M/uL 3.84 (L)   Hemoglobin 14.0 - 18.0 g/dL 11.2 (L)   Hematocrit 42.0 - 52.0 % 35.8 (L)   MCV 81.4 - 97.8 fL 93.2   MCH 27.0 - 33.0 pg 29.2   MCHC 32.3 - 36.5 g/dL 31.3 (L)        Hyperlipidemia    Chronic condition   -Patient taking atorvastatin 80 mg daily.  Doing well on this medication without reported side effects  -  Lab Results   Component Value Date/Time    CHOLSTRLTOT 193 01/27/2023 11:21 AM    CHOLSTRLTOT 185 05/03/2022 12:24 PM    LDL 97 01/27/2023 11:21 AM     (H) 05/03/2022 12:24 PM    HDL 48 01/27/2023 11:21 AM    HDL 45 05/03/2022 12:24 PM    TRIGLYCERIDE 239 (H) 01/27/2023 11:21 AM    TRIGLYCERIDE 175 (H) 05/03/2022 12:24 PM            Essential Hypertension, Benign    Chronic, taking lisinopril 10 mg daily without reported side effects.  He does not take this blood pressure medicine every day secondary to recent hypotension.  Blood pressure today in clinic is 100/50.  He reportedly took this medication last night. Denies  "dizziness/lightheadedness. Reports  fatigue has improved since being placed back on prednisone by endocrinology.  -Does report ongoing BLE after back surgery despite efforts to elevate lower extremities             Health Maintenance: Completed    ROS:  Review of Systems   Constitutional:  Positive for malaise/fatigue. Negative for chills, fever and weight loss.   Eyes:  Negative for blurred vision.   Respiratory:  Negative for cough and shortness of breath.    Cardiovascular:  Positive for leg swelling. Negative for chest pain.   Gastrointestinal:  Negative for abdominal pain, constipation, diarrhea, nausea and vomiting.   Musculoskeletal:  Negative for myalgias.   Neurological:  Negative for dizziness, weakness and headaches.       Objective:     Exam:  /50 (BP Location: Left arm, Patient Position: Sitting, BP Cuff Size: Adult)   Pulse 83   Temp 36.4 °C (97.5 °F) (Temporal)   Ht 1.702 m (5' 7\")   Wt 76.5 kg (168 lb 9.6 oz)   SpO2 99%   BMI 26.41 kg/m²  Body mass index is 26.41 kg/m².    Physical Exam  Constitutional:       Appearance: Normal appearance.   HENT:      Head: Normocephalic.   Eyes:      Conjunctiva/sclera: Conjunctivae normal.      Pupils: Pupils are equal, round, and reactive to light.   Cardiovascular:      Rate and Rhythm: Normal rate and regular rhythm.      Heart sounds: No murmur heard.  Pulmonary:      Effort: Pulmonary effort is normal. No respiratory distress.      Breath sounds: Normal breath sounds.   Musculoskeletal:         General: Normal range of motion.   Skin:     General: Skin is warm and dry.   Neurological:      General: No focal deficit present.      Mental Status: He is alert and oriented to person, place, and time.   Psychiatric:         Mood and Affect: Mood normal.         Behavior: Behavior normal.         Labs:   Lab Results   Component Value Date/Time    CHOLSTRLTOT 193 01/27/2023 11:21 AM    LDL 97 01/27/2023 11:21 AM    HDL 48 01/27/2023 11:21 AM    " TRIGLYCERIDE 239 (H) 01/27/2023 11:21 AM       Lab Results   Component Value Date/Time    SODIUM 142 05/25/2023 08:57 AM    POTASSIUM 4.6 05/25/2023 08:57 AM    CHLORIDE 107 05/25/2023 08:57 AM    CO2 22 05/25/2023 08:57 AM    GLUCOSE 111 (H) 05/25/2023 08:57 AM    BUN 24 (H) 05/25/2023 08:57 AM    CREATININE 1.17 05/25/2023 08:57 AM    CREATININE 1.48 (H) 03/18/2011 12:00 AM    BUNCREATRAT 18 03/18/2011 12:00 AM    GLOMRATE 47 (L) 03/18/2011 12:00 AM     Lab Results   Component Value Date/Time    ALKPHOSPHAT 91 05/25/2023 08:57 AM    ASTSGOT 25 05/25/2023 08:57 AM    ALTSGPT 23 05/25/2023 08:57 AM    TBILIRUBIN 0.3 05/25/2023 08:57 AM      Lab Results   Component Value Date/Time    HBA1C 6.5 (H) 05/25/2023 08:57 AM    HBA1C 7.2 (H) 01/27/2023 11:21 AM    HBA1C 7.2 (H) 05/03/2022 12:24 PM     No results found for: TSH  Lab Results   Component Value Date/Time    FREET4 0.97 05/25/2023 08:58 AM     No results found for: CBC      Assessment & Plan: Medical Decision Making     78 y.o. male with the following -     1. Type 2 diabetes mellitus with microalbuminuria, without long-term current use of insulin (HCC)  Chronic condition stable therefore we will decrease glipizide to 2.5 mg daily start patient on SGLT2 inhibitor Jardiance as directed  - Empagliflozin (JARDIANCE) 10 MG Tab tablet; Take 1 Tablet by mouth every day.  Dispense: 100 Tablet; Refill: 1  - Comp Metabolic Panel; Future  - MICROALBUMIN CREAT RATIO URINE; Future  - HEMOGLOBIN A1C; Future    2. Stage 3a chronic kidney disease (HCC)  Chronic condition stable with improvement in GFR  -Strongly recommend patient follow with nephrologist Dr. Roberts as he does have a history of malignant neoplasm of the kidney  -We will recheck microalbumin creatinine ratio at next lab draw as well as CMP    3. Anemia, unspecified type  Chronic condition uncontrolled patient under the care of of endocrinology, per endocrinology anemia likely stems from low testosterone  secondary to chemotherapy and outpatient on steroid therapy managed by endocrinology therefore we will continue to monitor CBC levels.    4. Essential hypertension, benign  Chronic condition stable  -Recommend continuation of home blood pressure monitoring.  Patient has been advised to hold blood pressure medication should levels be below 100/50.  I have decreased his lisinopril to 5 mg daily and placed on SGLT2 inhibitor Jardiance  - lisinopril (PRINIVIL) 5 MG Tab; Take 1 Tablet by mouth every day.  Dispense: 100 Tablet; Refill: 1    5. Hyperlipidemia, unspecified hyperlipidemia type  Chronic condition stable therefore we will continue patient on current statin atorvastatin 80 mg daily      Differential diagnosis, natural history, supportive care, and indications for immediate follow-up discussed.  Shared decision making approach utilized, and patient is amendable with plan of care.  Patient understands to return to clinic or go to the emergency department if symptoms worsen. All questions and concerns addressed to the best of my knowledge.    Return in about 3 months (around 8/31/2023) for F/U Lab Review.    Please note that this dictation was created using voice recognition software. I have made every reasonable attempt to correct obvious errors, but I expect that there are errors of grammar and possibly content that I did not discover before finalizing the note.

## 2023-06-01 DIAGNOSIS — R54 FRAILTY SYNDROME IN GERIATRIC PATIENT: ICD-10-CM

## 2023-06-02 ENCOUNTER — PATIENT MESSAGE (OUTPATIENT)
Dept: HEALTH INFORMATION MANAGEMENT | Facility: OTHER | Age: 78
End: 2023-06-02

## 2023-06-02 ENCOUNTER — DOCUMENTATION (OUTPATIENT)
Dept: HEALTH INFORMATION MANAGEMENT | Facility: OTHER | Age: 78
End: 2023-06-02
Payer: MEDICARE

## 2023-06-02 DIAGNOSIS — E27.3 ADRENAL INSUFFICIENCY DUE TO CORTICOSTEROID WITHDRAWAL (HCC): ICD-10-CM

## 2023-06-02 DIAGNOSIS — T38.0X5A ADRENAL INSUFFICIENCY DUE TO CORTICOSTEROID WITHDRAWAL (HCC): ICD-10-CM

## 2023-06-02 RX ORDER — PREDNISONE 1 MG/1
1 TABLET ORAL DAILY
Qty: 30 TABLET | Refills: 0 | Status: SHIPPED | OUTPATIENT
Start: 2023-06-02 | End: 2023-07-03

## 2023-06-02 NOTE — TELEPHONE ENCOUNTER
Received request via: Patient    Was the patient seen in the last year in this department? Yes    Does the patient have an active prescription (recently filled or refills available) for medication(s) requested?  Prescription was sent to mail order, they will not fill a 30 day supply. Please send to local pharmacy on [address] for 30 day supply.    Does the patient have FDC Plus and need 100 day supply (blood pressure, diabetes and cholesterol meds only)? Yes, quantity updated to 100 days

## 2023-06-07 ENCOUNTER — APPOINTMENT (RX ONLY)
Dept: URBAN - METROPOLITAN AREA CLINIC 4 | Facility: CLINIC | Age: 78
Setting detail: DERMATOLOGY
End: 2023-06-07

## 2023-06-14 DIAGNOSIS — E78.5 HYPERLIPIDEMIA, UNSPECIFIED HYPERLIPIDEMIA TYPE: ICD-10-CM

## 2023-06-14 DIAGNOSIS — E78.2 MIXED HYPERLIPIDEMIA: ICD-10-CM

## 2023-06-14 RX ORDER — EZETIMIBE 10 MG/1
10 TABLET ORAL DAILY
Qty: 100 TABLET | Refills: 3 | Status: SHIPPED | OUTPATIENT
Start: 2023-06-14

## 2023-06-17 ENCOUNTER — OUTPATIENT INFUSION SERVICES (OUTPATIENT)
Dept: ONCOLOGY | Facility: MEDICAL CENTER | Age: 78
End: 2023-06-17
Payer: MEDICARE

## 2023-06-17 VITALS
DIASTOLIC BLOOD PRESSURE: 69 MMHG | HEIGHT: 66 IN | TEMPERATURE: 97.2 F | HEART RATE: 98 BPM | SYSTOLIC BLOOD PRESSURE: 141 MMHG | OXYGEN SATURATION: 95 % | RESPIRATION RATE: 18 BRPM | WEIGHT: 166.01 LBS | BODY MASS INDEX: 26.68 KG/M2

## 2023-06-17 DIAGNOSIS — E27.3 ADRENAL INSUFFICIENCY DUE TO CORTICOSTEROID WITHDRAWAL (HCC): ICD-10-CM

## 2023-06-17 DIAGNOSIS — C61 HORMONE SENSITIVE PROSTATE CANCER (HCC): ICD-10-CM

## 2023-06-17 DIAGNOSIS — T38.0X5A ADRENAL INSUFFICIENCY DUE TO CORTICOSTEROID WITHDRAWAL (HCC): ICD-10-CM

## 2023-06-17 DIAGNOSIS — Z19.1 HORMONE SENSITIVE PROSTATE CANCER (HCC): ICD-10-CM

## 2023-06-17 DIAGNOSIS — C77.9 MALIGNANT NEOPLASM METASTATIC TO LYMPH NODES, UNSPECIFIED LYMPH NODE REGION (HCC): ICD-10-CM

## 2023-06-17 LAB
CORTIS SERPL-MCNC: 13.9 UG/DL (ref 0–23)
CORTIS SERPL-MCNC: 18.7 UG/DL (ref 0–23)
CORTIS SERPL-MCNC: 23.4 UG/DL (ref 0–23)

## 2023-06-17 PROCEDURE — 82024 ASSAY OF ACTH: CPT

## 2023-06-17 PROCEDURE — 700111 HCHG RX REV CODE 636 W/ 250 OVERRIDE (IP)

## 2023-06-17 PROCEDURE — 96374 THER/PROPH/DIAG INJ IV PUSH: CPT

## 2023-06-17 PROCEDURE — 82533 TOTAL CORTISOL: CPT

## 2023-06-17 RX ORDER — 0.9 % SODIUM CHLORIDE 0.9 %
10 VIAL (ML) INJECTION PRN
OUTPATIENT
Start: 2023-06-17

## 2023-06-17 RX ORDER — 0.9 % SODIUM CHLORIDE 0.9 %
3 VIAL (ML) INJECTION PRN
OUTPATIENT
Start: 2023-06-17

## 2023-06-17 RX ORDER — COSYNTROPIN 0.25 MG/ML
250 INJECTION, POWDER, FOR SOLUTION INTRAMUSCULAR; INTRAVENOUS ONCE
Status: CANCELLED | OUTPATIENT
Start: 2023-06-17 | End: 2023-06-17

## 2023-06-17 RX ORDER — COSYNTROPIN 0.25 MG/ML
250 INJECTION, POWDER, FOR SOLUTION INTRAMUSCULAR; INTRAVENOUS ONCE
Status: COMPLETED | OUTPATIENT
Start: 2023-06-17 | End: 2023-06-17

## 2023-06-17 RX ORDER — 0.9 % SODIUM CHLORIDE 0.9 %
VIAL (ML) INJECTION PRN
OUTPATIENT
Start: 2023-06-17

## 2023-06-17 RX ADMIN — COSYNTROPIN 250 MCG: 0.25 INJECTION, POWDER, LYOPHILIZED, FOR SOLUTION INTRAVENOUS at 08:23

## 2023-06-17 ASSESSMENT — FIBROSIS 4 INDEX: FIB4 SCORE: 2.085144140570747627

## 2023-06-17 NOTE — PROGRESS NOTES
Sam arrived ambulatory to the infusion center for his stim test. Peripheral IV placed in right arm, labs drawn and sent for testing. Cosyntropin given via IV, patient tolerated well and was drawn at 30 min and 60 min post injection. PIV removed, site covered with gauze and coban. He was discharged home in stable condition.

## 2023-06-19 LAB — ACTH PLAS-MCNC: 40.3 PG/ML (ref 7.2–63.3)

## 2023-06-20 ENCOUNTER — HOSPITAL ENCOUNTER (OUTPATIENT)
Dept: LAB | Facility: MEDICAL CENTER | Age: 78
End: 2023-06-20
Attending: UROLOGY
Payer: MEDICARE

## 2023-06-20 LAB — PSA SERPL-MCNC: <0.02 NG/ML (ref 0–4)

## 2023-06-20 PROCEDURE — 84153 ASSAY OF PSA TOTAL: CPT

## 2023-06-20 PROCEDURE — 36415 COLL VENOUS BLD VENIPUNCTURE: CPT

## 2023-06-29 ENCOUNTER — TELEPHONE (OUTPATIENT)
Dept: ENDOCRINOLOGY | Facility: MEDICAL CENTER | Age: 78
End: 2023-06-29
Payer: MEDICARE

## 2023-06-29 NOTE — TELEPHONE ENCOUNTER
Patient called in and left a voicemail that he is down to his last 4-5 tablets. Patient is requesting a refill.     Received request via: Patient    Was the patient seen in the last year in this department? Yes    Does the patient have an active prescription (recently filled or refills available) for medication(s) requested? No    Does the patient have assisted Plus and need 100 day supply (blood pressure, diabetes and cholesterol meds only)? Patient does not have SCP

## 2023-06-29 NOTE — TELEPHONE ENCOUNTER
Called patient to advise that [facility] is out of the office today will return Monday 7/3/2023. Routing message, a covering provider may send in a refill.

## 2023-06-30 DIAGNOSIS — T38.0X5A ADRENAL INSUFFICIENCY DUE TO CORTICOSTEROID WITHDRAWAL (HCC): ICD-10-CM

## 2023-06-30 DIAGNOSIS — E27.3 ADRENAL INSUFFICIENCY DUE TO CORTICOSTEROID WITHDRAWAL (HCC): ICD-10-CM

## 2023-07-01 NOTE — TELEPHONE ENCOUNTER
Medicare Wellness Visit, Female    The best way to live healthy is to have a lifestyle where you eat a well-balanced diet, exercise regularly, limit alcohol use, and quit all forms of tobacco/nicotine, if applicable. Regular preventive services are another way to keep healthy. Preventive services (vaccines, screening tests, monitoring & exams) can help personalize your care plan, which helps you manage your own care. Screening tests can find health problems at the earliest stages, when they are easiest to treat. [facility] follows the current, evidence-based guidelines published by the [facility] Moe Leonard ([facility]) when recommending preventive services for our patients. Because we follow these guidelines, sometimes recommendations change over time as research supports it. (For example, mammograms used to be recommended annually. Even though Medicare will still pay for an annual mammogram, the newer guidelines recommend a mammogram every two years for women of average risk.)    Of course, you and your provider may decide to screen more often for some diseases, based on your risk and co-morbidities (chronic disease you are already diagnosed with). Preventive services for you include:    - Medicare offers their members a free annual wellness visit, which is time for you and your primary care provider to discuss and plan for your preventive service needs. Take advantage of this benefit every year!    -All people over age 72 should receive the recommended pneumonia vaccines. Current USPSTF guidelines recommend a series of two vaccines for the best pneumonia protection.     -All adults should have a yearly flu vaccine and a tetanus vaccine every 10 years. All adults age 61 years should receive a shingles vaccine once in their lifetime.      -A bone mass density test is recommended when a woman turns 65 to screen for osteoporosis.  This test is only recommended once as a Received request via: Pharmacy    Was the patient seen in the last year in this department? Yes    Does the patient have an active prescription (recently filled or refills available) for medication(s) requested? No    Does the patient have nursing home Plus and need 100 day supply (blood pressure, diabetes and cholesterol meds only)? Yes, quantity updated to 100 days   screening. Some providers will use this same test as a disease monitoring tool if you already have osteoporosis. -All adults age 38-68 years who are overweight should have a diabetes screening test once every three years.     -Other screening tests & preventive services for persons with diabetes include: an eye exam to screen for diabetic retinopathy, a kidney function test, a foot exam, and stricter control over your cholesterol.     -Cardiovascular screening for adults with routine risk involves an electrocardiogram (ECG) at intervals determined by the provider.     -Colorectal cancer screenings should be done for adults age 54-65 years with normal risk. There are a number of acceptable methods of screening for this type of cancer. Each test has its own benefits and drawbacks. Discuss with your provider what is most appropriate for you during your annual wellness visit. The different tests include: colonoscopy (considered the best screening method), a fecal occult blood test, a fecal DNA test, and sigmoidoscopy. -Breast cancer screenings are recommended every other year for women of normal risk age 54-69 years.     -Cervical cancer screenings for women over age 72 are only recommended with certain risk factors.     -All adults born between [facility] should be screened once for Hepatitis C.      Here is a list of your current Health Maintenance items (your personalized list of preventive services) with a due date:  Health Maintenance Due   Topic Date Due    Glaucoma Screening   07/12/2006    DTaP/Tdap/Td  (1 - Tdap) 05/12/2008

## 2023-07-03 ENCOUNTER — TELEPHONE (OUTPATIENT)
Dept: ENDOCRINOLOGY | Facility: MEDICAL CENTER | Age: 78
End: 2023-07-03
Payer: MEDICARE

## 2023-07-03 RX ORDER — PREDNISONE 1 MG/1
1 TABLET ORAL DAILY
Qty: 30 TABLET | Refills: 0 | Status: SHIPPED | OUTPATIENT
Start: 2023-07-03 | End: 2023-07-31

## 2023-07-03 NOTE — TELEPHONE ENCOUNTER
Spoke to patient's wife and informed her that medication that was requested was sent to pharmacy patient requested.

## 2023-07-05 ENCOUNTER — APPOINTMENT (RX ONLY)
Dept: URBAN - METROPOLITAN AREA CLINIC 4 | Facility: CLINIC | Age: 78
Setting detail: DERMATOLOGY
End: 2023-07-05

## 2023-07-05 DIAGNOSIS — Z87.2 PERSONAL HISTORY OF DISEASES OF THE SKIN AND SUBCUTANEOUS TISSUE: ICD-10-CM | Status: STABLE

## 2023-07-05 DIAGNOSIS — L82.0 INFLAMED SEBORRHEIC KERATOSIS: ICD-10-CM

## 2023-07-05 DIAGNOSIS — D17 BENIGN LIPOMATOUS NEOPLASM: ICD-10-CM

## 2023-07-05 DIAGNOSIS — L81.4 OTHER MELANIN HYPERPIGMENTATION: ICD-10-CM

## 2023-07-05 DIAGNOSIS — L57.0 ACTINIC KERATOSIS: ICD-10-CM

## 2023-07-05 DIAGNOSIS — L82.1 OTHER SEBORRHEIC KERATOSIS: ICD-10-CM

## 2023-07-05 DIAGNOSIS — D22 MELANOCYTIC NEVI: ICD-10-CM

## 2023-07-05 PROBLEM — D22.5 MELANOCYTIC NEVI OF TRUNK: Status: ACTIVE | Noted: 2023-07-05

## 2023-07-05 PROBLEM — D22.39 MELANOCYTIC NEVI OF OTHER PARTS OF FACE: Status: ACTIVE | Noted: 2023-07-05

## 2023-07-05 PROBLEM — D17.22 BENIGN LIPOMATOUS NEOPLASM OF SKIN AND SUBCUTANEOUS TISSUE OF LEFT ARM: Status: ACTIVE | Noted: 2023-07-05

## 2023-07-05 PROCEDURE — ? DIAGNOSIS COMMENT

## 2023-07-05 PROCEDURE — 17000 DESTRUCT PREMALG LESION: CPT | Mod: 59

## 2023-07-05 PROCEDURE — ? COUNSELING

## 2023-07-05 PROCEDURE — ? LIQUID NITROGEN

## 2023-07-05 PROCEDURE — ? OBSERVATION

## 2023-07-05 PROCEDURE — 99213 OFFICE O/P EST LOW 20 MIN: CPT | Mod: 25

## 2023-07-05 PROCEDURE — 17110 DESTRUCTION B9 LES UP TO 14: CPT

## 2023-07-05 ASSESSMENT — LOCATION DETAILED DESCRIPTION DERM
LOCATION DETAILED: RIGHT MEDIAL SUPERIOR CHEST
LOCATION DETAILED: LEFT FOREHEAD
LOCATION DETAILED: EPIGASTRIC SKIN
LOCATION DETAILED: RIGHT BUTTOCK
LOCATION DETAILED: LEFT RIB CAGE
LOCATION DETAILED: RIGHT INFERIOR MEDIAL UPPER BACK
LOCATION DETAILED: LEFT INFERIOR MEDIAL FOREHEAD
LOCATION DETAILED: LEFT BUTTOCK
LOCATION DETAILED: RIGHT RADIAL DORSAL HAND
LOCATION DETAILED: RIGHT DISTAL DORSAL FOREARM
LOCATION DETAILED: LEFT POSTERIOR SHOULDER
LOCATION DETAILED: LEFT PROXIMAL DORSAL FOREARM
LOCATION DETAILED: RIGHT CENTRAL FRONTAL SCALP
LOCATION DETAILED: NASAL DORSUM

## 2023-07-05 ASSESSMENT — LOCATION SIMPLE DESCRIPTION DERM
LOCATION SIMPLE: LEFT SHOULDER
LOCATION SIMPLE: LEFT BUTTOCK
LOCATION SIMPLE: NOSE
LOCATION SIMPLE: ABDOMEN
LOCATION SIMPLE: LEFT FOREHEAD
LOCATION SIMPLE: RIGHT UPPER BACK
LOCATION SIMPLE: LEFT FOREARM
LOCATION SIMPLE: RIGHT BUTTOCK
LOCATION SIMPLE: RIGHT HAND
LOCATION SIMPLE: SCALP
LOCATION SIMPLE: RIGHT FOREARM
LOCATION SIMPLE: CHEST

## 2023-07-05 ASSESSMENT — LOCATION ZONE DERM
LOCATION ZONE: FACE
LOCATION ZONE: TRUNK
LOCATION ZONE: ARM
LOCATION ZONE: SCALP
LOCATION ZONE: NOSE
LOCATION ZONE: HAND

## 2023-07-05 NOTE — PROCEDURE: DIAGNOSIS COMMENT
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
Render Risk Assessment In Note?: no
Detail Level: Detailed

## 2023-07-05 NOTE — HPI: FULL BODY SKIN EXAMINATION
How Severe Are Your Spot(S)?: mild
What Type Of Note Output Would You Prefer (Optional)?: Standard Output
What Is The Reason For Today's Visit?: Full Body Skin Examination
What Is The Reason For Today's Visit? (Being Monitored For X): the development of new lesions
(3) slightly limited

## 2023-07-05 NOTE — PROCEDURE: LIQUID NITROGEN
Duration Of Freeze Thaw-Cycle (Seconds): 0
Post-Care Instructions: I reviewed with the patient in detail post-care instructions. Patient is to wear sunprotection, and avoid picking at any of the treated lesions. Pt may apply Vaseline to crusted or scabbing areas.
Render Note In Bullet Format When Appropriate: No
Show Aperture Variable?: Yes
Consent: The patient's consent was obtained including but not limited to risks of crusting, scabbing, blistering, scarring, darker or lighter pigmentary change, recurrence, incomplete removal and infection.
Detail Level: Detailed
Spray Paint Text: The liquid nitrogen was applied to the skin utilizing a spray paint frosting technique.
Medical Necessity Clause: This procedure was medically necessary because the lesions that were treated were:
Medical Necessity Information: It is in your best interest to select a reason for this procedure from the list below. All of these items fulfill various CMS LCD requirements except the new and changing color options.

## 2023-07-18 DIAGNOSIS — E78.5 HYPERLIPIDEMIA, UNSPECIFIED HYPERLIPIDEMIA TYPE: ICD-10-CM

## 2023-07-18 DIAGNOSIS — E78.2 MIXED HYPERLIPIDEMIA: ICD-10-CM

## 2023-07-18 RX ORDER — ATORVASTATIN CALCIUM 80 MG/1
80 TABLET, FILM COATED ORAL EVERY EVENING
Qty: 100 TABLET | Refills: 3 | Status: SHIPPED | OUTPATIENT
Start: 2023-07-18

## 2023-07-21 ENCOUNTER — APPOINTMENT (OUTPATIENT)
Dept: ENDOCRINOLOGY | Facility: MEDICAL CENTER | Age: 78
End: 2023-07-21
Payer: MEDICARE

## 2023-07-25 DIAGNOSIS — K21.00 GASTROESOPHAGEAL REFLUX DISEASE WITH ESOPHAGITIS: ICD-10-CM

## 2023-07-25 DIAGNOSIS — K21.9 GASTROESOPHAGEAL REFLUX DISEASE WITHOUT ESOPHAGITIS: ICD-10-CM

## 2023-07-25 RX ORDER — OMEPRAZOLE 20 MG/1
20 CAPSULE, DELAYED RELEASE ORAL DAILY
Qty: 100 CAPSULE | Refills: 0 | Status: SHIPPED | OUTPATIENT
Start: 2023-07-25 | End: 2023-10-18

## 2023-07-31 ENCOUNTER — OFFICE VISIT (OUTPATIENT)
Dept: ENDOCRINOLOGY | Facility: MEDICAL CENTER | Age: 78
End: 2023-07-31
Payer: MEDICARE

## 2023-07-31 VITALS
BODY MASS INDEX: 25.74 KG/M2 | SYSTOLIC BLOOD PRESSURE: 126 MMHG | HEART RATE: 90 BPM | DIASTOLIC BLOOD PRESSURE: 52 MMHG | HEIGHT: 67 IN | OXYGEN SATURATION: 97 % | WEIGHT: 164 LBS

## 2023-07-31 DIAGNOSIS — E55.9 VITAMIN D DEFICIENCY: ICD-10-CM

## 2023-07-31 DIAGNOSIS — E29.1 SECONDARY MALE HYPOGONADISM: ICD-10-CM

## 2023-07-31 DIAGNOSIS — E27.3 ADRENAL INSUFFICIENCY DUE TO CORTICOSTEROID WITHDRAWAL (HCC): ICD-10-CM

## 2023-07-31 DIAGNOSIS — T38.0X5A ADRENAL INSUFFICIENCY DUE TO CORTICOSTEROID WITHDRAWAL (HCC): ICD-10-CM

## 2023-07-31 PROCEDURE — 3074F SYST BP LT 130 MM HG: CPT

## 2023-07-31 PROCEDURE — 3078F DIAST BP <80 MM HG: CPT

## 2023-07-31 PROCEDURE — 99211 OFF/OP EST MAY X REQ PHY/QHP: CPT

## 2023-07-31 PROCEDURE — 99214 OFFICE O/P EST MOD 30 MIN: CPT

## 2023-07-31 RX ORDER — PREDNISONE 1 MG/1
1 TABLET ORAL DAILY
Qty: 30 TABLET | Refills: 1 | Status: SHIPPED | OUTPATIENT
Start: 2023-07-31 | End: 2023-11-20

## 2023-07-31 ASSESSMENT — FIBROSIS 4 INDEX: FIB4 SCORE: 2.085144140570747627

## 2023-07-31 NOTE — PROGRESS NOTES
New Patient Consult Note for Endocrinology  Referred by: Kenneth Connolly D.N.P.    Reason for consult: Chronic glucocorticoids    HPI:  Kemal Mueller is a 78 y.o. old patient who is seeing us today for care.  This is a pleasant patient and I appreciate the opportunity to participate in the care of this patient.  This is a new patient with me today.    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      He was E-consulted by Dr Brown with a plan for gradual steroid taper.  2.5mg reduction every 7 days until patient is down to 5mg daily which is a physiologic dose  then can switch to hydcortisone (shorter acting steroid to allow for recovery of HPA axis) followed by another taper     10mg bid then  after 1 week  10mg plus 7.5   then 10mg plus 5mg   then 10mg plus 2.5   then 10mg  only  Then 7.5mg  Then 5mg  Then switch to HC 10mg /5  Then HC 10mg  Then stop     Check morning cortisol if > 10 can stop HC    He was able to taper from 5mg hydrocortisone/day x 7 days to  5mg/day x 6 days to  5mg/day x 5 days to  5mg/day x 4 days where is started feeling nausea and vomiting. He took his last dose of day 4 today.     Denies: nausea and vomiting, diarrhea, abdominal pain however today he is feeling good.   Currently taking: prednisone 1 mg daily. I had switched him from cortef 5 mg daily to prednisone 1 mg daily until he had his ACTH stim test completed and follow up.      Latest Reference Range & Units 06/17/23 08:06 06/17/23 08:53 06/17/23 09:23   Cortisol 0.0 - 23.0 ug/dL 13.9 18.7 23.4 (H)        Latest Reference Range & Units 06/17/23 08:06   Acth 7.2 - 63.3 pg/mL 40.3     2. Secondary Hypogonadism   Latest Reference Range & Units 05/25/23 08:58   Free Testosterone 47 - 244  pg/mL <1 (L)   Sex Hormone Bind Globulin 19 - 76 nmol/L 46   Testosterone % Free 1.6 - 2.9 % <1.4 (L)   Testosterone,Total 300 - 720 ng/dL <3 (L)       3. Vitamin D deficiency  Currently taking Vitamin D 3 1000 IU BID   Latest Reference Range & Units 05/25/23 08:58   25-Hydroxy   Vitamin D 25 30 - 100 ng/mL 56     ROS:   Constitutional: No change in weight , No fatigue, No night sweats.  HEENT: No Headache.  Eyes:  No blurred vision, No visual changes.  Cardiac: No chest pain, No palpitations.  Resp: No shortness of breath, No cough,   Gastro: No nausea or vomiting, No diarrhea.  Neuro: Denies numbness or tinging in bilateral feet or hands, and no loss of sensation.  Endo: No heat or cold intolerance.  : No polyuria, No polydipsia, No chronic UTI's.  Lower extremities: No lower leg edema bilateral.  All other systems were reviewed and were negative.    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 ([facility]) 05/03/2023    Mixed stress and urge urinary incontinence 12/12/2022    Low back pain 11/14/2022    Atherosclerosis of aorta ([facility]) 09/27/2022    Type 2 diabetes mellitus with chronic kidney disease, without long-term current use of insulin, unspecified CKD stage ([facility])     Other spondylosis with radiculopathy, lumbar region 08/18/2022    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[facility]) 02/14/2022    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[facility]) 08/31/2020    Dyslipidemia 03/19/2020    CKD (chronic kidney disease), stage III ([facility])     Vitamin D deficiency 02/12/2020    Colon cancer screening 02/12/2020    Arthritis 01/15/2020    History of urinary stone 11/28/2018    History of malignant neoplasm of kidney 04/30/2018    Anemia 08/17/2017    Herpes zoster without complication 11/15/2016    Age-related nuclear cataract of eye 01/14/2016    Right renal mass 01/11/2016     Degeneration of cervical intervertebral disc 10/30/2012    Cervical stenosis of spine 10/30/2012    Cataracts, bilateral 01/13/2012    ED (erectile dysfunction) 01/11/2011    Prostate cancer (HCC) 09/21/2010    Nephrolithiasis 09/12/2010    Glaucoma 09/12/2010    GERD (gastroesophageal reflux disease) 09/12/2010    Hyperlipidemia 05/21/2009    Essential hypertension, benign 05/21/2009    Calculus of kidney 05/21/2009    Reflux esophagitis 05/21/2009    Proteinuria 05/21/2009       Past Surgical History:  Past Surgical History:   Procedure Laterality Date    LUMBAR LAMINECTOMY DISKECTOMY N/A 8/18/2022    Procedure: LUMBAR 2 - SACRAL 1 LAMINECTOMY;  Surgeon: Selvin Ying M.D.;  Location: SURGERY [facility];  Service: Neurosurgery    CATARACT PHACO WITH IOL Right 01/28/2016    Procedure: CATARACT PHACO WITH IOL;  Surgeon: Alfonso Hernandez M.D.;  Location: SURGERY [facility];  Service:     CATARACT PHACO WITH IOL Left 01/14/2016    Procedure: CATARACT PHACO WITH IOL;  Surgeon: Alfonso Hernandez M.D.;  Location: SURGERY [facility];  Service:     CERVICAL DISK AND FUSION ANTERIOR  10/30/2012    Performed by Selvin Ying M.D. at SURGERY [facility] ORS    PROSTATECTOMY, RADICAL RETRO  04/21/2010    Performed by EDYTA KUMAR at SURGERY [facility] ORS    NODE DISSECTION  04/21/2010    Performed by EDYTA KUMAR at SURGERY [facility] ORS    CARPAL TUNNEL RELEASE  1990    Bilateral    TRIGGER FINGER RELEASE  1990    Right small finger    TONSILLECTOMY AND ADENOIDECTOMY  1953    APPENDECTOMY      OTHER      radiology procedure - cryoablation bladder tumor    OK REMOVAL OF KIDNEY STONE  1986/1998    x2       Allergies:  Nkda [no known drug allergy]    Social History:  Social History     Socioeconomic History    Marital status:      Spouse name: Not on file    Number of children: Not on file    Years of education: Not on file    Highest education level: Not on file   Occupational History     Occupation: retired drugstore manager   Tobacco Use    Smoking status: Former     Packs/day: 0.20     Years: 10.00     Pack years: 2.00     Types: Cigarettes     Quit date: 1977     Years since quittin.6    Smokeless tobacco: Never    Tobacco comments:     1/3 ppd for 10 years, quit    Vaping Use    Vaping Use: Never used   Substance and Sexual Activity    Alcohol use: Yes     Alcohol/week: 1.2 oz     Types: 2 Cans of beer per week     Comment: 1-2 per day    Drug use: No    Sexual activity: Not Currently     Partners: Female   Other Topics Concern    Not on file   Social History Narrative    Not on file     Social Determinants of Health     Financial Resource Strain: Not on file   Food Insecurity: Not on file   Transportation Needs: Not on file   Physical Activity: Not on file   Stress: Not on file   Social Connections: Not on file   Intimate Partner Violence: Not on file   Housing Stability: Not on file       Family History:  Family History   Problem Relation Age of Onset    Diabetes Mother     Stroke Mother     Alzheimer's Disease Father        Medications:    Current Outpatient Medications:     omeprazole (PRILOSEC) 20 MG delayed-release capsule, Take 1 Capsule by mouth every day., Disp: 100 Capsule, Rfl: 0    atorvastatin (LIPITOR) 80 MG tablet, Take 1 Tablet by mouth every evening., Disp: 100 Tablet, Rfl: 3    predniSONE (DELTASONE) 1 MG Tab, TAKE 1 TABLET BY MOUTH EVERY DAY, Disp: 30 Tablet, Rfl: 0    ezetimibe (ZETIA) 10 MG Tab, Take 1 Tablet by mouth every day., Disp: 100 Tablet, Rfl: 3    Empagliflozin (JARDIANCE) 10 MG Tab tablet, Take 1 Tablet by mouth every day., Disp: 100 Tablet, Rfl: 1    lisinopril (PRINIVIL) 5 MG Tab, Take 1 Tablet by mouth every day., Disp: 100 Tablet, Rfl: 1    glipiZIDE SR (GLUCOTROL) 2.5 MG TABLET SR 24 HR, Take 2.5 mg by mouth every day., Disp: , Rfl:     ondansetron (ZOFRAN ODT) 4 MG TABLET DISPERSIBLE, , Disp: , Rfl:     Calcium Carbonate-Vit D-Min (CALCIUM 1200  "PO), Take 1 Tablet by mouth every day., Disp: , Rfl:     vitamin D3, cholecalciferol, 1000 UNIT Tab, Take 2 Tabs by mouth every day., Disp: 100 Tab, Rfl: 3    potassium citrate SR (UROCIT-K SR) 10 MEQ (1080 MG) TBCR, Take 10 mEq by mouth every evening., Disp: , Rfl:     Multiple Vitamins-Minerals (CENTRUM SILVER PO), Take 1 Tablet by mouth every day., Disp: , Rfl:     Brimonidine Tartrate-Timolol 0.2-0.5 % Solution, Administer 1 Drop into both eyes 2 times a day., Disp: , Rfl:     latanoprost (XALATAN) 0.005 % Solution, Place 1 Drop in both eyes every evening., Disp: , Rfl:     ACETAZOLAMIDE 125 MG PO TABS, Take 125 mg by mouth every day., Disp: , Rfl:       Physical Examination:   Vital signs: /52 (BP Location: Right arm, Patient Position: Sitting, BP Cuff Size: Adult)   Pulse 90   Ht 1.702 m (5' 7\")   Wt 74.4 kg (164 lb)   SpO2 97%   BMI 25.69 kg/m²   General: No distress, cooperative, well dressed and well nourished.   Eyes: No scleral icterus or discharge, No hyposphagma  ENMT: Normal on external inspection of nose, lips, No nasal drainage   Neck: No abnormal masses on inspection  Resp: Normal effort, Bilateral clear to auscultation, No wheezing, No rales  CVS: Regular rate and rhythm, S1 S2 normal, No murmur. No gallop  Extremities: No edema bilateral extremities  Neuro: Alert and oriented  Skin: No rash, No Ulcers  Psych: Normal mood and affect    Assessment and Plan:  1. 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   Prednisone 1 mg x 6 days   Prednisone 1 mg x 5 days  Prednisone 1 mg x 4 days  Prednisone 1 mg x 3 days  Prednisone 1 mg x 2 days  Prednisone 1 mg x 1 day  Stop.   We will repeat cortisol levels in 6 weeks.   Patient will notify me via mychart should he start feeling sick during this tapering.   - CORTISOL; " Future  - ACTH; Future  - predniSONE (DELTASONE) 1 MG Tab; Take 1 Tablet by mouth every day.  Dispense: 30 Tablet; Refill: 1    2. 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   I will continue to monitor.  - Testosterone, Free & Total, Adult Male (w/SHBG); Future     Return in about 6 weeks (around 9/11/2023). Patient will have blood work done 2 weeks prior to next apt in 6 weeks.     Thank you kindly for allowing me to participate in the diabetes care plan for this patient.    Antonio Abarca, APRN   7/31/23    CC:   Kenneth Connolly D.N.P.

## 2023-08-18 ENCOUNTER — HOSPITAL ENCOUNTER (OUTPATIENT)
Dept: LAB | Facility: MEDICAL CENTER | Age: 78
End: 2023-08-18
Payer: MEDICARE

## 2023-08-18 ENCOUNTER — HOSPITAL ENCOUNTER (OUTPATIENT)
Dept: LAB | Facility: MEDICAL CENTER | Age: 78
End: 2023-08-18
Attending: INTERNAL MEDICINE
Payer: MEDICARE

## 2023-08-18 DIAGNOSIS — E11.29 TYPE 2 DIABETES MELLITUS WITH MICROALBUMINURIA, WITHOUT LONG-TERM CURRENT USE OF INSULIN (HCC): ICD-10-CM

## 2023-08-18 DIAGNOSIS — R80.9 TYPE 2 DIABETES MELLITUS WITH MICROALBUMINURIA, WITHOUT LONG-TERM CURRENT USE OF INSULIN (HCC): ICD-10-CM

## 2023-08-18 LAB
ALBUMIN SERPL BCP-MCNC: 4.4 G/DL (ref 3.2–4.9)
ALBUMIN SERPL BCP-MCNC: 4.5 G/DL (ref 3.2–4.9)
ALBUMIN/GLOB SERPL: 1.6 G/DL
ALP SERPL-CCNC: 112 U/L (ref 30–99)
ALT SERPL-CCNC: 18 U/L (ref 2–50)
ANION GAP SERPL CALC-SCNC: 8 MMOL/L (ref 7–16)
APPEARANCE UR: CLEAR
AST SERPL-CCNC: 23 U/L (ref 12–45)
BACTERIA #/AREA URNS HPF: NEGATIVE /HPF
BASOPHILS # BLD AUTO: 0.5 % (ref 0–1.8)
BASOPHILS # BLD: 0.02 K/UL (ref 0–0.12)
BILIRUB SERPL-MCNC: 0.3 MG/DL (ref 0.1–1.5)
BILIRUB UR QL STRIP.AUTO: NEGATIVE
BUN SERPL-MCNC: 21 MG/DL (ref 8–22)
BUN SERPL-MCNC: 23 MG/DL (ref 8–22)
CALCIUM ALBUM COR SERPL-MCNC: 9.5 MG/DL (ref 8.5–10.5)
CALCIUM ALBUM COR SERPL-MCNC: 9.8 MG/DL (ref 8.5–10.5)
CALCIUM SERPL-MCNC: 10.2 MG/DL (ref 8.5–10.5)
CALCIUM SERPL-MCNC: 9.8 MG/DL (ref 8.5–10.5)
CHLORIDE SERPL-SCNC: 105 MMOL/L (ref 96–112)
CHLORIDE SERPL-SCNC: 105 MMOL/L (ref 96–112)
CHOLEST SERPL-MCNC: 151 MG/DL (ref 100–199)
CO2 SERPL-SCNC: 23 MMOL/L (ref 20–33)
CO2 SERPL-SCNC: 26 MMOL/L (ref 20–33)
COLOR UR: YELLOW
CREAT SERPL-MCNC: 1.22 MG/DL (ref 0.5–1.4)
CREAT SERPL-MCNC: 1.29 MG/DL (ref 0.5–1.4)
CREAT UR-MCNC: 91.52 MG/DL
CREAT UR-MCNC: 92.34 MG/DL
EOSINOPHIL # BLD AUTO: 0.01 K/UL (ref 0–0.51)
EOSINOPHIL NFR BLD: 0.3 % (ref 0–6.9)
EPI CELLS #/AREA URNS HPF: NEGATIVE /HPF
ERYTHROCYTE [DISTWIDTH] IN BLOOD BY AUTOMATED COUNT: 44.6 FL (ref 35.9–50)
EST. AVERAGE GLUCOSE BLD GHB EST-MCNC: 146 MG/DL
FASTING STATUS PATIENT QL REPORTED: NORMAL
GFR SERPLBLD CREATININE-BSD FMLA CKD-EPI: 56 ML/MIN/1.73 M 2
GFR SERPLBLD CREATININE-BSD FMLA CKD-EPI: 60 ML/MIN/1.73 M 2
GLOBULIN SER CALC-MCNC: 2.7 G/DL (ref 1.9–3.5)
GLUCOSE SERPL-MCNC: 136 MG/DL (ref 65–99)
GLUCOSE SERPL-MCNC: 138 MG/DL (ref 65–99)
GLUCOSE UR STRIP.AUTO-MCNC: 500 MG/DL
HBA1C MFR BLD: 6.7 % (ref 4–5.6)
HCT VFR BLD AUTO: 40 % (ref 42–52)
HDLC SERPL-MCNC: 32 MG/DL
HGB BLD-MCNC: 12.5 G/DL (ref 14–18)
HYALINE CASTS #/AREA URNS LPF: ABNORMAL /LPF
IMM GRANULOCYTES # BLD AUTO: 0.03 K/UL (ref 0–0.11)
IMM GRANULOCYTES NFR BLD AUTO: 0.8 % (ref 0–0.9)
KETONES UR STRIP.AUTO-MCNC: NEGATIVE MG/DL
LDLC SERPL CALC-MCNC: 74 MG/DL
LEUKOCYTE ESTERASE UR QL STRIP.AUTO: NEGATIVE
LYMPHOCYTES # BLD AUTO: 1.7 K/UL (ref 1–4.8)
LYMPHOCYTES NFR BLD: 42.6 % (ref 22–41)
MCH RBC QN AUTO: 28.6 PG (ref 27–33)
MCHC RBC AUTO-ENTMCNC: 31.3 G/DL (ref 32.3–36.5)
MCV RBC AUTO: 91.5 FL (ref 81.4–97.8)
MICRO URNS: ABNORMAL
MICROALBUMIN UR-MCNC: 18.6 MG/DL
MICROALBUMIN/CREAT UR: 203 MG/G (ref 0–30)
MONOCYTES # BLD AUTO: 0.86 K/UL (ref 0–0.85)
MONOCYTES NFR BLD AUTO: 21.6 % (ref 0–13.4)
NEUTROPHILS # BLD AUTO: 1.37 K/UL (ref 1.82–7.42)
NEUTROPHILS NFR BLD: 34.2 % (ref 44–72)
NITRITE UR QL STRIP.AUTO: NEGATIVE
NRBC # BLD AUTO: 0 K/UL
NRBC BLD-RTO: 0 /100 WBC (ref 0–0.2)
PH UR STRIP.AUTO: 7.5 [PH] (ref 5–8)
PHOSPHATE SERPL-MCNC: 3.8 MG/DL (ref 2.5–4.5)
PLATELET # BLD AUTO: 161 K/UL (ref 164–446)
PMV BLD AUTO: 11.2 FL (ref 9–12.9)
POTASSIUM SERPL-SCNC: 5 MMOL/L (ref 3.6–5.5)
POTASSIUM SERPL-SCNC: 5 MMOL/L (ref 3.6–5.5)
PROT SERPL-MCNC: 7.1 G/DL (ref 6–8.2)
PROT UR QL STRIP: 30 MG/DL
PROT UR-MCNC: 33 MG/DL (ref 0–15)
PROT/CREAT UR: 357 MG/G (ref 15–68)
RBC # BLD AUTO: 4.37 M/UL (ref 4.7–6.1)
RBC # URNS HPF: ABNORMAL /HPF
RBC UR QL AUTO: NEGATIVE
SODIUM SERPL-SCNC: 139 MMOL/L (ref 135–145)
SODIUM SERPL-SCNC: 141 MMOL/L (ref 135–145)
SP GR UR STRIP.AUTO: 1.02
TRIGL SERPL-MCNC: 225 MG/DL (ref 0–149)
UROBILINOGEN UR STRIP.AUTO-MCNC: 0.2 MG/DL
WBC # BLD AUTO: 4 K/UL (ref 4.8–10.8)
WBC #/AREA URNS HPF: ABNORMAL /HPF

## 2023-08-18 PROCEDURE — 82043 UR ALBUMIN QUANTITATIVE: CPT

## 2023-08-18 PROCEDURE — 80053 COMPREHEN METABOLIC PANEL: CPT

## 2023-08-18 PROCEDURE — 85025 COMPLETE CBC W/AUTO DIFF WBC: CPT

## 2023-08-18 PROCEDURE — 82570 ASSAY OF URINE CREATININE: CPT | Mod: 91

## 2023-08-18 PROCEDURE — 83036 HEMOGLOBIN GLYCOSYLATED A1C: CPT

## 2023-08-18 PROCEDURE — 82570 ASSAY OF URINE CREATININE: CPT

## 2023-08-18 PROCEDURE — 80061 LIPID PANEL: CPT

## 2023-08-18 PROCEDURE — 80069 RENAL FUNCTION PANEL: CPT

## 2023-08-18 PROCEDURE — 36415 COLL VENOUS BLD VENIPUNCTURE: CPT

## 2023-08-18 PROCEDURE — 84156 ASSAY OF PROTEIN URINE: CPT

## 2023-08-18 PROCEDURE — 81001 URINALYSIS AUTO W/SCOPE: CPT

## 2023-09-05 ENCOUNTER — OFFICE VISIT (OUTPATIENT)
Dept: MEDICAL GROUP | Facility: PHYSICIAN GROUP | Age: 78
End: 2023-09-05
Payer: MEDICARE

## 2023-09-05 VITALS
SYSTOLIC BLOOD PRESSURE: 90 MMHG | WEIGHT: 164.6 LBS | BODY MASS INDEX: 25.83 KG/M2 | OXYGEN SATURATION: 97 % | TEMPERATURE: 97.2 F | HEIGHT: 67 IN | DIASTOLIC BLOOD PRESSURE: 60 MMHG | HEART RATE: 82 BPM

## 2023-09-05 DIAGNOSIS — E11.29 TYPE 2 DIABETES MELLITUS WITH MICROALBUMINURIA, WITHOUT LONG-TERM CURRENT USE OF INSULIN (HCC): ICD-10-CM

## 2023-09-05 DIAGNOSIS — R80.9 TYPE 2 DIABETES MELLITUS WITH MICROALBUMINURIA, WITHOUT LONG-TERM CURRENT USE OF INSULIN (HCC): ICD-10-CM

## 2023-09-05 DIAGNOSIS — N18.2 STAGE 2 CHRONIC KIDNEY DISEASE: ICD-10-CM

## 2023-09-05 DIAGNOSIS — D64.9 ANEMIA, UNSPECIFIED TYPE: ICD-10-CM

## 2023-09-05 DIAGNOSIS — E78.5 HYPERLIPIDEMIA, UNSPECIFIED HYPERLIPIDEMIA TYPE: ICD-10-CM

## 2023-09-05 DIAGNOSIS — E11.69 TYPE 2 DIABETES MELLITUS WITH OTHER SPECIFIED COMPLICATION, WITHOUT LONG-TERM CURRENT USE OF INSULIN (HCC): ICD-10-CM

## 2023-09-05 PROCEDURE — 3078F DIAST BP <80 MM HG: CPT

## 2023-09-05 PROCEDURE — 3074F SYST BP LT 130 MM HG: CPT

## 2023-09-05 PROCEDURE — 99214 OFFICE O/P EST MOD 30 MIN: CPT

## 2023-09-05 ASSESSMENT — ENCOUNTER SYMPTOMS
DIZZINESS: 0
WEIGHT LOSS: 0
ABDOMINAL PAIN: 0
FEVER: 0
CONSTIPATION: 0
CHILLS: 0
NAUSEA: 0
SHORTNESS OF BREATH: 0
HEADACHES: 0
DIARRHEA: 0
BLURRED VISION: 0
COUGH: 0
VOMITING: 0
WEAKNESS: 0
MYALGIAS: 0

## 2023-09-05 ASSESSMENT — FIBROSIS 4 INDEX: FIB4 SCORE: 2.693740118805895331

## 2023-09-05 NOTE — ASSESSMENT & PLAN NOTE
Chronic condition stable and improving  -We will continue to monitor and survey CBC quarterly  -Continue to follow with oncology as scheduled

## 2023-09-05 NOTE — ASSESSMENT & PLAN NOTE
Chronic, stable and improving  We will continue Jardiance 10 mg daily along with lisinopril 5 mg daily  -Continue to follow with Dr. Roberts as scheduled

## 2023-09-05 NOTE — PROGRESS NOTES
Subjective:     CC: chronic health topics    HPI:   Kemal presents today with    Problem   Stage 2 Chronic Kidney Disease    GFR 60  Seeing Nephrology Dr. Roberts       Type 2 Diabetes Mellitus With Microalbuminuria (Hcc)    Chronic condition with most recent A1c at 6.7%.  Patient has discontinued  glipizide daily at last visit and is doing quite well on Jardiance 10 mg daily.  He denies any recent hypoglycemia.  He does show to have micro albuminuria for which we will continue to monitor. Seeing Dr. Roberts for CKD.     Anemia    Chronic, active  -Appears to have been low secondary to low testosterone, and was on Luprin shots for prostate CA-currently under the care of endocrinology   Latest Reference Range & Units 08/18/23 10:07   WBC 4.8 - 10.8 K/uL 4.0 (L)   RBC 4.70 - 6.10 M/uL 4.37 (L)   Hemoglobin 14.0 - 18.0 g/dL 12.5 (L)   Hematocrit 42.0 - 52.0 % 40.0 (L)   MCV 81.4 - 97.8 fL 91.5   MCH 27.0 - 33.0 pg 28.6   MCHC 32.3 - 36.5 g/dL 31.3 (L)   RDW 35.9 - 50.0 fL 44.6   Platelet Count 164 - 446 K/uL 161 (L)        Hyperlipidemia    Chronic condition   -Patient taking atorvastatin 80 mg daily.  Doing well on this medication without reported side effects  Lab Results   Component Value Date/Time    CHOLSTRLTOT 151 08/18/2023 10:07 AM    CHOLSTRLTOT 193 01/27/2023 11:21 AM    LDL 74 08/18/2023 10:07 AM    LDL 97 01/27/2023 11:21 AM    HDL 32 (A) 08/18/2023 10:07 AM    HDL 48 01/27/2023 11:21 AM    TRIGLYCERIDE 225 (H) 08/18/2023 10:07 AM    TRIGLYCERIDE 239 (H) 01/27/2023 11:21 AM              Ckd (Chronic Kidney Disease), Stage III (Hcc) (Resolved)    Chronic, stable  -Seeing Dr. Jones-Nephrologist  -Most recent GFR 60  -No nephrotoxic substance         Health Maintenance: Completed    ROS:  Review of Systems   Constitutional:  Negative for chills, fever, malaise/fatigue and weight loss.   Eyes:  Negative for blurred vision.   Respiratory:  Negative for cough and shortness of breath.    Cardiovascular:   "Negative for chest pain.   Gastrointestinal:  Negative for abdominal pain, constipation, diarrhea, nausea and vomiting.   Musculoskeletal:  Negative for myalgias.   Neurological:  Negative for dizziness, weakness and headaches.       Objective:     Exam:  BP 90/60 (BP Location: Left arm, Patient Position: Sitting, BP Cuff Size: Adult)   Pulse 82   Temp 36.2 °C (97.2 °F) (Temporal)   Ht 1.702 m (5' 7\")   Wt 74.7 kg (164 lb 9.6 oz)   SpO2 97%   BMI 25.78 kg/m²  Body mass index is 25.78 kg/m².    Physical Exam  Constitutional:       Appearance: Normal appearance.   HENT:      Head: Normocephalic.   Eyes:      Conjunctiva/sclera: Conjunctivae normal.      Pupils: Pupils are equal, round, and reactive to light.   Cardiovascular:      Rate and Rhythm: Normal rate and regular rhythm.      Heart sounds: No murmur heard.  Pulmonary:      Effort: Pulmonary effort is normal. No respiratory distress.      Breath sounds: Normal breath sounds.   Musculoskeletal:         General: Normal range of motion.   Skin:     General: Skin is warm and dry.   Neurological:      General: No focal deficit present.      Mental Status: He is alert and oriented to person, place, and time.   Psychiatric:         Mood and Affect: Mood normal.         Behavior: Behavior normal.         Labs:   Lab Results   Component Value Date/Time    CHOLSTRLTOT 151 08/18/2023 10:07 AM    LDL 74 08/18/2023 10:07 AM    HDL 32 (A) 08/18/2023 10:07 AM    TRIGLYCERIDE 225 (H) 08/18/2023 10:07 AM       Lab Results   Component Value Date/Time    SODIUM 141 08/18/2023 10:07 AM    POTASSIUM 5.0 08/18/2023 10:07 AM    CHLORIDE 105 08/18/2023 10:07 AM    CO2 23 08/18/2023 10:07 AM    GLUCOSE 138 (H) 08/18/2023 10:07 AM    BUN 23 (H) 08/18/2023 10:07 AM    CREATININE 1.22 08/18/2023 10:07 AM    CREATININE 1.48 (H) 03/18/2011 12:00 AM    BUNCREATRAT 18 03/18/2011 12:00 AM    GLOMRATE 47 (L) 03/18/2011 12:00 AM     Lab Results   Component Value Date/Time    ALKPHOSPHAT " "112 (H) 08/18/2023 10:05 AM    ASTSGOT 23 08/18/2023 10:05 AM    ALTSGPT 18 08/18/2023 10:05 AM    TBILIRUBIN 0.3 08/18/2023 10:05 AM      Lab Results   Component Value Date/Time    HBA1C 6.7 (H) 08/18/2023 10:05 AM    HBA1C 6.5 (H) 05/25/2023 08:57 AM    HBA1C 7.2 (H) 01/27/2023 11:21 AM     No results found for: \"TSH\"  Lab Results   Component Value Date/Time    FREET4 0.97 05/25/2023 08:58 AM         Assessment & Plan: Medical Decision Making     78 y.o. male with the following -     Problem List Items Addressed This Visit       Hyperlipidemia     Chronic condition improved  - Recommend diligent efforts towards healthy diet and exercise  - We will continue atorvastatin 80 mg daily         Anemia     Chronic condition stable and improving  -We will continue to monitor and survey CBC quarterly  -Continue to follow with oncology as scheduled         Relevant Orders    CBC WITH DIFFERENTIAL    Type 2 diabetes mellitus with microalbuminuria (HCC)     Chronic condition stable  -Continue Jardiance 10mg daily  -Continue healthy diet as discussed  -Repeat hemoglobin A1c in 7 weeks  -Retinal screening ordered         Relevant Orders    Comp Metabolic Panel    HEMOGLOBIN A1C    Referral for Retinal Screening Exam    Stage 2 chronic kidney disease     Chronic, stable and improving  We will continue Jardiance 10 mg daily along with lisinopril 5 mg daily  -Continue to follow with Dr. Roberts as scheduled         Relevant Orders    Comp Metabolic Panel     Other Visit Diagnoses       Type 2 diabetes mellitus with other specified complication, without long-term current use of insulin (HCC)                Differential diagnosis, natural history, supportive care, and indications for immediate follow-up discussed.  Shared decision making approach utilized, and patient is amendable with plan of care.  Patient understands to return to clinic or go to the emergency department if symptoms worsen. All questions and concerns addressed to " the best of my knowledge.    Return in about 3 months (around 12/5/2023) for F/U Lab Review.    Please note that this dictation was created using voice recognition software. I have made every reasonable attempt to correct obvious errors, but I expect that there are errors of grammar and possibly content that I did not discover before finalizing the note.

## 2023-09-05 NOTE — ASSESSMENT & PLAN NOTE
Chronic condition improved  - Recommend diligent efforts towards healthy diet and exercise  - We will continue atorvastatin 80 mg daily

## 2023-09-05 NOTE — ASSESSMENT & PLAN NOTE
Chronic condition stable  -Continue Jardiance 10mg daily  -Continue healthy diet as discussed  -Repeat hemoglobin A1c in 7 weeks  -Retinal screening ordered

## 2023-09-07 ENCOUNTER — HOSPITAL ENCOUNTER (OUTPATIENT)
Dept: LAB | Facility: MEDICAL CENTER | Age: 78
End: 2023-09-07
Payer: MEDICARE

## 2023-09-07 DIAGNOSIS — E55.9 VITAMIN D DEFICIENCY: ICD-10-CM

## 2023-09-07 DIAGNOSIS — T38.0X5A ADRENAL INSUFFICIENCY DUE TO CORTICOSTEROID WITHDRAWAL (HCC): ICD-10-CM

## 2023-09-07 DIAGNOSIS — E29.1 SECONDARY MALE HYPOGONADISM: ICD-10-CM

## 2023-09-07 DIAGNOSIS — E27.3 ADRENAL INSUFFICIENCY DUE TO CORTICOSTEROID WITHDRAWAL (HCC): ICD-10-CM

## 2023-09-07 LAB
25(OH)D3 SERPL-MCNC: 57 NG/ML (ref 30–100)
CORTIS SERPL-MCNC: 18.8 UG/DL (ref 0–23)

## 2023-09-07 PROCEDURE — 84403 ASSAY OF TOTAL TESTOSTERONE: CPT

## 2023-09-07 PROCEDURE — 82533 TOTAL CORTISOL: CPT

## 2023-09-07 PROCEDURE — 82024 ASSAY OF ACTH: CPT

## 2023-09-07 PROCEDURE — 82306 VITAMIN D 25 HYDROXY: CPT

## 2023-09-07 PROCEDURE — 36415 COLL VENOUS BLD VENIPUNCTURE: CPT

## 2023-09-07 PROCEDURE — 84402 ASSAY OF FREE TESTOSTERONE: CPT

## 2023-09-07 PROCEDURE — 84270 ASSAY OF SEX HORMONE GLOBUL: CPT

## 2023-09-09 LAB
ACTH PLAS-MCNC: 42.3 PG/ML (ref 7.2–63.3)
SHBG SERPL-SCNC: 35 NMOL/L (ref 19–76)
TESTOST FREE MFR SERPL: <1.6 % (ref 1.6–2.9)
TESTOST FREE SERPL-MCNC: <1 PG/ML (ref 47–244)
TESTOST SERPL-MCNC: <3 NG/DL (ref 300–720)

## 2023-09-11 ENCOUNTER — OFFICE VISIT (OUTPATIENT)
Dept: ENDOCRINOLOGY | Facility: MEDICAL CENTER | Age: 78
End: 2023-09-11
Payer: MEDICARE

## 2023-09-11 VITALS
SYSTOLIC BLOOD PRESSURE: 92 MMHG | HEART RATE: 96 BPM | BODY MASS INDEX: 25.74 KG/M2 | WEIGHT: 164 LBS | OXYGEN SATURATION: 97 % | DIASTOLIC BLOOD PRESSURE: 50 MMHG | HEIGHT: 67 IN

## 2023-09-11 DIAGNOSIS — T38.0X5A ADRENAL INSUFFICIENCY DUE TO CORTICOSTEROID WITHDRAWAL (HCC): ICD-10-CM

## 2023-09-11 DIAGNOSIS — E55.9 VITAMIN D DEFICIENCY: ICD-10-CM

## 2023-09-11 DIAGNOSIS — E27.3 ADRENAL INSUFFICIENCY DUE TO CORTICOSTEROID WITHDRAWAL (HCC): ICD-10-CM

## 2023-09-11 DIAGNOSIS — E29.1 SECONDARY MALE HYPOGONADISM: ICD-10-CM

## 2023-09-11 PROCEDURE — 3078F DIAST BP <80 MM HG: CPT

## 2023-09-11 PROCEDURE — 99214 OFFICE O/P EST MOD 30 MIN: CPT

## 2023-09-11 PROCEDURE — 3074F SYST BP LT 130 MM HG: CPT

## 2023-09-11 PROCEDURE — 99211 OFF/OP EST MAY X REQ PHY/QHP: CPT

## 2023-09-11 ASSESSMENT — FIBROSIS 4 INDEX: FIB4 SCORE: 2.693740118805895331

## 2023-09-11 NOTE — PROGRESS NOTES
New Patient Consult Note for Endocrinology  Referred by: Kenneth Connolly D.N.P.    Reason for consult: Chronic glucocorticoids    HPI:  Kemal Mueller is a 78 y.o. old patient who is seeing us today for care.  This is a pleasant patient and I appreciate the opportunity to participate in the care of this patient.  This is a new patient with me today.    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      He was E-consulted by Dr Brown with a plan for gradual steroid taper.  2.5mg reduction every 7 days until patient is down to 5mg daily which is a physiologic dose  then can switch to hydcortisone (shorter acting steroid to allow for recovery of HPA axis) followed by another taper     10mg bid then  after 1 week  10mg plus 7.5   then 10mg plus 5mg   then 10mg plus 2.5   then 10mg  only  Then 7.5mg  Then 5mg  Then switch to HC 10mg /5  Then HC 10mg  Then stop     Check morning cortisol if > 10 can stop HC    He was able to taper from 5mg hydrocortisone/day x 7 days to  5mg/day x 6 days to  5mg/day x 5 days to  5mg/day x 4 days where is started feeling nausea and vomiting. He took his last dose of day 4 today.     Denies: nausea and vomiting, diarrhea, abdominal pain, fatigue however today he is feeling good.   Currently taking:   Prednisone 1 mg x 6 days   Prednisone 1 mg x 5 days  Prednisone 1 mg x 4 days  Prednisone 1 mg x 3 days  Prednisone 1 mg x 2 days  Prednisone 1 mg x 1 day  Stopped prednisone last Wednesday    Latest Reference Range & Units 09/07/23 08:17   Cortisol 0.0 - 23.0 ug/dL 18.8        Latest Reference Range & Units 09/07/23 08:17   Acth 7.2 - 63.3 pg/mL 42.3     2. Secondary Hypogonadism   Latest Reference Range & Units 09/07/23 08:17   Free Testosterone  47 - 244 pg/mL <1 (L)   Sex Hormone Bind Globulin 19 - 76 nmol/L 35   Testosterone % Free 1.6 - 2.9 % <1.6   Testosterone,Total 300 - 720 ng/dL <3 (L)         3. Vitamin D deficiency  Currently taking Vitamin D 3 1000 IU BID   Latest Reference Range & Units 09/07/23 08:17   25-Hydroxy   Vitamin D 25 30 - 100 ng/mL 57     ROS:   Constitutional: No change in weight , No fatigue, No night sweats.  HEENT: No Headache.  Eyes:  No blurred vision, No visual changes.  Cardiac: No chest pain, No palpitations.  Resp: No shortness of breath, No cough,   Gastro: No nausea or vomiting, No diarrhea.  Neuro: Denies numbness or tinging in bilateral feet or hands, and no loss of sensation.  Endo: No heat or cold intolerance.  : No polyuria, No polydipsia, No chronic UTI's.  Lower extremities: No lower leg edema bilateral.  All other systems were reviewed and were negative.    Past Medical History:  Patient Active Problem List    Diagnosis Date Noted    Stage 2 chronic kidney disease 09/05/2023    Type 2 diabetes mellitus with microalbuminuria ([facility]) 05/31/2023    Adrenal insufficiency due to corticosteroid withdrawal ([facility]) 05/03/2023    Mixed stress and urge urinary incontinence 12/12/2022    Low back pain 11/14/2022    Atherosclerosis of aorta ([facility]) 09/27/2022    Type 2 diabetes mellitus with chronic kidney disease, without long-term current use of insulin, unspecified CKD stage ([facility])     Other spondylosis with radiculopathy, lumbar region 08/18/2022    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[facility]) 02/14/2022    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[facility]) 08/31/2020    Dyslipidemia 03/19/2020    Vitamin D deficiency 02/12/2020    Colon cancer screening 02/12/2020    Arthritis 01/15/2020    History of urinary stone 11/28/2018    History of malignant neoplasm of kidney 04/30/2018    Anemia 08/17/2017    Herpes zoster without complication 11/15/2016    Age-related nuclear cataract of eye 01/14/2016    Right renal mass 01/11/2016     Degeneration of cervical intervertebral disc 10/30/2012    Cervical stenosis of spine 10/30/2012    Cataracts, bilateral 01/13/2012    ED (erectile dysfunction) 01/11/2011    Prostate cancer (HCC) 09/21/2010    Nephrolithiasis 09/12/2010    Glaucoma 09/12/2010    GERD (gastroesophageal reflux disease) 09/12/2010    Hyperlipidemia 05/21/2009    Essential hypertension, benign 05/21/2009    Calculus of kidney 05/21/2009    Reflux esophagitis 05/21/2009    Proteinuria 05/21/2009       Past Surgical History:  Past Surgical History:   Procedure Laterality Date    LUMBAR LAMINECTOMY DISKECTOMY N/A 8/18/2022    Procedure: LUMBAR 2 - SACRAL 1 LAMINECTOMY;  Surgeon: Selvin Ying M.D.;  Location: SURGERY [facility];  Service: Neurosurgery    CATARACT PHACO WITH IOL Right 01/28/2016    Procedure: CATARACT PHACO WITH IOL;  Surgeon: Alfonso Hernandez M.D.;  Location: SURGERY [facility];  Service:     CATARACT PHACO WITH IOL Left 01/14/2016    Procedure: CATARACT PHACO WITH IOL;  Surgeon: Alfonso Hernandez M.D.;  Location: SURGERY [facility];  Service:     CERVICAL DISK AND FUSION ANTERIOR  10/30/2012    Performed by Selvin Ying M.D. at SURGERY [facility] ORS    PROSTATECTOMY, RADICAL RETRO  04/21/2010    Performed by EDYTA KUMAR at SURGERY [facility] ORS    NODE DISSECTION  04/21/2010    Performed by EDYTA KUMAR at SURGERY [facility] ORS    CARPAL TUNNEL RELEASE  1990    Bilateral    TRIGGER FINGER RELEASE  1990    Right small finger    TONSILLECTOMY AND ADENOIDECTOMY  1953    APPENDECTOMY      OTHER      radiology procedure - cryoablation bladder tumor    TX REMOVAL OF KIDNEY STONE  1986/1998    x2       Allergies:  Nkda [no known drug allergy]    Social History:  Social History     Socioeconomic History    Marital status:      Spouse name: Not on file    Number of children: Not on file    Years of education: Not on file    Highest education level: Not on file   Occupational History     Occupation: retired drugstore manager   Tobacco Use    Smoking status: Former     Current packs/day: 0.00     Average packs/day: 0.2 packs/day for 10.0 years (2.0 ttl pk-yrs)     Types: Cigarettes     Start date: 1967     Quit date: 1977     Years since quittin.7    Smokeless tobacco: Never    Tobacco comments:     1/3 ppd for 10 years, quit    Vaping Use    Vaping Use: Never used   Substance and Sexual Activity    Alcohol use: Yes     Alcohol/week: 1.2 oz     Types: 2 Cans of beer per week     Comment: 1-2 per day    Drug use: No    Sexual activity: Not Currently     Partners: Female   Other Topics Concern    Not on file   Social History Narrative    Not on file     Social Determinants of Health     Financial Resource Strain: Not on file   Food Insecurity: Not on file   Transportation Needs: Not on file   Physical Activity: Not on file   Stress: Not on file   Social Connections: Not on file   Intimate Partner Violence: Not on file   Housing Stability: Not on file       Family History:  Family History   Problem Relation Age of Onset    Diabetes Mother     Stroke Mother     Alzheimer's Disease Father        Medications:    Current Outpatient Medications:     omeprazole (PRILOSEC) 20 MG delayed-release capsule, Take 1 Capsule by mouth every day., Disp: 100 Capsule, Rfl: 0    atorvastatin (LIPITOR) 80 MG tablet, Take 1 Tablet by mouth every evening., Disp: 100 Tablet, Rfl: 3    ezetimibe (ZETIA) 10 MG Tab, Take 1 Tablet by mouth every day., Disp: 100 Tablet, Rfl: 3    Empagliflozin (JARDIANCE) 10 MG Tab tablet, Take 1 Tablet by mouth every day., Disp: 100 Tablet, Rfl: 1    lisinopril (PRINIVIL) 5 MG Tab, Take 1 Tablet by mouth every day., Disp: 100 Tablet, Rfl: 1    vitamin D3, cholecalciferol, 1000 UNIT Tab, Take 2 Tabs by mouth every day., Disp: 100 Tab, Rfl: 3    potassium citrate SR (UROCIT-K SR) 10 MEQ (1080 MG) TBCR, Take 10 mEq by mouth every evening., Disp: , Rfl:     Multiple Vitamins-Minerals  "(CENTRUM SILVER PO), Take 1 Tablet by mouth every day., Disp: , Rfl:     Brimonidine Tartrate-Timolol 0.2-0.5 % Solution, Administer 1 Drop into both eyes 2 times a day., Disp: , Rfl:     latanoprost (XALATAN) 0.005 % Solution, Place 1 Drop in both eyes every evening., Disp: , Rfl:     ACETAZOLAMIDE 125 MG PO TABS, Take 125 mg by mouth every day., Disp: , Rfl:     predniSONE (DELTASONE) 1 MG Tab, Take 1 Tablet by mouth every day. (Patient not taking: Reported on 9/11/2023), Disp: 30 Tablet, Rfl: 1    Calcium Carbonate-Vit D-Min (CALCIUM 1200 PO), Take 1 Tablet by mouth every day., Disp: , Rfl:       Physical Examination:   Vital signs: BP 92/50 (BP Location: Right arm, Patient Position: Sitting, BP Cuff Size: Adult)   Pulse 96   Ht 1.702 m (5' 7\")   Wt 74.4 kg (164 lb)   SpO2 97%   BMI 25.69 kg/m²   General: No distress, cooperative, well dressed and well nourished.   Eyes: No scleral icterus or discharge, No hyposphagma  ENMT: Normal on external inspection of nose, lips, No nasal drainage   Neck: No abnormal masses on inspection  Resp: Normal effort, Bilateral clear to auscultation, No wheezing, No rales  CVS: Regular rate and rhythm, S1 S2 normal, No murmur. No gallop  Extremities: No edema bilateral extremities  Neuro: Alert and oriented  Skin: No rash, No Ulcers  Psych: Normal mood and affect    Assessment and Plan:  1. 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   We will repeat cortisol levels in 3 months  Patient will notify me via mychart should he start feeling sick.   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   I will continue to monitor.  Repeat levels in 3 months.   - Testosterone, Free & Total, Adult Male (w/SHBG); Future    3. Vitamin D " deficiency  Stable  Continue current regimen - see HPI  - VITAMIN D,25 HYDROXY (DEFICIENCY); Future     Return in about 3 months (around 12/11/2023). Patient will have blood work done 1 weeks prior to next apt in 3 months    Thank you kindly for allowing me to participate in the diabetes care plan for this patient.    Antonio Abarca, APRN   9/11/23    CC:   Kenneth Connolly D.N.P.

## 2023-11-10 RX ORDER — VITAMIN B COMPLEX
1000 TABLET ORAL DAILY
COMMUNITY
End: 2023-11-10

## 2023-11-10 RX ORDER — IBUPROFEN 200 MG
CAPSULE ORAL DAILY
COMMUNITY

## 2023-11-10 RX ORDER — ACETAMINOPHEN 500 MG
500-1000 TABLET ORAL EVERY 6 HOURS PRN
COMMUNITY

## 2023-11-20 ENCOUNTER — OFFICE VISIT (OUTPATIENT)
Dept: INTERNAL MEDICINE | Facility: OTHER | Age: 78
End: 2023-11-20
Payer: MEDICARE

## 2023-11-20 VITALS
HEART RATE: 85 BPM | BODY MASS INDEX: 25.56 KG/M2 | OXYGEN SATURATION: 96 % | DIASTOLIC BLOOD PRESSURE: 62 MMHG | TEMPERATURE: 97.8 F | WEIGHT: 163.2 LBS | SYSTOLIC BLOOD PRESSURE: 108 MMHG

## 2023-11-20 DIAGNOSIS — E78.5 HYPERLIPIDEMIA, UNSPECIFIED HYPERLIPIDEMIA TYPE: ICD-10-CM

## 2023-11-20 DIAGNOSIS — I10 ESSENTIAL HYPERTENSION, BENIGN: ICD-10-CM

## 2023-11-20 DIAGNOSIS — Z87.442 HISTORY OF URINARY STONE: ICD-10-CM

## 2023-11-20 DIAGNOSIS — E11.22 TYPE 2 DIABETES MELLITUS WITH CHRONIC KIDNEY DISEASE, WITHOUT LONG-TERM CURRENT USE OF INSULIN, UNSPECIFIED CKD STAGE (HCC): ICD-10-CM

## 2023-11-20 DIAGNOSIS — E55.9 VITAMIN D DEFICIENCY: ICD-10-CM

## 2023-11-20 DIAGNOSIS — E11.29 TYPE 2 DIABETES MELLITUS WITH MICROALBUMINURIA, WITHOUT LONG-TERM CURRENT USE OF INSULIN (HCC): ICD-10-CM

## 2023-11-20 DIAGNOSIS — R80.9 TYPE 2 DIABETES MELLITUS WITH MICROALBUMINURIA, WITHOUT LONG-TERM CURRENT USE OF INSULIN (HCC): ICD-10-CM

## 2023-11-20 DIAGNOSIS — N20.0 CALCULUS OF KIDNEY: ICD-10-CM

## 2023-11-20 DIAGNOSIS — C61 MALIGNANT NEOPLASM OF PROSTATE (HCC): ICD-10-CM

## 2023-11-20 PROCEDURE — 99205 OFFICE O/P NEW HI 60 MIN: CPT | Mod: 25 | Performed by: REGISTERED NURSE

## 2023-11-20 PROCEDURE — G2212 PROLONG OUTPT/OFFICE VIS: HCPCS | Performed by: REGISTERED NURSE

## 2023-11-20 PROCEDURE — 3078F DIAST BP <80 MM HG: CPT | Performed by: REGISTERED NURSE

## 2023-11-20 PROCEDURE — 3074F SYST BP LT 130 MM HG: CPT | Performed by: REGISTERED NURSE

## 2023-11-20 PROCEDURE — 96127 BRIEF EMOTIONAL/BEHAV ASSMT: CPT | Performed by: REGISTERED NURSE

## 2023-11-20 SDOH — ECONOMIC STABILITY: TRANSPORTATION INSECURITY
IN THE PAST 12 MONTHS, HAS LACK OF TRANSPORTATION KEPT YOU FROM MEETINGS, WORK, OR FROM GETTING THINGS NEEDED FOR DAILY LIVING?: NO

## 2023-11-20 SDOH — ECONOMIC STABILITY: HOUSING INSECURITY
IN THE LAST 12 MONTHS, WAS THERE A TIME WHEN YOU DID NOT HAVE A STEADY PLACE TO SLEEP OR SLEPT IN A SHELTER (INCLUDING NOW)?: NO

## 2023-11-20 SDOH — HEALTH STABILITY: PHYSICAL HEALTH: ON AVERAGE, HOW MANY DAYS PER WEEK DO YOU ENGAGE IN MODERATE TO STRENUOUS EXERCISE (LIKE A BRISK WALK)?: 0 DAYS

## 2023-11-20 SDOH — ECONOMIC STABILITY: INCOME INSECURITY: IN THE LAST 12 MONTHS, WAS THERE A TIME WHEN YOU WERE NOT ABLE TO PAY THE MORTGAGE OR RENT ON TIME?: NO

## 2023-11-20 SDOH — SOCIAL STABILITY: SOCIAL NETWORK
DO YOU BELONG TO ANY CLUBS OR ORGANIZATIONS SUCH AS CHURCH GROUPS UNIONS, FRATERNAL OR ATHLETIC GROUPS, OR SCHOOL GROUPS?: NO

## 2023-11-20 SDOH — HEALTH STABILITY: MENTAL HEALTH
STRESS IS WHEN SOMEONE FEELS TENSE, NERVOUS, ANXIOUS, OR CAN'T SLEEP AT NIGHT BECAUSE THEIR MIND IS TROUBLED. HOW STRESSED ARE YOU?: NOT AT ALL

## 2023-11-20 SDOH — HEALTH STABILITY: PHYSICAL HEALTH: ON AVERAGE, HOW MANY MINUTES DO YOU ENGAGE IN EXERCISE AT THIS LEVEL?: 0 MIN

## 2023-11-20 SDOH — HEALTH STABILITY: MENTAL HEALTH: HOW OFTEN DO YOU HAVE A DRINK CONTAINING ALCOHOL?: 4 OR MORE TIMES A WEEK

## 2023-11-20 SDOH — SOCIAL STABILITY: SOCIAL NETWORK: IN A TYPICAL WEEK, HOW MANY TIMES DO YOU TALK ON THE PHONE WITH FAMILY, FRIENDS, OR NEIGHBORS?: ONCE A WEEK

## 2023-11-20 SDOH — ECONOMIC STABILITY: INCOME INSECURITY: HOW HARD IS IT FOR YOU TO PAY FOR THE VERY BASICS LIKE FOOD, HOUSING, MEDICAL CARE, AND HEATING?: NOT HARD AT ALL

## 2023-11-20 SDOH — HEALTH STABILITY: MENTAL HEALTH: HOW OFTEN DO YOU HAVE 6 OR MORE DRINKS ON ONE OCCASION?: NEVER

## 2023-11-20 SDOH — ECONOMIC STABILITY: INCOME INSECURITY: IN THE PAST 12 MONTHS, HAS THE ELECTRIC, GAS, OIL, OR WATER COMPANY THREATENED TO SHUT OFF SERVICE IN YOUR HOME?: NO

## 2023-11-20 SDOH — SOCIAL STABILITY: SOCIAL NETWORK: HOW OFTEN DO YOU ATTENT MEETINGS OF THE CLUB OR ORGANIZATION YOU BELONG TO?: NEVER

## 2023-11-20 SDOH — ECONOMIC STABILITY: FOOD INSECURITY: WITHIN THE PAST 12 MONTHS, YOU WORRIED THAT YOUR FOOD WOULD RUN OUT BEFORE YOU GOT MONEY TO BUY MORE.: NEVER TRUE

## 2023-11-20 SDOH — SOCIAL STABILITY: SOCIAL NETWORK: HOW OFTEN DO YOU GET TOGETHER WITH FRIENDS OR RELATIVES?: MORE THAN THREE TIMES A WEEK

## 2023-11-20 SDOH — ECONOMIC STABILITY: FOOD INSECURITY: WITHIN THE PAST 12 MONTHS, THE FOOD YOU BOUGHT JUST DIDN'T LAST AND YOU DIDN'T HAVE MONEY TO GET MORE.: NEVER TRUE

## 2023-11-20 SDOH — SOCIAL STABILITY: SOCIAL NETWORK: ARE YOU MARRIED, WIDOWED, DIVORCED, SEPARATED, NEVER MARRIED, OR LIVING WITH A PARTNER?: MARRIED

## 2023-11-20 SDOH — SOCIAL STABILITY: SOCIAL NETWORK: HOW OFTEN DO YOU ATTEND CHURCH OR RELIGIOUS SERVICES?: NEVER

## 2023-11-20 SDOH — ECONOMIC STABILITY: TRANSPORTATION INSECURITY
IN THE PAST 12 MONTHS, HAS THE LACK OF TRANSPORTATION KEPT YOU FROM MEDICAL APPOINTMENTS OR FROM GETTING MEDICATIONS?: NO

## 2023-11-20 SDOH — HEALTH STABILITY: MENTAL HEALTH: HOW MANY STANDARD DRINKS CONTAINING ALCOHOL DO YOU HAVE ON A TYPICAL DAY?: 1 OR 2

## 2023-11-20 ASSESSMENT — PATIENT HEALTH QUESTIONNAIRE - PHQ9
5. POOR APPETITE OR OVEREATING: 0
4. FEELING TIRED OR HAVING LITTLE ENERGY: 0
8. MOVING OR SPEAKING SO SLOWLY THAT OTHER PEOPLE COULD HAVE NOTICED. OR THE OPPOSITE, BEING SO FIGETY OR RESTLESS THAT YOU HAVE BEEN MOVING AROUND A LOT MORE THAN USUAL: 0
2. FEELING DOWN, DEPRESSED, IRRITABLE, OR HOPELESS: 0
3. TROUBLE FALLING OR STAYING ASLEEP OR SLEEPING TOO MUCH: 0
SUM OF ALL RESPONSES TO PHQ QUESTIONS 1-9: 0
SUM OF ALL RESPONSES TO PHQ9 QUESTIONS 1 AND 2: 0
7. TROUBLE CONCENTRATING ON THINGS, SUCH AS READING THE NEWSPAPER OR WATCHING TELEVISION: 0
6. FEELING BAD ABOUT YOURSELF - OR THAT YOU ARE A FAILURE OR HAVE LET YOURSELF OR YOUR FAMILY DOWN: 0
1. LITTLE INTEREST OR PLEASURE IN DOING THINGS: 0
9. THOUGHTS THAT YOU WOULD BE BETTER OFF DEAD, OR OF HURTING YOURSELF: 0

## 2023-11-20 ASSESSMENT — ROWLAND UNIVERSAL DEMENTIA ASSESSMENT SCALE (RUDAS)
DO ALL EXTERNAL LINES APPEAR IN PERSON'S DRAWING: YES
ORIENTATION SCORE: 5
NUMBER OF NEW ANIMALS PERSON NAMED: 8
WITH YOUR RIGHT HAND TOUCH YOUR LEFT SHOULD: YES
HAS PERSON DRAWN A PICTURE BASED ON A SQUARE: YES
DID PERSON REMEMBER COOKING OIL: YES
DID PERSON REMEMBER SOAP: YES
SHOW ME YOUR LEFT HAND: YES
DID PERSON MAKE ANY ADDITIONAL SAFETY PROPOSALS: NO
WITH YOUR LEFT HAND TOUCH YOUR RIGHT EAR: YES
RUDAS TOTAL SCORE: 27
MEMORY SCORE: 8
DO ALL INTERNAL LINES APPEAR IN PERSON'S DRAWING: YES
SHOW ME YOUR RIGHT FOOT: YES
DRAWING SCORE: 3
JUDGEMENT SCORE: 1
LANGUAGE SCORE: 8
DID PERSON REMEMBER EGGS: YES
PRAXIS SCORE: 2
PRAXIS (FIST / PALM): NORMAL (VERY FEW IF ANY ERRORS, SELF-CORRECTED, PROGRESSIVELY BETTER, GOOD MAINTENANCE, ONLY VERY SLIGHT LACK OF SYNCHRONY BETWEEN HANDS)
WHICH IS (INDICATE/POINT TO) MY LEFT KNEE: YES
DID PERSON REMEMBER TEA: YES
DID PERSON INDICATE THAT THEY WOULD LOOK FOR TRAFFIC: YES - PROMPTED

## 2023-11-20 ASSESSMENT — FIBROSIS 4 INDEX: FIB4 SCORE: 2.693740118805895331

## 2023-11-20 ASSESSMENT — ANXIETY QUESTIONNAIRES
3. WORRYING TOO MUCH ABOUT DIFFERENT THINGS: NOT AT ALL
7. FEELING AFRAID AS IF SOMETHING AWFUL MIGHT HAPPEN: NOT AT ALL
1. FEELING NERVOUS, ANXIOUS, OR ON EDGE: NOT AT ALL
4. TROUBLE RELAXING: NOT AT ALL
2. NOT BEING ABLE TO STOP OR CONTROL WORRYING: NOT AT ALL
6. BECOMING EASILY ANNOYED OR IRRITABLE: NOT AT ALL
5. BEING SO RESTLESS THAT IT IS HARD TO SIT STILL: NOT AT ALL
GAD7 TOTAL SCORE: 0

## 2023-11-20 ASSESSMENT — LIFESTYLE VARIABLES
AUDIT-C TOTAL SCORE: 4
SKIP TO QUESTIONS 9-10: 1

## 2023-11-20 NOTE — PATIENT INSTRUCTIONS
We thank you for taking the time to visit with our team of providers at the [facility]. During the medical visit we completed a Comprehensive Geriatric Assessment with a , pharmacist and medical provider, all trained in Geriatrics. Below are our Age Friendly recommendations using the 4 M's model of care: What Matters most to you, Mind, Medications and Mobility.     Medical Provider Recommendations:    When considering medical recommendations, we encourage you to work in partnership with your primary care provider to implement them.     Fall prevention - Consider increasing physical activity and including exercises that help improve balance such as walking, [place] and Yoga. See handouts.   Consider journaling as a way to preserve memories and share with your family in the future. We did not identify any cognitive concerns at this time based on a RUDAS score of 27/30 - normal. The RUDAS is a six-item cognitive screening that evaluates the following domains: memory, praxis, language, judgement, drawing, and body orientation. The assessment is designed to minimize the effects of cultural learning and language diversity.   Increase water intake to 64 ounces a day and increase fruits and vegetable intake. Consider a Mediterranean diet, which has an anti-inflammatory effect and is also known to support brain and cardiovascular health. A cystine kidney stone diet includes increasing fruits and vegetables and decreasing red meats. Although, you have not had kidney stones in many years, this diet supports kidney health.   Stay connected with friends and family.   Get established with a foot/ for regular nail care. [facility] [phone] has a podiatry specialist or you may consider:   Sammy Davila [phone]   Stephen Staley  [phone]    Pharmacist Recommendations:    During our assessment, we reviewed your medications to make sure they are supporting What Matters most to you.  When adjusting medications, we generally like to only make one change at a time. Below are some recommendations for you to consider.    Calcium     You are currently on a supplement called Calcium carbonate. Calcium carbonate requires stomach acid for best absorption, making it more suitable for individuals with normal acid levels, while calcium citrate is absorbed well in both acidic and less acidic environments, making it a preferred choice for those with reduced stomach acid production, such as older adults. Calcium carbonate absorption may be hindered by the use of proton pump inhibitors like omeprazole.     Recommendation:  Consider switching to a calcium citrate calcium supplementation.     Omeprazole     You are currently on a medication called Omeprazole. Omeprazole can be used for gastroesophageal reflux disease (GERD). Daily long-term use in the older adults has been associated with potential risks, including an increased susceptibility to infections, bone fractures and nutritional deficiencies, particularly of calcium, magnesium, and vitamin B12.     Recommendation: Consider reducing the duration of this medication use to every other day for a week or two then discontinue.    Lisinopril    You are currently on a medication called Lisinopril for blood pressure. You were also on prednisone for an extended period of time. The prolonged use of corticosteroids, a class of anti-inflammatory medications, can contribute to elevated blood pressure in some individuals. Checking your blood pressure every day in the morning and logging the numbers will be beneficial for your primary care provider.    Recommendation:  Consider talking to your primary care provider of the need for Lisinopril use. Bring a log of blood pressure numbers every day for 2 weeks.        Airborne     You are currently taking a supplement called Airborne. Airborne can be beneficial to reduce the duration of the common cold. For short-term use,  some individuals may choose to take Airborne during the cold and flu season or when traveling to crowded places as a precautionary measure. Long-term use of Airborne or similar supplements may carry potential risks, especially if consumed in excess.     Recommendation:  Consider using during the cold and flu season or when you start to see symptoms of the cold and flu.    Vitamin D3    You are currently taking a vitamin called Vitamin D3. Vitamin D3 can be beneficial in older adults for bone health, muscle function and immune system. This supplement recommends taking one capsule by mouth daily. One capsule will get you the sufficient daily recommendation.    Recommendation:  Consider only taking one capsule by mouth daily as two capsules is not benefiting you. If you get the calcium and vitamin D3 you may not need the only vitamin D3 supplement     Recommendations:    Thank you for sharing portions of your life with the social work team. It has been a pleasure to get to know you. We suggest the following to assist with What Matters to you:    Consider participating in the [facility]'s Community Wellness Programs. Call [phone] to inquire about programs to increase strength and balance. Wellness flyer provided.  It is recommended that this patient should exercise approximately 30 minutes a day on as many days of the week as possible.  It is best to gradually increase the time and is also important that the exercises be sustained long enough to actually provide good cardiovascular benefit. Check with your Primary Care Provider if there are any concerns about initiating an exercise program.  It is important to have an exercise plan that can continue throughout the year (even in winter!)   Consider attending [employer]. They have educational, social, cultural, and recreational programs. $85/year membership. [phone]; [email or web address]    Seasonal  community events   [employer] Animal Days in April.   Tuan [place] in June.   RiverFest in June. Live entertainment and kayaking in downtown [place].  ArtTown in July.  Dragon Lights at [facility] in July.   Hot August Nights in August.  Rib Cook Off in August.  [facility] [employer] Festival in September.  The Great [place] Balloon Race in September.  National [employer] Air Races in September.   Street Vibrations in September.   SeniorFest in September. Vendor fair specific to senior resources.   The Great [place] Festival in October with grape stomping, Italian buffet and gelato. Happens.   December: [facility] [employer], [place] at [facility].   [place] Senior Games, team sport activities. Year Round with [facility].    Thank you again for participating in the Comprehensive Geriatric Assessment with our team of providers at the [facility]. Should you have any follow-up questions you can reach our medical assistant at [phone], option 3.     We encourage you to consult with your primary care provider before implementing these changes and follow-up with them regarding any orders, re  ferrals, med changes, etc. In the case of a medical or psychiatric emergency, call 911.

## 2023-11-20 NOTE — PROGRESS NOTES
Interdisciplinary Comprehensive Geriatric Assessment   for Aging      Our comprehensive geriatric assessment team is comprised of a medical assistant, medical provider, pharmacist, and , all of whom create a note during the assessment. We provide recommendations modeling that of an [facility] with the 4 Ms of Care (What Matters, Mind, Medications, and Mobility).    We encourage patients to follow-up with their primary care provider prior to implementing the recommendations (orders, referrals, med changes, etc) discussed during the visit today.    Patient Name: Kemal Mueller  YOB: 1945  Referring Provider: Not a valid item  Referred For: PCP Kenneth Connolly  PCP: Kenneth Connolly D.N.P.     Interdisciplinary Geriatric Consult Team:   Shakila Perera, MSN, APRN, FNP-BC  Vika Patiño, MSN, APRN, FNP-BC  Julia Elder, Pharm D, BCGP  Bruna Ramesh, MSW, LCSW  Any Zambrano, MSW, LCSW    Chief Complaint   Patient presents with    [place] Comprehensive Geriatric Assessment     Frailty       What matters most to the patient: Want to make sure I am doing ok and live longer. He loves [employer] football and [employer] and finds true melissa in his grandchildren and great grandchildren.     Summary of Team Top Recommendations    Medical Provider  Fall prevention - Consider increasing physical activity and including exercises that help improve balance such as walking, [place] and Yoga. See handouts.   Consider journaling as a way to preserve memories and share with your family in the future. We did not identify any cognitive concerns at this time based on a RUDAS score of 27/30 - normal. The RUDAS is a six-item cognitive screening that evaluates the following domains: memory, praxis, language, judgement, drawing, and body orientation. The assessment is designed to minimize the effects of cultural learning and language diversity.   Increase water intake to 64 ounces a day and  increase fruits and vegetable intake. Consider a Mediterranean diet, which has an anti-inflammatory effect and is also known to support brain and cardiovascular health. A cystine kidney stone diet includes increasing fruits and vegetables and decreasing red meats. Although, you have not had kidney stones in many years, this diet supports kidney health.   Stay connected with friends and family.   Get established with a foot/ for regular nail care. [facility] [phone] has a podiatry specialist or you may consider:   Sammy Davila [phone]   Stephen Staley  [phone]    Pharmacist   1. Recommended patient keep daily blood pressure log and bring to next appointment.   2. Patient is on lisinopril for blood pressure. Patient has had a few systolic in the 90s. Consider reassessing the need for the blood pressure medication based on patients log.        Encourage patient to stay connected with supports and interest in seasonal community events. .  Remind patient regarding exercise and importance of routine   attending exercise class or workshops  Provided info for participating in the [facility]'s Community Wellness Programs (flyer provided) .      Diagnoses Related to Current Encounter  1. Prostate cancer (HCC)        2. Nephrolithiasis        3. Hyperlipidemia, unspecified hyperlipidemia type        4. Essential hypertension, benign        5. History of urinary stone        6. Vitamin D deficiency        7. Type 2 diabetes mellitus with chronic kidney disease, without long-term current use of insulin, unspecified CKD stage (HCC)        8. Type 2 diabetes mellitus with microalbuminuria, without long-term current use of insulin (HCC)            History of Present Illness:  Sam is here with his wife self referred by his PCP for frailty. When he was referred, he was battling prostate cancer with chemotherapy and taking large amounts of steroids. Since then, he has gotten  "stronger and reports doing well today. He was understandably frail during this health scare earlier this year. He is now off steroids and his blood pressure has been running on the lower end. He is considering discussing coming off BP meds with his PCP next visit.     He continues to drive although avoids driving at night. They have a  that comes once a month. They also have a  and live in a 55 year and over community in the [facility] area. He is close to his family although they do not have close contact with their oldest and youngest daughters. His granddaughter is especially close to him and stays in touch with him and his wife weekly. There support systems are mostly in family but they would feel comfortable reaching out to neighbors and friends that live nearby.    Typical day: wakes up around 8 am. Gets coffee ready and gets dressed for the day. Has a later breakfast.  They watch their grandchildren almost daily but have not been able to do this regularly due to Sam's recent illness. Does not forget to take medications.     History provided by: patient  Patient is a:Good historian  Hospitalized in the last three months? no      Relevant Past Medical History - Prostate cancer, kidney neoplasm, nephrolithiasis resolved since 1998, HTN, hyperlipidemia, type 2 DM, stage 2 CKD.     Review of Systems    Hearing - Wears hearing aids and reports \"they work pretty well\". Wife disagrees.   Vision - Cataract surgery with improvement.   Appetite - Stable  Weight Loss - some since prostate cancer diagnosis  Diet - Does not enjoy vegetables other than corn. He is a self-proclaimed \"meat and potatoes kind of amy\". Drinks one cup of coffee in the morning and drinks at least 48 ounces of water.   Dysphagia - Denies  Dental Problems - Denies, wears dentures  Sleep - feels rested when wakes up in the morning. Tries to get 8-9 hours of sleep. Wakes up 1-2 times to go to the bathroom.   Constipation - " "Denies  Diarrhea - Denies  Urinary Incontinence - Stress incontinence, wears pad liners.   Dizziness - Denies  Cardiac - Denies chest pain or palpitations  Respiratory - Occasional SOB with increased activity. Occasional cough with PND. Denies wheezing.     Cognitive ROS    Advanced Directives - Yes, up to date  Cognitive concern - Indicates short term memory is \"not as sharp\" as it used to be.   Behavior/Personality changes - Stable  Mood (see screenings/social work assessment) - Stable  SI/HI (see social work assessment): Denies  History of TBI - Denies   Hallucinations - Denies  Tremor - Denies  Gait changes - Decreased balance  Alcohol -  one beer a night not daily.   History of PVD/MI/Stroke -   Family hx of dementia - Father had dementia    Previous labs reviewed if available.   Previous head imaging reviewed if available    Vitals  /62   Pulse 85   Temp 36.6 °C (97.8 °F)   Wt 74 kg (163 lb 3.2 oz)   SpO2 96%   BMI 25.56 kg/m²     Physical Exam  Physical Exam  HENT:      Head: Normocephalic.      Mouth/Throat:      Mouth: Mucous membranes are moist.   Eyes:      Pupils: Pupils are equal, round, and reactive to light.   Pulmonary:      Effort: Pulmonary effort is normal.   Skin:     General: Skin is warm and dry.   Neurological:      General: No focal deficit present.      Mental Status: He is alert and oriented to person, place, and time. Mental status is at baseline.   Psychiatric:         Mood and Affect: Mood normal.         Thought Content: Thought content normal.         Judgment: Judgment normal.       Medications  Current Outpatient Medications   Medication Sig Refill Last Dispense    acetaminophen (TYLENOL) 500 MG Tab Take 500-1,000 mg by mouth every 6 hours as needed.  Unknown (patient-reported)    ACETAZOLAMIDE 125 MG PO TABS Take 125 mg by mouth every day.  Unknown (patient-reported)    atorvastatin (LIPITOR) 80 MG tablet Take 1 Tablet by mouth every evening. 3 Unknown (outside pharmacy) "    Brimonidine Tartrate-Timolol 0.2-0.5 % Solution Administer 1 Drop into both eyes 2 times a day.  Unknown (patient-reported)    Calcium 600 MG Tab Take  by mouth every day.  Unknown (patient-reported)    Empagliflozin (JARDIANCE) 10 MG Tab tablet Take 1 Tablet by mouth every day. 1 Unknown (outside pharmacy)    ezetimibe (ZETIA) 10 MG Tab Take 1 Tablet by mouth every day. 3 Unknown (outside pharmacy)    latanoprost (XALATAN) 0.005 % Solution Place 1 Drop in both eyes every evening.  Unknown (patient-reported)    lisinopril (PRINIVIL) 5 MG Tab Take 1 Tablet by mouth every day. 1 Unknown (outside pharmacy)    Multiple Vitamins-Minerals (CENTRUM SILVER PO) Take 1 Tablet by mouth every day.  Unknown (patient-reported)    omeprazole (PRILOSEC) 20 MG delayed-release capsule TAKE 1 CAPSULE BY MOUTH EVERY DAY 2 Unknown (outside pharmacy)    potassium citrate SR (UROCIT-K SR) 10 MEQ (1080 MG) TBCR Take 20 mEq by mouth 2 times a day.  Unknown (patient-reported)    predniSONE (DELTASONE) 1 MG Tab Take 1 Tablet by mouth every day. 1 Unknown (outside pharmacy)    vitamin D3, cholecalciferol, 1000 UNIT Tab Take 2 Tabs by mouth every day. 3 Unknown (outside pharmacy)     Social Determinants of Health     Alcohol Use: Not At Risk (11/20/2023)    AUDIT-C     Frequency of Alcohol Consumption: 4 or more times a week     Average Number of Drinks: 1 or 2     Frequency of Binge Drinking: Never   Depression: Not at risk (11/20/2023)    PHQ-2     PHQ-2 Score: 0   Financial Resource Strain: Low Risk  (11/20/2023)    Overall Financial Resource Strain (CARDIA)     Difficulty of Paying Living Expenses: Not hard at all   Food Insecurity: No Food Insecurity (11/20/2023)    Hunger Vital Sign     Worried About Running Out of Food in the Last Year: Never true     Ran Out of Food in the Last Year: Never true   Housing Stability: Unknown (11/20/2023)    Housing Stability Vital Sign     Unable to Pay for Housing in the Last Year: No     Number of  Places Lived in the Last Year: Not on file     Unstable Housing in the Last Year: No   Intimate Partner Violence: Not on file   Physical Activity: Inactive (11/20/2023)    Exercise Vital Sign     Days of Exercise per Week: 0 days     Minutes of Exercise per Session: 0 min   Social Connections: Moderately Isolated (11/20/2023)    Social Connection and Isolation Panel [NHANES]     Frequency of Communication with Friends and Family: Once a week     Frequency of Social Gatherings with Friends and Family: More than three times a week     Attends Synagogue Services: Never     Active Member of Clubs or Organizations: No     Attends Club or Organization Meetings: Never     Marital Status:    Stress: No Stress Concern Present (11/20/2023)    [place] [employer] - Occupational Stress Questionnaire     Feeling of Stress : Not at all   Tobacco Use: Medium Risk (11/20/2023)    Patient History     Smoking Tobacco Use: Former     Smokeless Tobacco Use: Never     Passive Exposure: Not on file   Transportation Needs: No Transportation Needs (11/20/2023)    PRAPARE - Transportation     Lack of Transportation (Medical): No     Lack of Transportation (Non-Medical): No   Utilities: Not At Risk (11/20/2023)    Utilities     Threatened with loss of utilities: No       Screenings     ADL  Independent in all    IADL  Independent in all    Fall Risk Assessment  Not at risk for falls    How many times per week are you exercising? No     What is your perception of your health? fair      Frail Scale  Score: Score: 2     Interpretation of Frail Scale Score  0 = robust   1-2 = pre-frail  3-5 = frail     RUDAS ([place] Dementia Assessment Scale)  A six-item cognitive screening that evaluates the following domains: memory, praxis, language, judgement, drawing, and body orientation. The assessment is designed to minimize the effects of cultural learning and language diversity.     Score: RUDAS Total Score: 27 /  30    Interpretation of RUDAS Score  Any score of 22 or less should be considered as possible cognitive impairment.     Dementia Severity Rating Scale  This is a multiple-choice informant reported questionnaire that assesses a variety of functional and cognitive abilities.  Score: 3 / 54    Interpretation of DSRS Score  0-18 = mild   19-36 = moderate  37-54 = severe    PHQ 9 Depression Screening  Score: Patient Health Questionnaire Score: 0 / 27    Interpretation of PHQ 9 Score  1-4 = No depression  5-9 = Mild depression  10-14 = Moderate depression  15-19 = Moderately severe depression  20-27 = Severe depression    Generalized Anxiety Screen (VICTORIANO 7)  Score: VICTORIANO-7 Total Score: 0 / 21    Interpretation of VICTORIANO 7 Score  0-4 = no anxiety  5-9 = mild anxiety  10-14 = moderate anxiety  15-21 = severe anxiety      Team Recommendations    Medical Provider Recommendations    Fall prevention - Consider increasing physical activity and including exercises that help improve balance such as walking, [place] and Yoga. See handouts.   Consider journaling as a way to preserve memories and share with your family in the future. We did not identify any cognitive concerns at this time based on a RUDAS score of 27/30 - normal. The RUDAS is a six-item cognitive screening that evaluates the following domains: memory, praxis, language, judgement, drawing, and body orientation. The assessment is designed to minimize the effects of cultural learning and language diversity.   Increase water intake to 64 ounces a day and increase fruits and vegetable intake. Consider a Mediterranean diet, which has an anti-inflammatory effect and is also known to support brain and cardiovascular health. A cystine kidney stone diet includes increasing fruits and vegetables and decreasing red meats. Although, you have not had kidney stones in many years, this diet supports kidney health.   Stay connected with friends and family.       Pharmacist  Recommendations  Please refer to pharmacy assessment for complete recommendations.     Recommendations  Please refer to social work assessment for complete recommendations.       Chronic Care Management program offered at today’s visit: CCM not offered at today's visit    My total time spent caring for the patient on the day of the encounter was 90 minutes. This visit was done concurrently with a pharmacist and .

## 2023-11-20 NOTE — PROGRESS NOTES
[facility] Assessment    Patient Name: Kemal Mueller    Patient's Primary Language if not English:   Patient's Care Partner(s) Name and Relationship: Wife Karen       Are you able to read/write? Yes   Any difficulty hearing? Yes   Wears hearing aids: Yes   Any difficulty with vision? No   Last eye exam: 5 months ago   Do you have dentures? Upper's   Dental issues: No   Have you been admitted to the hospital int the past 3 months? No     IADL Screening:    Are you still driving? Yes                    Are you able to prepare your own meals and/or cook, need assistance or unable to complete?  Independent             Are you able to do shopping and errands, need assistance or unable to complete? Independent            Are you able to do housekeeping, chores, need assistance or unable to complete? Independent           Are you able to do laundry, need assistance or unable to complete? Independent       Are you able to manage your medications, need assistance or unable to complete? Independent        Are you able to do your own money management, need assistance or unable to complete? Independent             Are you able to use the phone, need assistance or unable to use the phone? Independent             ADL Screening:    Are you independent, need assistance, or unable to bathe yourself? Independent            Are you independent, need assistance, or unable to dress yourself? Independent            Are you independent, need assistance, or unable to feed yourself? Independent       Are you independent, need assistance, or unable to get up out of a chair or off a bed? Independent             Are you independent, need assistance, or unable to use the toilet by yourself? Independent             Are you aware when you need to use the toilet? Yes     If no, please describe the problem you are experiencing. Liners for urinary incontinence     Fall Screening:     Any falls within the last year? 1, fell on ice, no injury     Do  you worry about falling? No     Do you feel unsteady when standing or walking? Sometimes     You have symptoms of lightheadedness or dizziness from lying to standing? No     Have you lost feeling in the bottom of your feet? Yes     Can you feel the floor beneath your feet? Yes      Any throw rugs on floor? Yes     Do you have stairs? No                     Do you have handrails on all stairs?     Good lighting in all hallways. Yes     How many times per week are you exercising? 0    What is your perception of your health? good      Frailty Screening:   FRAIL Assessment  Fatigue: How much time during the past 4 weeks did you feel tired:: Some or none of the time  Resistance: By yourself and not using aids, do you have any difficulty walking up 10 steps without resting?: Yes  Ambulation: By yourself and not using aids, do you have any difficulty walking a couple of blocks?: No  How much did you weigh a year ago?: 78.5 kg (173 lb)  How much do you weigh today?: 74 kg (163 lb 3.2 oz)  % Weight Loss: 5.7 %  Add a point from the weight change row if the patient has lost 5% or more: 1  Score: 2     Scorin =  Robust Health  1-2=Pre-frail  3-5=Frail      RUDAS ([place] Dementia Assessment Scale):  Scores  Orientation Score: 5  Praxis Score: 2  Drawing Score: 3  Judgement Score: 1  Memory Score: 8  Language Score: 8  RUDAS Total Score: 27    Dementia Severity Rating Scale:  3/54                                                                                                                                                         PHQ-9 Screening:  Patient Health Questionaire    Little interest or pleasure in doing things?: 0   Feeling down, depressed, or hopeless?: 0  Trouble falling or staying asleep, or sleeping too much? : 0  Feeling tired or having little energy? : 0  Poor appetite or overeating? : 0  Feeling bad about yourself - or that you are a failure or have let yourself or your family down? : 0  Trouble  concentrating on things, such as reading the newspaper or watching television? : 0  Moving or speaking so slowly that other people could have noticed.  Or the opposite - being so fidgety or restless that you have been moving around alot more than usual. : 0  Thoughts that you would be better off dead, or of hurting yourself?: 0  Patient Health Questionnaire Score: 0    If depressive symptoms identified deferred to follow up visit unless specifically addressed in assesment and plan.    Interpretation of PHQ-9 Total Score   Score Severity   1-4 No Depression   5-9 Mild Depression   10-14 Moderate Depression   15-19 Moderately Severe Depression   20-27 Severe Depression        VICTORIANO-7 Questionnaire    Feeling nervous, anxious, or on edge: Not at all  Not being able to sop or control worrying: Not at all  Worrying too much about different things: Not at all  Trouble relaxing: Not at all  Being so restless that it's hard to sit still: Not at all  Becoming easily annoyed or irritable: Not at all  Feeling afraid as if something awful might happen: Not at all  Total: 0    Interpretation of VICTORIANO 7 Total Score   Score Severity :  0-4 No Anxiety   5-9 Mild Anxiety  10-14 Moderate Anxiety  15-21 Severe Anxiety

## 2023-11-20 NOTE — PROGRESS NOTES
"[place] GERIATRIC   PSYCHOSOCIAL ASSESSMENT    Name: Kemal Mueller  MRN: [id number]  : 1945  Age: 78 y.o.  Date of assessment: 2023  Persons in attendance: Patient and Spouse/Partner  [facility]   Employment: , Retired [5]  Insurance coverage: [facility] SENIOR CARE PLUS: [facility] PREFERRED     Patient Concerns:  Primary presenting issue includes   Chief Complaint   Patient presents with    [place] [facility] Geriatric Assessment     Frailty   .   What are your most important health goals, that you would like to address today?  Patient reports \"What can I do better? How am I doing\"     Care Partner Concerns:  Patient's wife karen reports medications \"what is he taking\"     Social and Cultural Aspects of Care:  Martial Status from demographis:  [2]  Emergency Contacts:  Extended Emergency Contact Information  Primary Emergency Contact: Karen Mueller  Address: [address] [place] [address]           [place] [address] [facility]  Home Phone: [phone]  Mobile Phone: [phone]  Relation: Spouse  Secondary Emergency Contact: Milly Araujo  Address: pt to update  Home Phone: [phone]  Mobile Phone: [phone]  Relation: Grandchild   Social Connections: Not on file     DTR Sherrill ([place])   Son Sam Vinson ([place])   GDTR Milly ([place])  Neighbors are available as needed     Spiritual and existential aspects of care:  Patient reports \"Jainism\" and doesn't current attend Rastafarian service.      Advance Directives:   The patient has the following documents on file: Living will, POA Wife Karen (2016)     Current Living Environment  Type of Living Arrangement: Home-no needs  Household members: spouse  Single story? yes    Typical Day   Sleep No sleep problems  8am Wake up   Make coffee / ice tea for wife   Read newspaper   Clean up / get dressed   Chores / housework   Breakfast/Lunch/Dinner   Watch Football    What matters and what is important in " "your life?   (What bring melissa? What makes you happy? What makes life worth living?)  Patient reports. \"family, watching football college, [employer]\".     What goals do you want to accomplish in the next 6 months or before your next birthday?  Patient reports. \"Not very long until my next birthday\" and \"live a long time, I worry about grandcildren\" and \"live long enough to see them (great-grandchildren) grow,\"    Behavioral Health/Psychological and psychiatric aspects of care:  Therapy: No  Psychiatry: No    Anxiety: VICTORIANO-7 Total Score: 0  Interpretation of VICTORIANO 7 Total Score   Score Severity:  0-4 No Anxiety   5-9 Mild Anxiety  10-14 Moderate Anxiety  15-21 Severe Anxiety    Depression: Patient Health Questionnaire Score: 0   Interpretation of PHQ-9 Total Score   Score Severity:   0-4 Minimal Depression   5-9 Mild Depression (1 on 1 therapy recommended)   10-15 Moderate Depression (Intensive outpatient program recommended)   16-20 Moderate Severe Depression (Partial hospitalization recommended)   21+ Severe Depression (Inpatient hospitalization recommended)    Suicidal Ideation screening:  Patient denies SI   Patient denies HI   Patient denies family history of suicide     Mental Status:  Mental Status Exam    Appearance: Appropriate dress and grooming  Sensorium: Alert and Oriented X 4  Behavior: Appropriate  Motor Activity: Unremarkable  Eye Contact: Adequate  Speech: Normal  Mood: Euthymic  Affect: Congruent with normal range  Thought Flow: Linear  Thought Content: Unremarkable  Suicidality: Denies  Hallucinations: Denies  Cognition: Normal  Insight: Intact  Reliability: Apparently reliable  Judgement: Good       Safety Concerns:  No safety concerns    Social Determinants of Health with Concerns     Housing Stability: Unknown (11/20/2023)    Housing Stability Vital Sign     Unable to Pay for Housing in the Last Year: No     Number of Places Lived in the Last Year: Not on file     Unstable Housing in the Last Year: No "   Intimate Partner Violence: Not on file   Physical Activity: Inactive (11/20/2023)    Exercise Vital Sign     Days of Exercise per Week: 0 days     Minutes of Exercise per Session: 0 min   Social Connections: Moderately Isolated (11/20/2023)    Social Connection and Isolation Panel [NHANES]     Frequency of Communication with Friends and Family: Once a week     Frequency of Social Gatherings with Friends and Family: More than three times a week     Attends Hinduism Services: Never     Active Member of Clubs or Organizations: No     Attends Club or Organization Meetings: Never     Marital Status:    Tobacco Use: Medium Risk (11/20/2023)    Patient History     Smoking Tobacco Use: Former     Smokeless Tobacco Use: Never     Passive Exposure: Not on file     Alcohol Use: Not At Risk (11/20/2023)    AUDIT-C     Frequency of Alcohol Consumption: 4 or more times a week     Average Number of Drinks: 1 or 2     Frequency of Binge Drinking: Never   \  Income  Patient/ Family indiicate no concerns    Social Work Care Plan  Consider participating in the [facility]'s Community Wellness Programs. Call [phone] to inquire about programs to increase strength and balance. Wellness flyer provided.  It is recommended that this patient should exercise approximately 30 minutes a day on as many days of the week as possible.  It is best to gradually increase the time and is also important that the exercises be sustained long enough to actually provide good cardiovascular benefit. Check with your Primary Care Provider if there are any concerns about initiating an exercise program.  It is important to have an exercise plan that can continue throughout the year (even in winter!)   Consider attending [employer] [place]. They have educational, social, cultural, and recreational programs. $85/year membership. [phone]; [email or web address]    Seasonal community events   [place]  Baby Animal Days in April.   [place] in June.   RiverFest in June. Live entertainment and kayaking in downtown [place].  ArtTown in July.  Dragon Lights at [facility] in July.   Hot August Nights in August.  Rib Cook Off in August.  [place] Festival in September.  The [place] [employer] [place] Balloon Race in September.  [employer] [place] Air Races in September.   Street Vibrations in September.   SeniorFest in September. Vendor fair specific to senior resources.   The Great Polish Festival in October with grape stomping, Italian buffet and gelato. Happens.   December: [facility] [employer], [place] at [facility].   [place] Senior Games, team sport activities. Year Round with [facility].

## 2023-11-20 NOTE — PROGRESS NOTES
Geriatric Comprehensive Medication Review    Kemal Mueller is a 78 y.o. year old male here for Comprehensive Medication Review  Cognitive ability: normal  RUDAS Total Score: 27 / 30  Informant: patient  Reliability of informant: reliable   108/62 - blood pressure     Does patient have any allergies to medications? No   Nkda [no known drug allergy]  Did the patient bring medications to appointment? Yes      Diet  How many daily servings of vegetables? Beans, corn carrots,   Caffeine use? yes - 2-3 cups in the morning   How many ounces of water consumed daily?  3-4 of the 16 oz bottles   One beer every few nights.  Fall Risk   At risk for falls? yes - Due to ice  Hx of orthostatic hypotension? No   Hx of dizziness/vertigo? No   Dx of osteopenia/osteoporosis? no  Adequate calcium intake? Yes  Adequate vitamin d intake? Yes  Medications increasing fall risk? no    Medication Adherence  How are medications obtained from pharmacy?  Who prepares/organizes medications? family member  Who administers medications? patient  What tools are used to manage medications? pill box  Do you sometimes forget to take your medications? Yes  How many doses missed in the last 30 days?  Per assessment, does the patient have medication non-adherence? no  In the past month, have you had trouble affording your medications? no    Drug Therapy  Drug interactions reviewed: Yes  Are medications doses appropriately for renal and liver function? Yes  eGFR:    Cognitive Impairment   Labs:   Lab Results   Component Value Date/Time    [id number] 1035 (H) 04/03/2023 1133      Medications:   Comments:    Hypertension   Blood Pressure/Pulse: /62   Pulse 85   Temp 36.6 °C (97.8 °F)   Wt 74 kg (163 lb 3.2 oz)   SpO2 96%   BMI 25.56 kg/m²    Home monitor blood pressure? yes - Has home    Medications:   Comments: Talk about the time of day. Omron. Can't take     Hyperlipidemia   Labs:   Lab Results   Component Value Date/Time    CHOLSTRLTOT  151 08/18/2023 1007    TRIGLYCERIDE 225 (H) 08/18/2023 1007    HDL 32 (A) 08/18/2023 1007    LDL 74 08/18/2023 1007    CHOLHDLRAT 4.4 01/11/2011 0916      Primary prevention: Yes   Medications: Ezetimibe and Atorvastatin    Comments:    Diabetes   Last A1c:   Lab Results   Component Value Date/Time    HBA1C 6.7 (H) 08/18/2023 1005    AVGLUC 146 08/18/2023 1005      Insulin: no   Low blood sugars: no   Glucagon: No    Medications:   Comments:      Health Condition: Dyslipidemia   Medications: Atorvastatin 80 and Ezetimibe 10    Health Condition: Glaucoma   Medications:Latanoprost and brimonidine     Health Condition: Calculus Kidney(kidney stones)   Medications: Potassium Citrate and acetazolamide     Health Condition: Hypertension   Medications: Lisinopril    Misc. Medications:    OTC Products  Airborne    Dietary Supplements  Vitamin D3  Daily Multivitamin  Calcium and vitamin D3 supplement    Provider Recommendations  1. Recommended patient keep daily blood pressure log and bring to next appointment.   2. Patient is on lisinopril for blood pressure. Patient has had a few systolic in the 90s. Consider reassessing the need for the blood pressure medication based on patients log.    Patient Recommendations    Calcium     You are currently on a supplement called Calcium carbonate. Calcium carbonate requires stomach acid for best absorption, making it more suitable for individuals with normal acid levels, while calcium citrate is absorbed well in both acidic and less acidic environments, making it a preferred choice for those with reduced stomach acid production, such as older adults. Calcium carbonate absorption may be hindered by the use of proton pump inhibitors like omeprazole.     Recommendation:  Consider switching to a calcium citrate calcium supplementation.     Omeprazole     You are currently on a medication called Omeprazole. Omeprazole can be used for gastroesophageal reflux disease (GERD). Daily long-term  use in the older adults has been associated with potential risks, including an increased susceptibility to infections, bone fractures and nutritional deficiencies, particularly of calcium, magnesium, and vitamin B12.     Recommendation: Consider reducing the duration of this medication use to every other day.    Lisinopril    You are currently on a medication called Lisinopril for blood pressure. You were also on prednisone for an extended period of time. The prolonged use of corticosteroids, a class of anti-inflammatory medications, can contribute to elevated blood pressure in some individuals. Checking your blood pressure every day in the morning and logging the numbers will be beneficial for your primary care provider.    Recommendation:  Consider talking to your primary care provider of the need for Lisinopril use. Bring a log of blood pressure numbers every day for 2 weeks.        Airborne     You are currently taking a supplement called Airborne. Airborne can be beneficial to reduce the duration of the common cold. For short-term use, some individuals may choose to take Airborne during the cold and flu season or when traveling to crowded places as a precautionary measure. Long-term use of Airborne or similar supplements may carry potential risks, especially if consumed in excess.     Recommendation:  Consider using during the cold and flu season or when you start to see symptoms of the cold and flu.    Vitamin D3    You are currently taking a vitamin called Vitamin D3. Vitamin D3 can be beneficial in older adults for bone health, muscle function and immune system. This supplement recommends taking one capsule by mouth daily. One capsule will get you the sufficient daily recommendation.    Recommendation:  Consider only taking one capsule by mouth daily as two capsules is not benefiting you. If you get the calcium and vitamin D3 you may not need the only vitamin D3 supplement

## 2023-12-22 DIAGNOSIS — E11.29 TYPE 2 DIABETES MELLITUS WITH MICROALBUMINURIA, WITHOUT LONG-TERM CURRENT USE OF INSULIN (HCC): ICD-10-CM

## 2023-12-22 DIAGNOSIS — R80.9 TYPE 2 DIABETES MELLITUS WITH MICROALBUMINURIA, WITHOUT LONG-TERM CURRENT USE OF INSULIN (HCC): ICD-10-CM

## 2023-12-22 RX ORDER — EMPAGLIFLOZIN 10 MG/1
1 TABLET, FILM COATED ORAL DAILY
Qty: 100 TABLET | Refills: 2 | Status: SHIPPED | OUTPATIENT
Start: 2023-12-22

## 2023-12-29 ENCOUNTER — HOSPITAL ENCOUNTER (OUTPATIENT)
Dept: LAB | Facility: MEDICAL CENTER | Age: 78
End: 2023-12-29
Attending: UROLOGY
Payer: MEDICARE

## 2023-12-29 ENCOUNTER — HOSPITAL ENCOUNTER (OUTPATIENT)
Dept: LAB | Facility: MEDICAL CENTER | Age: 78
End: 2023-12-29
Payer: MEDICARE

## 2023-12-29 DIAGNOSIS — T38.0X5A ADRENAL INSUFFICIENCY DUE TO CORTICOSTEROID WITHDRAWAL (HCC): ICD-10-CM

## 2023-12-29 DIAGNOSIS — E27.3 ADRENAL INSUFFICIENCY DUE TO CORTICOSTEROID WITHDRAWAL (HCC): ICD-10-CM

## 2023-12-29 DIAGNOSIS — N18.2 STAGE 2 CHRONIC KIDNEY DISEASE: ICD-10-CM

## 2023-12-29 DIAGNOSIS — D64.9 ANEMIA, UNSPECIFIED TYPE: ICD-10-CM

## 2023-12-29 DIAGNOSIS — E29.1 SECONDARY MALE HYPOGONADISM: ICD-10-CM

## 2023-12-29 DIAGNOSIS — E11.29 TYPE 2 DIABETES MELLITUS WITH MICROALBUMINURIA, WITHOUT LONG-TERM CURRENT USE OF INSULIN (HCC): ICD-10-CM

## 2023-12-29 DIAGNOSIS — R80.9 TYPE 2 DIABETES MELLITUS WITH MICROALBUMINURIA, WITHOUT LONG-TERM CURRENT USE OF INSULIN (HCC): ICD-10-CM

## 2023-12-29 DIAGNOSIS — E55.9 VITAMIN D DEFICIENCY: ICD-10-CM

## 2023-12-29 LAB
25(OH)D3 SERPL-MCNC: 45 NG/ML (ref 30–100)
ALBUMIN SERPL BCP-MCNC: 4.4 G/DL (ref 3.2–4.9)
ALBUMIN/GLOB SERPL: 1.6 G/DL
ALP SERPL-CCNC: 119 U/L (ref 30–99)
ALT SERPL-CCNC: 23 U/L (ref 2–50)
ANION GAP SERPL CALC-SCNC: 10 MMOL/L (ref 7–16)
AST SERPL-CCNC: 27 U/L (ref 12–45)
BASOPHILS # BLD AUTO: 0.4 % (ref 0–1.8)
BASOPHILS # BLD: 0.02 K/UL (ref 0–0.12)
BILIRUB SERPL-MCNC: 0.3 MG/DL (ref 0.1–1.5)
BUN SERPL-MCNC: 25 MG/DL (ref 8–22)
CALCIUM ALBUM COR SERPL-MCNC: 9.3 MG/DL (ref 8.5–10.5)
CALCIUM SERPL-MCNC: 9.6 MG/DL (ref 8.5–10.5)
CHLORIDE SERPL-SCNC: 105 MMOL/L (ref 96–112)
CO2 SERPL-SCNC: 22 MMOL/L (ref 20–33)
CORTIS SERPL-MCNC: 13.4 UG/DL (ref 0–23)
CREAT SERPL-MCNC: 1.09 MG/DL (ref 0.5–1.4)
EOSINOPHIL # BLD AUTO: 0.02 K/UL (ref 0–0.51)
EOSINOPHIL NFR BLD: 0.4 % (ref 0–6.9)
ERYTHROCYTE [DISTWIDTH] IN BLOOD BY AUTOMATED COUNT: 41.4 FL (ref 35.9–50)
EST. AVERAGE GLUCOSE BLD GHB EST-MCNC: 151 MG/DL
GFR SERPLBLD CREATININE-BSD FMLA CKD-EPI: 68 ML/MIN/1.73 M 2
GLOBULIN SER CALC-MCNC: 2.7 G/DL (ref 1.9–3.5)
GLUCOSE SERPL-MCNC: 139 MG/DL (ref 65–99)
HBA1C MFR BLD: 6.9 % (ref 4–5.6)
HCT VFR BLD AUTO: 39.8 % (ref 42–52)
HGB BLD-MCNC: 13.1 G/DL (ref 14–18)
IMM GRANULOCYTES # BLD AUTO: 0.05 K/UL (ref 0–0.11)
IMM GRANULOCYTES NFR BLD AUTO: 1 % (ref 0–0.9)
LYMPHOCYTES # BLD AUTO: 2.05 K/UL (ref 1–4.8)
LYMPHOCYTES NFR BLD: 41.2 % (ref 22–41)
MCH RBC QN AUTO: 28.4 PG (ref 27–33)
MCHC RBC AUTO-ENTMCNC: 32.9 G/DL (ref 32.3–36.5)
MCV RBC AUTO: 86.1 FL (ref 81.4–97.8)
MONOCYTES # BLD AUTO: 1.02 K/UL (ref 0–0.85)
MONOCYTES NFR BLD AUTO: 20.5 % (ref 0–13.4)
NEUTROPHILS # BLD AUTO: 1.82 K/UL (ref 1.82–7.42)
NEUTROPHILS NFR BLD: 36.5 % (ref 44–72)
NRBC # BLD AUTO: 0 K/UL
NRBC BLD-RTO: 0 /100 WBC (ref 0–0.2)
PLATELET # BLD AUTO: 200 K/UL (ref 164–446)
PMV BLD AUTO: 11.4 FL (ref 9–12.9)
POTASSIUM SERPL-SCNC: 4.5 MMOL/L (ref 3.6–5.5)
PROT SERPL-MCNC: 7.1 G/DL (ref 6–8.2)
PSA SERPL-MCNC: <0.02 NG/ML (ref 0–4)
RBC # BLD AUTO: 4.62 M/UL (ref 4.7–6.1)
SODIUM SERPL-SCNC: 137 MMOL/L (ref 135–145)
TESTOST SERPL-MCNC: <3 NG/DL (ref 175–781)
WBC # BLD AUTO: 5 K/UL (ref 4.8–10.8)

## 2023-12-29 PROCEDURE — 84403 ASSAY OF TOTAL TESTOSTERONE: CPT

## 2023-12-29 PROCEDURE — 82533 TOTAL CORTISOL: CPT

## 2023-12-29 PROCEDURE — 82306 VITAMIN D 25 HYDROXY: CPT

## 2023-12-29 PROCEDURE — 85025 COMPLETE CBC W/AUTO DIFF WBC: CPT

## 2023-12-29 PROCEDURE — 84153 ASSAY OF PSA TOTAL: CPT

## 2023-12-29 PROCEDURE — 80053 COMPREHEN METABOLIC PANEL: CPT

## 2023-12-29 PROCEDURE — 82024 ASSAY OF ACTH: CPT

## 2023-12-29 PROCEDURE — 84402 ASSAY OF FREE TESTOSTERONE: CPT

## 2023-12-29 PROCEDURE — 84270 ASSAY OF SEX HORMONE GLOBUL: CPT

## 2023-12-29 PROCEDURE — 83036 HEMOGLOBIN GLYCOSYLATED A1C: CPT

## 2023-12-30 LAB
ACTH PLAS-MCNC: 18.5 PG/ML (ref 7.2–63.3)
SHBG SERPL-SCNC: 36 NMOL/L (ref 19–76)
TESTOST FREE MFR SERPL: <1.6 % (ref 1.6–2.9)
TESTOST FREE SERPL-MCNC: <1 PG/ML (ref 47–244)
TESTOST SERPL-MCNC: <3 NG/DL (ref 300–720)

## 2024-01-04 ENCOUNTER — APPOINTMENT (OUTPATIENT)
Dept: ENDOCRINOLOGY | Facility: MEDICAL CENTER | Age: 79
End: 2024-01-04
Payer: MEDICARE

## 2024-01-09 ENCOUNTER — OFFICE VISIT (OUTPATIENT)
Dept: MEDICAL GROUP | Facility: PHYSICIAN GROUP | Age: 79
End: 2024-01-09
Payer: MEDICARE

## 2024-01-09 VITALS
HEART RATE: 86 BPM | SYSTOLIC BLOOD PRESSURE: 100 MMHG | OXYGEN SATURATION: 98 % | TEMPERATURE: 96.3 F | HEIGHT: 67 IN | DIASTOLIC BLOOD PRESSURE: 50 MMHG | BODY MASS INDEX: 25.74 KG/M2 | WEIGHT: 164 LBS

## 2024-01-09 DIAGNOSIS — I70.0 ATHEROSCLEROSIS OF AORTA (HCC): ICD-10-CM

## 2024-01-09 DIAGNOSIS — R80.9 TYPE 2 DIABETES MELLITUS WITH MICROALBUMINURIA, WITHOUT LONG-TERM CURRENT USE OF INSULIN (HCC): ICD-10-CM

## 2024-01-09 DIAGNOSIS — Z85.46 HISTORY OF PROSTATE CANCER: ICD-10-CM

## 2024-01-09 DIAGNOSIS — I10 ESSENTIAL HYPERTENSION, BENIGN: ICD-10-CM

## 2024-01-09 DIAGNOSIS — Z23 NEED FOR VACCINATION: ICD-10-CM

## 2024-01-09 DIAGNOSIS — E11.29 TYPE 2 DIABETES MELLITUS WITH MICROALBUMINURIA, WITHOUT LONG-TERM CURRENT USE OF INSULIN (HCC): ICD-10-CM

## 2024-01-09 DIAGNOSIS — E78.5 HYPERLIPIDEMIA, UNSPECIFIED HYPERLIPIDEMIA TYPE: ICD-10-CM

## 2024-01-09 PROCEDURE — 3078F DIAST BP <80 MM HG: CPT

## 2024-01-09 PROCEDURE — 90662 IIV NO PRSV INCREASED AG IM: CPT

## 2024-01-09 PROCEDURE — 3074F SYST BP LT 130 MM HG: CPT

## 2024-01-09 PROCEDURE — 99214 OFFICE O/P EST MOD 30 MIN: CPT | Mod: 25

## 2024-01-09 PROCEDURE — G0008 ADMIN INFLUENZA VIRUS VAC: HCPCS

## 2024-01-09 RX ORDER — LISINOPRIL 2.5 MG/1
2.5 TABLET ORAL DAILY
Qty: 100 TABLET | Refills: 3 | Status: SHIPPED | OUTPATIENT
Start: 2024-01-09

## 2024-01-09 ASSESSMENT — ENCOUNTER SYMPTOMS
BLURRED VISION: 0
VOMITING: 0
COUGH: 0
DIZZINESS: 0
WEAKNESS: 0
CHILLS: 0
MYALGIAS: 0
SHORTNESS OF BREATH: 0
DIARRHEA: 0
ABDOMINAL PAIN: 0
FEVER: 0
HEADACHES: 0
WEIGHT LOSS: 0
CONSTIPATION: 0
NAUSEA: 0

## 2024-01-09 ASSESSMENT — FIBROSIS 4 INDEX: FIB4 SCORE: 2.28

## 2024-01-09 ASSESSMENT — PATIENT HEALTH QUESTIONNAIRE - PHQ9: CLINICAL INTERPRETATION OF PHQ2 SCORE: 0

## 2024-01-09 NOTE — PROGRESS NOTES
"Subjective:     CC: Follow-up    HPI:   Kemal presents today with    Problem   Type 2 Diabetes Mellitus With Microalbuminuria (Hcc)    Chronic condition with most recent A1c   Lab Results   Component Value Date/Time    HBA1C 6.9 (H) 12/29/2023 08:47 AM    HBA1C 6.7 (H) 08/18/2023 10:05 AM   doing quite well on Jardiance 10 mg daily.  He denies any recent hypoglycemia.  He does show to have micro albuminuria for which we will continue to monitor. Seeing Dr. Roberts for CKD.     Hyperlipidemia    Chronic condition   -Patient taking atorvastatin 80 mg daily.  Doing well on this medication without reported side effects  Lab Results   Component Value Date/Time    CHOLSTRLTOT 151 08/18/2023 10:07 AM    CHOLSTRLTOT 193 01/27/2023 11:21 AM    LDL 74 08/18/2023 10:07 AM    LDL 97 01/27/2023 11:21 AM    HDL 32 (A) 08/18/2023 10:07 AM    HDL 48 01/27/2023 11:21 AM    TRIGLYCERIDE 225 (H) 08/18/2023 10:07 AM    TRIGLYCERIDE 239 (H) 01/27/2023 11:21 AM          Essential Hypertension, Benign    Chronic, taking lisinopril 2.5 mg daily without reported side effects.    Blood pressure today in clinic is 100/50.   Denies dizziness/lightheadedness.           ROS:  Review of Systems   Constitutional:  Negative for chills, fever, malaise/fatigue and weight loss.   Eyes:  Negative for blurred vision.   Respiratory:  Negative for cough and shortness of breath.    Cardiovascular:  Negative for chest pain.   Gastrointestinal:  Negative for abdominal pain, constipation, diarrhea, nausea and vomiting.   Musculoskeletal:  Negative for myalgias.   Neurological:  Negative for dizziness, weakness and headaches.       Objective:     Exam:  /50 (BP Location: Left arm, Patient Position: Sitting, BP Cuff Size: Adult)   Pulse 86   Temp (!) 35.7 °C (96.3 °F) (Temporal)   Ht 1.702 m (5' 7\")   Wt 74.4 kg (164 lb)   SpO2 98%   BMI 25.69 kg/m²  Body mass index is 25.69 kg/m².    Physical Exam  Constitutional:       Appearance: Normal " appearance.   HENT:      Head: Normocephalic.   Eyes:      Conjunctiva/sclera: Conjunctivae normal.      Pupils: Pupils are equal, round, and reactive to light.   Cardiovascular:      Rate and Rhythm: Normal rate and regular rhythm.      Heart sounds: No murmur heard.  Pulmonary:      Effort: Pulmonary effort is normal. No respiratory distress.      Breath sounds: Normal breath sounds.   Musculoskeletal:         General: Normal range of motion.   Skin:     General: Skin is warm and dry.   Neurological:      General: No focal deficit present.      Mental Status: He is alert and oriented to person, place, and time.   Psychiatric:         Mood and Affect: Mood normal.         Behavior: Behavior normal.         Labs:   Hospital Outpatient Visit on 12/29/2023   Component Date Value    25-Hydroxy   Vitamin D 25 12/29/2023 45     Testosterone,Total 12/29/2023 <3 (L)     Sex Hormone Bind Globulin 12/29/2023 36     Free Testosterone 12/29/2023 <1 (L)     Testosterone % Free 12/29/2023 <1.6     Acth 12/29/2023 18.5     Cortisol 12/29/2023 13.4    Hospital Outpatient Visit on 12/29/2023   Component Date Value    Prostatic Specific Antig* 12/29/2023 <0.02     Testosterone,Total 12/29/2023 <3 (L)    Hospital Outpatient Visit on 12/29/2023   Component Date Value    Glycohemoglobin 12/29/2023 6.9 (H)     Est Avg Glucose 12/29/2023 151     Sodium 12/29/2023 137     Potassium 12/29/2023 4.5     Chloride 12/29/2023 105     Co2 12/29/2023 22     Anion Gap 12/29/2023 10.0     Glucose 12/29/2023 139 (H)     Bun 12/29/2023 25 (H)     Creatinine 12/29/2023 1.09     Calcium 12/29/2023 9.6     Correct Calcium 12/29/2023 9.3     AST(SGOT) 12/29/2023 27     ALT(SGPT) 12/29/2023 23     Alkaline Phosphatase 12/29/2023 119 (H)     Total Bilirubin 12/29/2023 0.3     Albumin 12/29/2023 4.4     Total Protein 12/29/2023 7.1     Globulin 12/29/2023 2.7     A-G Ratio 12/29/2023 1.6     WBC 12/29/2023 5.0     RBC 12/29/2023 4.62 (L)     Hemoglobin  12/29/2023 13.1 (L)     Hematocrit 12/29/2023 39.8 (L)     MCV 12/29/2023 86.1     MCH 12/29/2023 28.4     MCHC 12/29/2023 32.9     RDW 12/29/2023 41.4     Platelet Count 12/29/2023 200     MPV 12/29/2023 11.4     Neutrophils-Polys 12/29/2023 36.50 (L)     Lymphocytes 12/29/2023 41.20 (H)     Monocytes 12/29/2023 20.50 (H)     Eosinophils 12/29/2023 0.40     Basophils 12/29/2023 0.40     Immature Granulocytes 12/29/2023 1.00 (H)     Nucleated RBC 12/29/2023 0.00     Neutrophils (Absolute) 12/29/2023 1.82     Lymphs (Absolute) 12/29/2023 2.05     Monos (Absolute) 12/29/2023 1.02 (H)     Eos (Absolute) 12/29/2023 0.02     Baso (Absolute) 12/29/2023 0.02     Immature Granulocytes (a* 12/29/2023 0.05     NRBC (Absolute) 12/29/2023 0.00     GFR (CKD-EPI) 12/29/2023 68            Assessment & Plan: Medical Decision Making     79 y.o. male with the following -     Problem List Items Addressed This Visit       Hyperlipidemia     Chronic condition improved  - Recommend diligent efforts towards healthy diet and exercise  - We will continue atorvastatin 80 mg daily         Relevant Medications    lisinopril (PRINIVIL) 2.5 MG Tab    Other Relevant Orders    CBC WITH DIFFERENTIAL    Comp Metabolic Panel    Lipid Profile    HEMOGLOBIN A1C    Essential hypertension, benign     Chronic condition -continue lisinopril to 2.5 mg/day.  Patient to continue home blood pressure monitoring and if blood pressure is below 110/50 he is to hold his lisinopril.           Relevant Medications    lisinopril (PRINIVIL) 2.5 MG Tab    Other Relevant Orders    CBC WITH DIFFERENTIAL    Comp Metabolic Panel    Lipid Profile    HEMOGLOBIN A1C    Atherosclerosis of aorta (HCC)    Relevant Medications    lisinopril (PRINIVIL) 2.5 MG Tab    Type 2 diabetes mellitus with microalbuminuria (HCC)     Chronic condition stable  -Continue Jardiance 10mg daily  -Continue healthy diet as discussed  -Repeat hemoglobin A1c in 6 months           Relevant Orders    CBC  WITH DIFFERENTIAL    Comp Metabolic Panel    Lipid Profile    HEMOGLOBIN A1C     Other Visit Diagnoses       Need for vaccination        Relevant Orders    INFLUENZA VACCINE, HIGH DOSE (65+ ONLY) (Completed)    History of prostate cancer        Relevant Orders    CBC WITH DIFFERENTIAL    Comp Metabolic Panel            Differential diagnosis, natural history, supportive care, and indications for immediate follow-up discussed.  Shared decision making approach utilized, and patient is amendable with plan of care.  Patient understands to return to clinic or go to the emergency department if symptoms worsen. All questions and concerns addressed to the best of my knowledge.    Return in about 6 months (around 7/9/2024) for F/U Lab Review.    Please note that this dictation was created using voice recognition software. I have made every reasonable attempt to correct obvious errors, but I expect that there are errors of grammar and possibly content that I did not discover before finalizing the note.

## 2024-01-09 NOTE — ASSESSMENT & PLAN NOTE
Chronic condition -continue lisinopril to 2.5 mg/day.  Patient to continue home blood pressure monitoring and if blood pressure is below 110/50 he is to hold his lisinopril.

## 2024-01-09 NOTE — ASSESSMENT & PLAN NOTE
Chronic condition stable  -Continue Jardiance 10mg daily  -Continue healthy diet as discussed  -Repeat hemoglobin A1c in 6 months

## 2024-02-05 ENCOUNTER — PHARMACY VISIT (OUTPATIENT)
Dept: PHARMACY | Facility: MEDICAL CENTER | Age: 79
End: 2024-02-05
Payer: COMMERCIAL

## 2024-02-05 ENCOUNTER — OFFICE VISIT (OUTPATIENT)
Dept: ENDOCRINOLOGY | Facility: MEDICAL CENTER | Age: 79
End: 2024-02-05
Payer: MEDICARE

## 2024-02-05 VITALS
OXYGEN SATURATION: 97 % | HEART RATE: 90 BPM | SYSTOLIC BLOOD PRESSURE: 98 MMHG | BODY MASS INDEX: 25.71 KG/M2 | WEIGHT: 163.8 LBS | HEIGHT: 67 IN | DIASTOLIC BLOOD PRESSURE: 60 MMHG

## 2024-02-05 DIAGNOSIS — E11.9 DIABETES MELLITUS WITHOUT COMPLICATION (HCC): ICD-10-CM

## 2024-02-05 DIAGNOSIS — E29.1 HYPOGONADISM IN MALE: ICD-10-CM

## 2024-02-05 DIAGNOSIS — E55.9 VITAMIN D DEFICIENCY: ICD-10-CM

## 2024-02-05 DIAGNOSIS — E27.3 ADRENAL INSUFFICIENCY DUE TO CORTICOSTEROID WITHDRAWAL (HCC): ICD-10-CM

## 2024-02-05 DIAGNOSIS — T38.0X5A ADRENAL INSUFFICIENCY DUE TO CORTICOSTEROID WITHDRAWAL (HCC): ICD-10-CM

## 2024-02-05 DIAGNOSIS — B35.1 FUNGAL INFECTION OF TOENAIL: ICD-10-CM

## 2024-02-05 PROCEDURE — 3074F SYST BP LT 130 MM HG: CPT

## 2024-02-05 PROCEDURE — 3078F DIAST BP <80 MM HG: CPT

## 2024-02-05 PROCEDURE — 99214 OFFICE O/P EST MOD 30 MIN: CPT

## 2024-02-05 PROCEDURE — RXMED WILLOW AMBULATORY MEDICATION CHARGE

## 2024-02-05 PROCEDURE — 99213 OFFICE O/P EST LOW 20 MIN: CPT

## 2024-02-05 RX ORDER — EMPAGLIFLOZIN 10 MG/1
10 TABLET, FILM COATED ORAL DAILY
Qty: 30 TABLET | Refills: 0 | Status: SHIPPED | OUTPATIENT
Start: 2024-02-05

## 2024-02-05 ASSESSMENT — FIBROSIS 4 INDEX: FIB4 SCORE: 2.28

## 2024-02-05 NOTE — PROGRESS NOTES
New Patient Consult Note for Endocrinology  Referred by: Kenneth Connolly D.N.P.    Reason for consult: Chronic glucocorticoids    HPI:  Kemal Mueller is a 78 y.o. old patient who is seeing us today for care.  This is a pleasant patient and I appreciate the opportunity to participate in the care of this patient.  This is a new patient with me today.    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      He was E-consulted by Dr Brown with a plan for gradual steroid taper.  2.5mg reduction every 7 days until patient is down to 5mg daily which is a physiologic dose  then can switch to hydcortisone (shorter acting steroid to allow for recovery of HPA axis) followed by another taper. He stopped his predinsone in Sept 2023     Denies: nausea and vomiting, diarrhea, abdominal pain, fatigue   however today he is feeling good.      Latest Reference Range & Units 12/29/23 08:46   Cortisol 0.0 - 23.0 ug/dL 13.4        Latest Reference Range & Units 12/29/23 08:45   Acth 7.2 - 63.3 pg/mL 18.5     2. Secondary Hypogonadism  Reports family history of hemochromatosis   Latest Reference Range & Units 12/29/23 08:46 12/29/23 08:49   Free Testosterone 47 - 244 pg/mL <1 (L)    Sex Hormone Bind Globulin 19 - 76 nmol/L 36    Testosterone % Free 1.6 - 2.9 % <1.6    Testosterone,Total 175 - 781 ng/dL <3 (L) <3 (L)        Latest Reference Range & Units 12/29/23 08:49   Prostatic Specific Antigen Tot 0.00 - 4.00 ng/mL <0.02       3. Vitamin D deficiency  Currently taking Vitamin D 3 1000 IU BID   Latest Reference Range & Units 12/29/23 08:46   25-Hydroxy   Vitamin D 25 30 - 100 ng/mL 45     4. Diabetes Type 2  This is followed by PCP  A1c 6.9 on 12/29/24    Current Medication:  Jardiance 10mg  daily    He does not check his blood sugars    Last eye exam: Sept 2023  He would like a referral to podiatry for toe fungus    ROS:   Constitutional: No change in weight , No fatigue, No night sweats.  HEENT: No Headache.  Eyes:  No blurred vision, No visual changes.  Cardiac: No chest pain, No palpitations.  Resp: No shortness of breath, No cough,   Gastro: No nausea or vomiting, No diarrhea.  Neuro: Denies numbness or tinging in bilateral feet or hands, and no loss of sensation.  Endo: No heat or cold intolerance.  : No polyuria, No polydipsia, No chronic UTI's.  Lower extremities: No lower leg edema bilateral.  All other systems were reviewed and were negative.    Past Medical History:  Patient Active Problem List    Diagnosis Date Noted    Stage 2 chronic kidney disease 09/05/2023    Type 2 diabetes mellitus with microalbuminuria (HCC) 05/31/2023    Adrenal insufficiency due to corticosteroid withdrawal ([facility]) 05/03/2023    Mixed stress and urge urinary incontinence 12/12/2022    Low back pain 11/14/2022    Atherosclerosis of aorta ([facility]) 09/27/2022    Other spondylosis with radiculopathy, lumbar region 08/18/2022    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[facility]) 02/14/2022    Dyslipidemia 03/19/2020    Vitamin D deficiency 02/12/2020    Colon cancer screening 02/12/2020    Arthritis 01/15/2020    History of urinary stone 11/28/2018    History of malignant neoplasm of kidney 04/30/2018    Anemia 08/17/2017    Herpes zoster without complication 11/15/2016    Age-related nuclear cataract of eye 01/14/2016    Right renal mass 01/11/2016    Degeneration of cervical intervertebral disc 10/30/2012    Cervical stenosis of spine 10/30/2012    Cataracts, bilateral 01/13/2012    ED (erectile dysfunction) 01/11/2011    Nephrolithiasis 09/12/2010    Glaucoma 09/12/2010    GERD (gastroesophageal reflux disease) 09/12/2010    Hyperlipidemia 05/21/2009    Essential hypertension, benign 05/21/2009    Calculus of kidney 05/21/2009     Reflux esophagitis 2009    Proteinuria 2009       Past Surgical History:  Past Surgical History:   Procedure Laterality Date    LUMBAR LAMINECTOMY DISKECTOMY N/A 2022    Procedure: LUMBAR 2 - SACRAL 1 LAMINECTOMY;  Surgeon: Selvin Ying M.D.;  Location: SURGERY [facility];  Service: Neurosurgery    CATARACT PHACO WITH IOL Right 2016    Procedure: CATARACT PHACO WITH IOL;  Surgeon: Alfonso Hernandez M.D.;  Location: SURGERY [facility];  Service:     CATARACT PHACO WITH IOL Left 2016    Procedure: CATARACT PHACO WITH IOL;  Surgeon: Alfonso Hernandez M.D.;  Location: SURGERY [facility];  Service:     CERVICAL DISK AND FUSION ANTERIOR  10/30/2012    Performed by Selvin Ying M.D. at SURGERY [facility] ORS    PROSTATECTOMY, RADICAL RETRO  2010    Performed by EDYTA KUMAR at SURGERY [facility] ORS    NODE DISSECTION  2010    Performed by EDYTA KUMAR at SURGERY [facility] ORS    CARPAL TUNNEL RELEASE      Bilateral    TRIGGER FINGER RELEASE      Right small finger    TONSILLECTOMY AND ADENOIDECTOMY      APPENDECTOMY      OTHER      radiology procedure - cryoablation bladder tumor    [place] REMOVAL OF KIDNEY STONE  1986/1998    x2       Allergies:  Nkda [no known drug allergy]    Social History:  Social History     Socioeconomic History    Marital status:      Spouse name: Not on file    Number of children: Not on file    Years of education: Not on file    Highest education level: Not on file   Occupational History    Occupation: retired drugstore manager   Tobacco Use    Smoking status: Former     Current packs/day: 0.00     Average packs/day: 0.2 packs/day for 10.0 years (2.0 ttl pk-yrs)     Types: Cigarettes     Start date: 1967     Quit date: 1977     Years since quittin.1    Smokeless tobacco: Never    Tobacco comments:     1/3 ppd for 10 years, quit    Vaping Use    Vaping Use: Never used   Substance and Sexual Activity     Alcohol use: Yes     Alcohol/week: 1.2 oz     Types: 2 Cans of beer per week     Comment: 1-2 per day    Drug use: No    Sexual activity: Not Currently     Partners: Female   Other Topics Concern    Not on file   Social History Narrative    Not on file     Social Determinants of Health     Financial Resource Strain: Low Risk  (11/20/2023)    Overall Financial Resource Strain (CARDIA)     Difficulty of Paying Living Expenses: Not hard at all   Food Insecurity: No Food Insecurity (11/20/2023)    Hunger Vital Sign     Worried About Running Out of Food in the Last Year: Never true     Ran Out of Food in the Last Year: Never true   Transportation Needs: No Transportation Needs (11/20/2023)    PRAPARE - Transportation     Lack of Transportation (Medical): No     Lack of Transportation (Non-Medical): No   Physical Activity: Inactive (11/20/2023)    Exercise Vital Sign     Days of Exercise per Week: 0 days     Minutes of Exercise per Session: 0 min   Stress: No Stress Concern Present (11/20/2023)    [place] [employer] - Occupational Stress Questionnaire     Feeling of Stress : Not at all   Social Connections: Moderately Isolated (11/20/2023)    Social Connection and Isolation Panel [NHANES]     Frequency of Communication with Friends and Family: Once a week     Frequency of Social Gatherings with Friends and Family: More than three times a week     Attends Mandaeism Services: Never     Active Member of Clubs or Organizations: No     Attends Club or Organization Meetings: Never     Marital Status:    Intimate Partner Violence: Not on file   Housing Stability: Unknown (11/20/2023)    Housing Stability Vital Sign     Unable to Pay for Housing in the Last Year: No     Number of Places Lived in the Last Year: Not on file     Unstable Housing in the Last Year: No       Family History:  Family History   Problem Relation Age of Onset    Diabetes Mother     Stroke Mother     Alzheimer's Disease Father  "       Medications:    Current Outpatient Medications:     Empagliflozin (JARDIANCE) 10 MG Tab tablet, Take 1 Tablet by mouth every day. 8 boxes of sample please., Disp: 30 Tablet, Rfl: 0    lisinopril (PRINIVIL) 2.5 MG Tab, Take 1 Tablet by mouth every day., Disp: 100 Tablet, Rfl: 3    Empagliflozin (JARDIANCE) 10 MG Tab tablet, TAKE 1 TABLET BY MOUTH DAILY, Disp: 100 Tablet, Rfl: 2    Calcium 600 MG Tab, Take  by mouth every day., Disp: , Rfl:     acetaminophen (TYLENOL) 500 MG Tab, Take 500-1,000 mg by mouth every 6 hours as needed., Disp: , Rfl:     omeprazole (PRILOSEC) 20 MG delayed-release capsule, TAKE 1 CAPSULE BY MOUTH EVERY DAY, Disp: 100 Capsule, Rfl: 2    atorvastatin (LIPITOR) 80 MG tablet, Take 1 Tablet by mouth every evening., Disp: 100 Tablet, Rfl: 3    ezetimibe (ZETIA) 10 MG Tab, Take 1 Tablet by mouth every day., Disp: 100 Tablet, Rfl: 3    vitamin D3, cholecalciferol, 1000 UNIT Tab, Take 2 Tabs by mouth every day., Disp: 100 Tab, Rfl: 3    potassium citrate SR (UROCIT-K SR) 10 MEQ (1080 MG) TBCR, Take 20 mEq by mouth 2 times a day., Disp: , Rfl:     Multiple Vitamins-Minerals (CENTRUM SILVER PO), Take 1 Tablet by mouth every day., Disp: , Rfl:     Brimonidine Tartrate-Timolol 0.2-0.5 % Solution, Administer 1 Drop into both eyes 2 times a day., Disp: , Rfl:     latanoprost (XALATAN) 0.005 % Solution, Place 1 Drop in both eyes every evening., Disp: , Rfl:     ACETAZOLAMIDE 125 MG PO TABS, Take 125 mg by mouth every day., Disp: , Rfl:       Physical Examination:   Vital signs: BP 98/60 (BP Location: Right arm, Patient Position: Sitting)   Pulse 90   Ht 1.702 m (5' 7\")   Wt 74.3 kg (163 lb 12.8 oz)   SpO2 97%   BMI 25.65 kg/m²   General: No distress, cooperative, well dressed and well nourished.   Eyes: No scleral icterus or discharge, No hyposphagma  ENMT: Normal on external inspection of nose, lips, No nasal drainage   Neck: No abnormal masses on inspection  Resp: Normal effort, Bilateral " clear to auscultation, No wheezing, No rales  CVS: Regular rate and rhythm, S1 S2 normal, No murmur. No gallop  Extremities: No edema bilateral extremities  Neuro: Alert and oriented  Skin: No rash, No Ulcers  Psych: Normal mood and affect    Assessment and Plan:  1. Diabetes mellitus without complication (HCC)  Stable  Medication:  Jardiance 10 mg daily - continue  This is followed by pcp  - Referral to Podiatry  - Empagliflozin (JARDIANCE) 10 MG Tab tablet; Take 1 Tablet by mouth every day. 8 boxes of sample please.  Dispense: 30 Tablet; Refill: 0    2. Fungal infection of toenail  Unstable  I will place referral to podiatry for toenail fungus management.   - Referral to Podiatry    3. Hypogonadism in male  Unstable  Patient has family history of hemochromatosis. His ferritin levels in April 2023 were 882. I will get ferritin and TIBC to confirm diagnosis. This may be the cause of low testosterone levels.  - FERRITIN; Future  - CBC WITHOUT DIFFERENTIAL; Future  - IRON/TOTAL IRON BIND; Future    4. Vitamin D deficiency  Stable  We will continue to monitor and repeat levels in 6 months  - VITAMIN D,25 HYDROXY (DEFICIENCY); Future    5. 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   - ACTH; Future  - CORTISOL; Future     Return in about 5 months (around 7/5/2024). Patient will have blood work done 1 weeks prior to next apt in 3 months    Thank you kindly for allowing me to participate in the diabetes care plan for this patient.    Antonio Abarca, APRN   2/5/24    CC:   Kenneth Connolly D.N.P.

## 2024-02-05 NOTE — PROGRESS NOTES
"RN-CDE Note    Subjective:   Endocrinology Clinic Progress Note  PCP: Kenneth Connolly D.N.P.    HPI:  Kemal Mueller is a 79 y.o. old patient who is seen today by the Diabetes Nurse Specialist for review of his controlled type 2 diabetes.    Recent changes in health: no pina ges.   DM:   Last A1c:   Lab Results   Component Value Date/Time    HBA1C 6.9 (H) 12/29/2023 08:47 AM      Previous A1c was 6.7 on 8/18/23  A1C GOAL: < 7    Diabetes Medications:   Jardiance 10 mg daily      Exercise: sporadic irregular exercise, <half hour walking weekly  Diet: \"healthy\" diet  in general  Patient's body mass index is 25.65 kg/m². Exercise and nutrition counseling were performed at this visit.    Glucose monitoring frequency: not testing     Hypoglycemic episodes: no  Last Retinal Exam: on file and up-to-date  Daily Foot Exam: Yes  complains of neuropathy symptoms  Foot Exam:  Monofilament testing with a 10 gram force: sensation intact: intact bilaterally  Visual Inspection: Feet with maceration, ulcers, fissures.  Has extreme fungal infection on 1st toenail of right foot, very overgrown and open area at the base of the toenail  Pedal pulses: intact bilaterally  Referral to podiatry placed  Lab Results   Component Value Date/Time    MICROALBCALC 46.6 (H) 03/18/2011 12:00 AM    MICROALBCALC 66.9 (H) 05/19/2009 10:13 AM    MALBCRT 203 (H) 08/18/2023 10:05 AM    MICROALBUR 18.6 08/18/2023 10:05 AM    MICRALB 30.1 (H) 03/18/2011 12:00 AM        ACR Albumin/Creatinine Ratio goal <30     HTN:   Blood pressure goal <130/<80 .   Currently Rx ACE/ARB: Yes     Dyslipidemia:    Lab Results   Component Value Date/Time    CHOLSTRLTOT 151 08/18/2023 10:07 AM    LDL 74 08/18/2023 10:07 AM    HDL 32 (A) 08/18/2023 10:07 AM    TRIGLYCERIDE 225 (H) 08/18/2023 10:07 AM         Currently Rx Statin: Yes     He  reports that he quit smoking about 47 years ago. His smoking use included cigarettes. He started smoking about 57 years ago. He has a 2.0 " pack-year smoking history. He has never used smokeless tobacco.      Plan:     Discussed and educated on:   - All medications, side effects and compliance (discussed carefully)  - Foot Care: what to look for when checking feet every day  - HbA1C: target  - Home glucose monitoring emphasized  - Weight control and daily exercise    Recommended medication changes: no changes.

## 2024-02-12 ENCOUNTER — HOSPITAL ENCOUNTER (OUTPATIENT)
Dept: LAB | Facility: MEDICAL CENTER | Age: 79
End: 2024-02-12
Payer: MEDICARE

## 2024-02-12 ENCOUNTER — HOSPITAL ENCOUNTER (OUTPATIENT)
Dept: LAB | Facility: MEDICAL CENTER | Age: 79
End: 2024-02-12
Attending: RADIOLOGY
Payer: MEDICARE

## 2024-02-12 DIAGNOSIS — E29.1 HYPOGONADISM IN MALE: ICD-10-CM

## 2024-02-12 LAB
ERYTHROCYTE [DISTWIDTH] IN BLOOD BY AUTOMATED COUNT: 42.9 FL (ref 35.9–50)
FERRITIN SERPL-MCNC: 427 NG/ML (ref 22–322)
HCT VFR BLD AUTO: 40.5 % (ref 42–52)
HGB BLD-MCNC: 13.3 G/DL (ref 14–18)
IRON SATN MFR SERPL: 29 % (ref 15–55)
IRON SERPL-MCNC: 76 UG/DL (ref 50–180)
MCH RBC QN AUTO: 28.9 PG (ref 27–33)
MCHC RBC AUTO-ENTMCNC: 32.8 G/DL (ref 32.3–36.5)
MCV RBC AUTO: 88 FL (ref 81.4–97.8)
PLATELET # BLD AUTO: 165 K/UL (ref 164–446)
PMV BLD AUTO: 11.3 FL (ref 9–12.9)
PSA SERPL-MCNC: <0.02 NG/ML (ref 0–4)
RBC # BLD AUTO: 4.6 M/UL (ref 4.7–6.1)
TESTOST SERPL-MCNC: <3 NG/DL (ref 175–781)
TIBC SERPL-MCNC: 264 UG/DL (ref 250–450)
UIBC SERPL-MCNC: 188 UG/DL (ref 110–370)
WBC # BLD AUTO: 4.9 K/UL (ref 4.8–10.8)

## 2024-02-12 PROCEDURE — 83540 ASSAY OF IRON: CPT

## 2024-02-12 PROCEDURE — 85027 COMPLETE CBC AUTOMATED: CPT

## 2024-02-12 PROCEDURE — 82728 ASSAY OF FERRITIN: CPT

## 2024-02-12 PROCEDURE — 83550 IRON BINDING TEST: CPT

## 2024-02-12 PROCEDURE — 84153 ASSAY OF PSA TOTAL: CPT

## 2024-02-12 PROCEDURE — 84403 ASSAY OF TOTAL TESTOSTERONE: CPT

## 2024-02-26 ENCOUNTER — TELEPHONE (OUTPATIENT)
Dept: MEDICAL GROUP | Facility: PHYSICIAN GROUP | Age: 79
End: 2024-02-26
Payer: MEDICARE

## 2024-02-26 DIAGNOSIS — R05.1 ACUTE COUGH: ICD-10-CM

## 2024-02-26 RX ORDER — BENZONATATE 100 MG/1
100 CAPSULE ORAL 3 TIMES DAILY PRN
Qty: 60 CAPSULE | Refills: 0 | Status: SHIPPED | OUTPATIENT
Start: 2024-02-26

## 2024-02-26 NOTE — TELEPHONE ENCOUNTER
PAtient called and wants to know if there is anything for him to take for a lingering cough for 1 week.   MEDICINE

## 2024-03-11 ENCOUNTER — TELEPHONE (OUTPATIENT)
Dept: ENDOCRINOLOGY | Facility: MEDICAL CENTER | Age: 79
End: 2024-03-11
Payer: MEDICARE

## 2024-03-11 DIAGNOSIS — E29.1 SECONDARY MALE HYPOGONADISM: ICD-10-CM

## 2024-03-11 DIAGNOSIS — T38.0X5A ADRENAL INSUFFICIENCY DUE TO CORTICOSTEROID WITHDRAWAL (HCC): ICD-10-CM

## 2024-03-11 DIAGNOSIS — E29.1 HYPOGONADISM IN MALE: ICD-10-CM

## 2024-03-11 DIAGNOSIS — E27.3 ADRENAL INSUFFICIENCY DUE TO CORTICOSTEROID WITHDRAWAL (HCC): ICD-10-CM

## 2024-03-11 NOTE — TELEPHONE ENCOUNTER
Spoke to pt's wife to inform her that Antonio is leaving [facility] in May. Wife wants to talk to pt first before getting rescheduled. Pt will call back.

## 2024-03-11 NOTE — TELEPHONE ENCOUNTER
Pt called and stated he was expecting a phone call back regarding his labs that were completed in February. He wanted to know if he needed to make a follow up appt regarding the labs or not.

## 2024-03-13 ENCOUNTER — HOSPITAL ENCOUNTER (OUTPATIENT)
Dept: LAB | Facility: MEDICAL CENTER | Age: 79
End: 2024-03-13
Payer: MEDICARE

## 2024-03-13 DIAGNOSIS — E29.1 HYPOGONADISM IN MALE: ICD-10-CM

## 2024-03-13 LAB
FERRITIN SERPL-MCNC: 424 NG/ML (ref 22–322)
FSH SERPL-ACNC: 22.3 MIU/ML (ref 1.5–12.4)
LH SERPL-ACNC: 9 IU/L (ref 1.7–8.6)

## 2024-03-13 PROCEDURE — 84270 ASSAY OF SEX HORMONE GLOBUL: CPT

## 2024-03-13 PROCEDURE — 36415 COLL VENOUS BLD VENIPUNCTURE: CPT

## 2024-03-13 PROCEDURE — 84403 ASSAY OF TOTAL TESTOSTERONE: CPT

## 2024-03-13 PROCEDURE — 83002 ASSAY OF GONADOTROPIN (LH): CPT

## 2024-03-13 PROCEDURE — 84402 ASSAY OF FREE TESTOSTERONE: CPT

## 2024-03-13 PROCEDURE — 82728 ASSAY OF FERRITIN: CPT

## 2024-03-13 PROCEDURE — 83001 ASSAY OF GONADOTROPIN (FSH): CPT

## 2024-03-14 LAB
SHBG SERPL-SCNC: 31 NMOL/L (ref 19–76)
TESTOST FREE MFR SERPL: <1.7 % (ref 1.6–2.9)
TESTOST FREE SERPL-MCNC: <1 PG/ML (ref 47–244)
TESTOST SERPL-MCNC: <3 NG/DL (ref 300–720)

## 2024-04-01 ENCOUNTER — OFFICE VISIT (OUTPATIENT)
Dept: ENDOCRINOLOGY | Facility: MEDICAL CENTER | Age: 79
End: 2024-04-01
Payer: MEDICARE

## 2024-04-01 VITALS
DIASTOLIC BLOOD PRESSURE: 56 MMHG | WEIGHT: 163 LBS | BODY MASS INDEX: 25.58 KG/M2 | HEART RATE: 93 BPM | SYSTOLIC BLOOD PRESSURE: 124 MMHG | HEIGHT: 67 IN | OXYGEN SATURATION: 97 %

## 2024-04-01 DIAGNOSIS — T38.0X5A ADRENAL INSUFFICIENCY DUE TO CORTICOSTEROID WITHDRAWAL (HCC): ICD-10-CM

## 2024-04-01 DIAGNOSIS — E11.9 DIABETES MELLITUS WITHOUT COMPLICATION (HCC): ICD-10-CM

## 2024-04-01 DIAGNOSIS — E29.1 HYPOGONADISM IN MALE: ICD-10-CM

## 2024-04-01 DIAGNOSIS — E27.3 ADRENAL INSUFFICIENCY DUE TO CORTICOSTEROID WITHDRAWAL (HCC): ICD-10-CM

## 2024-04-01 DIAGNOSIS — E55.9 VITAMIN D DEFICIENCY: ICD-10-CM

## 2024-04-01 PROCEDURE — 3074F SYST BP LT 130 MM HG: CPT

## 2024-04-01 PROCEDURE — 3078F DIAST BP <80 MM HG: CPT

## 2024-04-01 PROCEDURE — 99211 OFF/OP EST MAY X REQ PHY/QHP: CPT

## 2024-04-01 PROCEDURE — 99214 OFFICE O/P EST MOD 30 MIN: CPT

## 2024-04-01 ASSESSMENT — FIBROSIS 4 INDEX: FIB4 SCORE: 2.76

## 2024-04-01 NOTE — PROGRESS NOTES
Reason for consult: Follow up for Chronic glucocorticoids    HPI:  Kemal Mueller is a 78 y.o. old patient who is seeing us today for care.  This is a pleasant patient and I appreciate the opportunity to participate in the care of this patient.        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      He was E-consulted by Dr Brown with a plan for gradual steroid taper.  2.5mg reduction every 7 days until patient is down to 5mg daily which is a physiologic dose  then can switch to hydcortisone (shorter acting steroid to allow for recovery of HPA axis) followed by another taper. He stopped his predinsone in Sept 2023     Denies: nausea and vomiting, diarrhea, abdominal pain, fatigue   however today he is feeling good.      Latest Reference Range & Units 12/29/23 08:46   Cortisol 0.0 - 23.0 ug/dL 13.4        Latest Reference Range & Units 12/29/23 08:45   Acth 7.2 - 63.3 pg/mL 18.5     2. Primary Hypogonadism  Reports family history of hemochromatosis   Latest Reference Range & Units 03/13/24 08:34   Follicle Stimulating Hormone 1.5 - 12.4 mIU/mL 22.3 (H)   Free Testosterone 47 - 244 pg/mL <1 (L)   Luteinizing Hormone 1.7 - 8.6 IU/L 9.0 (H)   Sex Hormone Bind Globulin 19 - 76 nmol/L 31   Testosterone % Free 1.6 - 2.9 % <1.7   Testosterone,Total 300 - 720 ng/dL <3 (L)      Latest Reference Range & Units 03/13/24 08:34   Ferritin 22.0 - 322.0 ng/mL 424.0 (H)        Latest Reference Range & Units 02/12/24 11:30   Hemoglobin 14.0 - 18.0 g/dL 13.3 (L)   Hematocrit 42.0 - 52.0 % 40.5 (L)      Latest Reference Range & Units 02/12/24 11:30   Iron 50 - 180 ug/dL 76   Total Iron Binding 250 - 450 ug/dL 264   % Saturation 15 - 55 % 29   Unsat Iron Binding 110 - 370 ug/dL 188      Latest Reference Range & Units 12/29/23 08:49    Prostatic Specific Antigen Tot 0.00 - 4.00 ng/mL <0.02     3. Vitamin D deficiency  Currently taking Vitamin D 3 1000 IU BID   Latest Reference Range & Units 12/29/23 08:46   25-Hydroxy   Vitamin D 25 30 - 100 ng/mL 45     4. Diabetes Type 2  This is followed by PCP  A1c 6.9 on 12/29/23    Current Medication:  Jardiance 10mg daily    He does not check his blood sugars    Last eye exam: Sept 2023  He would like a referral to podiatry for toe fungus    ROS:   Constitutional: No change in weight , No fatigue, No night sweats.  HEENT: No Headache.  Eyes:  No blurred vision, No visual changes.  Cardiac: No chest pain, No palpitations.  Resp: No shortness of breath, No cough,   Gastro: No nausea or vomiting, No diarrhea.  Neuro: Denies numbness or tinging in bilateral feet or hands, and no loss of sensation.  Endo: No heat or cold intolerance.  : No polyuria, No polydipsia, No chronic UTI's.  Lower extremities: No lower leg edema bilateral.  All other systems were reviewed and were negative.    Past Medical History:  Patient Active Problem List    Diagnosis Date Noted    Diabetes mellitus without complication ([facility]) 02/05/2024    Hypogonadism in male 02/05/2024    Stage 2 chronic kidney disease 09/05/2023    Type 2 diabetes mellitus with microalbuminuria ([facility]) 05/31/2023    Adrenal insufficiency due to corticosteroid withdrawal ([facility]) 05/03/2023    Mixed stress and urge urinary incontinence 12/12/2022    Low back pain 11/14/2022    Atherosclerosis of aorta ([facility]) 09/27/2022    Other spondylosis with radiculopathy, lumbar region 08/18/2022    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[facility]) 02/14/2022    Dyslipidemia 03/19/2020    Vitamin D deficiency 02/12/2020    Colon cancer screening 02/12/2020    Arthritis 01/15/2020    History of urinary stone 11/28/2018    History of malignant neoplasm of kidney 04/30/2018    Anemia 08/17/2017    Herpes zoster without complication 11/15/2016    Age-related nuclear cataract of eye 01/14/2016     Right renal mass 01/11/2016    Degeneration of cervical intervertebral disc 10/30/2012    Cervical stenosis of spine 10/30/2012    Cataracts, bilateral 01/13/2012    ED (erectile dysfunction) 01/11/2011    Nephrolithiasis 09/12/2010    Glaucoma 09/12/2010    GERD (gastroesophageal reflux disease) 09/12/2010    Hyperlipidemia 05/21/2009    Essential hypertension, benign 05/21/2009    Calculus of kidney 05/21/2009    Reflux esophagitis 05/21/2009    Proteinuria 05/21/2009       Past Surgical History:  Past Surgical History:   Procedure Laterality Date    LUMBAR LAMINECTOMY DISKECTOMY N/A 8/18/2022    Procedure: LUMBAR 2 - SACRAL 1 LAMINECTOMY;  Surgeon: Selvin Ying M.D.;  Location: SURGERY [facility];  Service: Neurosurgery    CATARACT PHACO WITH IOL Right 01/28/2016    Procedure: CATARACT PHACO WITH IOL;  Surgeon: Alfonso Hernandez M.D.;  Location: SURGERY [facility];  Service:     CATARACT PHACO WITH IOL Left 01/14/2016    Procedure: CATARACT PHACO WITH IOL;  Surgeon: Alfonso Hernandez M.D.;  Location: SURGERY SURG[facility];  Service:     CERVICAL DISK AND FUSION ANTERIOR  10/30/2012    Performed by Selvin Ying M.D. at SURGERY [facility] ORS    PROSTATECTOMY, RADICAL RETRO  04/21/2010    Performed by EDYTA KUMAR at SURGERY [facility] ORS    NODE DISSECTION  04/21/2010    Performed by EDYTA KUMAR at SURGERY [facility] ORS    CARPAL TUNNEL RELEASE  1990    Bilateral    TRIGGER FINGER RELEASE  1990    Right small finger    TONSILLECTOMY AND ADENOIDECTOMY  1953    APPENDECTOMY      OTHER      radiology procedure - cryoablation bladder tumor    FL REMOVAL OF KIDNEY STONE  1986/1998    x2       Allergies:  Nkda [no known drug allergy]    Social History:  Social History     Socioeconomic History    Marital status:      Spouse name: Not on file    Number of children: Not on file    Years of education: Not on file    Highest education level: Not on file   Occupational History    Occupation:  retired drugstore manager   Tobacco Use    Smoking status: Former     Current packs/day: 0.00     Average packs/day: 0.2 packs/day for 10.0 years (2.0 ttl pk-yrs)     Types: Cigarettes     Start date: 1967     Quit date: 1977     Years since quittin.2    Smokeless tobacco: Never    Tobacco comments:     1/3 ppd for 10 years, quit    Vaping Use    Vaping Use: Never used   Substance and Sexual Activity    Alcohol use: Yes     Alcohol/week: 1.2 oz     Types: 2 Cans of beer per week     Comment: 1-2 per day    Drug use: No    Sexual activity: Not Currently     Partners: Female   Other Topics Concern    Not on file   Social History Narrative    Not on file     Social Determinants of Health     Financial Resource Strain: Low Risk  (2023)    Overall Financial Resource Strain (CARDIA)     Difficulty of Paying Living Expenses: Not hard at all   Food Insecurity: No Food Insecurity (2023)    Hunger Vital Sign     Worried About Running Out of Food in the Last Year: Never true     Ran Out of Food in the Last Year: Never true   Transportation Needs: No Transportation Needs (2023)    PRAPARE - Transportation     Lack of Transportation (Medical): No     Lack of Transportation (Non-Medical): No   Physical Activity: Inactive (2023)    Exercise Vital Sign     Days of Exercise per Week: 0 days     Minutes of Exercise per Session: 0 min   Stress: No Stress Concern Present (2023)    [place] [employer] - Occupational Stress Questionnaire     Feeling of Stress : Not at all   Social Connections: Moderately Isolated (2023)    Social Connection and Isolation Panel [NHANES]     Frequency of Communication with Friends and Family: Once a week     Frequency of Social Gatherings with Friends and Family: More than three times a week     Attends Islam Services: Never     Active Member of Clubs or Organizations: No     Attends Club or Organization Meetings: Never      Marital Status:    Intimate Partner Violence: Not on file   Housing Stability: Unknown (11/20/2023)    Housing Stability Vital Sign     Unable to Pay for Housing in the Last Year: No     Number of Places Lived in the Last Year: Not on file     Unstable Housing in the Last Year: No       Family History:  Family History   Problem Relation Age of Onset    Diabetes Mother     Stroke Mother     Alzheimer's Disease Father        Medications:    Current Outpatient Medications:     benzonatate (TESSALON) 100 MG Cap, Take 1 Capsule by mouth 3 times a day as needed for Cough., Disp: 60 Capsule, Rfl: 0    Empagliflozin (JARDIANCE) 10 MG Tab tablet, Take 1 Tablet by mouth every day., Disp: 30 Tablet, Rfl: 0    lisinopril (PRINIVIL) 2.5 MG Tab, Take 1 Tablet by mouth every day., Disp: 100 Tablet, Rfl: 3    Empagliflozin (JARDIANCE) 10 MG Tab tablet, TAKE 1 TABLET BY MOUTH DAILY, Disp: 100 Tablet, Rfl: 2    Calcium 600 MG Tab, Take  by mouth every day., Disp: , Rfl:     acetaminophen (TYLENOL) 500 MG Tab, Take 500-1,000 mg by mouth every 6 hours as needed., Disp: , Rfl:     omeprazole (PRILOSEC) 20 MG delayed-release capsule, TAKE 1 CAPSULE BY MOUTH EVERY DAY, Disp: 100 Capsule, Rfl: 2    atorvastatin (LIPITOR) 80 MG tablet, Take 1 Tablet by mouth every evening., Disp: 100 Tablet, Rfl: 3    ezetimibe (ZETIA) 10 MG Tab, Take 1 Tablet by mouth every day., Disp: 100 Tablet, Rfl: 3    vitamin D3, cholecalciferol, 1000 UNIT Tab, Take 2 Tabs by mouth every day., Disp: 100 Tab, Rfl: 3    potassium citrate SR (UROCIT-K SR) 10 MEQ (1080 MG) TBCR, Take 20 mEq by mouth 2 times a day., Disp: , Rfl:     Multiple Vitamins-Minerals (CENTRUM SILVER PO), Take 1 Tablet by mouth every day., Disp: , Rfl:     Brimonidine Tartrate-Timolol 0.2-0.5 % Solution, Administer 1 Drop into both eyes 2 times a day., Disp: , Rfl:     latanoprost (XALATAN) 0.005 % Solution, Place 1 Drop in both eyes every evening., Disp: , Rfl:     ACETAZOLAMIDE 125 MG PO  "TABS, Take 125 mg by mouth every day., Disp: , Rfl:       Physical Examination:   Vital signs: /56 (BP Location: Right arm, Patient Position: Sitting, BP Cuff Size: Adult)   Pulse 93   Ht 1.702 m (5' 7\")   Wt 73.9 kg (163 lb)   SpO2 97%   BMI 25.53 kg/m²   General: No distress, cooperative, well dressed and well nourished.   Eyes: No scleral icterus or discharge, No hyposphagma  ENMT: Normal on external inspection of nose, lips, No nasal drainage   Neck: No abnormal masses on inspection  Resp: Normal effort, Bilateral clear to auscultation, No wheezing, No rales  CVS: Regular rate and rhythm, S1 S2 normal, No murmur. No gallop  Extremities: No edema bilateral extremities  Neuro: Alert and oriented  Skin: No rash, No Ulcers  Psych: Normal mood and affect    Assessment and Plan:  1. 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   - ACTH; Future  - CORTISOL; Future    2. Hypogonadism in male  Unstable  Patient's elevated LH and FSH are consistent with primary hypogonadism. At this time patient denies symptoms. We will continue to monitor.   - CBC WITH DIFFERENTIAL; Future  - Testosterone, Free & Total, Adult Male (w/SHBG); Future    3. Diabetes mellitus without complication (HCC)  Stable  Medication:  Jardiance 10 mg daily - continue  This is followed by pcp  - Comp Metabolic Panel; Future    4. Vitamin D deficiency  Stable  We will continue to monitor and repeat levels in 6 months  - VITAMIN D,25 HYDROXY (DEFICIENCY); Future     Return in about 6 months (around 10/1/2024). Patient will have blood work done 1 weeks prior to next apt in 6 months    Thank you kindly for allowing me to participate in the diabetes care plan for this patient.    Antonio Abarca, APRN   4/1/24    CC:   Kenneth Connolly D.N.P.  "

## 2024-05-02 DIAGNOSIS — K21.00 GASTROESOPHAGEAL REFLUX DISEASE WITH ESOPHAGITIS: ICD-10-CM

## 2024-05-02 DIAGNOSIS — K21.9 GASTROESOPHAGEAL REFLUX DISEASE WITHOUT ESOPHAGITIS: ICD-10-CM

## 2024-05-02 DIAGNOSIS — E78.5 HYPERLIPIDEMIA, UNSPECIFIED HYPERLIPIDEMIA TYPE: ICD-10-CM

## 2024-05-02 DIAGNOSIS — R80.9 TYPE 2 DIABETES MELLITUS WITH MICROALBUMINURIA, WITHOUT LONG-TERM CURRENT USE OF INSULIN (HCC): ICD-10-CM

## 2024-05-02 DIAGNOSIS — E11.9 DIABETES MELLITUS WITHOUT COMPLICATION (HCC): ICD-10-CM

## 2024-05-02 DIAGNOSIS — E78.2 MIXED HYPERLIPIDEMIA: ICD-10-CM

## 2024-05-02 DIAGNOSIS — I10 ESSENTIAL HYPERTENSION, BENIGN: ICD-10-CM

## 2024-05-02 DIAGNOSIS — E11.29 TYPE 2 DIABETES MELLITUS WITH MICROALBUMINURIA, WITHOUT LONG-TERM CURRENT USE OF INSULIN (HCC): ICD-10-CM

## 2024-05-02 PROCEDURE — RXMED WILLOW AMBULATORY MEDICATION CHARGE: Performed by: INTERNAL MEDICINE

## 2024-05-02 RX ORDER — ATORVASTATIN CALCIUM 80 MG/1
80 TABLET, FILM COATED ORAL EVERY EVENING
Qty: 100 TABLET | Refills: 3 | OUTPATIENT
Start: 2024-05-02

## 2024-05-02 RX ORDER — EZETIMIBE 10 MG/1
10 TABLET ORAL DAILY
Qty: 100 TABLET | Refills: 3 | OUTPATIENT
Start: 2024-05-02

## 2024-05-02 RX ORDER — EMPAGLIFLOZIN 10 MG/1
1 TABLET, FILM COATED ORAL DAILY
Qty: 30 TABLET | Refills: 0 | Status: SHIPPED | OUTPATIENT
Start: 2024-05-02

## 2024-05-02 RX ORDER — LISINOPRIL 2.5 MG/1
2.5 TABLET ORAL DAILY
Qty: 100 TABLET | Refills: 3 | OUTPATIENT
Start: 2024-05-02

## 2024-05-02 RX ORDER — EMPAGLIFLOZIN 10 MG/1
1 TABLET, FILM COATED ORAL DAILY
Qty: 100 TABLET | Refills: 2 | OUTPATIENT
Start: 2024-05-02

## 2024-05-02 RX ORDER — EMPAGLIFLOZIN 10 MG/1
10 TABLET, FILM COATED ORAL DAILY
Qty: 100 TABLET | Refills: 3 | Status: SHIPPED | OUTPATIENT
Start: 2024-05-02

## 2024-05-02 RX ORDER — OMEPRAZOLE 20 MG/1
20 CAPSULE, DELAYED RELEASE ORAL DAILY
Qty: 100 CAPSULE | Refills: 2 | OUTPATIENT
Start: 2024-05-02

## 2024-05-02 NOTE — TELEPHONE ENCOUNTER
Received request via: Pharmacy    Was the patient seen in the last year in this department? Yes    Does the patient have an active prescription (recently filled or refills available) for medication(s) requested? No    Pharmacy Name: [facility]    Does the patient have [facility] Plus and need 100 day supply (blood pressure, diabetes and cholesterol meds only)? Yes, quantity updated to 100 days

## 2024-05-03 ENCOUNTER — PHARMACY VISIT (OUTPATIENT)
Dept: PHARMACY | Facility: MEDICAL CENTER | Age: 79
End: 2024-05-03
Payer: COMMERCIAL

## 2024-05-10 PROCEDURE — RXMED WILLOW AMBULATORY MEDICATION CHARGE

## 2024-05-10 PROCEDURE — RXMED WILLOW AMBULATORY MEDICATION CHARGE: Performed by: UROLOGY

## 2024-05-16 ENCOUNTER — PHARMACY VISIT (OUTPATIENT)
Dept: PHARMACY | Facility: MEDICAL CENTER | Age: 79
End: 2024-05-16
Payer: COMMERCIAL

## 2024-05-16 PROCEDURE — RXMED WILLOW AMBULATORY MEDICATION CHARGE

## 2024-05-24 PROCEDURE — RXMED WILLOW AMBULATORY MEDICATION CHARGE

## 2024-05-30 ENCOUNTER — PHARMACY VISIT (OUTPATIENT)
Dept: PHARMACY | Facility: MEDICAL CENTER | Age: 79
End: 2024-05-30
Payer: COMMERCIAL

## 2024-05-30 PROCEDURE — RXMED WILLOW AMBULATORY MEDICATION CHARGE: Performed by: UROLOGY

## 2024-05-30 RX ORDER — POTASSIUM CITRATE 10 MEQ/1
20 TABLET, EXTENDED RELEASE ORAL 2 TIMES DAILY
Qty: 360 TABLET | Refills: 3 | OUTPATIENT
Start: 2024-05-30

## 2024-05-31 ENCOUNTER — PHARMACY VISIT (OUTPATIENT)
Dept: PHARMACY | Facility: MEDICAL CENTER | Age: 79
End: 2024-05-31
Payer: COMMERCIAL

## 2024-06-16 DIAGNOSIS — E78.5 HYPERLIPIDEMIA, UNSPECIFIED HYPERLIPIDEMIA TYPE: ICD-10-CM

## 2024-06-16 DIAGNOSIS — E78.2 MIXED HYPERLIPIDEMIA: ICD-10-CM

## 2024-06-17 RX ORDER — EZETIMIBE 10 MG/1
10 TABLET ORAL DAILY
Qty: 100 TABLET | Refills: 2 | Status: SHIPPED | OUTPATIENT
Start: 2024-06-17

## 2024-06-17 NOTE — TELEPHONE ENCOUNTER
Received request via: Pharmacy    Was the patient seen in the last year in this department? Yes    Does the patient have an active prescription (recently filled or refills available) for medication(s) requested? No    Pharmacy Name:     Does the patient have shelter Plus and need 100 day supply (blood pressure, diabetes and cholesterol meds only)? Medication is not for cholesterol, blood pressure or diabetes

## 2024-06-25 ENCOUNTER — TELEPHONE (OUTPATIENT)
Dept: HEALTH INFORMATION MANAGEMENT | Facility: OTHER | Age: 79
End: 2024-06-25

## 2024-07-02 ENCOUNTER — HOSPITAL ENCOUNTER (OUTPATIENT)
Dept: LAB | Facility: MEDICAL CENTER | Age: 79
End: 2024-07-02
Payer: MEDICARE

## 2024-07-02 DIAGNOSIS — R80.9 TYPE 2 DIABETES MELLITUS WITH MICROALBUMINURIA, WITHOUT LONG-TERM CURRENT USE OF INSULIN (HCC): ICD-10-CM

## 2024-07-02 DIAGNOSIS — I10 ESSENTIAL HYPERTENSION, BENIGN: ICD-10-CM

## 2024-07-02 DIAGNOSIS — E11.29 TYPE 2 DIABETES MELLITUS WITH MICROALBUMINURIA, WITHOUT LONG-TERM CURRENT USE OF INSULIN (HCC): ICD-10-CM

## 2024-07-02 DIAGNOSIS — Z85.46 HISTORY OF PROSTATE CANCER: ICD-10-CM

## 2024-07-02 DIAGNOSIS — E78.5 HYPERLIPIDEMIA, UNSPECIFIED HYPERLIPIDEMIA TYPE: ICD-10-CM

## 2024-07-02 LAB
ALBUMIN SERPL BCP-MCNC: 4.2 G/DL (ref 3.2–4.9)
ALBUMIN/GLOB SERPL: 1.5 G/DL
ALP SERPL-CCNC: 103 U/L (ref 30–99)
ALT SERPL-CCNC: 22 U/L (ref 2–50)
ANION GAP SERPL CALC-SCNC: 13 MMOL/L (ref 7–16)
AST SERPL-CCNC: 25 U/L (ref 12–45)
BASOPHILS # BLD AUTO: 0.2 % (ref 0–1.8)
BASOPHILS # BLD: 0.01 K/UL (ref 0–0.12)
BILIRUB SERPL-MCNC: 0.3 MG/DL (ref 0.1–1.5)
BUN SERPL-MCNC: 26 MG/DL (ref 8–22)
CALCIUM ALBUM COR SERPL-MCNC: 9.2 MG/DL (ref 8.5–10.5)
CALCIUM SERPL-MCNC: 9.4 MG/DL (ref 8.5–10.5)
CHLORIDE SERPL-SCNC: 106 MMOL/L (ref 96–112)
CHOLEST SERPL-MCNC: 152 MG/DL (ref 100–199)
CO2 SERPL-SCNC: 20 MMOL/L (ref 20–33)
CREAT SERPL-MCNC: 1.28 MG/DL (ref 0.5–1.4)
EOSINOPHIL # BLD AUTO: 0 K/UL (ref 0–0.51)
EOSINOPHIL NFR BLD: 0 % (ref 0–6.9)
ERYTHROCYTE [DISTWIDTH] IN BLOOD BY AUTOMATED COUNT: 43.5 FL (ref 35.9–50)
EST. AVERAGE GLUCOSE BLD GHB EST-MCNC: 151 MG/DL
FASTING STATUS PATIENT QL REPORTED: NORMAL
GFR SERPLBLD CREATININE-BSD FMLA CKD-EPI: 56 ML/MIN/1.73 M 2
GLOBULIN SER CALC-MCNC: 2.8 G/DL (ref 1.9–3.5)
GLUCOSE SERPL-MCNC: 141 MG/DL (ref 65–99)
HBA1C MFR BLD: 6.9 % (ref 4–5.6)
HCT VFR BLD AUTO: 39.9 % (ref 42–52)
HDLC SERPL-MCNC: 35 MG/DL
HGB BLD-MCNC: 13 G/DL (ref 14–18)
IMM GRANULOCYTES # BLD AUTO: 0.03 K/UL (ref 0–0.11)
IMM GRANULOCYTES NFR BLD AUTO: 0.6 % (ref 0–0.9)
LDLC SERPL CALC-MCNC: 74 MG/DL
LYMPHOCYTES # BLD AUTO: 1.94 K/UL (ref 1–4.8)
LYMPHOCYTES NFR BLD: 39.2 % (ref 22–41)
MCH RBC QN AUTO: 29.1 PG (ref 27–33)
MCHC RBC AUTO-ENTMCNC: 32.6 G/DL (ref 32.3–36.5)
MCV RBC AUTO: 89.3 FL (ref 81.4–97.8)
MONOCYTES # BLD AUTO: 1.1 K/UL (ref 0–0.85)
MONOCYTES NFR BLD AUTO: 22.2 % (ref 0–13.4)
NEUTROPHILS # BLD AUTO: 1.87 K/UL (ref 1.82–7.42)
NEUTROPHILS NFR BLD: 37.8 % (ref 44–72)
NRBC # BLD AUTO: 0 K/UL
NRBC BLD-RTO: 0 /100 WBC (ref 0–0.2)
PLATELET # BLD AUTO: 165 K/UL (ref 164–446)
PMV BLD AUTO: 10.9 FL (ref 9–12.9)
POTASSIUM SERPL-SCNC: 4.4 MMOL/L (ref 3.6–5.5)
PROT SERPL-MCNC: 7 G/DL (ref 6–8.2)
PSA SERPL-MCNC: <0.02 NG/ML (ref 0–4)
RBC # BLD AUTO: 4.47 M/UL (ref 4.7–6.1)
SODIUM SERPL-SCNC: 139 MMOL/L (ref 135–145)
TESTOST SERPL-MCNC: <3 NG/DL (ref 175–781)
TRIGL SERPL-MCNC: 216 MG/DL (ref 0–149)
WBC # BLD AUTO: 5 K/UL (ref 4.8–10.8)

## 2024-07-02 PROCEDURE — 85025 COMPLETE CBC W/AUTO DIFF WBC: CPT

## 2024-07-02 PROCEDURE — 84153 ASSAY OF PSA TOTAL: CPT

## 2024-07-02 PROCEDURE — 36415 COLL VENOUS BLD VENIPUNCTURE: CPT

## 2024-07-02 PROCEDURE — 80061 LIPID PANEL: CPT

## 2024-07-02 PROCEDURE — 84403 ASSAY OF TOTAL TESTOSTERONE: CPT

## 2024-07-02 PROCEDURE — 80053 COMPREHEN METABOLIC PANEL: CPT

## 2024-07-02 PROCEDURE — 83036 HEMOGLOBIN GLYCOSYLATED A1C: CPT

## 2024-07-08 PROCEDURE — RXMED WILLOW AMBULATORY MEDICATION CHARGE: Performed by: OPHTHALMOLOGY

## 2024-07-09 ENCOUNTER — OFFICE VISIT (OUTPATIENT)
Dept: MEDICAL GROUP | Facility: PHYSICIAN GROUP | Age: 79
End: 2024-07-09
Payer: MEDICARE

## 2024-07-09 VITALS
HEART RATE: 70 BPM | TEMPERATURE: 97.1 F | BODY MASS INDEX: 25.93 KG/M2 | OXYGEN SATURATION: 95 % | WEIGHT: 165.2 LBS | DIASTOLIC BLOOD PRESSURE: 50 MMHG | SYSTOLIC BLOOD PRESSURE: 100 MMHG | HEIGHT: 67 IN

## 2024-07-09 DIAGNOSIS — C61 HORMONE SENSITIVE PROSTATE CANCER (HCC): ICD-10-CM

## 2024-07-09 DIAGNOSIS — E11.69 HYPERLIPIDEMIA ASSOCIATED WITH TYPE 2 DIABETES MELLITUS (HCC): ICD-10-CM

## 2024-07-09 DIAGNOSIS — D64.9 ANEMIA, UNSPECIFIED TYPE: ICD-10-CM

## 2024-07-09 DIAGNOSIS — E11.29 TYPE 2 DIABETES MELLITUS WITH MICROALBUMINURIA (HCC): ICD-10-CM

## 2024-07-09 DIAGNOSIS — Z12.5 ENCOUNTER FOR SCREENING FOR MALIGNANT NEOPLASM OF PROSTATE: ICD-10-CM

## 2024-07-09 DIAGNOSIS — E78.5 HYPERLIPIDEMIA ASSOCIATED WITH TYPE 2 DIABETES MELLITUS (HCC): ICD-10-CM

## 2024-07-09 DIAGNOSIS — I10 ESSENTIAL HYPERTENSION, BENIGN: ICD-10-CM

## 2024-07-09 DIAGNOSIS — R80.9 TYPE 2 DIABETES MELLITUS WITH MICROALBUMINURIA (HCC): ICD-10-CM

## 2024-07-09 DIAGNOSIS — Z19.1 HORMONE SENSITIVE PROSTATE CANCER (HCC): ICD-10-CM

## 2024-07-09 PROBLEM — E11.9 DIABETES MELLITUS WITHOUT COMPLICATION (HCC): Status: RESOLVED | Noted: 2024-02-05 | Resolved: 2024-07-09

## 2024-07-09 PROCEDURE — 3074F SYST BP LT 130 MM HG: CPT

## 2024-07-09 PROCEDURE — 3078F DIAST BP <80 MM HG: CPT

## 2024-07-09 PROCEDURE — 99214 OFFICE O/P EST MOD 30 MIN: CPT

## 2024-07-09 ASSESSMENT — ENCOUNTER SYMPTOMS
WEAKNESS: 0
COUGH: 0
CHILLS: 0
MYALGIAS: 0
BLURRED VISION: 0
CONSTIPATION: 0
DIZZINESS: 0
NAUSEA: 0
VOMITING: 0
WEIGHT LOSS: 0
FEVER: 0
ABDOMINAL PAIN: 0
DIARRHEA: 0
HEADACHES: 0
SHORTNESS OF BREATH: 0

## 2024-07-09 ASSESSMENT — FIBROSIS 4 INDEX: FIB4 SCORE: 2.62

## 2024-07-10 ENCOUNTER — APPOINTMENT (RX ONLY)
Dept: URBAN - METROPOLITAN AREA CLINIC 4 | Facility: CLINIC | Age: 79
Setting detail: DERMATOLOGY
End: 2024-07-10

## 2024-07-10 DIAGNOSIS — L72.8 OTHER FOLLICULAR CYSTS OF THE SKIN AND SUBCUTANEOUS TISSUE: ICD-10-CM

## 2024-07-10 DIAGNOSIS — D22 MELANOCYTIC NEVI: ICD-10-CM

## 2024-07-10 DIAGNOSIS — L82.1 OTHER SEBORRHEIC KERATOSIS: ICD-10-CM

## 2024-07-10 DIAGNOSIS — Z87.2 PERSONAL HISTORY OF DISEASES OF THE SKIN AND SUBCUTANEOUS TISSUE: ICD-10-CM

## 2024-07-10 DIAGNOSIS — L81.4 OTHER MELANIN HYPERPIGMENTATION: ICD-10-CM

## 2024-07-10 DIAGNOSIS — D17 BENIGN LIPOMATOUS NEOPLASM: ICD-10-CM | Status: STABLE

## 2024-07-10 PROBLEM — D22.39 MELANOCYTIC NEVI OF OTHER PARTS OF FACE: Status: ACTIVE | Noted: 2024-07-10

## 2024-07-10 PROBLEM — D17.22 BENIGN LIPOMATOUS NEOPLASM OF SKIN AND SUBCUTANEOUS TISSUE OF LEFT ARM: Status: ACTIVE | Noted: 2024-07-10

## 2024-07-10 PROBLEM — D22.5 MELANOCYTIC NEVI OF TRUNK: Status: ACTIVE | Noted: 2024-07-10

## 2024-07-10 PROCEDURE — 99213 OFFICE O/P EST LOW 20 MIN: CPT

## 2024-07-10 PROCEDURE — ? COUNSELING

## 2024-07-10 PROCEDURE — ? DIAGNOSIS COMMENT

## 2024-07-10 PROCEDURE — ? OBSERVATION

## 2024-07-10 ASSESSMENT — LOCATION DETAILED DESCRIPTION DERM
LOCATION DETAILED: EPIGASTRIC SKIN
LOCATION DETAILED: RIGHT INFERIOR MEDIAL UPPER BACK
LOCATION DETAILED: SUPERIOR THORACIC SPINE
LOCATION DETAILED: RIGHT CENTRAL FRONTAL SCALP
LOCATION DETAILED: LEFT RIB CAGE
LOCATION DETAILED: LEFT PROXIMAL DORSAL FOREARM
LOCATION DETAILED: RIGHT MEDIAL UPPER BACK
LOCATION DETAILED: LEFT POSTERIOR SHOULDER
LOCATION DETAILED: LEFT INFERIOR MEDIAL FOREHEAD
LOCATION DETAILED: RIGHT RADIAL DORSAL HAND
LOCATION DETAILED: LEFT FOREHEAD
LOCATION DETAILED: LEFT ANTERIOR PROXIMAL THIGH
LOCATION DETAILED: RIGHT DISTAL DORSAL FOREARM

## 2024-07-10 ASSESSMENT — LOCATION SIMPLE DESCRIPTION DERM
LOCATION SIMPLE: LEFT FOREARM
LOCATION SIMPLE: LEFT SHOULDER
LOCATION SIMPLE: RIGHT UPPER BACK
LOCATION SIMPLE: LEFT FOREHEAD
LOCATION SIMPLE: ABDOMEN
LOCATION SIMPLE: UPPER BACK
LOCATION SIMPLE: RIGHT FOREARM
LOCATION SIMPLE: RIGHT HAND
LOCATION SIMPLE: LEFT THIGH
LOCATION SIMPLE: SCALP

## 2024-07-10 ASSESSMENT — LOCATION ZONE DERM
LOCATION ZONE: FACE
LOCATION ZONE: ARM
LOCATION ZONE: TRUNK
LOCATION ZONE: HAND
LOCATION ZONE: SCALP
LOCATION ZONE: LEG

## 2024-07-10 NOTE — PROCEDURE: DIAGNOSIS COMMENT
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
Render Risk Assessment In Note?: no
Detail Level: Detailed

## 2024-07-12 ENCOUNTER — PHARMACY VISIT (OUTPATIENT)
Dept: PHARMACY | Facility: MEDICAL CENTER | Age: 79
End: 2024-07-12
Payer: COMMERCIAL

## 2024-07-19 DIAGNOSIS — E78.5 HYPERLIPIDEMIA, UNSPECIFIED HYPERLIPIDEMIA TYPE: ICD-10-CM

## 2024-07-19 DIAGNOSIS — E78.2 MIXED HYPERLIPIDEMIA: ICD-10-CM

## 2024-07-19 PROCEDURE — RXMED WILLOW AMBULATORY MEDICATION CHARGE

## 2024-07-23 RX ORDER — EZETIMIBE 10 MG/1
10 TABLET ORAL DAILY
Qty: 100 TABLET | Refills: 2 | OUTPATIENT
Start: 2024-07-23

## 2024-07-25 DIAGNOSIS — E78.5 HYPERLIPIDEMIA, UNSPECIFIED HYPERLIPIDEMIA TYPE: ICD-10-CM

## 2024-07-25 DIAGNOSIS — E78.2 MIXED HYPERLIPIDEMIA: ICD-10-CM

## 2024-07-25 RX ORDER — EZETIMIBE 10 MG/1
10 TABLET ORAL DAILY
Qty: 100 TABLET | Refills: 2 | OUTPATIENT
Start: 2024-07-25

## 2024-07-26 ENCOUNTER — PHARMACY VISIT (OUTPATIENT)
Dept: PHARMACY | Facility: MEDICAL CENTER | Age: 79
End: 2024-07-26
Payer: COMMERCIAL

## 2024-07-26 DIAGNOSIS — E78.5 HYPERLIPIDEMIA, UNSPECIFIED HYPERLIPIDEMIA TYPE: ICD-10-CM

## 2024-07-26 DIAGNOSIS — E78.2 MIXED HYPERLIPIDEMIA: ICD-10-CM

## 2024-07-29 ENCOUNTER — PHARMACY VISIT (OUTPATIENT)
Dept: PHARMACY | Facility: MEDICAL CENTER | Age: 79
End: 2024-07-29
Payer: COMMERCIAL

## 2024-07-29 DIAGNOSIS — E78.2 MIXED HYPERLIPIDEMIA: ICD-10-CM

## 2024-07-29 DIAGNOSIS — E78.5 HYPERLIPIDEMIA, UNSPECIFIED HYPERLIPIDEMIA TYPE: ICD-10-CM

## 2024-07-29 PROCEDURE — RXMED WILLOW AMBULATORY MEDICATION CHARGE

## 2024-07-29 RX ORDER — EZETIMIBE 10 MG/1
10 TABLET ORAL DAILY
Qty: 100 TABLET | Refills: 3 | Status: SHIPPED | OUTPATIENT
Start: 2024-07-29

## 2024-07-29 RX ORDER — EZETIMIBE 10 MG/1
10 TABLET ORAL DAILY
Qty: 100 TABLET | Refills: 2 | OUTPATIENT
Start: 2024-07-29

## 2024-08-14 DIAGNOSIS — K21.00 GASTROESOPHAGEAL REFLUX DISEASE WITH ESOPHAGITIS: ICD-10-CM

## 2024-08-14 DIAGNOSIS — K21.9 GASTROESOPHAGEAL REFLUX DISEASE WITHOUT ESOPHAGITIS: ICD-10-CM

## 2024-08-14 PROCEDURE — RXMED WILLOW AMBULATORY MEDICATION CHARGE

## 2024-08-14 NOTE — TELEPHONE ENCOUNTER
Received request via: Pharmacy    Was the patient seen in the last year in this department? Yes    Does the patient have an active prescription (recently filled or refills available) for medication(s) requested? No    Pharmacy Name: [facility]    Does the patient have detention Plus and need 100-day supply? (This applies to ALL medications) Yes, quantity updated to 100 days

## 2024-08-19 ENCOUNTER — PHARMACY VISIT (OUTPATIENT)
Dept: PHARMACY | Facility: MEDICAL CENTER | Age: 79
End: 2024-08-19
Payer: COMMERCIAL

## 2024-08-21 PROCEDURE — RXMED WILLOW AMBULATORY MEDICATION CHARGE: Performed by: UROLOGY

## 2024-08-28 ENCOUNTER — PHARMACY VISIT (OUTPATIENT)
Dept: PHARMACY | Facility: MEDICAL CENTER | Age: 79
End: 2024-08-28
Payer: COMMERCIAL

## 2024-08-28 RX ORDER — ACETAZOLAMIDE 125 MG/1
TABLET ORAL
Qty: 90 TABLET | Refills: 2 | OUTPATIENT
Start: 2024-08-28

## 2024-09-03 ENCOUNTER — PHARMACY VISIT (OUTPATIENT)
Dept: PHARMACY | Facility: MEDICAL CENTER | Age: 79
End: 2024-09-03
Payer: COMMERCIAL

## 2024-09-03 DIAGNOSIS — E78.2 MIXED HYPERLIPIDEMIA: ICD-10-CM

## 2024-09-03 DIAGNOSIS — E78.5 HYPERLIPIDEMIA, UNSPECIFIED HYPERLIPIDEMIA TYPE: ICD-10-CM

## 2024-09-03 PROCEDURE — RXMED WILLOW AMBULATORY MEDICATION CHARGE: Performed by: OPHTHALMOLOGY

## 2024-09-03 PROCEDURE — RXMED WILLOW AMBULATORY MEDICATION CHARGE: Performed by: UROLOGY

## 2024-09-03 PROCEDURE — RXMED WILLOW AMBULATORY MEDICATION CHARGE

## 2024-09-03 RX ORDER — ATORVASTATIN CALCIUM 80 MG/1
80 TABLET, FILM COATED ORAL EVERY EVENING
Qty: 100 TABLET | Refills: 2 | Status: SHIPPED | OUTPATIENT
Start: 2024-09-03

## 2024-09-03 RX ORDER — BRIMONIDINE TARTRATE AND TIMOLOL MALEATE 2; 5 MG/ML; MG/ML
SOLUTION OPHTHALMIC
Qty: 15 ML | Refills: 3 | OUTPATIENT
Start: 2024-09-03

## 2024-09-03 RX ORDER — ACETAZOLAMIDE 125 MG/1
125 TABLET ORAL DAILY
Qty: 90 TABLET | Refills: 3 | OUTPATIENT
Start: 2024-09-03

## 2024-09-03 NOTE — TELEPHONE ENCOUNTER
Received request via: Patient    Was the patient seen in the last year in this department? Yes    Does the patient have an active prescription (recently filled or refills available) for medication(s) requested? No    Pharmacy Name: [facility]    Does the patient have [facility] and need 100-day supply? (This applies to ALL medications) Yes, quantity updated to 100 days

## 2024-09-11 PROCEDURE — RXMED WILLOW AMBULATORY MEDICATION CHARGE

## 2024-09-13 ENCOUNTER — PHARMACY VISIT (OUTPATIENT)
Dept: PHARMACY | Facility: MEDICAL CENTER | Age: 79
End: 2024-09-13
Payer: COMMERCIAL

## 2024-10-01 ENCOUNTER — APPOINTMENT (OUTPATIENT)
Dept: URGENT CARE | Facility: CLINIC | Age: 79
End: 2024-10-01

## 2024-10-01 ENCOUNTER — OFFICE VISIT (OUTPATIENT)
Dept: URGENT CARE | Facility: CLINIC | Age: 79
End: 2024-10-01
Payer: MEDICARE

## 2024-10-01 VITALS
DIASTOLIC BLOOD PRESSURE: 54 MMHG | TEMPERATURE: 98.3 F | BODY MASS INDEX: 24.64 KG/M2 | SYSTOLIC BLOOD PRESSURE: 106 MMHG | RESPIRATION RATE: 16 BRPM | WEIGHT: 157 LBS | HEART RATE: 125 BPM | OXYGEN SATURATION: 95 % | HEIGHT: 67 IN

## 2024-10-01 DIAGNOSIS — J02.9 SORE THROAT: ICD-10-CM

## 2024-10-01 DIAGNOSIS — J11.1 INFLUENZA-LIKE ILLNESS: Primary | ICD-10-CM

## 2024-10-01 DIAGNOSIS — R05.9 COUGH, UNSPECIFIED TYPE: ICD-10-CM

## 2024-10-01 DIAGNOSIS — Z20.818 STREP THROAT EXPOSURE: ICD-10-CM

## 2024-10-01 LAB — S PYO DNA SPEC NAA+PROBE: NOT DETECTED

## 2024-10-01 PROCEDURE — 99213 OFFICE O/P EST LOW 20 MIN: CPT | Performed by: PHYSICIAN ASSISTANT

## 2024-10-01 PROCEDURE — 3074F SYST BP LT 130 MM HG: CPT | Performed by: PHYSICIAN ASSISTANT

## 2024-10-01 PROCEDURE — 3078F DIAST BP <80 MM HG: CPT | Performed by: PHYSICIAN ASSISTANT

## 2024-10-01 PROCEDURE — 87651 STREP A DNA AMP PROBE: CPT | Performed by: PHYSICIAN ASSISTANT

## 2024-10-01 RX ORDER — DEXTROMETHORPHAN HYDROBROMIDE AND PROMETHAZINE HYDROCHLORIDE 15; 6.25 MG/5ML; MG/5ML
5 SYRUP ORAL EVERY 4 HOURS PRN
Qty: 180 ML | Refills: 0 | Status: SHIPPED | OUTPATIENT
Start: 2024-10-01 | End: 2024-10-11

## 2024-10-01 ASSESSMENT — FIBROSIS 4 INDEX: FIB4 SCORE: 2.62

## 2024-10-01 ASSESSMENT — ENCOUNTER SYMPTOMS
RHINORRHEA: 1
SORE THROAT: 1
HEADACHES: 1

## 2024-10-02 ASSESSMENT — ENCOUNTER SYMPTOMS
GASTROINTESTINAL NEGATIVE: 1
WHEEZING: 0
FEVER: 0
COUGH: 1
SHORTNESS OF BREATH: 0

## 2024-10-11 ENCOUNTER — PHARMACY VISIT (OUTPATIENT)
Dept: PHARMACY | Facility: MEDICAL CENTER | Age: 79
End: 2024-10-11
Payer: COMMERCIAL

## 2024-10-11 PROCEDURE — RXMED WILLOW AMBULATORY MEDICATION CHARGE

## 2024-10-16 PROCEDURE — RXMED WILLOW AMBULATORY MEDICATION CHARGE: Performed by: OPHTHALMOLOGY

## 2024-10-18 ENCOUNTER — PHARMACY VISIT (OUTPATIENT)
Dept: PHARMACY | Facility: MEDICAL CENTER | Age: 79
End: 2024-10-18
Payer: COMMERCIAL

## 2024-10-30 ENCOUNTER — OFFICE VISIT (OUTPATIENT)
Dept: MEDICAL GROUP | Facility: PHYSICIAN GROUP | Age: 79
End: 2024-10-30
Payer: MEDICARE

## 2024-10-30 VITALS
WEIGHT: 151 LBS | OXYGEN SATURATION: 98 % | TEMPERATURE: 96.5 F | SYSTOLIC BLOOD PRESSURE: 110 MMHG | BODY MASS INDEX: 23.7 KG/M2 | HEART RATE: 82 BPM | HEIGHT: 67 IN | DIASTOLIC BLOOD PRESSURE: 50 MMHG

## 2024-10-30 DIAGNOSIS — E11.29 TYPE 2 DIABETES MELLITUS WITH MICROALBUMINURIA (HCC): ICD-10-CM

## 2024-10-30 DIAGNOSIS — J18.9 COMMUNITY ACQUIRED PNEUMONIA OF RIGHT LOWER LOBE OF LUNG: ICD-10-CM

## 2024-10-30 DIAGNOSIS — R80.9 TYPE 2 DIABETES MELLITUS WITH MICROALBUMINURIA (HCC): ICD-10-CM

## 2024-10-30 PROCEDURE — RXMED WILLOW AMBULATORY MEDICATION CHARGE

## 2024-10-30 RX ORDER — DOXYCYCLINE HYCLATE 100 MG
100 TABLET ORAL 2 TIMES DAILY
Qty: 14 TABLET | Refills: 0 | Status: SHIPPED | OUTPATIENT
Start: 2024-10-30

## 2024-10-30 ASSESSMENT — FIBROSIS 4 INDEX: FIB4 SCORE: 2.62

## 2024-10-30 ASSESSMENT — ENCOUNTER SYMPTOMS
SPUTUM PRODUCTION: 1
WEAKNESS: 0
SHORTNESS OF BREATH: 1
NAUSEA: 0
FEVER: 0
DIARRHEA: 0
WEIGHT LOSS: 0
DIZZINESS: 0
MYALGIAS: 0
ABDOMINAL PAIN: 0
CONSTIPATION: 0
HEADACHES: 0
BLURRED VISION: 0
VOMITING: 0
COUGH: 1
CHILLS: 0

## 2024-10-31 ENCOUNTER — PHARMACY VISIT (OUTPATIENT)
Dept: PHARMACY | Facility: MEDICAL CENTER | Age: 79
End: 2024-10-31
Payer: COMMERCIAL

## 2024-11-01 ENCOUNTER — PHARMACY VISIT (OUTPATIENT)
Dept: PHARMACY | Facility: MEDICAL CENTER | Age: 79
End: 2024-11-01
Payer: COMMERCIAL

## 2024-11-01 PROCEDURE — RXMED WILLOW AMBULATORY MEDICATION CHARGE

## 2024-11-12 ENCOUNTER — NON-PROVIDER VISIT (OUTPATIENT)
Dept: MEDICAL GROUP | Facility: PHYSICIAN GROUP | Age: 79
End: 2024-11-12
Payer: MEDICARE

## 2024-11-12 DIAGNOSIS — Z23 NEED FOR VACCINATION: ICD-10-CM

## 2024-11-12 PROCEDURE — G0008 ADMIN INFLUENZA VIRUS VAC: HCPCS

## 2024-11-12 PROCEDURE — 90662 IIV NO PRSV INCREASED AG IM: CPT

## 2024-11-13 PROCEDURE — RXMED WILLOW AMBULATORY MEDICATION CHARGE

## 2024-11-13 PROCEDURE — RXMED WILLOW AMBULATORY MEDICATION CHARGE: Performed by: UROLOGY

## 2024-11-13 NOTE — PROGRESS NOTES
"Sam Mueller is a 79 y.o. male here for a non-provider visit for:   FLU    Reason for immunization: Annual Flu Vaccine  Immunization records indicate need for vaccine: Yes, confirmed with Epic  Minimum interval has been met for this vaccine: Yes  ABN completed: Yes    VIS Dated  0058/06/2021 was given to patient: Yes  All IAC Questionnaire questions were answered \"No.\"    Patient tolerated injection and no adverse effects were observed or reported: Yes    Pt scheduled for next dose in series: No   "

## 2024-11-15 ENCOUNTER — PHARMACY VISIT (OUTPATIENT)
Dept: PHARMACY | Facility: MEDICAL CENTER | Age: 79
End: 2024-11-15
Payer: COMMERCIAL

## 2024-11-29 PROCEDURE — RXMED WILLOW AMBULATORY MEDICATION CHARGE: Performed by: UROLOGY

## 2024-12-03 ENCOUNTER — PHARMACY VISIT (OUTPATIENT)
Dept: PHARMACY | Facility: MEDICAL CENTER | Age: 79
End: 2024-12-03
Payer: COMMERCIAL

## 2024-12-04 PROCEDURE — RXMED WILLOW AMBULATORY MEDICATION CHARGE

## 2024-12-09 PROCEDURE — RXMED WILLOW AMBULATORY MEDICATION CHARGE: Performed by: OPHTHALMOLOGY

## 2024-12-12 PROCEDURE — RXMED WILLOW AMBULATORY MEDICATION CHARGE

## 2024-12-16 ENCOUNTER — PHARMACY VISIT (OUTPATIENT)
Dept: PHARMACY | Facility: MEDICAL CENTER | Age: 79
End: 2024-12-16
Payer: COMMERCIAL

## 2024-12-27 ENCOUNTER — HOSPITAL ENCOUNTER (OUTPATIENT)
Dept: LAB | Facility: MEDICAL CENTER | Age: 79
End: 2024-12-27
Payer: MEDICARE

## 2024-12-27 DIAGNOSIS — C61 HORMONE SENSITIVE PROSTATE CANCER (HCC): ICD-10-CM

## 2024-12-27 DIAGNOSIS — R80.9 TYPE 2 DIABETES MELLITUS WITH MICROALBUMINURIA (HCC): ICD-10-CM

## 2024-12-27 DIAGNOSIS — E11.69 HYPERLIPIDEMIA ASSOCIATED WITH TYPE 2 DIABETES MELLITUS (HCC): ICD-10-CM

## 2024-12-27 DIAGNOSIS — E78.5 HYPERLIPIDEMIA ASSOCIATED WITH TYPE 2 DIABETES MELLITUS (HCC): ICD-10-CM

## 2024-12-27 DIAGNOSIS — E11.29 TYPE 2 DIABETES MELLITUS WITH MICROALBUMINURIA (HCC): ICD-10-CM

## 2024-12-27 DIAGNOSIS — Z12.5 ENCOUNTER FOR SCREENING FOR MALIGNANT NEOPLASM OF PROSTATE: ICD-10-CM

## 2024-12-27 DIAGNOSIS — D64.9 ANEMIA, UNSPECIFIED TYPE: ICD-10-CM

## 2024-12-27 DIAGNOSIS — Z19.1 HORMONE SENSITIVE PROSTATE CANCER (HCC): ICD-10-CM

## 2024-12-27 LAB
ALBUMIN SERPL BCP-MCNC: 4.3 G/DL (ref 3.2–4.9)
ALBUMIN/GLOB SERPL: 1.4 G/DL
ALP SERPL-CCNC: 106 U/L (ref 30–99)
ALT SERPL-CCNC: 23 U/L (ref 2–50)
ANION GAP SERPL CALC-SCNC: 10 MMOL/L (ref 7–16)
AST SERPL-CCNC: 24 U/L (ref 12–45)
BASOPHILS # BLD AUTO: 0.3 % (ref 0–1.8)
BASOPHILS # BLD: 0.02 K/UL (ref 0–0.12)
BILIRUB SERPL-MCNC: 0.4 MG/DL (ref 0.1–1.5)
BUN SERPL-MCNC: 30 MG/DL (ref 8–22)
CALCIUM ALBUM COR SERPL-MCNC: 9.1 MG/DL (ref 8.5–10.5)
CALCIUM SERPL-MCNC: 9.3 MG/DL (ref 8.5–10.5)
CHLORIDE SERPL-SCNC: 103 MMOL/L (ref 96–112)
CHOLEST SERPL-MCNC: 137 MG/DL (ref 100–199)
CO2 SERPL-SCNC: 22 MMOL/L (ref 20–33)
CREAT SERPL-MCNC: 1.21 MG/DL (ref 0.5–1.4)
CREAT UR-MCNC: 106.32 MG/DL
EOSINOPHIL # BLD AUTO: 0.02 K/UL (ref 0–0.51)
EOSINOPHIL NFR BLD: 0.3 % (ref 0–6.9)
ERYTHROCYTE [DISTWIDTH] IN BLOOD BY AUTOMATED COUNT: 43.9 FL (ref 35.9–50)
EST. AVERAGE GLUCOSE BLD GHB EST-MCNC: 143 MG/DL
GFR SERPLBLD CREATININE-BSD FMLA CKD-EPI: 60 ML/MIN/1.73 M 2
GLOBULIN SER CALC-MCNC: 3.1 G/DL (ref 1.9–3.5)
GLUCOSE SERPL-MCNC: 134 MG/DL (ref 65–99)
HBA1C MFR BLD: 6.6 % (ref 4–5.6)
HCT VFR BLD AUTO: 43 % (ref 42–52)
HDLC SERPL-MCNC: 38 MG/DL
HGB BLD-MCNC: 13.7 G/DL (ref 14–18)
IMM GRANULOCYTES # BLD AUTO: 0.02 K/UL (ref 0–0.11)
IMM GRANULOCYTES NFR BLD AUTO: 0.3 % (ref 0–0.9)
LDLC SERPL CALC-MCNC: 75 MG/DL
LYMPHOCYTES # BLD AUTO: 1.91 K/UL (ref 1–4.8)
LYMPHOCYTES NFR BLD: 30.9 % (ref 22–41)
MCH RBC QN AUTO: 28.2 PG (ref 27–33)
MCHC RBC AUTO-ENTMCNC: 31.9 G/DL (ref 32.3–36.5)
MCV RBC AUTO: 88.7 FL (ref 81.4–97.8)
MICROALBUMIN UR-MCNC: 73.2 MG/DL
MICROALBUMIN/CREAT UR: 688 MG/G (ref 0–30)
MONOCYTES # BLD AUTO: 1.81 K/UL (ref 0–0.85)
MONOCYTES NFR BLD AUTO: 29.2 % (ref 0–13.4)
NEUTROPHILS # BLD AUTO: 2.41 K/UL (ref 1.82–7.42)
NEUTROPHILS NFR BLD: 39 % (ref 44–72)
NRBC # BLD AUTO: 0 K/UL
NRBC BLD-RTO: 0 /100 WBC (ref 0–0.2)
PLATELET # BLD AUTO: 158 K/UL (ref 164–446)
PMV BLD AUTO: 10.9 FL (ref 9–12.9)
POTASSIUM SERPL-SCNC: 5.1 MMOL/L (ref 3.6–5.5)
PROT SERPL-MCNC: 7.4 G/DL (ref 6–8.2)
PSA SERPL DL<=0.01 NG/ML-MCNC: <0.02 NG/ML (ref 0–4)
RBC # BLD AUTO: 4.85 M/UL (ref 4.7–6.1)
SODIUM SERPL-SCNC: 135 MMOL/L (ref 135–145)
TESTOST SERPL-MCNC: <3 NG/DL (ref 175–781)
TRIGL SERPL-MCNC: 122 MG/DL (ref 0–149)
WBC # BLD AUTO: 6.2 K/UL (ref 4.8–10.8)

## 2024-12-27 PROCEDURE — 82043 UR ALBUMIN QUANTITATIVE: CPT

## 2024-12-27 PROCEDURE — 80053 COMPREHEN METABOLIC PANEL: CPT

## 2024-12-27 PROCEDURE — 84153 ASSAY OF PSA TOTAL: CPT

## 2024-12-27 PROCEDURE — 36415 COLL VENOUS BLD VENIPUNCTURE: CPT

## 2024-12-27 PROCEDURE — 83036 HEMOGLOBIN GLYCOSYLATED A1C: CPT

## 2024-12-27 PROCEDURE — 80061 LIPID PANEL: CPT

## 2024-12-27 PROCEDURE — 85025 COMPLETE CBC W/AUTO DIFF WBC: CPT

## 2024-12-27 PROCEDURE — 82570 ASSAY OF URINE CREATININE: CPT

## 2024-12-27 PROCEDURE — 84403 ASSAY OF TOTAL TESTOSTERONE: CPT

## 2024-12-31 ENCOUNTER — OFFICE VISIT (OUTPATIENT)
Dept: MEDICAL GROUP | Facility: PHYSICIAN GROUP | Age: 79
End: 2024-12-31
Payer: MEDICARE

## 2024-12-31 VITALS
SYSTOLIC BLOOD PRESSURE: 90 MMHG | TEMPERATURE: 97.2 F | BODY MASS INDEX: 23.76 KG/M2 | WEIGHT: 151.4 LBS | OXYGEN SATURATION: 97 % | HEART RATE: 80 BPM | HEIGHT: 67 IN | DIASTOLIC BLOOD PRESSURE: 50 MMHG

## 2024-12-31 DIAGNOSIS — E11.29 TYPE 2 DIABETES MELLITUS WITH MICROALBUMINURIA (HCC): ICD-10-CM

## 2024-12-31 DIAGNOSIS — E11.69 HYPERLIPIDEMIA ASSOCIATED WITH TYPE 2 DIABETES MELLITUS (HCC): ICD-10-CM

## 2024-12-31 DIAGNOSIS — Z00.00 HEALTHCARE MAINTENANCE: ICD-10-CM

## 2024-12-31 DIAGNOSIS — R80.9 TYPE 2 DIABETES MELLITUS WITH MICROALBUMINURIA (HCC): ICD-10-CM

## 2024-12-31 DIAGNOSIS — D64.89 ANEMIA DUE TO OTHER CAUSE, NOT CLASSIFIED: ICD-10-CM

## 2024-12-31 DIAGNOSIS — E78.5 HYPERLIPIDEMIA ASSOCIATED WITH TYPE 2 DIABETES MELLITUS (HCC): ICD-10-CM

## 2024-12-31 ASSESSMENT — ENCOUNTER SYMPTOMS
ABDOMINAL PAIN: 0
WEAKNESS: 0
SHORTNESS OF BREATH: 0
NAUSEA: 0
CONSTIPATION: 0
CHILLS: 0
DIARRHEA: 0
HEADACHES: 0
WEIGHT LOSS: 0
BLURRED VISION: 0
VOMITING: 0
DIZZINESS: 0
MYALGIAS: 0
COUGH: 0
FEVER: 0

## 2024-12-31 ASSESSMENT — FIBROSIS 4 INDEX: FIB4 SCORE: 2.57

## 2024-12-31 NOTE — PROGRESS NOTES
Verbal consent was acquired by the patient to use Immediately ambient listening note generation during this visit  Subjective:     CC:  Diagnoses of Hyperlipidemia associated with type 2 diabetes mellitus (HCC), Anemia due to other cause, not classified, Type 2 diabetes mellitus with microalbuminuria (HCC), and Healthcare maintenance were pertinent to this visit.    HISTORY OF THE PRESENT ILLNESS: Patient is a 79 y.o. male.   No problems updated.    History of Present Illness  The patient presents for evaluation of a cough.    He reports an improvement in his condition compared to the previous week. He experienced a recurrence of his cough, similar to a previous episode, which began just before [place]. The cough progressively worsened and was accompanied by congestion. He sought medical attention to prevent further deterioration. He has not been expectorating any sputum. Prior to the onset of the cough, he was in good health and currently feels well. His wife is not sick. His grandchildren had a mild cough, but they have since recovered. He was around them at [place] time, but he kept his distance. He managed his symptoms with Sudafed, Mucinex DM, and leftover cough medication from a previous prescription. He also used promethazine cough syrup, which had . He has been off medication for the past 4 days and reports that his cough has largely resolved.    He is currently under the care of a urologist and has discontinued follow-up with his oncologist and nephrologist unless necessary.    FAMILY HISTORY  His father had Alzheimer's disease and passed away at nearly 95 years old.    MEDICATIONS  Sudafed, Mucinex DM, promethazine    ROS:   Review of Systems   Constitutional:  Negative for chills, fever, malaise/fatigue and weight loss.   Eyes:  Negative for blurred vision.   Respiratory:  Negative for cough and shortness of breath.    Cardiovascular:  Negative for chest pain.   Gastrointestinal:  Negative for  "abdominal pain, constipation, diarrhea, nausea and vomiting.   Musculoskeletal:  Negative for myalgias.   Neurological:  Negative for dizziness, weakness and headaches.         Objective:     Exam: BP 90/50 (BP Location: Left arm, Patient Position: Sitting, BP Cuff Size: Adult)   Pulse 80   Temp 36.2 °C (97.2 °F) (Temporal)   Ht 1.702 m (5' 7\")   Wt 68.7 kg (151 lb 6.4 oz)   SpO2 97%  Body mass index is 23.71 kg/m².    Physical Exam  Constitutional:       Appearance: Normal appearance.   HENT:      Head: Normocephalic.      Mouth/Throat:      Mouth: Mucous membranes are moist.      Pharynx: Oropharynx is clear. No oropharyngeal exudate or posterior oropharyngeal erythema.   Eyes:      Conjunctiva/sclera: Conjunctivae normal.      Pupils: Pupils are equal, round, and reactive to light.   Cardiovascular:      Rate and Rhythm: Normal rate and regular rhythm.      Heart sounds: No murmur heard.  Pulmonary:      Effort: Pulmonary effort is normal. No respiratory distress.      Breath sounds: Normal breath sounds.   Musculoskeletal:         General: Normal range of motion.   Lymphadenopathy:      Cervical: No cervical adenopathy.   Skin:     General: Skin is warm and dry.   Neurological:      General: No focal deficit present.      Mental Status: He is alert and oriented to person, place, and time.   Psychiatric:         Mood and Affect: Mood normal.         Behavior: Behavior normal.           Labs:     Hospital Outpatient Visit on 12/27/2024   Component Date Value    Creatinine, Urine 12/27/2024 106.32     Microalbumin, Urine Rand* 12/27/2024 73.2     Micro Alb Creat Ratio 12/27/2024 688 (H)     WBC 12/27/2024 6.2     RBC 12/27/2024 4.85     Hemoglobin 12/27/2024 13.7 (L)     Hematocrit 12/27/2024 43.0     MCV 12/27/2024 88.7     MCH 12/27/2024 28.2     MCHC 12/27/2024 31.9 (L)     RDW 12/27/2024 43.9     Platelet Count 12/27/2024 158 (L)     MPV 12/27/2024 10.9     Neutrophils-Polys 12/27/2024 39.00 (L)     " Lymphocytes 12/27/2024 30.90     Monocytes 12/27/2024 29.20 (H)     Eosinophils 12/27/2024 0.30     Basophils 12/27/2024 0.30     Immature Granulocytes 12/27/2024 0.30     Nucleated RBC 12/27/2024 0.00     Neutrophils (Absolute) 12/27/2024 2.41     Lymphs (Absolute) 12/27/2024 1.91     Monos (Absolute) 12/27/2024 1.81 (H)     Eos (Absolute) 12/27/2024 0.02     Baso (Absolute) 12/27/2024 0.02     Immature Granulocytes (a* 12/27/2024 0.02     NRBC (Absolute) 12/27/2024 0.00     Cholesterol,Tot 12/27/2024 137     Triglycerides 12/27/2024 122     HDL 12/27/2024 38 (A)     LDL 12/27/2024 75     Sodium 12/27/2024 135     Potassium 12/27/2024 5.1     Chloride 12/27/2024 103     Co2 12/27/2024 22     Anion Gap 12/27/2024 10.0     Glucose 12/27/2024 134 (H)     Bun 12/27/2024 30 (H)     Creatinine 12/27/2024 1.21     Calcium 12/27/2024 9.3     Correct Calcium 12/27/2024 9.1     AST(SGOT) 12/27/2024 24     ALT(SGPT) 12/27/2024 23     Alkaline Phosphatase 12/27/2024 106 (H)     Total Bilirubin 12/27/2024 0.4     Albumin 12/27/2024 4.3     Total Protein 12/27/2024 7.4     Globulin 12/27/2024 3.1     A-G Ratio 12/27/2024 1.4     Glycohemoglobin 12/27/2024 6.6 (H)     Est Avg Glucose 12/27/2024 143     Testosterone,Total 12/27/2024 <3 (L)     Prostatic Specific Antig* 12/27/2024 <0.02     GFR (CKD-EPI) 12/27/2024 60          Assessment & Plan: Medical Decision Making   79 y.o. male with the following -    Assessment & Plan  1. Cough.  He reported a recurrence of his cough and congestion starting before [place]. He resumed his regimen of Sudafed, Mucinex DM, and previously prescribed cough medications, including promethazine syrup. He has not taken any medication for the past 4 days, and his cough has significantly improved. Upon examination, his lungs sound clear with no signs of pneumonia or other concerning findings. He is advised to maintain his current treatment regimen. If his cough worsens or fails to improve, he should  contact the office immediately.    2. Anemia.  His hemoglobin levels have improved to 13.7, nearing the normal range. He should continue his current regimen, including vitamin D3 supplementation. His hemoglobin levels will be rechecked in 6 months to ensure continued improvement.    3. Diabetes Mellitus.  His A1c has decreased from 6.9 to 6.6, indicating good control of his diabetes. Glucose levels were slightly elevated, which is expected due to his diabetic condition. He should continue his current diabetes management plan, empagliflozin 10 mg/day.    4. Elevated microalbumin.  He is spilling a bit of protein into the urine, with a microalbumin level of 688, which is the highest recorded for him. This will be rechecked in 6 months to monitor kidney function. He should continue drinking plenty of water.  Patient will continue ACE inhibitor lisinopril 2.5 mg daily    5. Health Maintenance.  His metabolic panel is within normal limits, and his liver, kidney, and electrolyte levels are satisfactory. His GFR fluctuates between the 40s and 60s, but it is currently at a healthy level. His prostate and testosterone levels are undetectable. He is scheduled for an eye exam in March 2025.    6.  Hyperlipidemia  Chronic stable condition therefore we will continue current regimen of atorvastatin 80 mg nightly and efforts towards healthy diet and exercise.    Follow-up  The patient is scheduled for a follow-up visit on 06/30/2025 at 9:00 AM.      Problem List Items Addressed This Visit       Hyperlipidemia associated with type 2 diabetes mellitus (HCC)    Relevant Orders    MICROALBUMIN CREAT RATIO URINE    Lipid Profile    Comp Metabolic Panel    Anemia    Relevant Orders    CBC WITH DIFFERENTIAL    Type 2 diabetes mellitus with microalbuminuria (HCC)    Relevant Orders    MICROALBUMIN CREAT RATIO URINE    Comp Metabolic Panel    HEMOGLOBIN A1C     Other Visit Diagnoses       Healthcare maintenance        Relevant Orders     CBC WITH DIFFERENTIAL    MICROALBUMIN CREAT RATIO URINE    Lipid Profile    Comp Metabolic Panel    HEMOGLOBIN A1C            Differential diagnosis, natural history, supportive care, and indications for immediate follow-up discussed.  Shared decision making approach was utilized, and patient is amendable with plan of care.  Patient understands to return to clinic or go to the emergency department if symptoms worsen. All questions and concerns addressed to the best of my knowledge.      Return in about 6 months (around 6/30/2025), or if symptoms worsen or fail to improve.    Please note that this dictation was created using voice recognition software. I have made every reasonable attempt to correct obvious errors, but I expect that there are errors of grammar and possibly content that I did not discover before finalizing the note.

## 2025-01-08 ENCOUNTER — APPOINTMENT (OUTPATIENT)
Dept: MEDICAL GROUP | Facility: PHYSICIAN GROUP | Age: 80
End: 2025-01-08
Payer: MEDICARE

## 2025-01-13 PROCEDURE — RXMED WILLOW AMBULATORY MEDICATION CHARGE

## 2025-01-15 ENCOUNTER — PHARMACY VISIT (OUTPATIENT)
Dept: PHARMACY | Facility: MEDICAL CENTER | Age: 80
End: 2025-01-15
Payer: COMMERCIAL

## 2025-02-11 DIAGNOSIS — I10 ESSENTIAL HYPERTENSION, BENIGN: ICD-10-CM

## 2025-02-11 PROCEDURE — RXMED WILLOW AMBULATORY MEDICATION CHARGE: Performed by: UROLOGY

## 2025-02-11 PROCEDURE — RXMED WILLOW AMBULATORY MEDICATION CHARGE

## 2025-02-11 RX ORDER — LISINOPRIL 2.5 MG/1
2.5 TABLET ORAL DAILY
Qty: 100 TABLET | Refills: 3 | Status: SHIPPED | OUTPATIENT
Start: 2025-02-11

## 2025-02-11 RX ORDER — LISINOPRIL 2.5 MG/1
2.5 TABLET ORAL DAILY
Qty: 100 TABLET | Refills: 3 | OUTPATIENT
Start: 2025-02-11

## 2025-02-11 NOTE — TELEPHONE ENCOUNTER
Received request via: Patient    Was the patient seen in the last year in this department? Yes    Does the patient have an active prescription (recently filled or refills available) for medication(s) requested? No    [facility]      Does the patient have half-way Plus and need 100-day supply? (This applies to ALL medications) Yes, quantity updated to 100 days

## 2025-02-12 ENCOUNTER — PHARMACY VISIT (OUTPATIENT)
Dept: PHARMACY | Facility: MEDICAL CENTER | Age: 80
End: 2025-02-12
Payer: COMMERCIAL

## 2025-03-03 PROCEDURE — RXMED WILLOW AMBULATORY MEDICATION CHARGE

## 2025-03-03 PROCEDURE — RXMED WILLOW AMBULATORY MEDICATION CHARGE: Performed by: UROLOGY

## 2025-03-04 ENCOUNTER — PHARMACY VISIT (OUTPATIENT)
Dept: PHARMACY | Facility: MEDICAL CENTER | Age: 80
End: 2025-03-04
Payer: COMMERCIAL

## 2025-03-13 PROCEDURE — RXMED WILLOW AMBULATORY MEDICATION CHARGE: Performed by: OPHTHALMOLOGY

## 2025-03-13 PROCEDURE — RXMED WILLOW AMBULATORY MEDICATION CHARGE

## 2025-03-20 ENCOUNTER — PHARMACY VISIT (OUTPATIENT)
Dept: PHARMACY | Facility: MEDICAL CENTER | Age: 80
End: 2025-03-20
Payer: COMMERCIAL

## 2025-03-31 PROCEDURE — RXMED WILLOW AMBULATORY MEDICATION CHARGE: Performed by: OPHTHALMOLOGY

## 2025-04-01 ENCOUNTER — PHARMACY VISIT (OUTPATIENT)
Dept: PHARMACY | Facility: MEDICAL CENTER | Age: 80
End: 2025-04-01
Payer: COMMERCIAL

## 2025-05-15 PROCEDURE — RXMED WILLOW AMBULATORY MEDICATION CHARGE

## 2025-05-16 ENCOUNTER — PHARMACY VISIT (OUTPATIENT)
Dept: PHARMACY | Facility: MEDICAL CENTER | Age: 80
End: 2025-05-16
Payer: COMMERCIAL

## 2025-05-22 PROCEDURE — RXMED WILLOW AMBULATORY MEDICATION CHARGE: Performed by: UROLOGY

## 2025-05-28 ENCOUNTER — PHARMACY VISIT (OUTPATIENT)
Dept: PHARMACY | Facility: MEDICAL CENTER | Age: 80
End: 2025-05-28
Payer: COMMERCIAL

## 2025-06-05 PROCEDURE — RXMED WILLOW AMBULATORY MEDICATION CHARGE

## 2025-06-06 ENCOUNTER — PHARMACY VISIT (OUTPATIENT)
Dept: PHARMACY | Facility: MEDICAL CENTER | Age: 80
End: 2025-06-06
Payer: COMMERCIAL

## 2025-06-18 DIAGNOSIS — E78.2 MIXED HYPERLIPIDEMIA: ICD-10-CM

## 2025-06-18 DIAGNOSIS — E78.5 HYPERLIPIDEMIA, UNSPECIFIED HYPERLIPIDEMIA TYPE: ICD-10-CM

## 2025-06-18 DIAGNOSIS — E11.9 DIABETES MELLITUS WITHOUT COMPLICATION (HCC): ICD-10-CM

## 2025-06-18 RX ORDER — ATORVASTATIN CALCIUM 80 MG/1
80 TABLET, FILM COATED ORAL EVERY EVENING
Qty: 100 TABLET | Refills: 2 | OUTPATIENT
Start: 2025-06-18

## 2025-06-18 RX ORDER — EMPAGLIFLOZIN 10 MG/1
10 TABLET, FILM COATED ORAL DAILY
Qty: 100 TABLET | Refills: 3 | OUTPATIENT
Start: 2025-06-18

## 2025-06-19 DIAGNOSIS — E78.2 MIXED HYPERLIPIDEMIA: ICD-10-CM

## 2025-06-19 DIAGNOSIS — E78.5 HYPERLIPIDEMIA, UNSPECIFIED HYPERLIPIDEMIA TYPE: ICD-10-CM

## 2025-06-19 DIAGNOSIS — E11.9 DIABETES MELLITUS WITHOUT COMPLICATION (HCC): ICD-10-CM

## 2025-06-19 PROCEDURE — RXMED WILLOW AMBULATORY MEDICATION CHARGE

## 2025-06-19 RX ORDER — ATORVASTATIN CALCIUM 80 MG/1
80 TABLET, FILM COATED ORAL EVERY EVENING
Qty: 100 TABLET | Refills: 3 | Status: SHIPPED | OUTPATIENT
Start: 2025-06-19

## 2025-06-19 RX ORDER — EMPAGLIFLOZIN 10 MG/1
10 TABLET, FILM COATED ORAL DAILY
Qty: 100 TABLET | Refills: 3 | Status: SHIPPED | OUTPATIENT
Start: 2025-06-19

## 2025-06-19 NOTE — TELEPHONE ENCOUNTER
Received request via: Patient    Was the patient seen in the last year in this department? Yes    Does the patient have an active prescription (recently filled or refills available) for medication(s) requested? No    Pharmacy Name: [facility]    Does the patient have long-term Plus and need 100-day supply? (This applies to ALL medications) Yes, quantity updated to 100 days

## 2025-06-20 ENCOUNTER — PHARMACY VISIT (OUTPATIENT)
Dept: PHARMACY | Facility: MEDICAL CENTER | Age: 80
End: 2025-06-20
Payer: COMMERCIAL

## 2025-06-23 ENCOUNTER — HOSPITAL ENCOUNTER (OUTPATIENT)
Dept: LAB | Facility: MEDICAL CENTER | Age: 80
End: 2025-06-23
Payer: MEDICARE

## 2025-06-23 DIAGNOSIS — R80.9 TYPE 2 DIABETES MELLITUS WITH MICROALBUMINURIA (HCC): ICD-10-CM

## 2025-06-23 DIAGNOSIS — E11.69 HYPERLIPIDEMIA ASSOCIATED WITH TYPE 2 DIABETES MELLITUS (HCC): ICD-10-CM

## 2025-06-23 DIAGNOSIS — Z00.00 HEALTHCARE MAINTENANCE: ICD-10-CM

## 2025-06-23 DIAGNOSIS — D64.89 ANEMIA DUE TO OTHER CAUSE, NOT CLASSIFIED: ICD-10-CM

## 2025-06-23 DIAGNOSIS — E78.5 HYPERLIPIDEMIA ASSOCIATED WITH TYPE 2 DIABETES MELLITUS (HCC): ICD-10-CM

## 2025-06-23 DIAGNOSIS — E11.29 TYPE 2 DIABETES MELLITUS WITH MICROALBUMINURIA (HCC): ICD-10-CM

## 2025-06-23 LAB
ALBUMIN SERPL BCP-MCNC: 4.3 G/DL (ref 3.2–4.9)
ALBUMIN/GLOB SERPL: 1.5 G/DL
ALP SERPL-CCNC: 95 U/L (ref 30–99)
ALT SERPL-CCNC: 26 U/L (ref 2–50)
ANION GAP SERPL CALC-SCNC: 11 MMOL/L (ref 7–16)
AST SERPL-CCNC: 28 U/L (ref 12–45)
BASOPHILS # BLD AUTO: 0.5 % (ref 0–1.8)
BASOPHILS # BLD: 0.03 K/UL (ref 0–0.12)
BILIRUB SERPL-MCNC: 0.2 MG/DL (ref 0.1–1.5)
BUN SERPL-MCNC: 36 MG/DL (ref 8–22)
CALCIUM ALBUM COR SERPL-MCNC: 9.1 MG/DL (ref 8.5–10.5)
CALCIUM SERPL-MCNC: 9.3 MG/DL (ref 8.5–10.5)
CHLORIDE SERPL-SCNC: 108 MMOL/L (ref 96–112)
CHOLEST SERPL-MCNC: 142 MG/DL (ref 100–199)
CO2 SERPL-SCNC: 21 MMOL/L (ref 20–33)
CREAT SERPL-MCNC: 1.53 MG/DL (ref 0.5–1.4)
CREAT UR-MCNC: 31.9 MG/DL
EOSINOPHIL # BLD AUTO: 0.02 K/UL (ref 0–0.51)
EOSINOPHIL NFR BLD: 0.3 % (ref 0–6.9)
ERYTHROCYTE [DISTWIDTH] IN BLOOD BY AUTOMATED COUNT: 43.3 FL (ref 35.9–50)
EST. AVERAGE GLUCOSE BLD GHB EST-MCNC: 146 MG/DL
FASTING STATUS PATIENT QL REPORTED: NORMAL
GFR SERPLBLD CREATININE-BSD FMLA CKD-EPI: 45 ML/MIN/1.73 M 2
GLOBULIN SER CALC-MCNC: 2.9 G/DL (ref 1.9–3.5)
GLUCOSE SERPL-MCNC: 117 MG/DL (ref 65–99)
HBA1C MFR BLD: 6.7 % (ref 4–5.6)
HCT VFR BLD AUTO: 43.2 % (ref 42–52)
HDLC SERPL-MCNC: 35 MG/DL
HGB BLD-MCNC: 13.7 G/DL (ref 14–18)
IMM GRANULOCYTES # BLD AUTO: 0.03 K/UL (ref 0–0.11)
IMM GRANULOCYTES NFR BLD AUTO: 0.5 % (ref 0–0.9)
LDLC SERPL CALC-MCNC: 71 MG/DL
LYMPHOCYTES # BLD AUTO: 2.07 K/UL (ref 1–4.8)
LYMPHOCYTES NFR BLD: 36.1 % (ref 22–41)
MCH RBC QN AUTO: 28.7 PG (ref 27–33)
MCHC RBC AUTO-ENTMCNC: 31.7 G/DL (ref 32.3–36.5)
MCV RBC AUTO: 90.6 FL (ref 81.4–97.8)
MICROALBUMIN UR-MCNC: 16.7 MG/DL
MICROALBUMIN/CREAT UR: 524 MG/G (ref 0–30)
MONOCYTES # BLD AUTO: 1.19 K/UL (ref 0–0.85)
MONOCYTES NFR BLD AUTO: 20.8 % (ref 0–13.4)
NEUTROPHILS # BLD AUTO: 2.39 K/UL (ref 1.82–7.42)
NEUTROPHILS NFR BLD: 41.8 % (ref 44–72)
NRBC # BLD AUTO: 0 K/UL
NRBC BLD-RTO: 0 /100 WBC (ref 0–0.2)
PLATELET # BLD AUTO: 166 K/UL (ref 164–446)
PMV BLD AUTO: 11.2 FL (ref 9–12.9)
POTASSIUM SERPL-SCNC: 4.8 MMOL/L (ref 3.6–5.5)
PROT SERPL-MCNC: 7.2 G/DL (ref 6–8.2)
PSA SERPL DL<=0.01 NG/ML-MCNC: 0.02 NG/ML (ref 0–4)
RBC # BLD AUTO: 4.77 M/UL (ref 4.7–6.1)
SODIUM SERPL-SCNC: 140 MMOL/L (ref 135–145)
TRIGL SERPL-MCNC: 178 MG/DL (ref 0–149)
WBC # BLD AUTO: 5.7 K/UL (ref 4.8–10.8)

## 2025-06-23 PROCEDURE — 85025 COMPLETE CBC W/AUTO DIFF WBC: CPT

## 2025-06-23 PROCEDURE — 36415 COLL VENOUS BLD VENIPUNCTURE: CPT

## 2025-06-23 PROCEDURE — 80053 COMPREHEN METABOLIC PANEL: CPT

## 2025-06-23 PROCEDURE — 82570 ASSAY OF URINE CREATININE: CPT

## 2025-06-23 PROCEDURE — 83036 HEMOGLOBIN GLYCOSYLATED A1C: CPT

## 2025-06-23 PROCEDURE — 80061 LIPID PANEL: CPT

## 2025-06-23 PROCEDURE — 82043 UR ALBUMIN QUANTITATIVE: CPT

## 2025-06-23 PROCEDURE — 84153 ASSAY OF PSA TOTAL: CPT

## 2025-06-24 ENCOUNTER — RESULTS FOLLOW-UP (OUTPATIENT)
Dept: MEDICAL GROUP | Facility: PHYSICIAN GROUP | Age: 80
End: 2025-06-24

## 2025-06-30 ENCOUNTER — OFFICE VISIT (OUTPATIENT)
Dept: MEDICAL GROUP | Facility: PHYSICIAN GROUP | Age: 80
End: 2025-06-30
Payer: MEDICARE

## 2025-06-30 VITALS
SYSTOLIC BLOOD PRESSURE: 134 MMHG | DIASTOLIC BLOOD PRESSURE: 70 MMHG | HEIGHT: 67 IN | WEIGHT: 166 LBS | HEART RATE: 82 BPM | TEMPERATURE: 98.2 F | BODY MASS INDEX: 26.06 KG/M2 | OXYGEN SATURATION: 96 %

## 2025-06-30 DIAGNOSIS — Z85.46 HISTORY OF PROSTATE CANCER: Primary | ICD-10-CM

## 2025-06-30 DIAGNOSIS — E78.5 HYPERLIPIDEMIA, UNSPECIFIED HYPERLIPIDEMIA TYPE: ICD-10-CM

## 2025-06-30 DIAGNOSIS — Z91.09 ENVIRONMENTAL ALLERGIES: ICD-10-CM

## 2025-06-30 DIAGNOSIS — E11.69 HYPERLIPIDEMIA ASSOCIATED WITH TYPE 2 DIABETES MELLITUS (HCC): ICD-10-CM

## 2025-06-30 DIAGNOSIS — K21.00 GASTROESOPHAGEAL REFLUX DISEASE WITH ESOPHAGITIS: ICD-10-CM

## 2025-06-30 DIAGNOSIS — R80.9 TYPE 2 DIABETES MELLITUS WITH MICROALBUMINURIA (HCC): ICD-10-CM

## 2025-06-30 DIAGNOSIS — E11.29 TYPE 2 DIABETES MELLITUS WITH MICROALBUMINURIA (HCC): ICD-10-CM

## 2025-06-30 DIAGNOSIS — E78.2 MIXED HYPERLIPIDEMIA: ICD-10-CM

## 2025-06-30 DIAGNOSIS — K21.9 GASTROESOPHAGEAL REFLUX DISEASE WITHOUT ESOPHAGITIS: ICD-10-CM

## 2025-06-30 DIAGNOSIS — E78.5 HYPERLIPIDEMIA ASSOCIATED WITH TYPE 2 DIABETES MELLITUS (HCC): ICD-10-CM

## 2025-06-30 DIAGNOSIS — R09.82 POST-NASAL DRIP: ICD-10-CM

## 2025-06-30 PROCEDURE — RXMED WILLOW AMBULATORY MEDICATION CHARGE

## 2025-06-30 RX ORDER — GABAPENTIN 100 MG/1
100 CAPSULE ORAL 3 TIMES DAILY PRN
Qty: 300 CAPSULE | Refills: 3 | Status: SHIPPED | OUTPATIENT
Start: 2025-06-30

## 2025-06-30 RX ORDER — FLUTICASONE PROPIONATE 50 MCG
1 SPRAY, SUSPENSION (ML) NASAL DAILY
Qty: 16 G | Refills: 1 | Status: SHIPPED | OUTPATIENT
Start: 2025-06-30

## 2025-06-30 RX ORDER — EZETIMIBE 10 MG/1
10 TABLET ORAL DAILY
Qty: 100 TABLET | Refills: 3 | Status: SHIPPED | OUTPATIENT
Start: 2025-06-30

## 2025-06-30 RX ORDER — OMEPRAZOLE 20 MG/1
20 CAPSULE, DELAYED RELEASE ORAL DAILY
Qty: 100 CAPSULE | Refills: 3 | Status: SHIPPED | OUTPATIENT
Start: 2025-06-30

## 2025-06-30 RX ORDER — AZELASTINE 1 MG/ML
1 SPRAY, METERED NASAL 2 TIMES DAILY
Qty: 30 ML | Refills: 1 | Status: SHIPPED | OUTPATIENT
Start: 2025-06-30

## 2025-06-30 ASSESSMENT — PATIENT HEALTH QUESTIONNAIRE - PHQ9: CLINICAL INTERPRETATION OF PHQ2 SCORE: 0

## 2025-06-30 ASSESSMENT — FIBROSIS 4 INDEX: FIB4 SCORE: 2.71

## 2025-06-30 ASSESSMENT — ENCOUNTER SYMPTOMS: TINGLING: 1

## 2025-06-30 NOTE — PROGRESS NOTES
Verbal consent was acquired by the patient to use [email or web address]) ambient listening note generation during this visit  Subjective:     CC:  The primary encounter diagnosis was History of prostate cancer. Diagnoses of Type 2 diabetes mellitus with microalbuminuria (HCC), Hyperlipidemia associated with type 2 diabetes mellitus (HCC), Environmental allergies, Post-nasal drip, Gastroesophageal reflux disease without esophagitis, Gastroesophageal reflux disease with esophagitis, Hyperlipidemia, unspecified hyperlipidemia type, and Mixed hyperlipidemia were also pertinent to this visit.    HISTORY OF THE PRESENT ILLNESS: Patient is a 80 y.o. male.   Problem   History of Prostate Cancer       History of Present Illness  The patient presents for a follow-up visit.    Paresthesia in Feet  - Persistent paresthesia in his feet attributed to spinal stenosis surgery performed approximately 3 years ago  - Numbness persists despite being informed it might resolve over time  - Paresthesia is absent upon waking but intensifies throughout the day, particularly during periods of inactivity  - Experiences intermittent pain in his toes  - Has not sought further consultation with his spine specialist since his last evaluation  - Recalls being prescribed gabapentin postoperatively    Coughing and Rhinorrhea  - Occasional episodes of coughing, often precipitated by deglutition or a tickling sensation in his throat  - Frequent rhinorrhea, typically expelled by expectoration  - Does not take any allergy medications  - Has not attempted saline nasal irrigation or the use of a neti pot  - Has used a nasal spray sporadically but does not use it regularly    Prostate Cancer  - Under the care of a urologist for prostate cancer with biannual appointments due to low PSA and testosterone levels  - Next appointment scheduled for 07/07/2025  - No longer under the care of an oncologist and is considered to be in remission    Medication Refill Issues  -  "Encountered difficulties obtaining refills for Jardiance and atorvastatin  - Requested refills a couple of weeks ago, but the pharmacy required new prescriptions  - Pharmacy indicated they would send a request  - After waiting a day, called to check on the status, but the pharmacy had not received the refill authorization  - Left a voicemail for the medical assistant with his name, date of birth, and medication request  - Called the pharmacy again after another day, but there was still no response  - Informed that the refill was denied because he had not been seen in the office for over a year  - Explained that he sees Aj every 6 months and is preparing for his lab work for his appointment on 06/30/2025  - Pharmacy indicated they would resubmit the request    GERD  - Takes omeprazole for gastroesophageal reflux disease (GERD)    Supplemental information: PAST SURGICAL HISTORY: Spinal stenosis surgery approximately 3 years ago    ROS:   Review of Systems   Neurological:  Positive for tingling.         Objective:     Exam: /70 (BP Location: Left arm, Patient Position: Sitting, BP Cuff Size: Adult)   Pulse 82   Temp 36.8 °C (98.2 °F) (Temporal)   Ht 1.702 m (5' 7\")   Wt 75.3 kg (166 lb)   SpO2 96%  Body mass index is 26 kg/m².    Physical Exam  Constitutional:       Appearance: Normal appearance.   HENT:      Head: Normocephalic.   Eyes:      Conjunctiva/sclera: Conjunctivae normal.      Pupils: Pupils are equal, round, and reactive to light.   Cardiovascular:      Rate and Rhythm: Normal rate and regular rhythm.      Heart sounds: No murmur heard.  Pulmonary:      Effort: Pulmonary effort is normal. No respiratory distress.      Breath sounds: Normal breath sounds.   Musculoskeletal:         General: Normal range of motion.   Skin:     General: Skin is warm and dry.   Neurological:      General: No focal deficit present.      Mental Status: He is alert and oriented to person, place, and time. "   Psychiatric:         Mood and Affect: Mood normal.         Behavior: Behavior normal.       Monofilament testing with a 10 gram force: sensation intact: decreased on right  Visual Inspection: Feet without maceration, ulcers, fissures.  Pedal pulses: intact bilaterally      Labs:   Hospital Outpatient Visit on 06/23/2025   Component Date Value    Prostatic Specific Antig* 06/23/2025 0.02    Hospital Outpatient Visit on 06/23/2025   Component Date Value    Glycohemoglobin 06/23/2025 6.7 (H)     Est Avg Glucose 06/23/2025 146     Sodium 06/23/2025 140     Potassium 06/23/2025 4.8     Chloride 06/23/2025 108     Co2 06/23/2025 21     Anion Gap 06/23/2025 11.0     Glucose 06/23/2025 117 (H)     Bun 06/23/2025 36 (H)     Creatinine 06/23/2025 1.53 (H)     Calcium 06/23/2025 9.3     Correct Calcium 06/23/2025 9.1     AST(SGOT) 06/23/2025 28     ALT(SGPT) 06/23/2025 26     Alkaline Phosphatase 06/23/2025 95     Total Bilirubin 06/23/2025 0.2     Albumin 06/23/2025 4.3     Total Protein 06/23/2025 7.2     Globulin 06/23/2025 2.9     A-G Ratio 06/23/2025 1.5     Cholesterol,Tot 06/23/2025 142     Triglycerides 06/23/2025 178 (H)     HDL 06/23/2025 35 (A)     LDL 06/23/2025 71     Creatinine, Urine 06/23/2025 31.90     Microalbumin, Urine Rand* 06/23/2025 16.7     Micro Alb Creat Ratio 06/23/2025 524 (H)     WBC 06/23/2025 5.7     RBC 06/23/2025 4.77     Hemoglobin 06/23/2025 13.7 (L)     Hematocrit 06/23/2025 43.2     MCV 06/23/2025 90.6     MCH 06/23/2025 28.7     MCHC 06/23/2025 31.7 (L)     RDW 06/23/2025 43.3     Platelet Count 06/23/2025 166     MPV 06/23/2025 11.2     Neutrophils-Polys 06/23/2025 41.80 (L)     Lymphocytes 06/23/2025 36.10     Monocytes 06/23/2025 20.80 (H)     Eosinophils 06/23/2025 0.30     Basophils 06/23/2025 0.50     Immature Granulocytes 06/23/2025 0.50     Nucleated RBC 06/23/2025 0.00     Neutrophils (Absolute) 06/23/2025 2.39     Lymphs (Absolute) 06/23/2025 2.07     Monos (Absolute)  06/23/2025 1.19 (H)     Eos (Absolute) 06/23/2025 0.02     Baso (Absolute) 06/23/2025 0.03     Immature Granulocytes (a* 06/23/2025 0.03     NRBC (Absolute) 06/23/2025 0.00     Fasting Status 06/23/2025 Fasting     GFR (CKD-EPI) 06/23/2025 45 (A)        Assessment & Plan: Medical Decision Making   80 y.o. male with the following -    Assessment & Plan  1. Peripheral neuropathy.  - Symptoms suggest peripheral neuropathy, potentially exacerbated by spinal stenosis.  - Monofilament exam shows slightly less sensation in the right foot compared to the left.  - Discussed the possibility of exacerbation due to spinal stenosis and diabetes.  - Gabapentin 100 mg prescribed, to be taken up to three times daily as needed for neuropathic foot pain.    2. Environmental allergies.  - Cough appears to be a result of postnasal drip, likely due to environmental allergies.  - No signs of heartburn or reflux contributing to symptoms.  - Discussed the use of NeilMed sinus rinse nightly before bed, followed by Flonase and azelastine nasal sprays, alternating nostrils each night.  - Flonase and azelastine nasal sprays prescribed.    3. History of prostate cancer.  - Currently in remission, follows up with urologist annually.  - Last PSA was 0.02, indicating good control.  - Next urology appointment scheduled for 07/07/2025.    4. Diabetes Mellitus.  - Diabetes is well-managed with an A1c of 6.7.  - Monofilament exam conducted today for diabetes management.  - Labs including CMP, lipid panel, and A1c ordered for the next visit.    5. Gastroesophageal Reflux Disease (GERD).  - Currently taking omeprazole 20 mg for GERD.  - No signs of heartburn or reflux contributing to symptoms.  - Refill for omeprazole provided.    6. Medication Management.  - Refills for Zetia and omeprazole provided.  - Advised to ensure timely follow-ups to avoid issues with medication refills.    Follow-up  - Follow-up scheduled for 12/22/2025 at 11:00 AM.    HCC Ever  Form    Diagnosis: E11.69, E78.5 - Hyperlipidemia associated with type 2 diabetes mellitus (HCC)  Assessment and plan: Chronic, stable. Continue with current defined treatment plan: . Follow-up at least annually.  Diagnosis: E11.29, R80.9 - Type 2 diabetes mellitus with microalbuminuria (HCC)  Assessment and plan: Chronic, stable. Continue with current defined treatment plan: . Follow-up at least annually.  Last edited 06/30/25 10:58 PDT by Kenneth Connolly D.N.P.         Problem List Items Addressed This Visit       Hyperlipidemia associated with type 2 diabetes mellitus (HCC)    Relevant Medications    ezetimibe (ZETIA) 10 MG Tab    Other Relevant Orders    Comp Metabolic Panel    Lipid Profile    HEMOGLOBIN A1C    GERD (gastroesophageal reflux disease)    Relevant Medications    omeprazole (PRILOSEC) 20 MG delayed-release capsule    Type 2 diabetes mellitus with microalbuminuria (HCC)    Relevant Orders    Diabetic Monofilament LE Exam (Completed)    Comp Metabolic Panel    Lipid Profile    HEMOGLOBIN A1C    History of prostate cancer - Primary     Other Visit Diagnoses         Environmental allergies        Relevant Medications    azelastine (ASTELIN) 0.1 % nasal spray    fluticasone (FLONASE) 50 MCG/ACT nasal spray      Post-nasal drip        Relevant Medications    azelastine (ASTELIN) 0.1 % nasal spray    fluticasone (FLONASE) 50 MCG/ACT nasal spray      Gastroesophageal reflux disease with esophagitis          Hyperlipidemia, unspecified hyperlipidemia type        Relevant Medications    ezetimibe (ZETIA) 10 MG Tab      Mixed hyperlipidemia        Relevant Medications    ezetimibe (ZETIA) 10 MG Tab            Differential diagnosis, natural history, supportive care, and indications for immediate follow-up discussed.  Shared decision making approach was utilized, and patient is amendable with plan of care.  Patient understands to return to clinic or go to the emergency department if symptoms worsen. All questions  and concerns addressed to the best of my knowledge.      Return in about 6 months (around 12/30/2025), or if symptoms worsen or fail to improve.    Please note that this dictation was created using voice recognition software. I have made every reasonable attempt to correct obvious errors, but I expect that there are errors of grammar and possibly content that I did not discover before finalizing the note.

## 2025-07-03 ENCOUNTER — PHARMACY VISIT (OUTPATIENT)
Dept: PHARMACY | Facility: MEDICAL CENTER | Age: 80
End: 2025-07-03
Payer: COMMERCIAL

## 2025-07-09 ENCOUNTER — APPOINTMENT (OUTPATIENT)
Dept: URBAN - METROPOLITAN AREA CLINIC 4 | Facility: CLINIC | Age: 80
Setting detail: DERMATOLOGY
End: 2025-07-09

## 2025-07-09 DIAGNOSIS — D17 BENIGN LIPOMATOUS NEOPLASM: ICD-10-CM | Status: STABLE

## 2025-07-09 DIAGNOSIS — Z87.2 PERSONAL HISTORY OF DISEASES OF THE SKIN AND SUBCUTANEOUS TISSUE: ICD-10-CM

## 2025-07-09 DIAGNOSIS — L82.1 OTHER SEBORRHEIC KERATOSIS: ICD-10-CM

## 2025-07-09 DIAGNOSIS — D22 MELANOCYTIC NEVI: ICD-10-CM

## 2025-07-09 DIAGNOSIS — L81.4 OTHER MELANIN HYPERPIGMENTATION: ICD-10-CM

## 2025-07-09 PROBLEM — D22.5 MELANOCYTIC NEVI OF TRUNK: Status: ACTIVE | Noted: 2025-07-09

## 2025-07-09 PROBLEM — D17.22 BENIGN LIPOMATOUS NEOPLASM OF SKIN AND SUBCUTANEOUS TISSUE OF LEFT ARM: Status: ACTIVE | Noted: 2025-07-09

## 2025-07-09 PROCEDURE — ? COUNSELING

## 2025-07-09 PROCEDURE — ? DIAGNOSIS COMMENT

## 2025-07-09 PROCEDURE — ? OBSERVATION

## 2025-07-09 ASSESSMENT — LOCATION SIMPLE DESCRIPTION DERM
LOCATION SIMPLE: UPPER BACK
LOCATION SIMPLE: ABDOMEN
LOCATION SIMPLE: LEFT SHOULDER
LOCATION SIMPLE: LEFT FOREARM
LOCATION SIMPLE: RIGHT UPPER BACK
LOCATION SIMPLE: LEFT PRETIBIAL REGION
LOCATION SIMPLE: RIGHT UPPER ARM

## 2025-07-09 ASSESSMENT — LOCATION ZONE DERM
LOCATION ZONE: ARM
LOCATION ZONE: TRUNK
LOCATION ZONE: LEG

## 2025-07-09 ASSESSMENT — LOCATION DETAILED DESCRIPTION DERM
LOCATION DETAILED: LEFT LATERAL DISTAL PRETIBIAL REGION
LOCATION DETAILED: SUPERIOR THORACIC SPINE
LOCATION DETAILED: EPIGASTRIC SKIN
LOCATION DETAILED: LEFT POSTERIOR SHOULDER
LOCATION DETAILED: RIGHT MEDIAL UPPER BACK
LOCATION DETAILED: RIGHT MID-UPPER BACK
LOCATION DETAILED: LEFT PROXIMAL DORSAL FOREARM
LOCATION DETAILED: LEFT LATERAL ABDOMEN
LOCATION DETAILED: RIGHT PROXIMAL POSTERIOR UPPER ARM

## 2025-07-14 PROCEDURE — RXMED WILLOW AMBULATORY MEDICATION CHARGE: Performed by: OPHTHALMOLOGY

## 2025-07-18 ENCOUNTER — PHARMACY VISIT (OUTPATIENT)
Dept: PHARMACY | Facility: MEDICAL CENTER | Age: 80
End: 2025-07-18
Payer: COMMERCIAL

## 2025-07-30 RX ORDER — LATANOPROST 50 UG/ML
SOLUTION/ DROPS OPHTHALMIC
Qty: 7.5 ML | Refills: 3 | Status: CANCELLED | OUTPATIENT
Start: 2025-07-30

## 2025-08-05 PROCEDURE — RXMED WILLOW AMBULATORY MEDICATION CHARGE: Performed by: OPHTHALMOLOGY

## 2025-08-11 ENCOUNTER — PHARMACY VISIT (OUTPATIENT)
Dept: PHARMACY | Facility: MEDICAL CENTER | Age: 80
End: 2025-08-11
Payer: COMMERCIAL

## 2025-08-18 PROCEDURE — RXMED WILLOW AMBULATORY MEDICATION CHARGE

## 2025-08-22 ENCOUNTER — PHARMACY VISIT (OUTPATIENT)
Dept: PHARMACY | Facility: MEDICAL CENTER | Age: 80
End: 2025-08-22
Payer: COMMERCIAL

## 2025-08-22 PROCEDURE — RXMED WILLOW AMBULATORY MEDICATION CHARGE: Performed by: UROLOGY

## 2025-08-22 RX ORDER — POTASSIUM CITRATE 1080 MG/1
TABLET, EXTENDED RELEASE ORAL
Qty: 90 TABLET | Refills: 3 | OUTPATIENT
Start: 2025-08-22

## 2025-08-22 RX ORDER — ACETAZOLAMIDE 125 MG/1
125 TABLET ORAL DAILY
Qty: 90 TABLET | Refills: 3 | OUTPATIENT
Start: 2025-08-22

## 2025-08-22 RX ORDER — ACETAZOLAMIDE 125 MG/1
125 TABLET ORAL DAILY
Qty: 90 TABLET | Refills: 3 | Status: CANCELLED | OUTPATIENT
Start: 2025-08-20

## 2025-08-25 ENCOUNTER — PHARMACY VISIT (OUTPATIENT)
Dept: PHARMACY | Facility: MEDICAL CENTER | Age: 80
End: 2025-08-25
Payer: COMMERCIAL

## 2025-08-25 RX ORDER — ACETAZOLAMIDE 125 MG/1
125 TABLET ORAL DAILY
Qty: 90 TABLET | Refills: 3 | OUTPATIENT
Start: 2025-08-25

## (undated) DEVICE — LIGHT SOURCE MIS 12FT

## (undated) DEVICE — Device

## (undated) DEVICE — GLOVE SZ 8 BIOGEL PI MICRO - PF LF (50PR/BX)

## (undated) DEVICE — SUCTION INSTRUMENT YANKAUER BULBOUS TIP W/O VENT (50EA/CA)

## (undated) DEVICE — MASK ANESTHESIA ADULT  - (100/CA)

## (undated) DEVICE — TUBING C&T SET FLYING LEADS DRAIN TUBING (10EA/BX)

## (undated) DEVICE — ARMREST CRADLE FOAM - (2PR/PK 12PR/CA)

## (undated) DEVICE — RESERVOIR SUCTION 100 CC - SILICONE (20EA/CA)

## (undated) DEVICE — GOWN WARMING STANDARD FLEX - (30/CA)

## (undated) DEVICE — MIDAS LUBRICATOR DIFFUSER PACK (4EA/CA)

## (undated) DEVICE — GLOVE PROTEXIS W/NEU-THERA SZ 8.5 (50PR/BX)

## (undated) DEVICE — SUTURE GENERAL

## (undated) DEVICE — SET EXTENSION WITH 2 PORTS (48EA/CA) ***PART #2C8610 IS A SUBSTITUTE*****

## (undated) DEVICE — CLOSURE SKIN STRIP 1/2 X 4 IN - (STERI STRIP) (50/BX 4BX/CA)

## (undated) DEVICE — TUBING CLEARLINK DUO-VENT - C-FLO (48EA/CA)

## (undated) DEVICE — ELECTRODE DUAL RETURN W/ CORD - (50/PK)

## (undated) DEVICE — SYRINGE DISP. 60 CC LL - (30/BX, 12BX/CA)**WHEN THESE ARE GONE ORDER #500206**

## (undated) DEVICE — LACTATED RINGERS INJ. 500 ML - (24EA/CA)

## (undated) DEVICE — SUTURE 2-0 VICRYL PLUS CT-1 - 8 X 18 INCH(12/BX)

## (undated) DEVICE — BLADE SURGICAL CLIPPER - (50EA/CA)

## (undated) DEVICE — SODIUM CHL IRRIGATION 0.9% 1000ML (12EA/CA)

## (undated) DEVICE — BOVIE  BLADE 6 EXTENDED - (50/PK)

## (undated) DEVICE — KIT SURGIFLO W/OUT THROMBIN - (6EA/CA)

## (undated) DEVICE — SYRINGE NON SAFETY 10 CC 20 GA X 1-1/2 IN (100/BX 4BX/CA)

## (undated) DEVICE — SUTURE 1 VICRYL PLUS CT-1 - 18 INCH (12/BX)

## (undated) DEVICE — SET EPIDURAL BURRON NON-SAFETY (12EA/CA)

## (undated) DEVICE — TOOL MR8 14CM MATCH HD SYM-TRI 3MM DIAMETER (1/EA)

## (undated) DEVICE — SENSOR OXIMETER ADULT SPO2 RD SET (20EA/BX)

## (undated) DEVICE — TOOL DISSECT MATCH HEAD

## (undated) DEVICE — ELECTRODE 850 FOAM ADHESIVE - HYDROGEL RADIOTRNSPRNT (50/PK)

## (undated) DEVICE — DRAPE MICROSCOPE ARMATEC 120IN X 46IN (10EA/CA)

## (undated) DEVICE — INTRAOP NEURO IN OR 1:1 PER 15 MIN

## (undated) DEVICE — HEADREST PRONEVIEW LARGE - (10/CA)

## (undated) DEVICE — NEPTUNE 4 PORT MANIFOLD - (20/PK)

## (undated) DEVICE — DRAPE SURG STERI-DRAPE 7X11OD - (40EA/CA)

## (undated) DEVICE — PROTECTOR ULNA NERVE - (36PR/CA)

## (undated) DEVICE — PACK NEURO - (2EA/CA)

## (undated) DEVICE — DRAPE LAPAROTOMY T SHEET - (12EA/CA)

## (undated) DEVICE — CHLORAPREP 26 ML APPLICATOR - ORANGE TINT(25/CA)

## (undated) DEVICE — LACTATED RINGERS INJ 1000 ML - (14EA/CA 60CA/PF)

## (undated) DEVICE — DRAPE STRLE REG TOWEL 18X24 - (10/BX 4BX/CA)"

## (undated) DEVICE — SET LEADWIRE 5 LEAD BEDSIDE DISPOSABLE ECG (1SET OF 5/EA)

## (undated) DEVICE — SPONGE GAUZESTER 4 X 4 4PLY - (128PK/CA)

## (undated) DEVICE — GLOVE BIOGEL SZ 7.5 SURGICAL PF LTX - (50PR/BX 4BX/CA)

## (undated) DEVICE — TOWEL STOP TIMEOUT SAFETY FLAG (40EA/CA)

## (undated) DEVICE — DRAIN JACKSON PRATT 10FR - (10/CS)

## (undated) DEVICE — KIT ANESTHESIA W/CIRCUIT & 3/LT BAG W/FILTER (20EA/CA)

## (undated) DEVICE — NEEDLE 20 GA X 1 INCH - NON SAFTEY (100/BX)

## (undated) DEVICE — PACK JACKSON TABLE KIT W/OUT - HR (6EA/CA)

## (undated) DEVICE — SLEEVE VASO CALF MED - (10PR/CA)

## (undated) DEVICE — CANISTER SUCTION 3000ML MECHANICAL FILTER AUTO SHUTOFF MEDI-VAC NONSTERILE LF DISP  (40EA/CA)

## (undated) DEVICE — GLOVE BIOGEL SZ 7 SURGICAL PF LTX - (50PR/BX 4BX/CA)

## (undated) DEVICE — KIT EVACUATER 3 SPRING PVC LF 1/8 DRAIN SIZE (10EA/CA)"

## (undated) DEVICE — GLOVE BIOGEL PI INDICATOR SZ 8.0 SURGICAL PF LF -(50/BX 4BX/CA)